# Patient Record
Sex: MALE | Race: BLACK OR AFRICAN AMERICAN
[De-identification: names, ages, dates, MRNs, and addresses within clinical notes are randomized per-mention and may not be internally consistent; named-entity substitution may affect disease eponyms.]

---

## 2018-02-25 ENCOUNTER — HOSPITAL ENCOUNTER (INPATIENT)
Dept: HOSPITAL 92 - ERS | Age: 63
LOS: 6 days | Discharge: HOME HEALTH | DRG: 291 | End: 2018-03-03
Attending: HOSPITALIST | Admitting: HOSPITALIST
Payer: MEDICARE

## 2018-02-25 VITALS — BODY MASS INDEX: 26.8 KG/M2

## 2018-02-25 DIAGNOSIS — I48.3: ICD-10-CM

## 2018-02-25 DIAGNOSIS — Z95.1: ICD-10-CM

## 2018-02-25 DIAGNOSIS — J44.9: ICD-10-CM

## 2018-02-25 DIAGNOSIS — I13.2: Primary | ICD-10-CM

## 2018-02-25 DIAGNOSIS — N17.9: ICD-10-CM

## 2018-02-25 DIAGNOSIS — I25.810: ICD-10-CM

## 2018-02-25 DIAGNOSIS — I24.8: ICD-10-CM

## 2018-02-25 DIAGNOSIS — I08.3: ICD-10-CM

## 2018-02-25 DIAGNOSIS — I48.0: ICD-10-CM

## 2018-02-25 DIAGNOSIS — I50.33: ICD-10-CM

## 2018-02-25 DIAGNOSIS — J96.21: ICD-10-CM

## 2018-02-25 DIAGNOSIS — I47.2: ICD-10-CM

## 2018-02-25 DIAGNOSIS — Z99.81: ICD-10-CM

## 2018-02-25 DIAGNOSIS — J18.9: ICD-10-CM

## 2018-02-25 DIAGNOSIS — N18.6: ICD-10-CM

## 2018-02-25 DIAGNOSIS — Z94.0: ICD-10-CM

## 2018-02-25 DIAGNOSIS — D63.1: ICD-10-CM

## 2018-02-25 DIAGNOSIS — E87.2: ICD-10-CM

## 2018-02-25 LAB
ALBUMIN SERPL BCG-MCNC: 4.2 G/DL (ref 3.4–4.8)
ALP SERPL-CCNC: 150 U/L (ref 40–150)
ALT SERPL W P-5'-P-CCNC: 8 U/L (ref 8–55)
ANALYZER IN CARDIO: (no result)
ANION GAP SERPL CALC-SCNC: 20 MMOL/L (ref 10–20)
AST SERPL-CCNC: 12 U/L (ref 5–34)
BASE EXCESS STD BLDA CALC-SCNC: -9.3 MEQ/L
BASOPHILS # BLD AUTO: 0 THOU/UL (ref 0–0.2)
BASOPHILS NFR BLD AUTO: 0.1 % (ref 0–1)
BILIRUB SERPL-MCNC: 1 MG/DL (ref 0.2–1.2)
BUN SERPL-MCNC: 40 MG/DL (ref 8.4–25.7)
CA-I BLDA-SCNC: 1.2 MMOL/L (ref 1.12–1.3)
CALCIUM SERPL-MCNC: 9.4 MG/DL (ref 7.8–10.44)
CHLORIDE SERPL-SCNC: 106 MMOL/L (ref 98–107)
CK MB SERPL-MCNC: 1.9 NG/ML (ref 0–6.6)
CK SERPL-CCNC: 69 U/L (ref 30–200)
CO2 SERPL-SCNC: 13 MMOL/L (ref 23–31)
CREAT CL PREDICTED SERPL C-G-VRATE: 0 ML/MIN (ref 70–130)
EOSINOPHIL # BLD AUTO: 0.1 THOU/UL (ref 0–0.7)
EOSINOPHIL NFR BLD AUTO: 0.7 % (ref 0–10)
GLOBULIN SER CALC-MCNC: 3.8 G/DL (ref 2.4–3.5)
GLUCOSE SERPL-MCNC: 93 MG/DL (ref 80–115)
HCO3 BLDA-SCNC: 14.3 MEQ/L (ref 22–26)
HCT VFR BLDA CALC: 28.8 % (ref 42–52)
HGB BLD-MCNC: 9.3 G/DL (ref 14–18)
HGB BLDA-MCNC: 8.1 G/DL (ref 14–18)
LYMPHOCYTES # BLD: 1.2 THOU/UL (ref 1.2–3.4)
LYMPHOCYTES NFR BLD AUTO: 15.1 % (ref 21–51)
MCH RBC QN AUTO: 26.6 PG (ref 27–31)
MCV RBC AUTO: 85.2 FL (ref 80–94)
MONOCYTES # BLD AUTO: 0.6 THOU/UL (ref 0.11–0.59)
MONOCYTES NFR BLD AUTO: 7.3 % (ref 0–10)
NEUTROPHILS # BLD AUTO: 6.3 THOU/UL (ref 1.4–6.5)
NEUTROPHILS NFR BLD AUTO: 76.8 % (ref 42–75)
PCO2 BLDA: 23.4 MMHG (ref 35–45)
PH BLDA: 7.4 [PH] (ref 7.35–7.45)
PLATELET # BLD AUTO: 301 THOU/UL (ref 130–400)
PO2 BLDA: 65.9 MMHG (ref 80–100)
POTASSIUM SERPL-SCNC: 5 MMOL/L (ref 3.5–5.1)
RBC # BLD AUTO: 3.51 MILL/UL (ref 4.7–6.1)
SODIUM SERPL-SCNC: 134 MMOL/L (ref 136–145)
SPECIMEN DRAWN FROM PATIENT: (no result)
TROPONIN I SERPL DL<=0.01 NG/ML-MCNC: 0.03 NG/ML (ref ?–0.03)
TROPONIN I SERPL DL<=0.01 NG/ML-MCNC: 0.04 NG/ML (ref ?–0.03)
TROPONIN I SERPL DL<=0.01 NG/ML-MCNC: 0.04 NG/ML (ref ?–0.03)
WBC # BLD AUTO: 8.2 THOU/UL (ref 4.8–10.8)

## 2018-02-25 PROCEDURE — 85025 COMPLETE CBC W/AUTO DIFF WBC: CPT

## 2018-02-25 PROCEDURE — 71046 X-RAY EXAM CHEST 2 VIEWS: CPT

## 2018-02-25 PROCEDURE — 83880 ASSAY OF NATRIURETIC PEPTIDE: CPT

## 2018-02-25 PROCEDURE — 93306 TTE W/DOPPLER COMPLETE: CPT

## 2018-02-25 PROCEDURE — 82553 CREATINE MB FRACTION: CPT

## 2018-02-25 PROCEDURE — 82805 BLOOD GASES W/O2 SATURATION: CPT

## 2018-02-25 PROCEDURE — 93312 ECHO TRANSESOPHAGEAL: CPT

## 2018-02-25 PROCEDURE — 80053 COMPREHEN METABOLIC PANEL: CPT

## 2018-02-25 PROCEDURE — 93005 ELECTROCARDIOGRAM TRACING: CPT

## 2018-02-25 PROCEDURE — 94760 N-INVAS EAR/PLS OXIMETRY 1: CPT

## 2018-02-25 PROCEDURE — 71045 X-RAY EXAM CHEST 1 VIEW: CPT

## 2018-02-25 PROCEDURE — 83550 IRON BINDING TEST: CPT

## 2018-02-25 PROCEDURE — 36415 COLL VENOUS BLD VENIPUNCTURE: CPT

## 2018-02-25 PROCEDURE — 84484 ASSAY OF TROPONIN QUANT: CPT

## 2018-02-25 PROCEDURE — 80197 ASSAY OF TACROLIMUS: CPT

## 2018-02-25 PROCEDURE — 93798 PHYS/QHP OP CAR RHAB W/ECG: CPT

## 2018-02-25 PROCEDURE — 80048 BASIC METABOLIC PNL TOTAL CA: CPT

## 2018-02-25 PROCEDURE — 83540 ASSAY OF IRON: CPT

## 2018-02-25 PROCEDURE — 93010 ELECTROCARDIOGRAM REPORT: CPT

## 2018-02-25 PROCEDURE — A4216 STERILE WATER/SALINE, 10 ML: HCPCS

## 2018-02-25 PROCEDURE — 82728 ASSAY OF FERRITIN: CPT

## 2018-02-25 PROCEDURE — 96374 THER/PROPH/DIAG INJ IV PUSH: CPT

## 2018-02-25 PROCEDURE — 87040 BLOOD CULTURE FOR BACTERIA: CPT

## 2018-02-25 RX ADMIN — ALUMINUM ZIRCONIUM TRICHLOROHYDREX GLY SCH EACH: 0.2 STICK TOPICAL at 20:40

## 2018-02-25 RX ADMIN — MOMETASONE FUROATE AND FORMOTEROL FUMARATE DIHYDRATE SCH PUFF: 100; 5 AEROSOL RESPIRATORY (INHALATION) at 18:42

## 2018-02-25 NOTE — RAD
PORTABLE CHEST:

 

Date:  02/25/18 

 

HISTORY:  

Dyspnea. 

 

COMPARISON:  

02/25/18. 

 

FINDINGS:

Cardiomegaly with postop sternotomy change. Mild vascular congestion. Interstitial congestion. Possib
ly small effusions. 

 

IMPRESSION: 

There is cardiomegaly and mild vascular congestion. 

 

 

POS: [facility]

## 2018-02-25 NOTE — HP
DATE OF ADMISSION:  02/25/2018

 

CHIEF COMPLAINT:  Shortness of breath.

 

SUBJECTIVE:  This is a 62-year-old -American male with a known history of congestive heart marylin
carla with a history of cardiac bypass done in 2014 and he sees Dr. Boggs as Cardiology.  Patient h
as never been followed up with his cardiologist.  He has a known history of renal transplant and sees
 Dr. Potter and he has not visited Dr. Potter for many weeks.  He has been admitted to the hospital in 11/2
016.  Patient has been noticing worsening shortness of breath for the past 2-3 days and today he felt
 worse and he decided to come to the hospital.  He denied having any chest pain now, but he was havin
g some occasional chest pains on and off.  When he came to the ER, he had heart rate of 30-40, so car
diologist was consulted who advised patient to be started on dobutamine drip.  Patient is on beta blo
cker 50 mg twice a day at home and rest today.  The patient also was very hypoxic in the ER and he re
fused keeping on the BiPAP.  So an ABG was done.  He had a low CO2 and very low oxygenation.  He was 
also noted to have elevated BNP and volume overload state.  Patient most likely is in congestive hear
t failure at this time.  He denies having any chest pain at this time.  No nausea, no vomiting, no di
arrhea, no constipation.  He does take Prograf and his Prograf has not been checked for some time acc
ording to him as he has not been following up with his primary doctors nor the nephrologist.

 

The patient is seen in the ICU.

 

PAST MEDICAL HISTORY:  Chronic kidney disease on renal transplant, on immunosuppressant drugs, histor
y of coronary artery bypass graft in 2015, history of nonsustained ventricular tachycardia, history o
f congestive heart failure.

 

PAST SURGICAL HISTORY:

1.  History of renal transplant.

2.  Coronary artery bypass graft surgery.

 

SOCIAL HISTORY:  Patient denies smoking tobacco.  No history of alcohol, no history of illicit drug u
se.  He lives with his wife at home.

 

FAMILY HISTORY:  Denies family history of any coronary artery disease.

 

ALLERGIES:  No known drug allergies.

 

HOME MEDICATIONS:  Albuterol inhaler 2 puffs inhalation q.4 hours, aspirin 325 mg p.o. daily, atorvas
tatin 40 mg p.o. daily, budesonide inhaler twice a day, clonidine 0.3 mg p.o. b.i.d., Lasix 40 mg p.o
. daily, hydralazine 25 mg p.o. b.i.d., isosorbide mononitrate 60 mg p.o. daily, metoprolol 50 mg p.o
. b.i.d., mycophenolate 360 mg p.o. b.i.d., nifedipine 60 mg p.o. b.i.d., tacrolimus, Prograf 0.5 mg 
p.o. b.i.d.

 

REVIEW OF SYSTEMS:  All 12 systems are reviewed with the patient thoroughly and found to be negative 
at this time.  Systems reviewed are HEENT, CVS, CNS, respiratory, GI, musculoskeletal, skin, and psyc
hiatric.  The following complete review of systems was negative, unless otherwise mentioned in the HP
I or below:

Constitutional:  Weight loss or gain, sense of well-being, ability to conduct usual activities, exerc
ise tolerance.

Skin/Breast:  Rash, itching, changes in hair growth or loss, nail changes, breast lumps, tenderness, 
swelling, nipple discharge.

Eyes:  Vision, double vision, tearing, blind spots, pain.

ENT/Mouth:  Headaches (location, time of onset, duration, precipitating factors), vertigo, lightheade
dness, injury. Vision, double vision, tearing, blind spots, pain, nose bleeding, colds, obstruction, 
discharge, dental difficulties, gingival bleeding, dentures, neck stiffness, pain, tenderness, masses
 in thyroid or other areas

Cardiovascular:  Precordial pain, substernal distress, palpitations, syncope, dyspnea on exertion, or
thopnea, nocturnal paroxysmal dyspnea, edema, cyanosis, hypertension, heart murmurs, varicosities, ph
lebitis, claudication.

Respiratory:  Pain, shortness of breath, wheezing, stridor, cough, hemoptysis, fever or night sweats

Gastrointestinal:  Poor appetite, dysphagia, indigestion, abdominal pain, heartburn, eructation, naus
ea, vomiting, hematemesis, jaundice, constipation, or diarrhea, abnormal stools (jos emanuel-colored, tarry,
 bloody, greasy, foul smelling), flatulence, hemorrhoids, recent changes in bowel habits.

Genitourinary:  Urgency, frequency, dysuria, nocturia, hematuria, polyuria, oliguria, unusual (or juanito
nge in) color of urine, stones, hesitancy, change in size of stream, dribbling, acute retention or in
continence, libido, potency.

Musculoskeletal:  Pain, swelling, redness or heat of muscles or joints, limitation, of motion, muscul
ar weakness, atrophy, cramps.

Neurologic/Psychiatric:  Convulsions, paralyses, tremor, incoordination, paraesthesias, difficulties 
with memory of speech, sensory or motor disturbances, or muscular coordination (ataxia, tremor), emot
ional problems, anxiety, depression, previous psychiatric care, unusual perceptions, hallucinations.

Allergy/Immunologic:  Skin rash, anemia, bleeding tendency, polydipsia, polyuria, intolerance to heat
 or cold.

 

PHYSICAL EXAMINATION:

VITAL SIGNS:  Blood pressure is 132/68, heart rate of 63, respiratory rate is 20, saturations 100% on
 3 liters.

GENERAL:  The patient is moderately built, moderately nourished.  Does not appear to be in acute dist
ress at this time, but is alert and oriented x3.

HEENT:  Atraumatic, normocephalic, PERRLA. Extraocular muscles were intact.  Oral mucosa pink and mo
st.

CARDIOVASCULAR:  S1 and S2 normal.  No murmurs, rubs or gallops.

LUNGS:  Bilateral air entry was equal.  No wheezing, no crackles.

ABDOMEN:  Soft and nontender.  No guarding, no rebound tenderness.  Bowel sounds normal.

MUSCULOSKELETAL:  No calf tenderness.  Pedal edema was noted up to the knees, 3+ pedal edema.

SKIN:  No cyanosis, no erythema, no rash, no pallor.

CENTRAL NERVOUS SYSTEM:  Cranial nerve examination II-XII intact.  No focal deficits are noted.

NECK:  JVD was noted elevated.  No thyromegaly.  No lymphadenopathy.

PSYCHIATRIC:  No signs of suicidal ideation.  No signs of gagan were noted.

 

LABORATORY DATA AND IMAGING DATA:

1.  Sodium 135, potassium 5.0, chloride is 106, bicarbonate is 13, BUN is 40, creatinine 2.25, and bl
ood sugar is 93.  Troponin 0.035, trending down to 0.031, BNP is 1478 which is elevated from his prev
ious visits.

2.  Blood gas; pH was 7.4, pCO2 was normal, pO2 was 65.9.

3.  Chest x-ray was done showing mild pulmonary vascular congestion with possible left basilar consol
idation.

 

ASSESSMENT AND PLAN:

1.  Acute hypoxic respiratory failure.

2.  Possible pneumonia in the left lung base.

3.  Acute congestive heart failure, this is likely diastolic dysfunction.

4.  Non-ST elevation myocardial infarction.

5.  Acute on chronic kidney disease stage 4.

6.  Immunosuppressed secondary to renal transplant medications.

7.  Anemia of chronic kidney disease.

8.  Acute bradycardia likely transient secondary to medications.

 

PLAN:

1.  Plan is to closely monitor this patient's high complexities with multiple comorbidities.  Patient
 presented with hypoxia with clear signs of congestive heart failure with elevated BNP and clear sign
s of volume overload.  Patient will be started on IV Lasix at this time with IV Lasix cautiously nadege
ting with 20 mg IV b.i.d. and closely monitor the renal functions.  We will continue the patient on h
ome medications at this time.

2.  The patient has transplanted kidney and is on Prograf.  We will consult Nephrology at this time a
nd we will check the Prograf levels.  We will closely follow up with Nephrology recommendations with 
his diuresis.

3.  The patient has an evidence of possible left lower lobe consolidation.  The patient being history
 of renal transplant, we will do prophylactic antibiotic with levofloxacin 500 mg IV daily and we mae
l closely monitor his infectious status.  Get the blood cultures and sputum cultures.

4.  The patient has had low heart rate in the ER 30s and initially, patient was planned to be started
 on dobutamine, but heart rate went up to like 60s to 70s.  At this time, we will wait for Cardiology
 recommendations and we will hold off on the beta blockers at this time.  At some point, patient migh
t need evaluation by Electrophysiology if the heart rate does not come up after stopping the beta blo
ckers.  We will get a 2D echo now and look for any wall motion abnormalities.

5.  Patient has elevated troponins, but they have been stable.  The patient denied having any chest p
ains.  Patient has known history of coronary artery disease with coronary artery bypass graft.  We wi
ll follow with Cardiology recommendations at this time.

6.  Patient will be continued on aspirin, but not beta blocker because of acute bradycardia.  The pat
ient has anemia of chronic disease, likely from chronic kidney disease.  We will closely monitor at t
his time.

7.  The patient has persistent hypoxia and we will continue with nebulizer treatments at this time an
d treat the underlying cause.

8.  DVT prophylaxis, he is on Lovenox 40 mg subcu daily.

 

I spent 75 minutes on this patient, of this, one hour is critical care time.

## 2018-02-25 NOTE — RAD
PORTABLE CHEST 1 VIEW:

 

Date:  02/25/18 

Time:  1032 hours

 

HISTORY:

Dyspnea

 

FINDINGS/IMPRESSION: 

 

Comparison made with exam of 07/11/17. 

 

Changes of median sternotomy are again seen. The heart is enlarged. There is mild pulmonary vascular 
congestion with left basilar consolidation. No pneumothoraces or large effusions are seen. 

 

 

 

 

POS: [facility]

## 2018-02-26 LAB
ANION GAP SERPL CALC-SCNC: 20 MMOL/L (ref 10–20)
BASOPHILS # BLD AUTO: 0 THOU/UL (ref 0–0.2)
BASOPHILS NFR BLD AUTO: 0.1 % (ref 0–1)
BUN SERPL-MCNC: 38 MG/DL (ref 8.4–25.7)
CALCIUM SERPL-MCNC: 9.3 MG/DL (ref 7.8–10.44)
CHLORIDE SERPL-SCNC: 106 MMOL/L (ref 98–107)
CO2 SERPL-SCNC: 14 MMOL/L (ref 23–31)
CREAT CL PREDICTED SERPL C-G-VRATE: 44 ML/MIN (ref 70–130)
EOSINOPHIL # BLD AUTO: 0 THOU/UL (ref 0–0.7)
EOSINOPHIL NFR BLD AUTO: 0.2 % (ref 0–10)
GLUCOSE SERPL-MCNC: 79 MG/DL (ref 80–115)
HGB BLD-MCNC: 9.2 G/DL (ref 14–18)
IRON SERPL-MCNC: 10 UG/DL (ref 65–175)
IRON SERPL-MCNC: 13 UG/DL (ref 65–175)
LYMPHOCYTES # BLD: 0.9 THOU/UL (ref 1.2–3.4)
LYMPHOCYTES NFR BLD AUTO: 9.2 % (ref 21–51)
MCH RBC QN AUTO: 26.9 PG (ref 27–31)
MCV RBC AUTO: 85.8 FL (ref 80–94)
MONOCYTES # BLD AUTO: 1.1 THOU/UL (ref 0.11–0.59)
MONOCYTES NFR BLD AUTO: 11.9 % (ref 0–10)
NEUTROPHILS # BLD AUTO: 7.4 THOU/UL (ref 1.4–6.5)
NEUTROPHILS NFR BLD AUTO: 78.7 % (ref 42–75)
PLATELET # BLD AUTO: 251 THOU/UL (ref 130–400)
POTASSIUM SERPL-SCNC: 5.1 MMOL/L (ref 3.5–5.1)
RBC # BLD AUTO: 3.42 MILL/UL (ref 4.7–6.1)
SODIUM SERPL-SCNC: 135 MMOL/L (ref 136–145)
UIBC SERPL-MCNC: 244 MCG/DL (ref 261–462)
WBC # BLD AUTO: 9.5 THOU/UL (ref 4.8–10.8)

## 2018-02-26 RX ADMIN — NITROGLYCERIN SCH INCH: 20 OINTMENT TOPICAL at 05:21

## 2018-02-26 RX ADMIN — NITROGLYCERIN SCH INCH: 20 OINTMENT TOPICAL at 22:15

## 2018-02-26 RX ADMIN — NITROGLYCERIN SCH INCH: 20 OINTMENT TOPICAL at 14:02

## 2018-02-26 RX ADMIN — ALUMINUM ZIRCONIUM TRICHLOROHYDREX GLY SCH EACH: 0.2 STICK TOPICAL at 08:42

## 2018-02-26 RX ADMIN — MOMETASONE FUROATE AND FORMOTEROL FUMARATE DIHYDRATE SCH PUFF: 100; 5 AEROSOL RESPIRATORY (INHALATION) at 08:37

## 2018-02-26 RX ADMIN — MOMETASONE FUROATE AND FORMOTEROL FUMARATE DIHYDRATE SCH PUFF: 100; 5 AEROSOL RESPIRATORY (INHALATION) at 19:18

## 2018-02-26 RX ADMIN — Medication SCH ML: at 20:52

## 2018-02-26 RX ADMIN — Medication SCH ML: at 08:42

## 2018-02-26 RX ADMIN — ALUMINUM ZIRCONIUM TRICHLOROHYDREX GLY SCH EACH: 0.2 STICK TOPICAL at 20:51

## 2018-02-26 RX ADMIN — NIFEDIPINE SCH MG: 60 TABLET, EXTENDED RELEASE ORAL at 08:41

## 2018-02-26 NOTE — RAD
PORTABLE AP CHEST XRAY:

 

DATE: 2/26/18.

 

HISTORY: 

Respiratory distress.

 

COMPARISON: 

2/25/18.

 

FINDINGS: 

There has been interval improvement in the pulmonary vascular congestion as well as the mild perihila
r interstitial opacities when compared to the prior study.  Interstitial densities do persist, but ag
ain are improved.  Cardiac silhouette is magnified by projection but does appear mildly enlarged.  Po
stsurgical changes related to CABG are again present.  There is probable tiny left pleural effusion p
resent.  No other interval change.

 

IMPRESSION: 

Improvement in congestive heart failure and mild pulmonary edema.  Mild interstitial edema does persi
st.

 

POS: [facility]

## 2018-02-26 NOTE — PDOC.PN
- Subjective


Encounter Start Date: 02/26/18


Encounter Start Time: 11:00





patient is seen today, alert and oriented. No other concenrs snoted. 





- Objective


MAR Reviewed: Yes


Vital Signs & Weight: 


 Vital Signs (12 hours)











  Temp Pulse Pulse Pulse Pulse Resp BP


 


 02/26/18 11:00  97.6 F      


 


 02/26/18 09:57    74  95  95   157/78 H


 


 02/26/18 08:44       


 


 02/26/18 08:41   89     


 


 02/26/18 08:37   89     15 


 


 02/26/18 08:00  97.9 F  89     15 


 


 02/26/18 04:00  99.2 F      














  BP BP Pulse Ox Pulse Ox Pulse Ox Pulse Ox


 


 02/26/18 11:00      


 


 02/26/18 09:57  174/79 H  153/78 H   93 L  87 L  93 L


 


 02/26/18 08:44    100   


 


 02/26/18 08:41      


 


 02/26/18 08:37    100   


 


 02/26/18 08:00      


 


 02/26/18 04:00      








 Weight











Admit Weight                   181 lb 10.574 oz


 


Weight                         173 lb 4.533 oz











 Most Recent Monitor Data











Heart Rate from ECG            76


 


NIBP                           155/74


 


NIBP BP-Mean                   110


 


Respiration from ECG           21


 


SpO2                           97














I&O: 


 











 02/25/18 02/26/18 02/27/18





 06:59 06:59 06:59


 


Intake Total   500


 


Output Total  2050 1260


 


Balance  -2050 -760











Result Diagrams: 


 02/26/18 03:54





 02/26/18 03:54


Radiology Reviewed by me: Yes





Phys Exam





- Physical Examination


HEENT: PERRLA, moist MMs


Neck: no nodes, no JVD


Respiratory: no wheezing, no rales


Cardiovascular: RRR, no significant murmur


Gastrointestinal: soft, non-tender


Musculoskeletal: no edema, pulses present





Dx/Plan


(1) Acute on chronic diastolic congestive heart failure


Code(s): I50.33 - ACUTE ON CHRONIC DIASTOLIC (CONGESTIVE) HEART FAILURE   Status

: Acute   Comment: ef of 55%, volume Over load, continue nwith IV diuresis, 

monitoring renal fucntions, cardiology following.   





(2) Demand ischemia of myocardium


Code(s): I24.8 - OTHER FORMS OF ACUTE ISCHEMIC HEART DISEASE   Status: Acute   

Comment: Conmtinue with treating underlying cause of CHF. Likely deman ischmia.

   





(3) Pneumonia


Code(s): J18.9 - PNEUMONIA, UNSPECIFIED ORGANISM   Status: Acute   


Qualifiers: 


   Laterality: left   Lung location: lower lobe of lung 


Comment: Will continue with IV Levofloxacin, renally dosed.   





(4) Chronic kidney disease, stage III (moderate)


Status: Chronic   Comment: Renal consulted, pending prograf levels.   





(5) Hypertension


Code(s): I10 - ESSENTIAL (PRIMARY) HYPERTENSION   Status: Chronic   


Qualifiers: 


   Hypertension type: essential hypertension 


Comment: Well controlled, COntinue with Lisinopril.   





(6) Kidney transplant status


Code(s): Z94.0 - KIDNEY TRANSPLANT STATUS   Status: Chronic   Comment: its been 

9 yrs now   





- Plan


cont current plan of care, plan discussed w/ family, continue antibiotics, PT/OT

, , respiratory therapy, incentive spirometry, DVT proph w/

lovenox





* .








- Discharge Day


Encounter end time: 11:35





Review of Systems





- Review of Systems


Constitutional: negative: fever, chills, sweats, weakness, malaise, other


Eyes: negative: Pain, Vision Change, Conjunctivae Inflammation, Eyelid 

Inflammation, Redness, Other


ENT: negative: Ear Pain, Ear Discharge, Nose Pain, Nose Discharge, Nose 

Congestion, Mouth Pain, Mouth Swelling, Throat Pain, Throat Swelling, Other


Respiratory: Cough, Shortness of Breath


Cardiovascular: negative: chest pain, palpitations, orthopnea, paroxysmal 

nocturnal dyspnea, edema, light headedness, other


Gastrointestinal: negative: Nausea, Vomiting, Abdominal Pain, Diarrhea, 

Constipation, Melena, Hematochezia, Other


Genitourinary: negative: Dysuria, Frequency, Incontinence, Hematuria, Retention

, Other


Musculoskeletal: negative: Neck Pain, Shoulder Pain, Arm Pain, Back Pain, Hand 

Pain, Leg Pain, Foot Pain, Other


Skin: negative: Rash, Lesions, Elliot, Bruising, Other





- Medications/Allergies


Allergies/Adverse Reactions: 


 Allergies











Allergy/AdvReac Type Severity Reaction Status Date / Time


 


No Known Drug Allergies Allergy Unknown  Verified 02/25/18 12:37











Medications: 


 Current Medications





Acetaminophen (Tylenol)  650 mg PO Q4H PRN


   PRN Reason: Headache/Fever or Pain


Hydrocodone Bitart/Acetaminophen (Norco 5/325)  1 tab PO Q4H PRN


   PRN Reason: Moderate Pain (4-6)


Albuterol Sulfate (Proventil Hfa)  2 puff INH Q4H PRN


   PRN Reason: Dyspnea


Aspirin (Aspirin)  325 mg PO DAILY [facility]


   Last Admin: 02/26/18 08:41 Dose:  325 mg


Atorvastatin Calcium (Lipitor)  40 mg PO HS [facility]


   Last Admin: 02/25/18 20:35 Dose:  40 mg


Atropine Sulfate (Atropine)  0.5 mg IVP ASDIR PRN


   PRN Reason: symptomatic bradycardia


Docusate Sodium (Colace)  100 mg PO BID [facility]


   Last Admin: 02/26/18 08:41 Dose:  100 mg


Famotidine (Pepcid)  20 mg PO 2100 [facility]


Furosemide (Lasix)  40 mg SLOW IVP 0600,1400 [facility]


   Last Admin: 02/26/18 14:02 Dose:  40 mg


Hydralazine HCl (Apresoline)  50 mg PO BID [facility]


   Last Admin: 02/26/18 08:41 Dose:  50 mg


Levofloxacin 250 mg/ Device  50 mls @ 100 mls/hr IVPB 1500 [facility]


   Last Admin: 02/26/18 14:03 Dose:  50 mls


Isosorbide Mononitrate (Imdur)  60 mg PO DAILY [facility]


   Last Admin: 02/26/18 08:41 Dose:  60 mg


Lisinopril (Zestril)  2.5 mg PO DAILY [facility]


   Last Admin: 02/26/18 08:41 Dose:  2.5 mg


Mometasone Furoate/Formoterol Fumar (Dulera 100 Mcg/5 Mcg Inhaler)  1 puff INH 

BID-RT [facility]


   Last Admin: 02/26/18 08:37 Dose:  1 puff


Nifedipine (Procardia Xl)  60 mg PO DAILY [facility]


   Last Admin: 02/26/18 08:41 Dose:  60 mg


Nitroglycerin (Nitro-Bid 2% Ointment)  1 inch TOP Q8HR [facility]


   Last Admin: 02/26/18 14:02 Dose:  1 inch


Ondansetron HCl (Zofran)  4 mg IVP Q6H PRN


   PRN Reason: Nausea/Vomiting


Myfortic 360 Mg Ec (Tab)  0 each PO BID [facility]


   Last Admin: 02/26/18 08:42 Dose:  1 each


Sodium Chloride (Flush - Normal Saline)  10 ml IVF Q12HR [facility]


   Last Admin: 02/26/18 08:42 Dose:  10 ml


Sodium Chloride (Flush - Normal Saline)  10 ml IVF PRN PRN


   PRN Reason: Saline Flush


Tacrolimus (Prograf)  0.5 mg PO BID [facility]


   Last Admin: 02/26/18 08:41 Dose:  0.5 mg

## 2018-02-26 NOTE — CON
DATE OF CONSULTATION:  02/26/2018

 

CONSULTING PHYSICIAN:  Dr. Cm.

 

REASON FOR CONSULTATION:  Acute respiratory failure.

 

HISTORY OF PRESENT ILLNESS:  Mr. Obie Collins is a 62-year-old male who have had opportunity to meet
 several time in the past.  He came into the hospital yesterday with increasing shortness of breath o
dominga the last several days.  He was found to be in congestive heart failure with intermittent bradycar
velasquez.  He was tried on BiPAP, but could not tolerate it.  He received some diuretics and diuresed prof
usely, is now breathing better.

 

He provided his own history without limitations.  I have also reviewed the notes in the chart.

 

PAST MEDICAL HISTORY:

1.  Chronic kidney disease, requiring transplant.

2.  Previous episodes of congestive heart failure and hypertensive emergency.

3.  Previous episodes of respiratory failure.

4.  Previous coronary bypass grafting surgery.

5.  Nonsustained ventricular tachycardia.

6.  Hyperlipidemia.

7.  Hypertension.

 

PAST SURGICAL HISTORY:  Coronary bypass grafting surgery and renal transplant.

 

SOCIAL HISTORY:  Formerly worked as a .  Former smoker.  He does not consume alcohol.

 

REVIEW OF SYSTEMS:  A 12-point review of systems is otherwise negative.

 

MEDICATIONS:  Prior to admission, Symbicort 80/4.5 two puffs twice daily, ProAir HFA 2 puffs every 4 
hours as needed, clonidine 0.3 mg b.i.d., Lipitor 40 mg daily, nifedipine ER 60 mg b.i.d., Apresoline
 50 mg b.i.d., Lasix 40 mg daily, aspirin 325 mg daily, Prograf 0.5 mg b.i.d., mycophenolate sodium 3
60 mg b.i.d., isosorbide 60 mg daily, metoprolol 50 mg b.i.d.

 

PHYSICAL EXAMINATION:

VITAL SIGNS:  Temperature 98.9, pulse 61, blood pressure 134/76, O2 sat 98% on 40% oxygen.

GENERAL:  He is awake, alert, and conversant, in no distress.

HEENT:  He has some mild bitemporal wasting.  Oropharynx clear.

NECK:  Without adenopathy, JVD, bruit, or thyromegaly.

CARDIAC:  S1 and S2 regular with a 2/6 systolic murmur.

LUNGS:  He has a few inspiratory crackles at the bases without wheezing.

ABDOMEN:  Soft, nontender, no hepatosplenomegaly.

EXTREMITIES:  No clubbing, cyanosis, or edema.

NEUROLOGIC:  Moves all 4 extremities without difficulty.

SKIN:  Shows no obvious lesions.

 

LABORATORY AND DIAGNOSTIC DATA:  White blood cell count 9.5, hemoglobin 9.2, hematocrit 29.3, platele
t count 251,000, pH 7.40, pCO2 of 23, pO2 of 65.  Sodium 135, potassium 5.1, chloride 106, CO2 of 14,
 BUN 38, creatinine 2.0, glucose 79.  BNP 1591.  Chest x-ray showed diffuse pulmonary edema bilateral
ly with cardiomegaly, post-sternotomy wires.

 

ASSESSMENT:

1.  Acute congestive heart failure.

2.  Acute respiratory failure secondary to pulmonary edema.

3.  Hypertension.

4.  Status post renal transplant.

5.  Renal insufficiency.

6.  Metabolic acidosis.

 

RECOMMENDATIONS:

1.  Would have Nephrology assess the patient's state as we could be looking at a situation where his 
kidney is failing.

2.  Continue diuretics.

3.  Wean oxygen as tolerated.

4.  Continue immunosuppression medications.

5.  Likely can transfer out of the ICU later today.

 

Thirty minutes time was spent performing the consultation.  Of this time, greater than 50% was spent 
with the patient and/or on patient's floor.

## 2018-02-26 NOTE — EKG
Test Reason : RHYTHM CHANGE

Blood Pressure : ***/*** mmHG

Vent. Rate : 059 BPM     Atrial Rate : 267 BPM

   P-R Int : 000 ms          QRS Dur : 130 ms

    QT Int : 486 ms       P-R-T Axes : 000 -23 -88 degrees

   QTc Int : 481 ms

 

Atrial flutter with variable A-V block

Right bundle branch block

Possible Inferior infarct (cited on or before 23-JUL-2014)

T wave abnormality, consider lateral ischemia

Abnormal ECG

When compared with ECG of 25-FEB-2018 09:33, (Unconfirmed)

Atrial flutter has replaced Sinus rhythm

T wave inversion less evident in Anterior leads

Confirmed by DR. GALINDO ARAMBULA (3) on 2/26/2018 4:57:47 PM

 

Referred By:  MIRTHA           Confirmed By:DR. GALINDO ARAMBULA

## 2018-02-26 NOTE — CON
DATE OF CONSULTATION:  02/26/2018

 

SUBJECTIVE:  Mr. Collins is a 62-year-old black male with known history of status post renal transpl
ant, admitted for congestive heart failure.  We are now being consulted for management of his transpl
anted kidney.  Please note this patient has had previous episodes of CHF.  He has not been able to fo
llow up at [facility] in the last several months.

 

REVIEW OF SYSTEMS:  Positive for shortness of breath, no chest pain, no nausea, no vomiting, no synco
pal episode, no allograft tenderness.  No productive cough, no fever or chills.  No gross hematuria. 
 No dysuria, no urinary frequency.

 

MEDICATIONS:  Currently on Norco 5/325 q.4, Proventil 2 puffs q.4, aspirin 325 mg once daily, Lipitor
 40 mg tab at bedtime, Pepcid 20 mg daily, Lasix 40 mg IV q.12, Imdur 60 mg daily, Levaquin 250 mg IV
 daily, lisinopril 2.5 mg once a day, Procardia 60 mg XL tab daily, Myfortic 360 mg b.i.d., tacrolimu
s 0.5 mg p.o. b.i.d.

 

PAST MEDICAL HISTORY:  Includes status post ESRD, coronary artery disease, status post congestive hea
rt failure, status post acute respiratory failure, chronic pain, hyperlipidemia, longstanding hyperte
nsion.

 

PAST SURGICAL HISTORY:

1.  Status post cadaveric renal transplant.

2.  Status post cardiac catheterization.

3.  Status post CABG.

4.  Status post cuffed hemodialysis catheter placement.

5.  Status post AV fistula placement.

6.  Status post colonoscopy.

 

SOCIAL HISTORY:  The patient is , retired , several children.  Currently no smoking, al
cohol intake, no IV drug abuse.  Status post multiple blood transfusions.  Sedentary lifestyle.  Educ
ation high school.

 

FAMILY HISTORY:  No family history of ESRD.

 

ALLERGIES: ? PENICILLIN.

 

TRAUMA:  None.

 

IMMUNIZATIONS:  Up to date.

 

HOSPITALIZATIONS:  Please see past medical history.

 

PHYSICAL EXAMINATION:

VITAL SIGNS:  Blood pressure is noted at 120/70, heart rate 89, pulse ox 100%.

GENERAL:  Patient is noted to be awake, alert, comfortable, not in overt distress.  He is eating and 
taking his breakfast.

HEENT:  He has slightly pale conjunctivae, anicteric sclerae.

NECK:  No neck mass, no carotid bruits, no JVD.

CHEST:  No deformities.

LUNGS:  Clear breath sounds.  No wheezing, no crackles.

HEART:  Normal sinus rhythm.  No murmur, no gallops, no rubs.

ABDOMEN:  Globular, soft, nontender.  Renal allograft.  No tenderness.

EXTREMITIES:  Trace edema, no deformities.

NEUROLOGIC:  Awake and oriented to 3 spheres.  Moving all extremities.  No tremors or asterixis, no a
taxia.

 

LABORATORY DATA AND IMAGING DATA:  Laboratories of 02/26/2018; white count 9.5, hemoglobin 9.2, hemat
ocrit 29.3.  Sodium 135, potassium 5.1, chloride 106, carbon dioxide 14, BUN 38, creatinine 2.02, glu
cose 79.  Iron is 13.  PRBC 244.  BNP is 1591.  Troponin I 0.035.

 

Chest x-ray shows increased lung markings.

 

ASSESSMENT AND PLAN:

1.  Status post cadaveric renal transplant - continue current immunosuppressive regimen.  No changes 
to be made.  I will get a Prograf level in a.m. with this patient.  We will adjust Prograf or tacroli
mus as needed.  Continue current CellCept regimen.

2.  Congestive heart failure, currently on IV Lasix.  Continue supportive care.  Adjust Lasix as need
ed depending on his renal function.  This patient has history of noncompliance to his clinic visits. 
 He has been advised to follow up at the [facility] for adjustments of his immunosuppressive regime
n.

 

I agree with current management.

## 2018-02-27 LAB
ANION GAP SERPL CALC-SCNC: 16 MMOL/L (ref 10–20)
BUN SERPL-MCNC: 31 MG/DL (ref 8.4–25.7)
CALCIUM SERPL-MCNC: 8.9 MG/DL (ref 7.8–10.44)
CHLORIDE SERPL-SCNC: 109 MMOL/L (ref 98–107)
CO2 SERPL-SCNC: 18 MMOL/L (ref 23–31)
CREAT CL PREDICTED SERPL C-G-VRATE: 46 ML/MIN (ref 70–130)
GLUCOSE SERPL-MCNC: 89 MG/DL (ref 80–115)
POTASSIUM SERPL-SCNC: 4.1 MMOL/L (ref 3.5–5.1)
SODIUM SERPL-SCNC: 139 MMOL/L (ref 136–145)
TROPONIN I SERPL DL<=0.01 NG/ML-MCNC: 0.05 NG/ML (ref ?–0.03)

## 2018-02-27 PROCEDURE — 5A2204Z RESTORATION OF CARDIAC RHYTHM, SINGLE: ICD-10-PCS | Performed by: INTERNAL MEDICINE

## 2018-02-27 RX ADMIN — NIFEDIPINE SCH MG: 60 TABLET, EXTENDED RELEASE ORAL at 08:37

## 2018-02-27 RX ADMIN — ASPIRIN SCH MG: 81 TABLET ORAL at 08:30

## 2018-02-27 RX ADMIN — NITROGLYCERIN SCH: 20 OINTMENT TOPICAL at 14:43

## 2018-02-27 RX ADMIN — ALUMINUM ZIRCONIUM TRICHLOROHYDREX GLY SCH EACH: 0.2 STICK TOPICAL at 20:59

## 2018-02-27 RX ADMIN — NITROGLYCERIN SCH: 20 OINTMENT TOPICAL at 06:02

## 2018-02-27 RX ADMIN — Medication PRN ML: at 06:02

## 2018-02-27 RX ADMIN — ALUMINUM ZIRCONIUM TRICHLOROHYDREX GLY SCH EACH: 0.2 STICK TOPICAL at 08:38

## 2018-02-27 RX ADMIN — MOMETASONE FUROATE AND FORMOTEROL FUMARATE DIHYDRATE SCH PUFF: 100; 5 AEROSOL RESPIRATORY (INHALATION) at 07:17

## 2018-02-27 RX ADMIN — Medication SCH ML: at 20:59

## 2018-02-27 RX ADMIN — Medication SCH ML: at 08:39

## 2018-02-27 RX ADMIN — MOMETASONE FUROATE AND FORMOTEROL FUMARATE DIHYDRATE SCH PUFF: 100; 5 AEROSOL RESPIRATORY (INHALATION) at 18:18

## 2018-02-27 NOTE — CON
DATE OF CONSULTATION:  02/27/2018

 

REFERRING PHYSICIAN:  Dr. Juanjo Boggs.

 

CONSULTING PHYSICIAN:  Dr. Mayer.

 

REASON FOR CONSULTATION:  Atrial fibrillation and arrhythmia management.

 

PRIMARY CARDIOLOGIST:  Dr. Juanjo Boggs.

 

HISTORY OF PRESENT ILLNESS:  Mr. Collins is a pleasant 62-year-old male with 
congestive heart failure, admitted to the hospital with progressive shortness 
of breath and swelling of the extremities.  He is found to be in diastolic 
heart failure exacerbation and presented to the hospital on 02/25.  He has been 
given IV Lasix, which is significantly reduced his fluid accumulation and 
mostly resolved symptoms.  Also, of note, he was found to have intermittent 
bradycardia on presentation to the emergency room and was placed on oxygen as 
he was unable to tolerate BiPAP.  He has not had any recurrent bradycardia 
since admission.  Overall Mr. Collins is unaware of his arrhythmia.  He 
reports that he has had irregular heart rates and rhythm for some time now.  He 
is unsure when he was diagnosed with his irregularity.  He is unaware when he 
is out of breath and does not have any associated symptoms.  He will 
occasionally feels heart race, but he is not correlated that with being out of 
rhythm.  He denies any dizziness, syncope, or near syncope.  He denies 
palpitations.  He reports that he does have recurrent shortness of breath fluid 
retention.  Today, he reports he is feeling well without heart race and 
palpitations, chest pain, or pressure, but continues to have just mild swelling 
of the extremities.

 

REVIEW OF SYSTEMS:  Twelve point review of systems is conducted and is negative 
except as listed in the HPI.

 

PAST MEDICAL HISTORY:

1.  Chronic kidney disease, status post renal transplant, on immunosuppressant 
drugs (Prograf).

2.  Prior coronary artery bypass grafting.

3.  History of nonsustained ventricular tachycardia.

4.  History of atrial fibrillation.

5.  Hypertension.

6.  Dyslipidemia.

 

PAST SURGICAL HISTORY:  Include coronary artery bypass grafting and renal 
transplant.

 

SOCIAL HISTORY:  Negative for tobacco habituation alcohol abuse or illicit drug 
use.

 

FAMILY HISTORY:  Negative for sudden cardiac death or early onset coronary 
artery disease for the age of 65.

 

ALLERGIES:  No known drug allergies.

 

CODE STATUS:  FULL resuscitation.

 

HOME MEDICATIONS:  Include albuterol inhaler, aspirin daily, atorvastatin, 
clonidine, Lasix 40 mg daily, hydralazine 25 mg b.i.d., isosorbide, metoprolol, 
nifedipine, and Prograf.

 

PHYSICAL EXAMINATION:

VITAL SIGNS:  Temperature 98.5, pulse is 88, respirations 16, oxygen saturation 
is 93% on 4 liters via nasal cannula.  Blood pressure is 166/78.

GENERAL:  This is a well-groomed -American gentleman in no acute 
distress.  He is resting comfortably in bed, well examined.  He does appeared 
older than his stated age and chronically ill.  Overall, he reports that he is 
beginning to feel better, but continues to have some swelling in his legs.

HEENT:  Patient is normocephalic.  Sclerae nonicteric.  EOMs are intact.  Oral 
mucosa is moist and pink with adequate dentition.

NECK:  Supple without thyromegaly or lymphadenopathy.  His carotids are clear 
without bruit.

LUNGS:  Clear to auscultation bilaterally without wheezes, crackles, or 
rhonchi.  Respirations are even and unlabored with good bilateral excursion.

HEART:  His heart rate is irregularly irregular.  At the movement without 
significant murmur, rub or gallop.

EXTREMITIES:  His legs are warm and dry to touch without clubbing or cyanosis.  
There is moderate edema to the lower extremities.

ABDOMINAL:  Exam is benign.  Abdomen is soft and nontender without palpable 
masses or hepatosplenomegaly.  Hepatojugular reflex is negative.  Gait was not 
assessed as the patient remains in bed during examination.

NEUROLOGIC:  Grossly intact.

SKIN:  Nonfocal.

 

DATABASE:  Recent lab results.  Hematology:  WBC 9.5, hemoglobin 9.2, 
hematocrit 29.3, platelet count is 251.  Chemistry:  Sodium 139, potassium 4.1, 
chloride 109, carbon dioxide 18, BUN is 31, creatinine 1.79 (2.5 on admission), 
calcium 8.9, glucose 89.  BNP on admission was 1478.  Chest x-ray on 02/26/
2018.  Impression:  Improvement of congestive heart failure, mild pulmonary 
edema.  Mild interstitial edema and does persist.  Most recent ROM available 
was performed 07/12/2017.  EF is estimated at 55% to 60%, mild left atrial 
dilation, mild concentric LVH, left ventricle size was normal.  Moderate to 
severe MR, mild TR and mild AR.  EKG and telemetry rhythm strips are reviewed 
personally.  EKG do reflect a coarse atrial fibrillation and typical atrial 
flutter.  Patient is currently in normal sinus rhythm, rate are well controlled 
in the 70 to 90 range.  There is existing right bundle branch block.

 

ASSESSMENT AND PLAN:

1.  Paroxysmal atrial fibrillation and atrial flutter, status post 
cardioversion with early recurrence of atrial arrhythmias.  Previous amiodarone 
therapy in 2014.

2.  Coronary artery disease, prior bypass grafting with normal LVEF, documented 
in 2017 at 55%-60%.

3.  Acute on chronic diastolic congestive heart failure exacerbation, now 
compensated.

4.  Nonsustained ventricular tachycardia, 7 beats in duration.

5.  Chronic kidney disease, status post renal transplant, currently on 
immunosuppressive therapy Prograf.  Creatinine on admission was 2.2 and is 
currently 1.79.

6.  QT prolongation (484 milliseconds) on EKG while on Prograf.

 

PLAN:   We discussed treatment options with the patient and his family at 
length.  Options include continued medical management is conservative therapy 
versus PVAI and radiofrequency ablation for more aggressive management.  At 
this time, the patient wishes to proceed with medical management alone.  He is 
asymptomatic with his arrhythmias and rate control was a reasonable option at 
this time.  Antiaarhythmic choices are severerly limited due to his renal 
dysfunction, severe LVH, active diastolic CHF and baseline QT prolongation.  We 
will continue to follow the patient as outpatient hence ablation is an option 
should the patient change his mind.  

 

Thank you for the referral for allowing me to participate in the care of this 
patient.

 

CORRINA

## 2018-02-27 NOTE — PRG
DATE OF SERVICE:  02/27/2018

 

SUBJECTIVE:  The patient is doing well and is going down for a ROM with cardioversion.

 

PHYSICAL EXAMINATION:

VITAL SIGNS:  Temperature 98.1, pulse 84, respirations 16, O2 sat 93% on 4 liters, blood pressure 150
/86.

HEENT:  Unremarkable.

NECK:  No JVD.

CHEST:  Clear without wheezing.

CARDIAC:  S1 and S2 regular.

ABDOMEN:  Soft.

EXTREMITIES:  No edema.

 

LABORATORY DATA:  Sodium 139, potassium 4.1, chloride 109, CO2 18, BUN 31, creatinine 1.7, glucose 89
.  BNP level is 900.

 

ASSESSMENT:

1.  Acute respiratory failure secondary to pulmonary edema from diastolic heart failure.

2.  Status post renal transplant with some degree of chronic renal insufficiency.

 

PLAN:  Continue diuresis and Cardiology management.  Continue nebulization treatments.

## 2018-02-27 NOTE — ECHO
62-year-old gentleman with paroxysmal atrial fibrillation.  

 

The patient was taken to the PACU. The patient is sedated by anesthesiology.  The  transesophageal pr
obe was placed distally in the esophagus and stomach.  Echocardiographic images were obtained.  The t
ransesophageal probe was removed.

 

FINDINGS:

1.  Normal left ventricular systolic function.

2.  Left ventricle is not dilated.

3.  Moderate mitral regurgitation.

4.  Mild tricuspid regurgitation.

5.  No thrombus in the left atrium or left atrial appendage.

6.  Atherosclerotic debris in the descending aorta.

 

IMPRESSION:  

No formed thrombus in the left atrium or left atrial appendage.

## 2018-02-27 NOTE — PRG
DATE OF SERVICE:  02/27/2018

 

RENAL MEDICINE

 

SUBJECTIVE:  Mr. Collins is a 62-year-old black male who was admitted for CHF.  We were consulted fo
r his management of his transplanted kidney.  His renal function this morning is much improved.  He i
s near baseline.  He is currently taking his current immunosuppressive regimen.

 

Current tacrolimus level is still pending.  His shortness of breath when compared to yesterday has ac
tually dramatically improved, he is on diuretic regimen.

 

PHYSICAL EXAMINATION:

VITAL SIGNS:  Blood pressure is 150/86, heart rate 84, respiratory rate 16, temperature 98.1, pulse o
x 93%.

GENERAL:  Noted to be awake, alert, comfortable, not in distress.

SKIN:  Adequate turgor.

HEENT:  Pinkish conjunctivae, anicteric sclerae.

NECK:  No neck mass, no carotid bruits, no JVD.

CHEST:  No deformities.

LUNGS:  Decreased breath sounds.  No wheezing.

HEART:  Normal sinus rhythm.  No murmur, no gallops, no rubs.

ABDOMEN:  Globular, soft, nontender, no masses.  Renal allograft, nontender.

EXTREMITIES:  Trace edema.

 

MEDICATIONS:  Medications of 02/27/2018 was reviewed.

 

LABORATORY DATA:  Laboratories of 02/26/2018, hemoglobin 9.2.  On 02/27/2018; sodium 139, potassium 4
.1, chloride 109, carbon dioxide 18, BUN 31, creatinine 1.79, glucose 89, calcium 8.9, BNP is 900.

 

ASSESSMENT AND PLAN:

1.  Congestive heart failure, clinically much improved.  Continuing current diuretic regimen.

2.  Status post cadaveric renal transplant, stable renal function.  Creatinine of 1.79 is at baseline
.  Continue current immunosuppressive regimen.  Awaiting tacrolimus level.

3.  Borderline anemia.  We will continue to observe.  We will be rechecking base met and CBC in a.m.

## 2018-02-27 NOTE — OP
PROCEDURE:  Electrical cardioversion.

 

INDICATIONS:  A 62-year-old gentleman with typical atrial flutter.

 

DESCRIPTION OF PROCEDURE:  The patient was taken to the PACU, the patient is sedated by Anesthesiolog
y.  The patient was shocked with 50 joules of synchronized electricity.  The patient converted to nor
mal sinus rhythm.

 

IMPRESSION:  Successful electrical cardioversion.

## 2018-02-27 NOTE — PDOC.PN
- Subjective


Encounter Start Date: 02/27/18


Encounter Start Time: 08:40


Subjective: marked improvement, no sob





- Objective


MAR Reviewed: Yes


Vital Signs & Weight: 


 Vital Signs (12 hours)











  Temp Pulse Resp BP BP Pulse Ox


 


 02/27/18 08:39   84    


 


 02/27/18 08:38   84    


 


 02/27/18 08:37   84    


 


 02/27/18 08:26  98.1 F  84  16   150/86 H  93 L


 


 02/27/18 07:20       100


 


 02/27/18 07:17   90  18    92 L


 


 02/27/18 04:00  98.9 F  88  18   151/78 H  93 L


 


 02/26/18 22:10   88  20  141/78 H   90 L


 


 02/26/18 20:46  98 F  81  20    90 L








 Weight











Admit Weight                   181 lb 10.574 oz


 


Weight                         168 lb 4.8 oz











 Most Recent Monitor Data











Heart Rate from ECG            88


 


NIBP                           136/75


 


NIBP BP-Mean                   106


 


Respiration from ECG           19


 


SpO2                           95














I&O: 


 











 02/26/18 02/27/18 02/28/18





 06:59 06:59 06:59


 


Intake Total  674 


 


Output Total 2050 1787 


 


Balance -2050 -1113 











Result Diagrams: 


 02/26/18 03:54





 02/27/18 05:21





Phys Exam





- Physical Examination


Neck: no JVD


Respiratory: clear to auscultation bilateral


Cardiovascular: no significant murmur, irregular


Gastrointestinal: soft, no distention, positive bowel sounds


Musculoskeletal: edema present





Dx/Plan


(1) Atrial fibrillation with controlled ventricular rate


Code(s): I48.91 - UNSPECIFIED ATRIAL FIBRILLATION   Status: Acute   





(2) Acute on chronic renal failure


Code(s): N17.9 - ACUTE KIDNEY FAILURE, UNSPECIFIED; N18.9 - CHRONIC KIDNEY 

DISEASE, UNSPECIFIED   Status: Acute   


Qualifiers: 


   Acute renal failure type: unspecified 





(3) Acute on chronic diastolic congestive heart failure


Code(s): I50.33 - ACUTE ON CHRONIC DIASTOLIC (CONGESTIVE) HEART FAILURE   Status

: Acute   Comment: ef of 55%, volume Over load, continue nwith IV diuresis, 

monitoring renal fucntions, cardiology following.   





(4) Demand ischemia of myocardium


Code(s): I24.8 - OTHER FORMS OF ACUTE ISCHEMIC HEART DISEASE   Status: Chronic 

  Comment: Conmtinue with treating underlying cause of CHF. Likely deman 

ischmia.   





(5) NSVT (nonsustained ventricular tachycardia)


Code(s): I47.2 - VENTRICULAR TACHYCARDIA   Status: Acute   





(6) CAD (coronary artery disease)


Code(s): I25.10 - ATHSCL HEART DISEASE OF NATIVE CORONARY ARTERY W/O ANG PCTRS 

  Status: Chronic   


Qualifiers: 


   Coronary Disease-Associated Artery/Lesion type: bypass graft   Native vs. 

transplanted heart: native heart   Associated angina: without angina   

Qualified Code(s): I25.810 - Atherosclerosis of coronary artery bypass graft(s) 

without angina pectoris   





(7) Hypertension


Code(s): I10 - ESSENTIAL (PRIMARY) HYPERTENSION   Status: Chronic   


Qualifiers: 


   Hypertension type: essential hypertension 


Comment: Well controlled, COntinue with Lisinopril.   





(8) Kidney transplant status


Code(s): Z94.0 - KIDNEY TRANSPLANT STATUS   Status: Chronic   Comment: its been 

9 yrs now   





(9) Respiratory failure with hypoxia


Code(s): J96.91 - RESPIRATORY FAILURE, UNSPECIFIED WITH HYPOXIA   Status: 

Resolved   





- Plan


planned ROM with cardioversion today


-: cont diuresis


-: renal adjusting immunosuppressives


-: cont ASA, statin





* .

## 2018-02-28 LAB
ANION GAP SERPL CALC-SCNC: 13 MMOL/L (ref 10–20)
BASOPHILS # BLD AUTO: 0 THOU/UL (ref 0–0.2)
BASOPHILS NFR BLD AUTO: 0.2 % (ref 0–1)
BUN SERPL-MCNC: 22 MG/DL (ref 8.4–25.7)
CALCIUM SERPL-MCNC: 9.1 MG/DL (ref 7.8–10.44)
CHLORIDE SERPL-SCNC: 109 MMOL/L (ref 98–107)
CO2 SERPL-SCNC: 21 MMOL/L (ref 23–31)
CREAT CL PREDICTED SERPL C-G-VRATE: 48 ML/MIN (ref 70–130)
EOSINOPHIL # BLD AUTO: 0.1 THOU/UL (ref 0–0.7)
EOSINOPHIL NFR BLD AUTO: 0.9 % (ref 0–10)
GLUCOSE SERPL-MCNC: 96 MG/DL (ref 80–115)
HGB BLD-MCNC: 8.7 G/DL (ref 14–18)
LYMPHOCYTES # BLD: 1.2 THOU/UL (ref 1.2–3.4)
LYMPHOCYTES NFR BLD AUTO: 12.9 % (ref 21–51)
MCH RBC QN AUTO: 25.9 PG (ref 27–31)
MCV RBC AUTO: 85.2 FL (ref 80–94)
MONOCYTES # BLD AUTO: 0.9 THOU/UL (ref 0.11–0.59)
MONOCYTES NFR BLD AUTO: 9.8 % (ref 0–10)
NEUTROPHILS # BLD AUTO: 7 THOU/UL (ref 1.4–6.5)
NEUTROPHILS NFR BLD AUTO: 76.2 % (ref 42–75)
PLATELET # BLD AUTO: 297 THOU/UL (ref 130–400)
POTASSIUM SERPL-SCNC: 3.8 MMOL/L (ref 3.5–5.1)
RBC # BLD AUTO: 3.36 MILL/UL (ref 4.7–6.1)
SODIUM SERPL-SCNC: 139 MMOL/L (ref 136–145)
WBC # BLD AUTO: 9.2 THOU/UL (ref 4.8–10.8)

## 2018-02-28 RX ADMIN — Medication SCH ML: at 10:08

## 2018-02-28 RX ADMIN — ALUMINUM ZIRCONIUM TRICHLOROHYDREX GLY SCH EACH: 0.2 STICK TOPICAL at 10:08

## 2018-02-28 RX ADMIN — ASPIRIN SCH MG: 81 TABLET ORAL at 10:07

## 2018-02-28 RX ADMIN — MOMETASONE FUROATE AND FORMOTEROL FUMARATE DIHYDRATE SCH PUFF: 100; 5 AEROSOL RESPIRATORY (INHALATION) at 18:46

## 2018-02-28 RX ADMIN — MOMETASONE FUROATE AND FORMOTEROL FUMARATE DIHYDRATE SCH PUFF: 100; 5 AEROSOL RESPIRATORY (INHALATION) at 08:00

## 2018-02-28 RX ADMIN — NIFEDIPINE SCH MG: 60 TABLET, EXTENDED RELEASE ORAL at 10:05

## 2018-02-28 RX ADMIN — HYDROCODONE BITARTRATE AND ACETAMINOPHEN PRN TAB: 5; 325 TABLET ORAL at 11:02

## 2018-02-28 RX ADMIN — Medication PRN ML: at 05:19

## 2018-02-28 RX ADMIN — Medication SCH: at 20:21

## 2018-02-28 RX ADMIN — ALUMINUM ZIRCONIUM TRICHLOROHYDREX GLY SCH EACH: 0.2 STICK TOPICAL at 20:21

## 2018-02-28 NOTE — PDOC.PN
- Subjective


Encounter Start Date: 02/28/18


Encounter Start Time: 08:27


Subjective: no sob, edema





- Objective


MAR Reviewed: Yes


Vital Signs & Weight: 


 Vital Signs (12 hours)











  Temp Pulse Resp BP Pulse Ox


 


 02/28/18 05:18    20  


 


 02/28/18 04:00  97.8 F  86  20  131/73  92 L


 


 02/28/18 03:20      91 L


 


 02/27/18 21:00  98.4 F  93  16   92 L








 Weight











Admit Weight                   181 lb 10.574 oz


 


Weight                         165 lb 4.8 oz











 Most Recent Monitor Data











Heart Rate from ECG            88


 


NIBP                           136/75


 


NIBP BP-Mean                   106


 


Respiration from ECG           19


 


SpO2                           95














I&O: 


 











 02/27/18 02/28/18 03/01/18





 06:59 06:59 06:59


 


Intake Total 674 1049 


 


Output Total 1787 1625 


 


Balance -1113 -576 











Result Diagrams: 


 02/28/18 05:02





 02/28/18 05:02





Phys Exam





- Physical Examination


Neck: no JVD


Respiratory: clear to auscultation bilateral


Cardiovascular: RRR, no significant murmur


Gastrointestinal: soft, non-tender, positive bowel sounds


Musculoskeletal: no edema





Dx/Plan


(1) Atrial fibrillation with controlled ventricular rate


Code(s): I48.91 - UNSPECIFIED ATRIAL FIBRILLATION   Status: Acute   





(2) Acute on chronic renal failure


Code(s): N17.9 - ACUTE KIDNEY FAILURE, UNSPECIFIED; N18.9 - CHRONIC KIDNEY 

DISEASE, UNSPECIFIED   Status: Acute   


Qualifiers: 


   Acute renal failure type: unspecified 





(3) Acute on chronic diastolic congestive heart failure


Code(s): I50.33 - ACUTE ON CHRONIC DIASTOLIC (CONGESTIVE) HEART FAILURE   Status

: Acute   Comment: ef of 55%, volume Over load, continue nwith IV diuresis, 

monitoring renal fucntions, cardiology following.   





(4) Demand ischemia of myocardium


Code(s): I24.8 - OTHER FORMS OF ACUTE ISCHEMIC HEART DISEASE   Status: Chronic 

  Comment: Conmtinue with treating underlying cause of CHF. Likely deman 

ischmia.   





(5) NSVT (nonsustained ventricular tachycardia)


Code(s): I47.2 - VENTRICULAR TACHYCARDIA   Status: Acute   





(6) CAD (coronary artery disease)


Code(s): I25.10 - ATHSCL HEART DISEASE OF NATIVE CORONARY ARTERY W/O ANG PCTRS 

  Status: Chronic   


Qualifiers: 


   Coronary Disease-Associated Artery/Lesion type: bypass graft   Native vs. 

transplanted heart: native heart   Associated angina: without angina   

Qualified Code(s): I25.810 - Atherosclerosis of coronary artery bypass graft(s) 

without angina pectoris   





(7) Hypertension


Code(s): I10 - ESSENTIAL (PRIMARY) HYPERTENSION   Status: Chronic   


Qualifiers: 


   Hypertension type: essential hypertension   Qualified Code(s): I10 - 

Essential (primary) hypertension   


Comment: Well controlled, COntinue with Lisinopril.   





(8) Kidney transplant status


Code(s): Z94.0 - KIDNEY TRANSPLANT STATUS   Status: Chronic   Comment: its been 

9 yrs now   





(9) Atrial fibrillation with rapid ventricular response


Code(s): I48.91 - UNSPECIFIED ATRIAL FIBRILLATION   Status: Acute   





- Plan


cxr- then reevaluate


-: 1130 am- AF @ about 140. carzem 15 mg bolus, then infuse cardizem at 5


-: cont all other meds





* .

## 2018-02-28 NOTE — PRG
DATE OF SERVICE:  02/28/2018

 

I am seeing Mr. Collins at our telemetry floor.

 

SUBJECTIVE:  He seems to be doing well today.  No angina, no palpitations.  No stroke-like symptoms o
r bleeding issues noted.

 

OBJECTIVE:

VITAL SIGNS:  Blood pressure is 131/73, heart rate 86, respiration 18, temperature 97.8 degrees Fahre
nheit.

GENERAL:  He is alert and oriented man in no apparent distress.

NECK:  Supple.  Jugular veins are distended.

CHEST:  Coarse without crackles.

CARDIOVASCULAR:  Heart sounds are irregularly irregular.  S1, S2, variable.  No murmur or gallop.

ABDOMEN:  Benign.  Bowel sounds positive.

EXTREMITIES:  Lower extremities:  No edema, clubbing, or cyanosis.

 

DATABASE:  Hemoglobin 8.7, white cell count 9.2, platelet count is 297.  Sodium 139, potassium 3.8, B
UN is 20, creatinine 1.66.

 

Telemetry strips reveals continue atrial fibrillation with adequate ventricular rate control.

 

ASSESSMENT AND PLAN:  Mr. Collins is a pleasant 62-year-old man with a history of renal transplant, 
which is functioning reasonably well.  Current BUN 22, creatinine 1.66.  He also has acute on chronic
 diastolic heart failure on this admission, which has improved with gentle diuresis.  He was found in
 atrial fibrillation and a cardioversion was attempted, but did not keep his sinus rhythm for a prolo
nged period of time and was back in AFib quickly.  He has nonsustained ventricular arrhythmia was not
ed, but without any evidence of ischemia present.

 

We discussed the treatment options.  Choice of antiarrhythmic agents are difficult in this patient wi
th renal dysfunction as well as Prograf use.  Amiodarone could be an option, but has additive QT prol
onging effects, which already present on the patient's baseline EKG.

 

For this reason, we opted for continued rate control.  He might be considered for a pulmonary venous 
isolation procedure after stabilization as an outpatient.  For now, we prefers medical management.  I
 would recommend oral anticoagulation.  He has already started on apixaban 5 mg twice a day.

 

I will see him as an outpatient.  Would sign off.  Discuss if any further questions arise.

## 2018-02-28 NOTE — PRG
DATE OF SERVICE:  02/28/2018

 

Mr. Collins is doing well today, he says that the electrophysiology team is working on changing medi
cations to help with his atrial arrhythmias.  

 

PHYSICAL EXAMINATION: 

VITAL SIGNS:  Temperature 97.8, pulse 86, respirations 20, O2 sat 92, blood pressure 131/73.

HEENT:  Unremarkable.

NECK:  No JVD.

CHEST:  Clear without wheezing.

CARDIAC:  S1 and S2 irregular with ectopy.

ABDOMEN:  Soft.

EXTREMITIES:  No edema.

 

LABORATORY DATA:  White blood cell count 9.2, hematocrit 28.7, platelet count 297, sodium 139, potass
ium 3.8, chloride 108, CO2 21, BUN 22, creatinine 1.6, glucose 96.

 

ASSESSMENT:

1.  Status post of pulmonary edema/congestive heart failure with respiratory failure requiring mechan
ical ventilation.

2.  Status post renal transplant.

 

PLAN:  Continue management per Cardiology.  Pulmonary status is stable.

## 2018-02-28 NOTE — PRG
DATE OF SERVICE:  02/28/2018

 

SUBJECTIVE:  Mr. Collins is a 62-year-old black male, who is status post cadaveric renal transplant 
and admitted for CHF.  We are following him up for his renal transplantation.  He is doing better.  DALE goodson has been diuresed well.  His shortness of breath is clinically much improved.  Renal function has a
lso been holding steady.

 

OBJECTIVE:

VITAL SIGNS:  Blood pressure 131/73, heart rate 86, respiratory rate 20, temperature 97.8, pulse ox 9
2%.

GENERAL EXAM:  Awake, supine, comfortable, not in distress.

SKIN:  Adequate turgor.

HEENT:  Slightly pale conjunctivae.  Anicteric sclerae.

NECK:  No neck mass, no carotid bruits, no JVD.

CHEST:  No deformities.

LUNGS:  Clear breath sounds.  No wheezing.  No crackles.

HEART:  Normal sinus rhythm.  No murmur, no gallops, no rubs.

ABDOMEN:  Globular, soft, nontender.  No masses.

EXTREMITIES:  No edema.

 

Medications of 02/28/2018 were reviewed.

 

LABORATORY DATA:  Laboratories of 02/28/2018, white count 9.2, hemoglobin 8.7.  Sodium 139, potassium
 3.8, chloride 109, carbon dioxide 21, BUN is 22, creatinine 1.66, glucose 96, calcium 9.1.  Tacrolim
us level is currently pending.

 

ASSESSMENT AND PLAN:

1.  Congestive heart failure - clinically much improved.  The patient has been diuresed.

He has also been started on a low-dose lisinopril.  Currently on Lasix 40 mg IV q.12 hours.

2.  Status post cadaveric renal transplant, stable renal function.  Creatinine is noted at its best v
alue.  Continue current immunosuppressive regimen.  No changes to be made with his current antireject
ion medications.  Recheck basic metabolic panel and CBC in a.m.

## 2018-02-28 NOTE — EKG
Test Reason : 

Blood Pressure : ***/*** mmHG

Vent. Rate : 093 BPM     Atrial Rate : 091 BPM

   P-R Int : 000 ms          QRS Dur : 126 ms

    QT Int : 390 ms       P-R-T Axes : 000 035 122 degrees

   QTc Int : 484 ms

 

Atrial fibrillation

Right bundle branch block

Possible Inferior infarct (cited on or before 23-JUL-2014)

T wave abnormality, consider lateral ischemia

Abnormal ECG

When compared with ECG of 25-FEB-2018 18:23,

Atrial fibrillation has replaced Atrial flutter

Vent. rate has increased BY  34 BPM

Nonspecific T wave abnormality has replaced inverted T waves in Inferior leads

T wave inversion more evident in Anterior leads

Confirmed by DR. GALINDO ARAMBULA ([id number]) on 2/28/2018 7:26:34 AM

 

Referred By:  LATHA           Confirmed By:DR. GALINDO ARAMBULA

## 2018-03-01 LAB
ANION GAP SERPL CALC-SCNC: 15 MMOL/L (ref 10–20)
BASOPHILS # BLD AUTO: 0 THOU/UL (ref 0–0.2)
BASOPHILS NFR BLD AUTO: 0 % (ref 0–1)
BUN SERPL-MCNC: 28 MG/DL (ref 8.4–25.7)
CALCIUM SERPL-MCNC: 9.3 MG/DL (ref 7.8–10.44)
CHLORIDE SERPL-SCNC: 104 MMOL/L (ref 98–107)
CO2 SERPL-SCNC: 20 MMOL/L (ref 23–31)
CREAT CL PREDICTED SERPL C-G-VRATE: 35 ML/MIN (ref 70–130)
EOSINOPHIL # BLD AUTO: 0.1 THOU/UL (ref 0–0.7)
EOSINOPHIL NFR BLD AUTO: 1.6 % (ref 0–10)
GLUCOSE SERPL-MCNC: 90 MG/DL (ref 80–115)
HGB BLD-MCNC: 8.5 G/DL (ref 14–18)
LYMPHOCYTES # BLD: 1.2 THOU/UL (ref 1.2–3.4)
LYMPHOCYTES NFR BLD AUTO: 14.7 % (ref 21–51)
MCH RBC QN AUTO: 26 PG (ref 27–31)
MCV RBC AUTO: 84.2 FL (ref 80–94)
MONOCYTES # BLD AUTO: 0.9 THOU/UL (ref 0.11–0.59)
MONOCYTES NFR BLD AUTO: 11.3 % (ref 0–10)
NEUTROPHILS # BLD AUTO: 5.7 THOU/UL (ref 1.4–6.5)
NEUTROPHILS NFR BLD AUTO: 72.4 % (ref 42–75)
PLATELET # BLD AUTO: 311 THOU/UL (ref 130–400)
POTASSIUM SERPL-SCNC: 4 MMOL/L (ref 3.5–5.1)
RBC # BLD AUTO: 3.26 MILL/UL (ref 4.7–6.1)
SODIUM SERPL-SCNC: 135 MMOL/L (ref 136–145)
WBC # BLD AUTO: 7.8 THOU/UL (ref 4.8–10.8)

## 2018-03-01 RX ADMIN — Medication SCH ML: at 21:04

## 2018-03-01 RX ADMIN — Medication PRN ML: at 05:06

## 2018-03-01 RX ADMIN — MOMETASONE FUROATE AND FORMOTEROL FUMARATE DIHYDRATE SCH PUFF: 100; 5 AEROSOL RESPIRATORY (INHALATION) at 18:50

## 2018-03-01 RX ADMIN — MOMETASONE FUROATE AND FORMOTEROL FUMARATE DIHYDRATE SCH PUFF: 100; 5 AEROSOL RESPIRATORY (INHALATION) at 06:49

## 2018-03-01 RX ADMIN — ALUMINUM ZIRCONIUM TRICHLOROHYDREX GLY SCH EACH: 0.2 STICK TOPICAL at 21:05

## 2018-03-01 RX ADMIN — ASPIRIN SCH MG: 81 TABLET ORAL at 10:21

## 2018-03-01 RX ADMIN — Medication SCH ML: at 10:22

## 2018-03-01 RX ADMIN — NIFEDIPINE SCH MG: 60 TABLET, EXTENDED RELEASE ORAL at 10:19

## 2018-03-01 RX ADMIN — ALUMINUM ZIRCONIUM TRICHLOROHYDREX GLY SCH EACH: 0.2 STICK TOPICAL at 10:23

## 2018-03-01 RX ADMIN — HYDROCODONE BITARTRATE AND ACETAMINOPHEN PRN TAB: 5; 325 TABLET ORAL at 05:15

## 2018-03-01 NOTE — PRG
DATE OF SERVICE:  03/01/2018

 

SUBJECTIVE:  Mr. Collins is a 62-year-old black male followed up for his cadaveric renal transplant.
  Yesterday his renal function has been holding steady.  He was seen by Dr. Mayer, his EP cardiologist
.  He was found to be in atrial fibrillation with an adequate ventricular rate control.  The treatmen
t options were said to have been discussed with this patient.  At the present time, he is preparing m
edical management.

 

His renal function was noted to be a little worse today.  This may be a reflection of the current diu
retic regimen.  We will be making adjustments with his meds.  No other complaints, no chest pain or s
hortness of breath.

 

PHYSICAL EXAMINATION: 

VITAL SIGNS:  Blood pressure is 126/65, heart rate 69, respiratory rate 14, pulse ox 99%.

GENERAL:  Awake, alert, comfortable, not in distress.

SKIN:  Adequate turgor. 

HEENT:  Pinkish conjunctivae, anicteric sclerae.

NECK:  No neck mass, no carotid bruits, no JVD.

CHEST:  No deformities.

LUNGS:  Clear breath sounds, no wheezing, no crackles.

HEART:  Irregular.

ABDOMEN:  Globular, soft, nontender.

EXTREMITIES:  No edema, no deformities.

 

MEDICATIONS:  03/01/2018 - Reviewed.

 

LABORATORIES:  03/01/2018 - Hemoglobin 8.5.  Sodium 135, potassium 4, chloride 104, carbon dioxide 20
, BUN 28, creatinine 2.27, glucose 90, calcium 9.3.  Tacrolimus level is noted to be at 17.4.

 

ASSESSMENT AND PLAN:  

1.  Elevated Tacrolimus level - this was a nonfasting lab work.  A repeat fasting blood work has alre
maude been ordered and the results are pending.  

2.  Status post cadaveric renal transplant.  Continue immunosuppressive regimen.  Creatinine is highe
r today which may be a reflection of the current diuretic regimen.  The patient was on Lasix at 40 mg
 IV q.12h. at one time.  In addition, he has also been on lisinopril.  Our plan is as previously orde
red by the hospitalist is to hold off the Lasix as well as the lisinopril.  

 

We will recheck base met and CBC in a.m.  Awaiting for results of the tacrolimus level.

## 2018-03-01 NOTE — PDOC.PN
- Subjective


Encounter Start Date: 03/01/18


Encounter Start Time: 08:41


Subjective: seen and examined feeling better





- Objective


Vital Signs & Weight: 


 Vital Signs (12 hours)











  Temp Pulse Resp BP Pulse Ox


 


 03/01/18 06:49   69  14   99


 


 03/01/18 04:42      100


 


 03/01/18 04:35  98.3 F  72  18  126/65  92 L








 Weight











Admit Weight                   181 lb 10.574 oz


 


Weight                         165 lb 6.4 oz











 Most Recent Monitor Data











Heart Rate from ECG            88


 


NIBP                           136/75


 


NIBP BP-Mean                   106


 


Respiration from ECG           19


 


SpO2                           95














I&O: 


 











 02/28/18 03/01/18 03/02/18





 06:59 06:59 06:59


 


Intake Total 1049 284.1 


 


Output Total 1625 225 


 


Balance -576 59.1 











Result Diagrams: 


 03/01/18 05:29





 03/01/18 05:29





Phys Exam





- Physical Examination


Constitutional: NAD


HEENT: PERRLA, moist MMs, sclera anicteric, TM's clear, oral pharynx no lesions


Neck: no nodes, no JVD, supple, full ROM


Respiratory: no wheezing, no rales, no rhonchi


Cardiovascular: no significant murmur, no rub


Gastrointestinal: soft, non-tender, no distention, positive bowel sounds


Musculoskeletal: no edema, pulses present





Dx/Plan


(1) Acute on chronic renal failure


Code(s): N17.9 - ACUTE KIDNEY FAILURE, UNSPECIFIED; N18.9 - CHRONIC KIDNEY 

DISEASE, UNSPECIFIED   Status: Acute   


Qualifiers: 


   Acute renal failure type: unspecified 





(2) Atrial fibrillation with controlled ventricular rate


Code(s): I48.91 - UNSPECIFIED ATRIAL FIBRILLATION   Status: Acute   





(3) Atrial fibrillation with rapid ventricular response


Code(s): I48.91 - UNSPECIFIED ATRIAL FIBRILLATION   Status: Acute   





(4) Acute on chronic diastolic congestive heart failure


Code(s): I50.33 - ACUTE ON CHRONIC DIASTOLIC (CONGESTIVE) HEART FAILURE   Status

: Acute   Comment: ef of 55%, volume Over load, continue nwith IV diuresis, 

monitoring renal fucntions, cardiology following.   





(5) Pulmonary edema


Code(s): J81.1 - CHRONIC PULMONARY EDEMA   Status: Acute   





(6) ESRD (end stage renal disease)


Code(s): N18.6 - END STAGE RENAL DISEASE   Status: Chronic   





(7) Hypertension


Code(s): I10 - ESSENTIAL (PRIMARY) HYPERTENSION   Status: Chronic   


Qualifiers: 


   Hypertension type: essential hypertension   Qualified Code(s): I10 - 

Essential (primary) hypertension   


Comment: Well controlled, COntinue with Lisinopril.   





(8) Kidney transplant status


Code(s): Z94.0 - KIDNEY TRANSPLANT STATUS   Status: Chronic   Comment: its been 

9 yrs now   





- Plan


plan discussed w/ family, PT/OT, , respiratory therapy


Worsening renal function noted--Nephrologist to see him


-: Rate control with meds per EP/cardiology





* .

## 2018-03-02 LAB
ANION GAP SERPL CALC-SCNC: 14 MMOL/L (ref 10–20)
BASOPHILS # BLD AUTO: 0 THOU/UL (ref 0–0.2)
BASOPHILS NFR BLD AUTO: 0.1 % (ref 0–1)
BUN SERPL-MCNC: 35 MG/DL (ref 8.4–25.7)
CALCIUM SERPL-MCNC: 9.2 MG/DL (ref 7.8–10.44)
CHLORIDE SERPL-SCNC: 104 MMOL/L (ref 98–107)
CO2 SERPL-SCNC: 21 MMOL/L (ref 23–31)
CREAT CL PREDICTED SERPL C-G-VRATE: 35 ML/MIN (ref 70–130)
EOSINOPHIL # BLD AUTO: 0.2 THOU/UL (ref 0–0.7)
EOSINOPHIL NFR BLD AUTO: 2.7 % (ref 0–10)
GLUCOSE SERPL-MCNC: 89 MG/DL (ref 80–115)
HGB BLD-MCNC: 8.2 G/DL (ref 14–18)
LYMPHOCYTES # BLD: 1.2 THOU/UL (ref 1.2–3.4)
LYMPHOCYTES NFR BLD AUTO: 17.1 % (ref 21–51)
MCH RBC QN AUTO: 25.6 PG (ref 27–31)
MCV RBC AUTO: 84.3 FL (ref 80–94)
MONOCYTES # BLD AUTO: 0.8 THOU/UL (ref 0.11–0.59)
MONOCYTES NFR BLD AUTO: 11.4 % (ref 0–10)
NEUTROPHILS # BLD AUTO: 4.8 THOU/UL (ref 1.4–6.5)
NEUTROPHILS NFR BLD AUTO: 68.7 % (ref 42–75)
PLATELET # BLD AUTO: 316 THOU/UL (ref 130–400)
POTASSIUM SERPL-SCNC: 4 MMOL/L (ref 3.5–5.1)
RBC # BLD AUTO: 3.19 MILL/UL (ref 4.7–6.1)
SODIUM SERPL-SCNC: 135 MMOL/L (ref 136–145)
WBC # BLD AUTO: 7.1 THOU/UL (ref 4.8–10.8)

## 2018-03-02 RX ADMIN — HYDROCODONE BITARTRATE AND ACETAMINOPHEN PRN TAB: 5; 325 TABLET ORAL at 23:31

## 2018-03-02 RX ADMIN — MOMETASONE FUROATE AND FORMOTEROL FUMARATE DIHYDRATE SCH PUFF: 100; 5 AEROSOL RESPIRATORY (INHALATION) at 09:25

## 2018-03-02 RX ADMIN — MOMETASONE FUROATE AND FORMOTEROL FUMARATE DIHYDRATE SCH PUFF: 100; 5 AEROSOL RESPIRATORY (INHALATION) at 18:44

## 2018-03-02 RX ADMIN — Medication SCH ML: at 20:21

## 2018-03-02 RX ADMIN — ASPIRIN SCH MG: 81 TABLET ORAL at 09:13

## 2018-03-02 RX ADMIN — Medication SCH ML: at 09:15

## 2018-03-02 RX ADMIN — ALUMINUM ZIRCONIUM TRICHLOROHYDREX GLY SCH EACH: 0.2 STICK TOPICAL at 09:15

## 2018-03-02 RX ADMIN — NIFEDIPINE SCH MG: 60 TABLET, EXTENDED RELEASE ORAL at 09:14

## 2018-03-02 RX ADMIN — ALUMINUM ZIRCONIUM TRICHLOROHYDREX GLY SCH EACH: 0.2 STICK TOPICAL at 20:21

## 2018-03-02 NOTE — PRG
DATE OF SERVICE:  03/02/2018

 

SUBJECTIVE:  Mr. Collins is a 62-year-old black male who is status post cadaveric renal transplant, 
admitted for  CHF.  He was diuresed and it improved his congestive heart failure.  However, in the la
st few days, creatinine was noted to have gone up.  We have discontinued the diuretics  and ACE inhib
itors have been discontinued.  There is some plateauing with the renal dysfunction.  He was also seen
 by Dr. Mayer and he has declined any further workup for his cardiac arrhythmia.  He prefers conservat
roberto management.  No new complaints today, no chest pain or shortness of breath.

 

PHYSICAL EXAMINATION:

VITAL SIGNS:  Blood pressure 130/66, heart rate 64, respiratory 16, temperature is 98.7, pulse ox 96%
.

GENERAL:  Awake, alert, sitting comfortable, not in distress.

SKIN:  Adequate turgor. 

HEENT:  Slightly pale conjunctivae, anicteric sclerae.

NECK:  No neck mass, no carotid bruits, no JVD.

CHEST:  No deformities.

LUNGS:  Clear breath sounds.

HEART:  Normal sinus rhythm.  Grade 2/6 systolic murmur, no gallops, no rubs.

ABDOMEN:  Globular, soft, nontender, no masses.

EXTREMITIES:  No edema, no deformities.

 

MEDICATIONS:  03/02/2018 - Reviewed.

 

LABORATORY:  03/02/2018 - White count 7.1, hemoglobin 8.2.  Sodium 135, potassium 4, chloride 104, ca
rbon dioxide 21, BUN 35, creatinine 2.29, GFR is 35 mL per minute, glucose 89, calcium 9.2.

 

ASSESSMENT AND PLAN:  

1.  Acute kidney injury - superimposed prerenal azotemia.  Off diuretics and ACE inhibitors.  There i
s leveling off of the creatinine.  It is now noted at 2.29.  We do anticipate improvement with the 
darrion kidney injury.  

2.  Status post cadaveric renal transplant, stable.  Continue current immunosuppressive regimen.  Tac
rolimus level on 02/27/2018 was noted at 5.5.  This is therapeutic.  Continue current immunosuppressi
ve regimen.

3.  Congestive heart failure, clinically much improved off diuretics due to the worsening renal dysfu
nction.

4.  Anemia.  Start ferrous sulfate 325 mg b.i.d.  Workup as an outpatient.

## 2018-03-02 NOTE — PDOC.PN
- Subjective


Encounter Start Date: 03/02/18


Encounter Start Time: 08:49


Subjective: Seen and examined feeling ok





- Objective


Vital Signs & Weight: 


 Vital Signs (12 hours)











  Temp Pulse Resp BP BP BP Pulse Ox


 


 03/02/18 08:24  98.7 F  71  18    130/66  92 L


 


 03/02/18 08:00  98.1 F  76  18     92 L


 


 03/02/18 03:55  98.1 F  76  18   135/74   93 L


 


 03/02/18 01:31        93 L


 


 03/01/18 21:04   75   107/57 L   








 Weight











Admit Weight                   181 lb 10.574 oz


 


Weight                         165 lb 1.6 oz











 Most Recent Monitor Data











Heart Rate from ECG            88


 


NIBP                           136/75


 


NIBP BP-Mean                   106


 


Respiration from ECG           19


 


SpO2                           95














I&O: 


 











 03/01/18 03/02/18 03/03/18





 06:59 06:59 06:59


 


Intake Total 284.1 920 


 


Output Total 225 300 


 


Balance 59.1 620 











Result Diagrams: 


 03/02/18 05:09





 03/02/18 05:09





Phys Exam





- Physical Examination


Constitutional: NAD


HEENT: PERRLA, moist MMs, sclera anicteric, TM's clear, oral pharynx no lesions


Neck: no nodes, no JVD, supple, full ROM


Respiratory: no wheezing, no rales, no rhonchi, clear to auscultation bilateral


Cardiovascular: RRR, no significant murmur, no rub


Gastrointestinal: soft, non-tender, no distention, positive bowel sounds


Musculoskeletal: no edema, pulses present





Dx/Plan


(1) Acute on chronic renal failure


Code(s): N17.9 - ACUTE KIDNEY FAILURE, UNSPECIFIED; N18.9 - CHRONIC KIDNEY 

DISEASE, UNSPECIFIED   Status: Acute   


Qualifiers: 


   Acute renal failure type: unspecified 





(2) Atrial fibrillation with controlled ventricular rate


Code(s): I48.91 - UNSPECIFIED ATRIAL FIBRILLATION   Status: Acute   





(3) Atrial fibrillation with rapid ventricular response


Code(s): I48.91 - UNSPECIFIED ATRIAL FIBRILLATION   Status: Acute   





(4) Acute on chronic diastolic congestive heart failure


Code(s): I50.33 - ACUTE ON CHRONIC DIASTOLIC (CONGESTIVE) HEART FAILURE   Status

: Acute   Comment: ef of 55%, volume Over load, continue nwith IV diuresis, 

monitoring renal fucntions, cardiology following.   





(5) Pulmonary edema


Code(s): J81.1 - CHRONIC PULMONARY EDEMA   Status: Acute   





(6) ESRD (end stage renal disease)


Code(s): N18.6 - END STAGE RENAL DISEASE   Status: Chronic   





(7) Hypertension


Code(s): I10 - ESSENTIAL (PRIMARY) HYPERTENSION   Status: Chronic   


Qualifiers: 


   Hypertension type: essential hypertension   Qualified Code(s): I10 - 

Essential (primary) hypertension   


Comment: Well controlled, COntinue with Lisinopril.   





(8) Kidney transplant status


Code(s): Z94.0 - KIDNEY TRANSPLANT STATUS   Status: Chronic   Comment: its been 

9 yrs now   





- Plan


cont current plan of care, PT/OT, 


Diuretics and Acei on hold but creatinine is still up


-: Prograf level is pending


-: Renal following


-: D/c when creatinine starts improving





* .

## 2018-03-03 VITALS — TEMPERATURE: 97.6 F | DIASTOLIC BLOOD PRESSURE: 63 MMHG | SYSTOLIC BLOOD PRESSURE: 117 MMHG

## 2018-03-03 LAB
ANION GAP SERPL CALC-SCNC: 13 MMOL/L (ref 10–20)
BASOPHILS # BLD AUTO: 0 THOU/UL (ref 0–0.2)
BASOPHILS NFR BLD AUTO: 0 % (ref 0–1)
BUN SERPL-MCNC: 33 MG/DL (ref 8.4–25.7)
CALCIUM SERPL-MCNC: 8.9 MG/DL (ref 7.8–10.44)
CHLORIDE SERPL-SCNC: 104 MMOL/L (ref 98–107)
CO2 SERPL-SCNC: 21 MMOL/L (ref 23–31)
CREAT CL PREDICTED SERPL C-G-VRATE: 44 ML/MIN (ref 70–130)
EOSINOPHIL # BLD AUTO: 0.2 THOU/UL (ref 0–0.7)
EOSINOPHIL NFR BLD AUTO: 3.6 % (ref 0–10)
GLUCOSE SERPL-MCNC: 85 MG/DL (ref 80–115)
HGB BLD-MCNC: 7.5 G/DL (ref 14–18)
LYMPHOCYTES # BLD: 1.3 THOU/UL (ref 1.2–3.4)
LYMPHOCYTES NFR BLD AUTO: 20.5 % (ref 21–51)
MCH RBC QN AUTO: 26 PG (ref 27–31)
MCV RBC AUTO: 81.7 FL (ref 80–94)
MONOCYTES # BLD AUTO: 0.8 THOU/UL (ref 0.11–0.59)
MONOCYTES NFR BLD AUTO: 13 % (ref 0–10)
NEUTROPHILS # BLD AUTO: 4 THOU/UL (ref 1.4–6.5)
NEUTROPHILS NFR BLD AUTO: 62.9 % (ref 42–75)
PLATELET # BLD AUTO: 279 THOU/UL (ref 130–400)
POTASSIUM SERPL-SCNC: 4.2 MMOL/L (ref 3.5–5.1)
RBC # BLD AUTO: 2.89 MILL/UL (ref 4.7–6.1)
SODIUM SERPL-SCNC: 134 MMOL/L (ref 136–145)
WBC # BLD AUTO: 6.3 THOU/UL (ref 4.8–10.8)

## 2018-03-03 RX ADMIN — NIFEDIPINE SCH MG: 60 TABLET, EXTENDED RELEASE ORAL at 08:11

## 2018-03-03 RX ADMIN — MOMETASONE FUROATE AND FORMOTEROL FUMARATE DIHYDRATE SCH PUFF: 100; 5 AEROSOL RESPIRATORY (INHALATION) at 07:01

## 2018-03-03 RX ADMIN — ASPIRIN SCH MG: 81 TABLET ORAL at 08:11

## 2018-03-03 RX ADMIN — ALUMINUM ZIRCONIUM TRICHLOROHYDREX GLY SCH EACH: 0.2 STICK TOPICAL at 08:11

## 2018-03-03 RX ADMIN — Medication SCH ML: at 08:12

## 2018-03-03 NOTE — PRG
DATE OF SERVICE:  03/03/2018

 

RENAL MEDICINE

 

SUBJECTIVE:  Mr. Ragland is a 62-year-old black male followed up for his status post cadaveric renal tra
nsplant.  Doing well.  Creatinine is now improved.  He had recently a superimposed prerenal azotemia.
  Diuretics were placed on hold and the inhibitor was discontinued.  In addition, his tacrolimus leve
l is therapeutic.  No other complaints, no chest pain or shortness of breath.

 

PHYSICAL EXAMINATION:

VITAL SIGNS:  Blood pressure is 147/71, heart rate 82, respiratory rate 18, temperature 98.2, pulse o
x 91%.

GENERAL:  Noted to be awake, sitting comfortable, not in distress.

SKIN:  Adequate turgor.

HEENT:  Pinkish conjunctivae, anicteric sclerae.

NECK:  No neck mass, no carotid bruits, no JVD.

CHEST:  No deformities.

LUNGS:  Clear breath sounds.

HEART:  Normal sinus rhythm.  No murmurs, no gallops, no rubs.

ABDOMEN:  Globular, soft, nontender, no masses.

EXTREMITIES:  No edema, no deformities.

 

MEDICATIONS:  Medications of 03/03/2018 was reviewed.

 

LABORATORY DATA:  Laboratories of 03/03/2018; sodium 134, potassium 4.2, chloride 104, carbon dioxide
 21, BUN 33, creatinine 1.83, GFR 45 mL per minute, glucose 85, and calcium 8.9.

 

ASSESSMENT AND PLAN:

1.  Acute kidney injury - hemodynamically mediated renal dysfunction.  Much improved with discontinua
tion of the furosemide and ACE inhibitor.  My bias is to resume him back on Lasix at 40 mg tab once a
 day due to improved renal function.

2.  Status post cadaveric renal transplant, stable.  Therapeutic Prograf level.  Continue current imm
unosuppressive regimen.

3.  Congestive heart failure, clinically improved.  We will resume back Lasix at 40 mg tab once a day
.

## 2018-03-03 NOTE — EKG
Test Reason : 

Blood Pressure : ***/*** mmHG

Vent. Rate : 057 BPM     Atrial Rate : 267 BPM

   P-R Int : 000 ms          QRS Dur : 128 ms

    QT Int : 510 ms       P-R-T Axes : 000 008 018 degrees

   QTc Int : 496 ms

 

Atrial flutter with variable A-V block

Right bundle branch block

Cannot rule out Inferior infarct , age undetermined

T wave abnormality, consider lateral ischemia

Abnormal ECG

 

Confirmed by RHEA LALA ([id number]),  MAURICIO GRAVES ([id number]) on 3/3/2018 2:24:26 PM

 

Referred By:             Confirmed By:RHEA LALA

## 2018-03-03 NOTE — EKG
Test Reason : 

Blood Pressure : ***/*** mmHG

Vent. Rate : 057 BPM     Atrial Rate : 057 BPM

   P-R Int : 164 ms          QRS Dur : 132 ms

    QT Int : 514 ms       P-R-T Axes : 000 003 042 degrees

   QTc Int : 500 ms

 

Sinus bradycardia with sinus arrhythmia

Right bundle branch block

Possible Inferior infarct , age undetermined

T wave abnormality, consider lateral ischemia

Abnormal ECG

 

Confirmed by RHEA LALA ([id number]),  MAURICIO GRAVES ([id number]) on 3/3/2018 2:24:27 PM

 

Referred By:             Confirmed By:RHEA LALA

## 2018-03-03 NOTE — PDOC.CTH
Cardiology Progress Note





- Subjective





The pt seen and examined.  No overnight events.  No cardiac complaints.  He is 

still on 3LNC.  He uses Home O2 as PRN.  





- Objective


 Vital Signs











  Temp Pulse Resp BP BP Pulse Ox


 


 03/03/18 11:53  98.2 F  56 L  16   133/64  96


 


 03/03/18 08:10  98.2 F  82  18    91 L


 


 03/03/18 08:06  98.2 F  82  18   147/71 H  91 L


 


 03/03/18 07:01   63  15    92 L


 


 03/03/18 04:51  98.2 F  58 L  18  117/75   94 L








 











Admit Weight                   181 lb 10.574 oz


 


Weight                         167 lb 11.2 oz














 











 03/02/18 03/03/18 03/04/18





 06:59 06:59 06:59


 


Intake Total 920 1820 


 


Output Total 300 805 


 


Balance 620 1015 














- Physical Examination


General/Neuro: alert & oriented x3


Neck: no JVD present


Lungs: CTA (diminished at bases)


Heart: other: (irregular)


Abdomen: soft


Extremities: other: (no edema)





- Telemetry


Telemetry Rhythm: Afib HR 50s-60s





- Labs


Result Diagrams: 


 03/03/18 04:47





 03/03/18 04:47


 Troponin/CKMB











CK-MB (CK-2)  1.9 ng/mL (0-6.6)   02/25/18  09:36    


 


Troponin I  0.047 ng/mL (< 0.028)  H  02/27/18  05:21    














- Assessment/Plan





1. Afib with RVR - remains in Afib with HR 50-60s.  On Digoxin and Eliquis 5mg 

BID.  S/p Cardioversion on 02/25/18.  


2. Acute on chronic diastolic renal failure - Stable with Lasix 40mg PO.  Not 

on ACE due to hx of CKD. 


3. DINA on CKD - Improving; managed by nephrologist


4. 3V CAD with hx of CABG in 2014 - stable; on ASA and statin; not on BBlocker 

due to Bradycardia. Not on ACE due to hx of CKD. Cont. monitor 


5. HTN - stable with current medication; cont. monitor


6. Hx of Kidney tx - stable


7. [place] - Hgb today was 7.5 which was 8.2 yesterday; on Iron supplement; 

managed by PCP


MAR reviewed 





* Echo on 02/25/18 showed EF 55-60%, mild LAE, mild AR, mild-mod MR, mod-severe 

TR, Rt ventrical systolic pressure of 77 mmHg











Review of Systems





- Review of Systems


Constitutional: reports: no symptoms reported


EENTM: reports: no symptoms reported


Respiratory: reports: see HPI


Cardiac (ROS): reports: no symptoms reported


ABD/GI: reports: no symptoms reported


: reports: no symptoms reported


Musculoskeletal: reports: no symptoms reported


Skin: reports: no symptoms reported

## 2018-03-19 ENCOUNTER — HOSPITAL ENCOUNTER (INPATIENT)
Dept: HOSPITAL 92 - ERS | Age: 63
LOS: 6 days | Discharge: HOME | DRG: 291 | End: 2018-03-25
Attending: FAMILY MEDICINE | Admitting: FAMILY MEDICINE
Payer: MEDICARE

## 2018-03-19 VITALS — BODY MASS INDEX: 25.2 KG/M2

## 2018-03-19 DIAGNOSIS — I27.20: ICD-10-CM

## 2018-03-19 DIAGNOSIS — I25.10: ICD-10-CM

## 2018-03-19 DIAGNOSIS — Z95.1: ICD-10-CM

## 2018-03-19 DIAGNOSIS — T86.12: ICD-10-CM

## 2018-03-19 DIAGNOSIS — E87.2: ICD-10-CM

## 2018-03-19 DIAGNOSIS — D63.1: ICD-10-CM

## 2018-03-19 DIAGNOSIS — Z87.891: ICD-10-CM

## 2018-03-19 DIAGNOSIS — I50.33: ICD-10-CM

## 2018-03-19 DIAGNOSIS — E87.5: ICD-10-CM

## 2018-03-19 DIAGNOSIS — R71.0: ICD-10-CM

## 2018-03-19 DIAGNOSIS — Z79.01: ICD-10-CM

## 2018-03-19 DIAGNOSIS — J44.9: ICD-10-CM

## 2018-03-19 DIAGNOSIS — J96.21: ICD-10-CM

## 2018-03-19 DIAGNOSIS — I44.1: ICD-10-CM

## 2018-03-19 DIAGNOSIS — N18.4: ICD-10-CM

## 2018-03-19 DIAGNOSIS — N17.9: ICD-10-CM

## 2018-03-19 DIAGNOSIS — E78.5: ICD-10-CM

## 2018-03-19 DIAGNOSIS — I13.0: Primary | ICD-10-CM

## 2018-03-19 DIAGNOSIS — Z99.81: ICD-10-CM

## 2018-03-19 DIAGNOSIS — I48.0: ICD-10-CM

## 2018-03-19 DIAGNOSIS — I07.1: ICD-10-CM

## 2018-03-19 DIAGNOSIS — M10.9: ICD-10-CM

## 2018-03-19 LAB
ALBUMIN SERPL BCG-MCNC: 4.1 G/DL (ref 3.4–4.8)
ALP SERPL-CCNC: 144 U/L (ref 40–150)
ALT SERPL W P-5'-P-CCNC: 8 U/L (ref 8–55)
ANION GAP SERPL CALC-SCNC: 16 MMOL/L (ref 10–20)
AST SERPL-CCNC: 9 U/L (ref 5–34)
BASOPHILS # BLD AUTO: 0 THOU/UL (ref 0–0.2)
BASOPHILS NFR BLD AUTO: 0.3 % (ref 0–1)
BICARBONATE (HCO3V): 18.4 MMOL/L (ref 1–85)
BILIRUB SERPL-MCNC: 0.7 MG/DL (ref 0.2–1.2)
BUN SERPL-MCNC: 61 MG/DL (ref 8.4–25.7)
CA-I BLD-SCNC: 1.17 MMOL/L (ref 1.12–1.32)
CALCIUM SERPL-MCNC: 9.4 MG/DL (ref 7.8–10.44)
CHLORIDE SERPL-SCNC: 110 MMOL/L (ref 98–107)
CHLORIDE SERPL-SCNC: 113 MMOL/L (ref 98–113)
CK MB SERPL-MCNC: 1.5 NG/ML (ref 0–6.6)
CO2 BLDV CALC-SCNC: 19.3 MMOL/L (ref 1–85)
CO2 SERPL-SCNC: 18 MMOL/L (ref 23–31)
CO2 TENSION (PVCO2): 31.6 MMHG (ref 41–51)
CREAT CL PREDICTED SERPL C-G-VRATE: 0 ML/MIN (ref 70–130)
EOSINOPHIL # BLD AUTO: 0.1 THOU/UL (ref 0–0.7)
EOSINOPHIL NFR BLD AUTO: 1.2 % (ref 0–10)
GLOBULIN SER CALC-MCNC: 3.6 G/DL (ref 2.4–3.5)
GLUCOSE SERPL-MCNC: 101 MG/DL (ref 80–115)
HCT VFR BLD CALC: 24 % (ref 36–52)
HEMOGLOBIN - CALC: 8.2 G/DL (ref 12–18)
HGB BLD-MCNC: 8.2 G/DL (ref 14–18)
LYMPHOCYTES # BLD: 1.1 THOU/UL (ref 1.2–3.4)
LYMPHOCYTES NFR BLD AUTO: 15.7 % (ref 21–51)
MCH RBC QN AUTO: 25.4 PG (ref 27–31)
MCV RBC AUTO: 82 FL (ref 80–94)
MONOCYTES # BLD AUTO: 0.7 THOU/UL (ref 0.11–0.59)
MONOCYTES NFR BLD AUTO: 10.5 % (ref 0–10)
NEUTROPHILS # BLD AUTO: 4.8 THOU/UL (ref 1.4–6.5)
NEUTROPHILS NFR BLD AUTO: 72.2 % (ref 42–75)
O2 TENSION (PVO2): 32 MMHG (ref 35–45)
PLATELET # BLD AUTO: 336 THOU/UL (ref 130–400)
POTASSIUM SERPL-SCNC: 5.1 MMOL/L (ref 3.4–4.7)
POTASSIUM SERPL-SCNC: 5.2 MMOL/L (ref 3.5–5.1)
RBC # BLD AUTO: 3.25 MILL/UL (ref 4.7–6.1)
SAO2 % BLDV FROM PO2: 60.9 % (ref 94–98)
SODIUM SERPL-SCNC: 139 MMOL/L (ref 136–145)
SODIUM SERPL-SCNC: 143 MMOL/L (ref 138–145)
TROPONIN I SERPL DL<=0.01 NG/ML-MCNC: 0.04 NG/ML (ref ?–0.03)
TROPONIN I SERPL DL<=0.01 NG/ML-MCNC: 0.05 NG/ML (ref ?–0.03)
WBC # BLD AUTO: 6.7 THOU/UL (ref 4.8–10.8)

## 2018-03-19 PROCEDURE — 85025 COMPLETE CBC W/AUTO DIFF WBC: CPT

## 2018-03-19 PROCEDURE — 83605 ASSAY OF LACTIC ACID: CPT

## 2018-03-19 PROCEDURE — 84484 ASSAY OF TROPONIN QUANT: CPT

## 2018-03-19 PROCEDURE — 82553 CREATINE MB FRACTION: CPT

## 2018-03-19 PROCEDURE — 82330 ASSAY OF CALCIUM: CPT

## 2018-03-19 PROCEDURE — 94640 AIRWAY INHALATION TREATMENT: CPT

## 2018-03-19 PROCEDURE — 80197 ASSAY OF TACROLIMUS: CPT

## 2018-03-19 PROCEDURE — 86850 RBC ANTIBODY SCREEN: CPT

## 2018-03-19 PROCEDURE — 85027 COMPLETE CBC AUTOMATED: CPT

## 2018-03-19 PROCEDURE — 83880 ASSAY OF NATRIURETIC PEPTIDE: CPT

## 2018-03-19 PROCEDURE — 81001 URINALYSIS AUTO W/SCOPE: CPT

## 2018-03-19 PROCEDURE — P9016 RBC LEUKOCYTES REDUCED: HCPCS

## 2018-03-19 PROCEDURE — 94760 N-INVAS EAR/PLS OXIMETRY 1: CPT

## 2018-03-19 PROCEDURE — 36430 TRANSFUSION BLD/BLD COMPNT: CPT

## 2018-03-19 PROCEDURE — 85007 BL SMEAR W/DIFF WBC COUNT: CPT

## 2018-03-19 PROCEDURE — 71045 X-RAY EXAM CHEST 1 VIEW: CPT

## 2018-03-19 PROCEDURE — 36415 COLL VENOUS BLD VENIPUNCTURE: CPT

## 2018-03-19 PROCEDURE — 83540 ASSAY OF IRON: CPT

## 2018-03-19 PROCEDURE — 82274 ASSAY TEST FOR BLOOD FECAL: CPT

## 2018-03-19 PROCEDURE — 82728 ASSAY OF FERRITIN: CPT

## 2018-03-19 PROCEDURE — 86900 BLOOD TYPING SEROLOGIC ABO: CPT

## 2018-03-19 PROCEDURE — 96376 TX/PRO/DX INJ SAME DRUG ADON: CPT

## 2018-03-19 PROCEDURE — 80053 COMPREHEN METABOLIC PANEL: CPT

## 2018-03-19 PROCEDURE — 83550 IRON BINDING TEST: CPT

## 2018-03-19 PROCEDURE — 96374 THER/PROPH/DIAG INJ IV PUSH: CPT

## 2018-03-19 PROCEDURE — 80048 BASIC METABOLIC PNL TOTAL CA: CPT

## 2018-03-19 PROCEDURE — 82805 BLOOD GASES W/O2 SATURATION: CPT

## 2018-03-19 PROCEDURE — 93005 ELECTROCARDIOGRAM TRACING: CPT

## 2018-03-19 PROCEDURE — 85046 RETICYTE/HGB CONCENTRATE: CPT

## 2018-03-19 PROCEDURE — 86901 BLOOD TYPING SEROLOGIC RH(D): CPT

## 2018-03-19 PROCEDURE — A4216 STERILE WATER/SALINE, 10 ML: HCPCS

## 2018-03-19 PROCEDURE — 82803 BLOOD GASES ANY COMBINATION: CPT

## 2018-03-19 RX ADMIN — MYCOPHENOLIC ACID SCH MG: 180 TABLET, DELAYED RELEASE ORAL at 20:44

## 2018-03-19 RX ADMIN — NIFEDIPINE SCH MG: 60 TABLET, EXTENDED RELEASE ORAL at 20:39

## 2018-03-19 NOTE — RAD
CHEST ONE VIEW:

 

HISTORY:

Dyspnea.

 

COMPARISON:

02/26/2018

 

FINDINGS:

Persistent cardiomegaly.  Stable sternotomy changes.  Pulmonary vessels are within normal limits.  Th
ere are diffuse interstitial opacities, suggesting an infiltrate or edema.  No significant pleural ef
fusion.  There is hyperinflation.  No pneumothorax.

 

IMPRESSION:

1.  Cardiomegaly.

 

2.  Interstitial edema.

 

3.  Congestive heart failure.

 

POS: [facility]

## 2018-03-19 NOTE — HP
DATE OF ADMISSION:  03/19/2018

 

PRIMARY CARE PHYSICIAN:  Dr. Arthur Dang.

 

PRIMARY NEPHROLOGIST:  Dr. Potter.

 

CHIEF COMPLAINT:  Shortness of breath and lower extremity swelling.

 

HISTORY OF PRESENT ILLNESS:  This is a 62-year-old -American male who presents to [facility] complaining of increasing lower extremity swelling with associated shortness of
 breath over the last 3-5 days.  The patient's history is notable with a recent admission on 02/25/20
18 to [facility] for shortness of breath with a known history of diastolic con
gestive heart failure as well as chronic hypoxic respiratory failure with severe pulmonary hypertensi
on on chronic oxygen supplementation at 3-4 liters per minute by nasal cannula.  During the admission
 on 02/25/2018, the patient was treated with IV Lasix as well as evaluated by the Cardiology Service 
for atrial fibrillation/flutter, undergoing an electrical cardioversion on 02/27/2018 with conversion
 to a sinus mechanism.  The patient apparently was placed on digoxin, which the patient states he has
 been taking on a regular basis since his discharge in early 03/2018.  The patient does complain of s
ome hallucinations with the new medication that he was placed upon either Eliquis or digoxin, which w
ere apparently new prescriptions at the time of discharge in early 03/2018.  The patient also states 
he has been compliant with his chronic medications to include Lasix 40 mg daily.  The patient denied 
any specific dietary indiscretion, increased fluid intake, fever, chills, hemoptysis, hematemesis or 
chest pain.  The patient states his symptoms are worse when lying flat and usually sits propped up in
 a chair.  The patient also states he has been elevating his lower extremities to help with the swell
ing, but this has been unsuccessful.  In the emergency room, the patient underwent general evaluation
 including chest imaging showing bilateral pulmonary edema with interstitial lung changes and treated
 with IV Lasix x40 mg total.  The patient apparently had excellent diuresis according to the ER atten
ding with improved respiratory function.  The patient was referred to the Hospitalist Service for stefanie christine.

 

PAST MEDICAL HISTORY:

1.  Diastolic congestive heart failure with preserved ejection fraction of 55%.

2.  Coronary artery disease status post coronary artery bypass grafting in 2014.

3.  History of atrial fibrillation/flutter, status post cardioversion on 02/27/2018 with current sinu
s mechanism.

4.  Chronic anticoagulation with Eliquis.

5.  Chronic kidney disease, stage 4, status post cadaveric transplant, on chronic immunosuppressive t
herapy.

6.  Pulmonary hypertension.

7.  Moderate to severe tricuspid regurgitation.

 

PAST SURGICAL HISTORY:

1.  Status post cadaveric renal transplant.

2.  Status post coronary artery bypass grafting.

3.  Status post electrical cardioversion.

 

CURRENT MEDICATIONS:

1.  ProAir HFA 2 puffs inhaled q.4 hours p.r.n.

2.  Eliquis 5 mg p.o. b.i.d.

3.  Lipitor 40 mg p.o. at bedtime.

4.  Symbicort 80/4.5 one puff inhaled b.i.d.

5.  Clonidine 0.3 mg p.o. b.i.d.

6.  Digoxin 0.125 mg p.o. daily.

7.  Lasix 40 mg p.o. daily.

8.  Hydralazine 50 mg p.o. b.i.d.

9.  Isosorbide mononitrate 60 mg p.o. daily.

10.  Lopressor 50 mg p.o. b.i.d.

11.  Myfortic 360 mg p.o. b.i.d.

12.  Nifedipine ER 60 mg p.o. b.i.d.

13.  Prograf 0.5 mg p.o. b.i.d.

 

ALLERGIES:  No known drug allergies.

 

FAMILY HISTORY:  No inheritable diseases per patient report.

 

SOCIAL HISTORY:  The patient is , residing in [place], [place].  Currently disabled.  No current a
lcohol, tobacco or illicit drug use.  Functional of all activities of daily living.

 

REVIEW OF SYSTEMS:  The following complete review of systems was negative, unless otherwise mentioned
 in the HPI or below:

Constitutional:  Weight loss or gain, ability to conduct usual activities.

Skin:  Rash, itching.

Eyes:  Double vision, pain.

ENT/Mouth:  Nose bleeding, neck stiffness, pain, tenderness.

Cardiovascular:  Palpitations, dyspnea on exertion, orthopnea.

Respiratory:  Shortness of breath, wheezing, cough, hemoptysis, fever or night sweats.

Gastrointestinal:  Poor appetite, abdominal pain, heartburn, nausea, vomiting, constipation, or diarr
hea.

Genitourinary:  Urgency, frequency, dysuria, nocturia.

Musculoskeletal:  Pain, swelling.

Neurologic/Psychiatric:  Anxiety, depression.

Allergy/Immunologic:  Skin rash, bleeding tendency.

 

Otherwise negative except as stated per HPI.

 

PHYSICAL EXAMINATION:

VITAL SIGNS:  On admission, blood pressure 132/63, pulse 65, respiratory rate 16, temperature 96.4 de
grees Fahrenheit, O2 saturation 92% on 4 liters per minute by nasal cannula.

GENERAL APPEARANCE:  This is a 62-year-old -American male, alert and oriented x3, pleasant, il
l appearing, in no acute distress.

HEENT:  Pupils are equal, round, and reactive to light and accommodation.  Extraocular muscles are in
tact.  No scleral icterus, no conjunctival injection.  Nares patent.  OP is clear.

NECK:  Supple, no cervical adenopathy, no thyromegaly, no carotid bruits, no JVD appreciated.  Cervic
al spine with full active and passive range of motion.  No meningeal signs appreciated.

LUNGS:  Scattered basilar crackles bilaterally.  Diminished airflow in the bases.

CARDIOVASCULAR:  S1, S2 with 2/6 systolic ejection murmur in the left upper sternal border.

ABDOMEN:  Rounded, soft, nontender, nondistended.  Bowel sounds are positive in all four quadrants.  
There is no hepatosplenomegaly, no abdominal bruits, no rebound or guarding appreciated.

EXTREMITIES:  Bilateral pitting edema to the knees.  Pulses are palpable distally at the dorsalis ped
is, posterior tibial, and popliteal arteries bilaterally.  Capillary refill less than 2 seconds.

NEUROLOGIC:  Cranial nerves II-XII are grossly intact.  No focal or lateralizing signs appreciated.

 

PERTINENT LABORATORY AND X-RAY FINDINGS:  Sodium 139, potassium 5.2, chloride 110, CO2 of 18, BUN 61,
 creatinine 3.04 with estimated GFR of 25.  Previous estimated GFR 45 on 03/03/2018.  Troponin I rang
ed between 0.036-0.049.  BNP 1471, previously noted 524 on 02/28/2018.  CBC showed a white blood cell
 count of 6.7, hemoglobin 8.2, hematocrit 27, platelet count of 336.  Venous blood gas dated 03/19/20
18 showed a pH 7.37, pCO2 of 32, pO2 of 32.  Portable chest x-ray dated 03/19/2018 showed interstitia
l pulmonary edema bilaterally.  Cardiomegaly noted.  Changes consistent with congestive heart failure
.  EKG dated 03/19/2018 by my interpretation shows a sinus bradycardia with first degree AV block.  R
ight bundle branch block pattern noted.  Heart rates in the 50s to 60s.  Attenuated R waves noted in 
the precordial leads.  Left axis deviation.  Isolated T-wave inversion in lead III.  No acute ST-T wa
ve changes appreciated.

 

ASSESSMENT AND PLAN:

1.  Acute on chronic diastolic congestive heart failure exacerbation with ejection fraction of 55%.  
The patient will be placed in observation status.  Continue Lasix 20 mg IV b.i.d.  We will provide di
uretic therapy with cautious use given patient's chronic kidney disease stage 4 and cadaveric renal t
ransplant.  We will continue to monitor urine output and daily weights.  Last 2D transthoracic echoca
rdiogram performed on 02/25/2018 showed preserved ejection fraction of 55%.

2.  Acute kidney injury on chronic kidney disease, stage 3-4.  We will continue close monitoring of r
enal function.  Consult Nephrology Service for evaluation.  Given the need for diuretic therapy, clos
e monitoring is in order.  Avoid nephrotoxic agents and contrast media.

3.  Hyperkalemia.  Mild.  Suspect secondarily to acute kidney injury.  See #2 above.

4.  Acute on chronic hypoxemic respiratory failure.  We will continue oxygen supplementation to maint
ain O2 saturation greater than or equal to 90%  Titrate oxygen therapy for clinical response.  Resume
 ProAir HFA 2 puffs q.4. hours p.r.n. and Symbicort 80/4.5 one puff inhaled b.i.d.

5.  History of atrial fibrillation with rapid ventricular response, status post electrical cardiovers
ion, on chronic anticoagulation with Eliquis.  We will continue rate control measures.  Current sinus
 mechanism noted on telemetry monitoring.  Continue Eliquis 5 mg p.o. b.i.d.  Check digoxin level.

6.  Three-vessel coronary artery disease status post coronary artery bypass grafting.  Stable current
ly.  No evidence to suggest acute coronary syndrome.  Resume home medication regimen and monitor clin
ical response.

7.  Chronic normocytic anemia secondary to chronic kidney disease.  Stable currently.  No evidence to
 suggest acute blood loss.  Serial H&H monitoring.

8.  Hypertension.  Resume home antihypertensive regimen and monitor clinical response.

9.  Prophylaxis.  Sequential compression devices held due to lower extremity edema.  Currently on ant
icoagulation with Eliquis.  No deep venous thrombosis prophylaxis.

10.  Code status is FULL.  Surrogate medical decision maker is patient's spouse.

## 2018-03-20 LAB
ANALYZER IN CARDIO: (no result)
ANION GAP SERPL CALC-SCNC: 15 MMOL/L (ref 10–20)
BASE EXCESS STD BLDA CALC-SCNC: -8.5 MEQ/L
BUN SERPL-MCNC: 55 MG/DL (ref 8.4–25.7)
CA-I BLDA-SCNC: 1.2 MMOL/L (ref 1.12–1.3)
CALCIUM SERPL-MCNC: 9.2 MG/DL (ref 7.8–10.44)
CHLORIDE SERPL-SCNC: 110 MMOL/L (ref 98–107)
CO2 SERPL-SCNC: 18 MMOL/L (ref 23–31)
CREAT CL PREDICTED SERPL C-G-VRATE: 32 ML/MIN (ref 70–130)
CRYSTAL-AUWI FLAG: 0 (ref 0–15)
GLUCOSE SERPL-MCNC: 88 MG/DL (ref 80–115)
HCO3 BLDA-SCNC: 16.1 MEQ/L (ref 22–26)
HCT VFR BLDA CALC: 30.3 % (ref 42–52)
HEV IGM SER QL: 0.1 (ref 0–7.99)
HGB BLD-MCNC: 7.6 G/DL (ref 14–18)
HGB BLDA-MCNC: 7.9 G/DL (ref 14–18)
HYALINE CASTS #/AREA URNS LPF: (no result) LPF
MCH RBC QN AUTO: 25.9 PG (ref 27–31)
MCV RBC AUTO: 85.2 FL (ref 80–94)
MDIFF COMPLETE?: YES
PATHC CAST-AUWI FLAG: 0.13 (ref 0–2.49)
PCO2 BLDA: 29.7 MMHG (ref 35–45)
PH BLDA: 7.35 [PH] (ref 7.35–7.45)
PLATELET # BLD AUTO: 293 THOU/UL (ref 130–400)
PO2 BLDA: 48.2 MMHG (ref 80–100)
POTASSIUM SERPL-SCNC: 4.9 MMOL/L (ref 3.5–5.1)
RBC # BLD AUTO: 2.94 MILL/UL (ref 4.7–6.1)
RBC UR QL AUTO: (no result) HPF (ref 0–3)
SODIUM SERPL-SCNC: 138 MMOL/L (ref 136–145)
SP GR UR STRIP: 1.01 (ref 1–1.04)
SPECIMEN DRAWN FROM PATIENT: (no result)
SPERM-AUWI FLAG: 0 (ref 0–9.9)
WBC # BLD AUTO: 7.4 THOU/UL (ref 4.8–10.8)
YEAST-AUWI FLAG: 0 (ref 0–25)

## 2018-03-20 RX ADMIN — Medication SCH ML: at 13:38

## 2018-03-20 RX ADMIN — MYCOPHENOLIC ACID SCH MG: 180 TABLET, DELAYED RELEASE ORAL at 09:21

## 2018-03-20 RX ADMIN — NIFEDIPINE SCH MG: 60 TABLET, EXTENDED RELEASE ORAL at 09:16

## 2018-03-20 RX ADMIN — MYCOPHENOLIC ACID SCH MG: 180 TABLET, DELAYED RELEASE ORAL at 22:04

## 2018-03-20 RX ADMIN — MOMETASONE FUROATE AND FORMOTEROL FUMARATE DIHYDRATE SCH PUFF: 100; 5 AEROSOL RESPIRATORY (INHALATION) at 18:17

## 2018-03-20 RX ADMIN — MOMETASONE FUROATE AND FORMOTEROL FUMARATE DIHYDRATE SCH PUFF: 100; 5 AEROSOL RESPIRATORY (INHALATION) at 06:57

## 2018-03-20 RX ADMIN — Medication SCH ML: at 22:05

## 2018-03-20 RX ADMIN — NIFEDIPINE SCH MG: 60 TABLET, EXTENDED RELEASE ORAL at 22:05

## 2018-03-20 NOTE — CON
DATE OF CONSULTATION:  03/20/2018

 

HISTORY OF PRESENT ILLNESS:  The patient is a pleasant 61-year-old gentleman with a history of corona
ry bypass surgery and congestive heart failure, who presents again with increasing dyspnea.  The yeimy
ent in 2014 was found to have severe 3-vessel coronary artery disease.  He subsequently underwent cor
onary bypass graft surgery.  The patient also has a history of nonsustained ventricular tachycardia, 
underwent previous EP evaluation.  He has declined to undergo evaluation for ICD and has been on medi
corey therapy, admitted on several occasions with congestive heart failure.  The patient also has a his
tory of paroxysmal atrial fibrillation.  He was recently hospitalized and underwent an EP consultatio
n.  The patient declined to undergo an EP study.  Digoxin was added to the patient's medical regimen.
  He was started on anticoagulation therapy.  The patient states he has been compliant with his medic
ation, but presents again with increasing dyspnea.  He denies having any chest discomfort.

 

PAST MEDICAL HISTORY:  Significant for,

1.  Coronary artery disease.

2.  Congestive heart failure.

3.  Hypertension.

4.  Hyperlipidemia.

5.  History of neuropathy.

 

PAST SURGICAL HISTORY:  He has had a renal transplant, appendectomy, coronary bypass graft surgery.

 

SOCIAL HISTORY:  Nonsmoker.

 

ALLERGIES:  No known drug allergies

 

MEDICATION:  On admission was Prograf 0.5 b.i.d., nifedipine 60 b.i.d., Myfortic 360 b.i.d., Eliquis 
5 b.i.d., Lipitor 40 at bedtime, Lasix 40 daily, hydralazine 50 p.o. t.i.d., Imdur 60 q.a.m., digoxin
 0.125 daily, and clonidine 0.3 b.i.d.

 

FAMILY HISTORY:  No strong family history of heart disease.

 

REVIEW OF SYSTEMS:  Ten-point system otherwise unremarkable.  No history of easy bruising or bleeding
, bright red blood per rectum.

 

PHYSICAL EXAMINATION:

GENERAL:  This is an ill-appearing gentleman in no acute distress with a blood pressure 115/53.

NECK:  Showed no carotid bruits.

LUNGS:  Few crackles in both bases.

HEART:  Irregular rate and rhythm with a normal S1 and S2.

ABDOMEN:  Nondistended.

EXTREMITIES:  Showed mild bilateral edema.

SKIN:  Warm and dry.

NEUROLOGIC:  Nonfocal.

VASCULAR:  Radial pulses 2+.

 

LABORATORY DATA:  Sodium 138, potassium 4.9, chloride 110, bicarbonate 18, BUN 55, creatinine 2.56.  
Troponin 0.049, BNP is 1446.  White blood cell count 7.4, hemoglobin 7.6, hematocrit 25, platelets ar
e 293.  His EKG revealed normal sinus rhythm with prolonged first degree AV block with Q-waves sugges
tive of previous inferior infarct.  Telemetry monitoring revealed a second degree AV block, Mobitz ty
pe 1.

 

IMPRESSION:

1.  Congestive heart failure, diastolic.

2.  Second degree Mobitz type 1, asymptomatic.

3.  History of atrial fibrillation.

4.  History of coronary bypass surgery.

5.  History of renal transplant.

6.  History of tricuspid regurgitation.

7.  Dyslipidemia.

 

This gentleman presents with recurrent congestive heart failure.  His blood pressure at this time is 
adequately controlled.  He has a second degree Mobitz type 1 heart block, we would discontinue the padma madison's digoxin.  He also had reported having hallucinations may be secondary due to this medication.
  We will follow this patient with you through his hospitalization.

## 2018-03-20 NOTE — CON
DATE OF CONSULTATION:  03/20/2018

 

HISTORY OF PRESENT ILLNESS:  Mr. Collins is a 62-year-old black male who is status post cadaveric re
nal transplant and admitted for congestive heart failure.  He was started on IV diuretics.  His breat
fantasma this morning is actually improved.  Please note this patient has a history of diastolic dysfunct
ion.  I also reviewed his immunosuppressive regimen and I adjusted it this morning.

 

REVIEW OF SYSTEMS:  Positive for lower leg edema.  Positive for shortness of breath.  No chest pain o
r syncopal episode.  No nausea.  No vomiting.  No productive cough.  No dysuria, no fever or chills. 
 No urinary frequency.  No abdominal pain.  Appetite and energy level is decreased.  No joint pains. 
 No new skin rash.  No sore throat.  No diplopia.

 

MEDICATIONS:  Currently on albuterol inhaler q.4 p.r.n., Eliquis 5 mg p.o. b.i.d., atorvastatin 40 mg
 at bedtime, budesonide oral inhaler, clonidine 0.3 mg p.o. b.i.d., famotidine 20 mg q.p.m., furosemi
de 20 mg IV b.i.d., hydralazine 50 mg p.o. b.i.d., Imdur 60 mg once a day, metoprolol tartrate 50 mg 
p.o. b.i.d., mometasone oral inhaler b.i.d., mycophenolate 360 mg p.o. b.i.d., tacrolimus 2.5 mg p.o.
 b.i.d., Zofran p.r.n., nifedipine 60 mg p.o. b.i.d.

 

PAST MEDICAL HISTORY:

1.  Status post cadaveric renal transplant.

2.  Status post CHF - diastolic dysfunction.

3.  Hypertension.

4.  Hyperlipidemia.

5.  Chronic pain.

6.  Coronary artery disease.

 

PAST SURGICAL HISTORY:

1.  Status post cadaveric renal transplant.

2.  Status post colonoscopy.

3.  Status post AV fistula placement.

4.  Status post cuffed dialysis catheter placement.

5.  Status post cardiac catheterization.

6.  Status post coronary artery bypass graft.

 

SOCIAL HISTORY:  The patient lives in [place], .  He is a retired , several children.  No
 smoking, no alcohol intake, no drug abuse.  Status post multiple blood transfusions.  Sedentary life
style.  Education high school.

 

FAMILY HISTORY:  No family history of ESRD.

 

ALLERGIES:  PENICILLIN.

 

TRAUMA:  None.

 

IMMUNIZATIONS:  Up to date.

 

HOSPITALIZATIONS:  Please see past medical history.

 

PHYSICAL EXAMINATION:

VITAL SIGNS:  Blood pressure is 112/56, heart rate 61, respiratory rate 18, temperature 98, pulse ox 
93% on 3 liters nasal cannula.

GENERAL:  Awake, alert, supine, in mild respiratory distress.

SKIN:  Adequate turgor.

HEENT:  He has pale conjunctivae, anicteric sclerae.

NECK:  No neck mass, no carotid bruits, no JVD.

CHEST:  No deformities.

LUNGS:  Decreased breath sounds.

HEART:  Normal sinus rhythm.  No murmur, no gallops, no rubs.

ABDOMEN:  Globular, soft, nontender.  No masses.

EXTREMITIES:  No edema, no deformities.

 

LABORATORY:  Of 03/20/2018, white count 7.4, hemoglobin 7.6.  Sodium 138, potassium 4.9, chloride 110
, carbon dioxide 18, BUN 55, creatinine 2.56, glucose 88, GFR 31 mL per minute, calcium 9.2.

 

Of 03/19/2018, BUN 61, creatinine 3.04.

 

Of 03/03/2018, creatinine 1.83.

 

Chest x-ray of 03/19/2018 shows CHF.

 

ASSESSMENT AND PLAN:

1.  Shortness of breath secondary to congestive heart failure.  Last echo was normal - he has diastol
ic dysfunction.  Agree with IV diuretics.

2.  Status post cadaveric renal transplant - creatinine in the higher when he came at 3.01.  He may h
ave a possible superimposed prerenal azotemia on top of his underlying chronic renal failure.  I have
 adjusted the immunosuppressive regimen to Prograf 2.5 mg tab b.i.d., mycophenolate 360 mg p.o. b.i.d
. and I resume back his prednisone at 5 mg tab once a day.  Please note that in the past, renal dysfu
nction has been slowly progressive.  The patient is not complied with his renal visit to the transpla
nt program back at [facility].  We will be checking tacrolimus level in a.m.  For the moment, ag
ree with overall management.

3.  Anemia.  Check stool cards.  Start ferrous sulfate.

## 2018-03-20 NOTE — PDOC.EVN
Event Note





- Event Note


Event Note: 





Called due to hypoxia and dyspnea, rhythm to Aflutter with variable block, but 

ratecontrolled.





ABG requested, PCO2 30 and pH 7.35, PO2 40s.  Labs reviewe,d HCO3 18.  looks 

like a metabolic acidosis iwht resp compensation.  still satting in 80s on 40 

VM.  biomarkers and BMP ordered STAT.  Will transfer to IM.  Renal transplant 

with DINA on CKD, Dr Potter following.  Already on eliquis 5mg BID, nursing 

reported blood tinged phlegm earlier this evening.





CXR with improvement form yesterdays, less pulm edema, will give lasix and 

follow up on labs

## 2018-03-20 NOTE — PDOC.PN
- Subjective


Encounter Start Date: 03/20/18


Encounter Start Time: 11:10





- Objective


Resuscitation Status: 


 











Resuscitation Status           FULL:Full Resuscitation














Vital Signs & Weight: 


 Vital Signs (12 hours)











  Temp Pulse Resp BP Pulse Ox


 


 03/20/18 10:47  97.9 F  64  18  114/56 L  92 L


 


 03/20/18 09:16   61   


 


 03/20/18 08:10  98 F  61  18  


 


 03/20/18 07:26  98 F  61  18  112/56 L  93 L


 


 03/20/18 06:57   62  14   96


 


 03/20/18 03:12  98.0 F  59 L  18  115/53 L  92 L








 Weight











Weight                         171 lb 1.6 oz














I&O: 


 











 03/19/18 03/20/18 03/21/18





 06:59 06:59 06:59


 


Intake Total  294 240


 


Output Total  950 450


 


Balance  -656 -210











Result Diagrams: 


 03/20/18 04:23





 03/20/18 04:23


Additional Labs: 





 Laboratory Tests











  11/16/16 03/19/18 03/19/18





  10:45 12:30 12:30


 


Hgb   8.2 L 


 


MCV   82.0 


 


Sodium  131 L  


 


Potassium   


 


BUN   


 


Creatinine   


 


B-Natriuretic Peptide    1470.9 H














  03/19/18 03/20/18 03/20/18





  12:30 04:23 04:23


 


Hgb   


 


MCV    85.2


 


Sodium   


 


Potassium  5.2 H  


 


BUN  61 H  


 


Creatinine  3.04 H  


 


B-Natriuretic Peptide   1446.7 H 











EKG Reviewed by me: Yes (Tele = SR, intermittent 2nd degree AV block)





Phys Exam





- Physical Examination


Constitutional: NAD


HEENT: PERRLA, oral pharynx no lesions


Neck: no JVD, supple


basilar crackles


Cardiovascular: RRR


Gastrointestinal: soft, non-tender, no distention, positive bowel sounds


diminished edema of bilat LE's


Musculoskeletal: pulses present, edema present


Neurological: normal sensation, moves all 4 limbs


Psychiatric: A&O x 3


Skin: normal turgor, cap refill <2 seconds





Dx/Plan


(1) Acute on chronic diastolic congestive heart failure


Code(s): I50.33 - ACUTE ON CHRONIC DIASTOLIC (CONGESTIVE) HEART FAILURE   Status

: Acute   Comment: Improved with diuresis, EF 55% on Echo, continue Lasix 20mg 

IV q12h   





(2) Acute on chronic renal failure


Code(s): N17.9 - ACUTE KIDNEY FAILURE, UNSPECIFIED; N18.9 - CHRONIC KIDNEY 

DISEASE, UNSPECIFIED   Status: Acute   


Qualifiers: 


   Acute renal failure type: unspecified 


Comment: Improved with diuretic use, continue Lasix as outlined in #1, 

Nephrology assistance appreciated   





(3) CAD (coronary artery disease)


Code(s): I25.10 - ATHSCL HEART DISEASE OF NATIVE CORONARY ARTERY W/O ANG PCTRS 

  Status: Chronic   


Qualifiers: 


   Coronary Disease-Associated Artery/Lesion type: bypass graft   Native vs. 

transplanted heart: native heart   Associated angina: without angina   

Qualified Code(s): I25.810 - Atherosclerosis of coronary artery bypass graft(s) 

without angina pectoris   


Comment: Stable overall, no evidence of ACS, ASA 81mg daily, Lipitor 40mg daily

   





(4) Hypertension


Code(s): I10 - ESSENTIAL (PRIMARY) HYPERTENSION   Status: Chronic   


Qualifiers: 


   Hypertension type: essential hypertension   Qualified Code(s): I10 - 

Essential (primary) hypertension   


Comment: Stable, continue home regimen with Hydralazine, Clonidine, Nifedipine 

and Metoprolol, serial monitoring   





(5) Hyperkalemia


Code(s): E87.5 - HYPERKALEMIA   Status: Acute   Comment: Improved with diuresis

, follow serially   





(6) Acute and chronic respiratory failure with hypoxia


Code(s): J96.21 - ACUTE AND CHRONIC RESPIRATORY FAILURE WITH HYPOXIA   Status: 

Acute   Comment: Currently improved and at baseline O2 requirement of 3-4L/min 

NC   





(7) Second degree AV block


Code(s): I44.1 - ATRIOVENTRICULAR BLOCK, SECOND DEGREE   Status: Acute   Comment

: Intermittent, consult Cardiology for evaluation, may need titration of 

current CCB and B-blocker   





(8) Normocytic anemia


Code(s): D64.9 - ANEMIA, UNSPECIFIED   Status: Chronic   Comment: subacute, 

serial H/H monitoring, FeSO4, stool guaiac   





- Plan


out of bed/ambulate


Continue diuresis with Lasix


-: Appreciate Nephrology assistance


-: FeSO4 325mg BID


-: Restart ASA and Lipitor


-: Convert to inpt status





* Consult Cardiology service regarding 2nd degree AVB


* AM lab: BMP, CBC, Stool guaiac

## 2018-03-21 LAB
ANION GAP SERPL CALC-SCNC: 12 MMOL/L (ref 10–20)
ANION GAP SERPL CALC-SCNC: 21 MMOL/L (ref 10–20)
BASOPHILS # BLD AUTO: 0 THOU/UL (ref 0–0.2)
BASOPHILS NFR BLD AUTO: 0.3 % (ref 0–1)
BUN SERPL-MCNC: 44 MG/DL (ref 8.4–25.7)
BUN SERPL-MCNC: 45 MG/DL (ref 8.4–25.7)
CALCIUM SERPL-MCNC: 8.9 MG/DL (ref 7.8–10.44)
CALCIUM SERPL-MCNC: 9.4 MG/DL (ref 7.8–10.44)
CHLORIDE SERPL-SCNC: 108 MMOL/L (ref 98–107)
CHLORIDE SERPL-SCNC: 109 MMOL/L (ref 98–107)
CK MB SERPL-MCNC: 1 NG/ML (ref 0–6.6)
CK MB SERPL-MCNC: 1.2 NG/ML (ref 0–6.6)
CO2 SERPL-SCNC: 13 MMOL/L (ref 23–31)
CO2 SERPL-SCNC: 21 MMOL/L (ref 23–31)
CREAT CL PREDICTED SERPL C-G-VRATE: 37 ML/MIN (ref 70–130)
CREAT CL PREDICTED SERPL C-G-VRATE: 37 ML/MIN (ref 70–130)
EOSINOPHIL # BLD AUTO: 0 THOU/UL (ref 0–0.7)
EOSINOPHIL NFR BLD AUTO: 0.2 % (ref 0–10)
GLUCOSE SERPL-MCNC: 90 MG/DL (ref 80–115)
GLUCOSE SERPL-MCNC: 98 MG/DL (ref 80–115)
HGB BLD-MCNC: 7.4 G/DL (ref 14–18)
LYMPHOCYTES # BLD: 0.8 THOU/UL (ref 1.2–3.4)
LYMPHOCYTES NFR BLD AUTO: 10.4 % (ref 21–51)
MCH RBC QN AUTO: 25.6 PG (ref 27–31)
MCV RBC AUTO: 83.2 FL (ref 80–94)
MONOCYTES # BLD AUTO: 0.8 THOU/UL (ref 0.11–0.59)
MONOCYTES NFR BLD AUTO: 10.5 % (ref 0–10)
NEUTROPHILS # BLD AUTO: 5.8 THOU/UL (ref 1.4–6.5)
NEUTROPHILS NFR BLD AUTO: 78.6 % (ref 42–75)
PLATELET # BLD AUTO: 267 THOU/UL (ref 130–400)
POTASSIUM SERPL-SCNC: 5.1 MMOL/L (ref 3.5–5.1)
POTASSIUM SERPL-SCNC: 5.1 MMOL/L (ref 3.5–5.1)
RBC # BLD AUTO: 2.89 MILL/UL (ref 4.7–6.1)
SODIUM SERPL-SCNC: 137 MMOL/L (ref 136–145)
SODIUM SERPL-SCNC: 137 MMOL/L (ref 136–145)
TROPONIN I SERPL DL<=0.01 NG/ML-MCNC: 0.03 NG/ML (ref ?–0.03)
TROPONIN I SERPL DL<=0.01 NG/ML-MCNC: 0.06 NG/ML (ref ?–0.03)
WBC # BLD AUTO: 7.4 THOU/UL (ref 4.8–10.8)

## 2018-03-21 RX ADMIN — MOMETASONE FUROATE AND FORMOTEROL FUMARATE DIHYDRATE SCH PUFF: 100; 5 AEROSOL RESPIRATORY (INHALATION) at 11:12

## 2018-03-21 RX ADMIN — NIFEDIPINE SCH: 60 TABLET, EXTENDED RELEASE ORAL at 08:16

## 2018-03-21 RX ADMIN — MYCOPHENOLIC ACID SCH MG: 180 TABLET, DELAYED RELEASE ORAL at 08:14

## 2018-03-21 RX ADMIN — Medication SCH ML: at 20:25

## 2018-03-21 RX ADMIN — MOMETASONE FUROATE AND FORMOTEROL FUMARATE DIHYDRATE SCH PUFF: 100; 5 AEROSOL RESPIRATORY (INHALATION) at 19:18

## 2018-03-21 RX ADMIN — ASPIRIN SCH MG: 81 TABLET ORAL at 08:14

## 2018-03-21 RX ADMIN — MYCOPHENOLIC ACID SCH MG: 180 TABLET, DELAYED RELEASE ORAL at 20:21

## 2018-03-21 RX ADMIN — Medication PRN ML: at 00:44

## 2018-03-21 RX ADMIN — Medication SCH ML: at 08:16

## 2018-03-21 RX ADMIN — Medication PRN ML: at 06:11

## 2018-03-21 NOTE — CON
DATE OF CONSULTATION:  03/21/2018

 

This is a 62-year-old  gentleman who was brought to the hospital with worsening conge
stive heart failure.  He has had lower extremity swelling.  Last night he became more hypoxic and was
 transferred to [facility].

 

He is currently on a face mask.  His sats are 96%.  He said he is better.  He is coughing up some rel
atively clear sputum.  Denies any chest pain, chills or sweats.

 

A VQ scan was ordered, but the patient is apparently on Eliquis 2.5 mg twice a day.

 

The patient is a former smoker, quit many years ago.

 

PAST MEDICAL HISTORY:  Otherwise, pertinent for CHF, COPD, renal failure, failed transplant, hyperlip
idemia, gout.

 

PAST SURGICAL HISTORY:  Kidney transplant in 2009.  Bypass surgery, access.

 

SOCIAL/FAMILY HISTORY:  He is a .

 

ALCOHOL:  None.  

 

TOBACCO:  Tobacco as noted.

 

REVIEW OF SYSTEMS:  Negative.

 

I reviewed his medical records, old x-rays personally.  X-ray shows bilateral pulmonary infiltrates c
onsistent with CHF.

 

PHYSICAL EXAMINATION:

VITAL SIGNS:  Blood pressure 106/50, sats are 96 on 4 liters, respiration rate 18.

CHEST:  Minimal crackles, no wheezing.

CARDIAC:  Normal S1, S2.

ABDOMEN:  Soft.  No masses.

 

LABORATORY:  White count 7,000.  H&H 7 and 24, platelet count is normal.  His pO2 74, pCO2 47.29.  Cr
eatinine 2.24.

 

IMPRESSION:

1.  Acute and chronic respiratory failure secondary to congestive heart failure.

2.  Renal failure, failed transplant.

3.  Hypertension.

 

PLAN:  He is on his antirejection medication, low dose prednisone and Prograf.  I have started aggres
sive neb treatments, supportive care.  It would be unusual for him to have a PE on Eliquis.  

 

I will follow while in the [facility].

 

I will notify Dr. Walls who has seen him in the past.

## 2018-03-21 NOTE — PRG
DATE OF SERVICE:  03/21/2018

 

SUBJECTIVE:  Mr. Collins is a 62-year-old black male with known history of a renal transplantation a
nd admitted for congestive heart failure.  His breathing worsened last night and for that reason he h
as been placed at [facility].  He is currently on a face mask.  His breathing is a little improved.  No com
plaints of chest pain.  Cardiology has been consulted.

 

OBJECTIVE:

VITAL SIGNS:  Blood pressure is 106/55 with a heart rate of 59, respiratory rate 22, pulse ox 96%.

GENERAL:  Noted to be awake, sitting comfortable, not in overt distress.

SKIN:  Adequate turgor. 

HEENT:  Slightly pale conjunctivae, anicteric sclerae.

NECK:  No neck mass, no carotid bruits, no JVD.

CHEST:  No deformities.

LUNGS:  Decreased breath sounds.

HEART:  Normal sinus rhythm.  No murmur, no gallops, no rubs.

ABDOMEN:  Globular, soft, nontender.

EXTREMITIES:  No edema.

 

MEDICATIONS:  03/21/2018 - Reviewed.

 

LABORATORY:  03/21/2018 - White count 7.4, hemoglobin 7.4, hematocrit 24, sodium 137, potassium 5.1, 
chloride 109, carbon dioxide 21, BUN 44, creatinine 2.24, GFR 36 mL per minute.  Troponin I 0.057.

 

ASSESSMENT AND PLAN:

1.  Anemia.  Continue current management.  I would probably consider starting this patient on iron patel
pplementation.  If no improvement, consider placing patient on weekly Epogen.

2.  Congestive heart failure - continue current diuretic regimen.  The patient is currently on Lasix 
at 40 mg IV q.12.  

3.  Status post cadaveric renal transplant, stabilizing renal function.  Creatinine of 2.2 is about t
he same as yesterday.  Continue current diuretic regimen.  There is no indication for any dialytic in
tervention with this patient.  He will continue with his current immunosuppressive regimen.

 

Overall, I agree with current management.

 

Recheck base met and CBC in a.m.

## 2018-03-21 NOTE — PDOC.PN
- Subjective


Encounter Start Date: 03/21/18


Encounter Start Time: 18:00


Subjective: f/u for acute/chronic diast CHF with increased dyspnea and hypoxia 


-: requiring transfer to [facility] overnight. Currently feels fine. Lasix 40mg BID





- Objective


MAR Reviewed: Yes


Vital Signs & Weight: 


 Vital Signs (12 hours)











  Temp Pulse Resp BP BP Pulse Ox


 


 03/21/18 15:29  98.3 F  62  19   103/52 L  100


 


 03/21/18 11:10   64  20    91 L


 


 03/21/18 11:00  98.8 F  71  17   119/63  90 L


 


 03/21/18 08:16   59 L   106/55 L  


 


 03/21/18 08:15   59 L   106/55 L  


 


 03/21/18 08:00  98.0 F  59 L  22 H    100


 


 03/21/18 07:38  98.6 F     


 


 03/21/18 07:24  98.6 F  57 L  22 H   109/59 L  100








 Weight











Weight                         170 lb 6.4 oz














I&O: 


 











 03/20/18 03/21/18 03/22/18





 06:59 06:59 06:59


 


Intake Total  260 


 


Output Total  1080 


 


Balance  -820 











Result Diagrams: 


 03/21/18 04:05





 03/21/18 04:05


Additional Labs: 





 Laboratory Tests











  11/16/16 11/16/16 11/16/16





  10:45 10:45 10:45


 


Hgb   11.3 L 


 


MCV   


 


Sodium  131 L  


 


Potassium  3.2 L  


 


BUN  30 H  


 


Creatinine  2.23 H  


 


Uric Acid   


 


B-Natriuretic Peptide    321.5 H














  11/17/16 11/17/16 03/19/18





  05:00 05:05 12:30


 


Hgb   11.6 L  8.2 L


 


MCV    82.0


 


Sodium   


 


Potassium   


 


BUN   


 


Creatinine   


 


Uric Acid  13.3 H  


 


B-Natriuretic Peptide   














  03/19/18 03/19/18 03/20/18





  12:30 12:30 04:23


 


Hgb   


 


MCV   


 


Sodium   


 


Potassium   5.2 H 


 


BUN   61 H 


 


Creatinine   3.04 H 


 


Uric Acid   


 


B-Natriuretic Peptide  1470.9 H   1446.7 H














  03/20/18 03/20/18





  04:23 23:44


 


Hgb  7.6 L 


 


MCV  85.2 


 


Sodium  


 


Potassium  


 


BUN  


 


Creatinine   2.22 H


 


Uric Acid  


 


B-Natriuretic Peptide  











Radiology Reviewed by me: Yes (PCXR - increased edema)


EKG Reviewed by me: Yes (Tele - SR in 70's)





Phys Exam





- Physical Examination


Constitutional: NAD


HEENT: PERRLA, oral pharynx no lesions


Neck: no JVD, supple


few basilar crackles


Respiratory: no wheezing


Cardiovascular: RRR


Gastrointestinal: soft, non-tender, no distention, positive bowel sounds


mild edema of bilat LE's


Musculoskeletal: pulses present


Neurological: normal sensation, moves all 4 limbs


Psychiatric: A&O x 3


Skin: normal turgor, cap refill <2 seconds





Dx/Plan


(1) Acute on chronic diastolic congestive heart failure


Code(s): I50.33 - ACUTE ON CHRONIC DIASTOLIC (CONGESTIVE) HEART FAILURE   Status

: Acute   Comment: Improved with diuresis, EF 55% on Echo, continue Lasix 40mg 

IV q12h   





(2) Acute on chronic renal failure


Code(s): N17.9 - ACUTE KIDNEY FAILURE, UNSPECIFIED; N18.9 - CHRONIC KIDNEY 

DISEASE, UNSPECIFIED   Status: Acute   


Qualifiers: 


   Acute renal failure type: unspecified 


Comment: Improved with diuretic use, continue Lasix as outlined in #1, 

Nephrology assistance appreciated   





(3) CAD (coronary artery disease)


Code(s): I25.10 - ATHSCL HEART DISEASE OF NATIVE CORONARY ARTERY W/O ANG PCTRS 

  Status: Chronic   


Qualifiers: 


   Coronary Disease-Associated Artery/Lesion type: bypass graft   Native vs. 

transplanted heart: native heart   Associated angina: without angina   

Qualified Code(s): I25.810 - Atherosclerosis of coronary artery bypass graft(s) 

without angina pectoris   


Comment: Stable overall, no evidence of ACS, ASA 81mg daily, Lipitor 40mg daily

   





(4) Hypertension


Code(s): I10 - ESSENTIAL (PRIMARY) HYPERTENSION   Status: Chronic   


Qualifiers: 


   Hypertension type: essential hypertension   Qualified Code(s): I10 - 

Essential (primary) hypertension   


Comment: Stable, continue home regimen with Hydralazine, Clonidine, Nifedipine 

and Metoprolol, serial monitoring   





(5) Hyperkalemia


Code(s): E87.5 - HYPERKALEMIA   Status: Acute   Comment: Improved with diuresis

, follow serially   





(6) Acute and chronic respiratory failure with hypoxia


Code(s): J96.21 - ACUTE AND CHRONIC RESPIRATORY FAILURE WITH HYPOXIA   Status: 

Acute   Comment: Currently improved and at baseline O2 requirement of 3-4L/min 

NC   





(7) Second degree AV block


Code(s): I44.1 - ATRIOVENTRICULAR BLOCK, SECOND DEGREE   Status: Acute   Comment

: Intermittent, consult Cardiology for evaluation, may need titration of 

current CCB and B-blocker   





(8) Normocytic anemia


Code(s): D64.9 - ANEMIA, UNSPECIFIED   Status: Chronic   Comment: ?subacute, 

serial H/H monitoring, FeSO4, stool guaiac, consider Epogen   





- Plan


PT/OT, respiratory therapy, out of bed/ambulate, DVT proph w/SCDs


Stable overall


-: Continue Lasix 40mg IV q12h


-: Digoxin d/c'd


-: continue Prograf, Prednisone and Mycophenolate


-: Transfe to Tele





* AM lab: BMP, CBC, Stool occult

## 2018-03-22 LAB
ANION GAP SERPL CALC-SCNC: 14 MMOL/L (ref 10–20)
BASOPHILS # BLD AUTO: 0.1 THOU/UL (ref 0–0.2)
BASOPHILS NFR BLD AUTO: 1.2 % (ref 0–1)
BUN SERPL-MCNC: 39 MG/DL (ref 8.4–25.7)
CALCIUM SERPL-MCNC: 8.9 MG/DL (ref 7.8–10.44)
CHLORIDE SERPL-SCNC: 106 MMOL/L (ref 98–107)
CO2 SERPL-SCNC: 21 MMOL/L (ref 23–31)
CREAT CL PREDICTED SERPL C-G-VRATE: 41 ML/MIN (ref 70–130)
EOSINOPHIL # BLD AUTO: 0 THOU/UL (ref 0–0.7)
EOSINOPHIL NFR BLD AUTO: 0.7 % (ref 0–10)
GLUCOSE SERPL-MCNC: 90 MG/DL (ref 80–115)
HGB BLD-MCNC: 6.8 G/DL (ref 14–18)
LYMPHOCYTES # BLD: 0.9 THOU/UL (ref 1.2–3.4)
LYMPHOCYTES NFR BLD AUTO: 14.9 % (ref 21–51)
MCH RBC QN AUTO: 25.3 PG (ref 27–31)
MCV RBC AUTO: 83 FL (ref 80–94)
MONOCYTES # BLD AUTO: 0.8 THOU/UL (ref 0.11–0.59)
MONOCYTES NFR BLD AUTO: 12.6 % (ref 0–10)
NEUTROPHILS # BLD AUTO: 4.4 THOU/UL (ref 1.4–6.5)
NEUTROPHILS NFR BLD AUTO: 70.5 % (ref 42–75)
PLATELET # BLD AUTO: 228 THOU/UL (ref 130–400)
POTASSIUM SERPL-SCNC: 4.5 MMOL/L (ref 3.5–5.1)
RBC # BLD AUTO: 2.7 MILL/UL (ref 4.7–6.1)
SODIUM SERPL-SCNC: 136 MMOL/L (ref 136–145)
WBC # BLD AUTO: 6.2 THOU/UL (ref 4.8–10.8)

## 2018-03-22 PROCEDURE — 30233N1 TRANSFUSION OF NONAUTOLOGOUS RED BLOOD CELLS INTO PERIPHERAL VEIN, PERCUTANEOUS APPROACH: ICD-10-PCS | Performed by: INTERNAL MEDICINE

## 2018-03-22 RX ADMIN — MOMETASONE FUROATE AND FORMOTEROL FUMARATE DIHYDRATE SCH PUFF: 100; 5 AEROSOL RESPIRATORY (INHALATION) at 08:57

## 2018-03-22 RX ADMIN — MYCOPHENOLIC ACID SCH MG: 180 TABLET, DELAYED RELEASE ORAL at 08:24

## 2018-03-22 RX ADMIN — Medication SCH ML: at 20:27

## 2018-03-22 RX ADMIN — MYCOPHENOLIC ACID SCH MG: 180 TABLET, DELAYED RELEASE ORAL at 21:32

## 2018-03-22 RX ADMIN — ASPIRIN SCH MG: 81 TABLET ORAL at 08:14

## 2018-03-22 RX ADMIN — Medication SCH ML: at 08:24

## 2018-03-22 RX ADMIN — MOMETASONE FUROATE AND FORMOTEROL FUMARATE DIHYDRATE SCH PUFF: 100; 5 AEROSOL RESPIRATORY (INHALATION) at 18:58

## 2018-03-22 NOTE — PRG
DATE OF SERVICE:  03/22/2018

 

SUBJECTIVE:  Mr. Collins is a 62-year-old black male who was admitted for CHF and we are following h
im up for his renal transplantation.  Creatinine was initially higher than baseline when admitted.  H
owever, over the last few days, this has been slowly improving.  He is currently on a diuretic regime
n of Lasix 40 mg IV q.12.  His breathing is much better this morning.  We have also resumed his curre
nt immunosuppressive regimen.  He has no new complaints today.  He still feels tired.

 

PHYSICAL EXAMINATION:

VITAL SIGNS:  Blood pressure is 134/70, respiratory rate 19, heart rate 70, pulse ox ranging from 88%
-94%.

GENERAL:  Awake, supine, comfortable, not in overt distress.

SKIN:  Adequate turgor.

HEENT:  Pale conjunctivae, anicteric sclerae.

NECK:  No neck mass, no carotid bruits, no JVD.

CHEST:  No deformities.

LUNGS:  Clear breath sounds.  No wheezing, no crackles.

HEART:  Normal sinus rhythm.  No murmurs, no gallops, no rubs.

ABDOMEN:  Globular, soft, nontender, no masses.

EXTREMITIES:  Trace edema, no deformities.

 

MEDICATIONS:  Medications of 03/22/2018 reviewed.

 

LABORATORY DATA:  Laboratories of 03/22/2018; white count 6.2, hemoglobin 6.8, hematocrit 22.4.

 

Sodium 136, potassium 4.5, chloride 106, carbon dioxide 21, BUN 39, creatinine 2.05.

 

ASSESSMENT AND PLAN:

1.  Shortness of breath - secondary to congestive heart failure, clinically much improved.  Currently
 on IV diuresis.

2.  Status post renal transplant.  He has a superimposed prerenal azotemia which is actually improvin
g.  Creatinine now is noted at 2.05 with a glomerular filtration rate of 40 mL per minute.  We will c
ontinue with his current immunosuppressive regimen.  No changes to be made at the present time.

3.  Anemia.  We will observe.  The patient is clinically asymptomatic.  Recheck another basic metabol
ic panel and CBC in a.m.

## 2018-03-22 NOTE — PDOC.PN
- Subjective


Encounter Start Date: 03/22/18


Encounter Start Time: 19:15


Subjective: f/u for CHF on IV Lasix and DINA improving. Pt denies any specific 

complaint


-: but feels generally weak. Appetite good. No fever. Tele showing 


-: A-flutter and bradycardia in 40-50's. 





- Objective


MAR Reviewed: Yes


Vital Signs & Weight: 


 Vital Signs (12 hours)











  Temp Pulse Resp BP BP Pulse Ox


 


 03/22/18 19:26  97.8 F  67  17   143/69 H  96


 


 03/22/18 18:58   54 L  18   


 


 03/22/18 15:55  98.0 F  60  17   135/78  100


 


 03/22/18 14:09   57 L  18    94 L


 


 03/22/18 11:45  98.1 F  61  18   135/69  90 L


 


 03/22/18 08:58       96


 


 03/22/18 08:55   60  15    95


 


 03/22/18 08:34  98.1 F  70  19   134/70  88 L


 


 03/22/18 08:14   74   134/70  


 


 03/22/18 08:13     134/70  








 Weight











Weight                         171 lb 9 oz














I&O: 


 











 03/21/18 03/22/18 03/23/18





 06:59 06:59 06:59


 


Intake Total 260 420 960


 


Output Total 1080 925 750


 


Balance -820 -505 210











Result Diagrams: 


 03/22/18 04:39





 03/22/18 04:39


Additional Labs: 





Microbiology





03/22/18 14:15   Stool   Stool Occult Blood (ALEXIS) - Final





 Laboratory Tests











  11/16/16 11/16/16 11/16/16





  10:45 10:45 10:45


 


Hgb   11.3 L 


 


MCV   


 


Sodium  131 L  


 


Potassium  3.2 L  


 


BUN  30 H  


 


Creatinine  2.23 H  


 


Uric Acid   


 


B-Natriuretic Peptide    321.5 H














  11/17/16 11/17/16 03/19/18





  05:00 05:05 12:30


 


Hgb   11.6 L  8.2 L


 


MCV    82.0


 


Sodium   


 


Potassium   


 


BUN   


 


Creatinine   


 


Uric Acid  13.3 H  


 


B-Natriuretic Peptide   














  03/19/18 03/19/18 03/20/18





  12:30 12:30 04:23


 


Hgb   


 


MCV   


 


Sodium   


 


Potassium   5.2 H 


 


BUN   61 H 


 


Creatinine   3.04 H 


 


Uric Acid   


 


B-Natriuretic Peptide  1470.9 H   1446.7 H














  03/20/18 03/20/18 03/21/18





  04:23 23:44 04:05


 


Hgb  7.6 L  


 


MCV  85.2  


 


Sodium   


 


Potassium   


 


BUN   


 


Creatinine   2.22 H  2.24 H


 


Uric Acid   


 


B-Natriuretic Peptide   














  03/21/18





  04:05


 


Hgb  7.4 L


 


MCV 


 


Sodium 


 


Potassium 


 


BUN 


 


Creatinine 


 


Uric Acid 


 


B-Natriuretic Peptide 











Radiology Reviewed by me: Yes (PCXR -infiltrates and edema similar to 3/19/18)


EKG Reviewed by me: Yes (Tele - A-flutter, rates in 40-50's)





Phys Exam





- Physical Examination


ill-appearing, frail, responsive to questions


HEENT: PERRLA, oral pharynx no lesions


Neck: no JVD, supple


few basilar coarse sounds


Cardiovascular: irregular


Gastrointestinal: soft, non-tender, no distention, positive bowel sounds


minimal edema


Musculoskeletal: pulses present


Neurological: normal sensation, moves all 4 limbs


Psychiatric: A&O x 3


Skin: normal turgor, cap refill <2 seconds





Dx/Plan


(1) Acute on chronic diastolic congestive heart failure


Code(s): I50.33 - ACUTE ON CHRONIC DIASTOLIC (CONGESTIVE) HEART FAILURE   Status

: Acute   Comment: Improved with diuresis, EF 55% on Echo, continue Lasix 40mg 

IV q12h   





(2) Acute on chronic renal failure


Code(s): N17.9 - ACUTE KIDNEY FAILURE, UNSPECIFIED; N18.9 - CHRONIC KIDNEY 

DISEASE, UNSPECIFIED   Status: Acute   


Qualifiers: 


   Acute renal failure type: unspecified 


Comment: Improved with diuretic use, continue Lasix as outlined in #1, 

Nephrology assistance appreciated   





(3) CAD (coronary artery disease)


Code(s): I25.10 - ATHSCL HEART DISEASE OF NATIVE CORONARY ARTERY W/O ANG PCTRS 

  Status: Chronic   


Qualifiers: 


   Coronary Disease-Associated Artery/Lesion type: bypass graft   Native vs. 

transplanted heart: native heart   Associated angina: without angina   

Qualified Code(s): I25.810 - Atherosclerosis of coronary artery bypass graft(s) 

without angina pectoris   


Comment: Stable overall, no evidence of ACS, ASA 81mg daily, Lipitor 40mg daily

   





(4) Hypertension


Code(s): I10 - ESSENTIAL (PRIMARY) HYPERTENSION   Status: Chronic   


Qualifiers: 


   Hypertension type: essential hypertension   Qualified Code(s): I10 - 

Essential (primary) hypertension   


Comment: Stable, continue home regimen, hold Nifedipine and decrease Metoprolol 

25mg BID due to bradycardia   





(5) Hyperkalemia


Code(s): E87.5 - HYPERKALEMIA   Status: Acute   Comment: Improved with diuresis

, follow serially   





(6) Acute and chronic respiratory failure with hypoxia


Code(s): J96.21 - ACUTE AND CHRONIC RESPIRATORY FAILURE WITH HYPOXIA   Status: 

Acute   Comment: Currently improved and at baseline O2 requirement of 3-4L/min 

NC   





(7) Second degree AV block


Code(s): I44.1 - ATRIOVENTRICULAR BLOCK, SECOND DEGREE   Status: Acute   Comment

: Intermittent, consult Cardiology for evaluation, may need titration of 

current CCB and B-blocker   





(8) Normocytic anemia


Code(s): D64.9 - ANEMIA, UNSPECIFIED   Status: Chronic   Comment: ?subacute, 

serial H/H monitoring, FeSO4, stool guaiac, consider Epogen, Type and cross 1u 

PRBC's, transfuse 1u when availabe, CBC in am, GI consult   





(9) Bradycardia


Code(s): R00.1 - BRADYCARDIA, UNSPECIFIED   Status: Acute   Comment: Likely 

iatrogenic, d/c Clonidine, decrease Metoprolol 25mg BID, Nifedipine d/c'd   





- Plan


PT/OT, , out of bed/ambulate


Continue Lasix 40mg IV q12h


-: T &C 1u PRBCs and transfuse 1u


-: Decrease Metoprolol 25mg BID


-: Hold Eliquis due to guaiac + stool


-: Consult GI service





* Increase Pepcid 20mg BID


* AM lab: BMP, CBC, Fe, Ferritin, Retic count

## 2018-03-22 NOTE — RAD
PORTABLE UPRIGHT FRONTAL CHEST RADIOGRAPH:

 

03/22/2018

 

HISTORY:

Congestive heart failure.

 

COMPARISON:

03/19/2018

 

FINDINGS:

Diffuse increased interstitial density noted.  Mild alveolar opacity suspected, lateral aspect, mid r
ight lung zone.  No pneumothorax or large volume pleural effusion.  The cardiac silhouette is promine
nt.  Midline sternotomy wires are present.

 

IMPRESSION:

Interstitial opacity noted with mild lateral mid right lung zone air space disease.  These findings a
re similar when compared to the 03/20/2018 exam and the 03/19/2018 exam.  They are new since 02/28/20
18.

 

Findings suggest interstitial edema and/or interstitial infectious pneumonitis.  Followup following t
reatment advised.

 

POS: [facility]

## 2018-03-22 NOTE — PRG
DATE OF SERVICE:  03/22/2018

 

This morning is awake, alert, responsive.  

 

PHYSICAL EXAMINATION: 

VITAL SIGNS:  Blood pressure 130/64, sats are 90 on 4 liters, respiratory rate 18, temperature 99.

CHEST:  Chest reveals decreased breath sounds, no wheezing.

CARDIAC:  Normal S1, S2.  

ABDOMEN:  Soft, no masses.

 

LABORATORY DATA:  White count 6,000, H&H 6 and 22, platelet count 248, creatinine 2.

 

IMPRESSION:

1.  Renal failure.  

2.  Congestive heart failure.  

3.  Status post renal transplant. 

4.  Hypertension. 

5.  Anemia.

 

The patient appears to be at his baseline.  Diuretics, neb treatments, supportive care.

 

I will follow while in the [facility].

## 2018-03-23 LAB
ANION GAP SERPL CALC-SCNC: 16 MMOL/L (ref 10–20)
BUN SERPL-MCNC: 41 MG/DL (ref 8.4–25.7)
CALCIUM SERPL-MCNC: 9.7 MG/DL (ref 7.8–10.44)
CHLORIDE SERPL-SCNC: 104 MMOL/L (ref 98–107)
CO2 SERPL-SCNC: 20 MMOL/L (ref 23–31)
CREAT CL PREDICTED SERPL C-G-VRATE: 33 ML/MIN (ref 70–130)
GLUCOSE SERPL-MCNC: 90 MG/DL (ref 80–115)
HGB BLD-MCNC: 9.7 G/DL (ref 14–18)
IRON SERPL-MCNC: 31 UG/DL (ref 65–175)
MCH RBC QN AUTO: 25.3 PG (ref 27–31)
MCV RBC AUTO: 82.3 FL (ref 80–94)
MDIFF COMPLETE?: YES
PLATELET # BLD AUTO: 294 THOU/UL (ref 130–400)
PLATELET BLD QL SMEAR: (no result)
POTASSIUM SERPL-SCNC: 4.2 MMOL/L (ref 3.5–5.1)
RBC # BLD AUTO: 3.84 MILL/UL (ref 4.7–6.1)
RETICS/RBC NFR: 3.8 % (ref 0.5–1.5)
SODIUM SERPL-SCNC: 136 MMOL/L (ref 136–145)
UIBC SERPL-MCNC: 275 MCG/DL (ref 261–462)
WBC # BLD AUTO: 9 THOU/UL (ref 4.8–10.8)

## 2018-03-23 RX ADMIN — MYCOPHENOLIC ACID SCH: 180 TABLET, DELAYED RELEASE ORAL at 09:13

## 2018-03-23 RX ADMIN — MOMETASONE FUROATE AND FORMOTEROL FUMARATE DIHYDRATE SCH PUFF: 100; 5 AEROSOL RESPIRATORY (INHALATION) at 19:40

## 2018-03-23 RX ADMIN — ASPIRIN SCH MG: 81 TABLET ORAL at 09:11

## 2018-03-23 RX ADMIN — NIFEDIPINE SCH MG: 60 TABLET, EXTENDED RELEASE ORAL at 09:12

## 2018-03-23 RX ADMIN — Medication PRN ML: at 05:59

## 2018-03-23 RX ADMIN — Medication SCH ML: at 20:52

## 2018-03-23 RX ADMIN — MOMETASONE FUROATE AND FORMOTEROL FUMARATE DIHYDRATE SCH PUFF: 100; 5 AEROSOL RESPIRATORY (INHALATION) at 06:29

## 2018-03-23 RX ADMIN — MYCOPHENOLIC ACID SCH MG: 180 TABLET, DELAYED RELEASE ORAL at 20:56

## 2018-03-23 RX ADMIN — Medication SCH ML: at 09:12

## 2018-03-23 NOTE — PRG
DATE OF SERVICE:  03/23/2018

 

SUBJECTIVE:  Mr. Collins is a 62-year-old black male with known history of status post renal transpl
ant and admitted for CHF.  He has been diuresed.  He is much improved.  However, creatinine slightly 
higher at 2.5.  This could be a reflection of the diuretic regimen.  Also received 1 unit packed RBC 
for his anemia.  I did check his tacrolimus level and it came back as 16.  It is unclear whether this
 is a fasting.  My plan is simply to recheck again his tacrolimus level in a.m.  No other complaints.


 

PHYSICAL EXAMINATION:

VITAL SIGNS:  Blood pressure is 132/66, heart rate 87, respiratory rate 18, temperature 97.9, pulse o
x 90%.

GENERAL:  Noted to be awake, alert, comfortable, not in distress.

SKIN:  Adequate turgor.

HEENT:  He has pinkish conjunctivae, anicteric sclerae.

NECK:  No neck mass, no carotid bruits, no JVD.

CHEST:  No deformities.

LUNGS:  Clear breath sounds.  No wheezing, no crackles.

HEART:  Normal sinus rhythm.  No murmur, no gallops or rubs.

ABDOMEN:  Globular, soft, nontender, no masses.

EXTREMITIES:  Trace edema.

 

MEDICATIONS:  03/23/2018, reviewed.

 

LABORATORIES:  03/23/2018, potassium 4.2, BUN 41, creatinine 2.5.  Iron is 31.

Tacrolimus level, 03/21/2018, 16.

 

ASSESSMENT AND PLAN:

1.  Status post renal transplant -- tacrolimus level is elevated at 16.  This may be erroneous.  My p
bella is to repeat another tacrolimus level in a.m.

2.  Congestive heart failure, clinically much improved.  Continue current diuretic regimen.  Furosemi
de has been adjusted downwards.

3.  Acute kidney injury -- superimposed prerenal azotemia.  Adjust diuretics as needed.

## 2018-03-23 NOTE — CON
DATE OF CONSULTATION:  03/23/2018

 

REASON FOR CONSULTATION:  Anemia.

 

CONSULTING PHYSICIAN:  Dr. Marco Altman.

 

HISTORY OF PRESENT ILLNESS:  The patient is a 62-year-old male with past medical history of diastolic
 heart failure with an ejection fraction of 55%, coronary artery disease status post CABG, atrial fib
rillation/atrial flutter status post cardioversion on chronic anticoagulation, chronic kidney disease
 stage 4 status post transplant with continued renal insufficiency, pulmonary hypertension and modera
te/severe tricuspid regurgitation who was initially admitted for complaints of increased shortness of
 breath and weakness.  The patient was admitted on 03/19/2018 with increased complaints of shortness 
of breath and weakness with dyspnea on exertion.  He was noted to have increased lower extremity nelsy
a and given IV Lasix at that time with good diuresis and improvement in his shortness of breath.  How
ever, during this hospitalization, his H and H was trended and on 03/22/2018, he was noted to have a 
significant decrease in his H and H when compared to his baseline (baseline being hemoglobin 7.5, hem
atocrit 25).  This drop in his H and H was felt to be due to an acute event and he was transfused 1 u
nit of PRBCs with good response to that therapy.  Upon interview with the patient, he denies any naus
ea, vomiting, fevers, chills, shortness of breath, chest pain, hematemesis, melena, hematochezia, abd
ominal pain, dysphagia, odynophagia, increased ecchymosis anywhere on his body or rash.

 

REVIEW OF SYSTEMS:  A 10-category review of systems was obtained with all responses negative except f
or the pertinent positives as listed in the HPI.

 

PAST MEDICAL HISTORY:  As per HPI.

 

PAST SURGICAL HISTORY:  Cadaveric renal transplant, coronary artery disease status post CABG, electri
corey cardioversion.

 

FAMILY HISTORY:  No GI malignancies.

 

SOCIAL HISTORY:  Currently denies any tobacco, alcohol or illicit drug use.

 

OUTPATIENT MEDICATIONS:  Reviewed.

 

ALLERGIES:  No known drug allergies.

 

PHYSICAL EXAMINATION:

VITAL SIGNS:  Temperature of 97.9, pulse 87, blood pressure 132/66, respiratory rate 18, satting 90% 
on 4 liters nasal cannula.

GENERAL:  The patient lying in bed, in no acute distress.  He is alert and oriented x4.

NECK:  Supple, mild JVD noted.

CARDIOVASCULAR:  Irregularly irregular rhythm with no discernible murmurs, gallops or rubs.

RESPIRATORY:  Mild diminished breath sounds in the bilateral lower lung bases, otherwise clear to aus
cultation bilaterally.

ABDOMEN:  Normoactive bowel sounds, soft, nontender, nondistended.

EXTREMITIES:  Mild left lower extremity edema to mid shin.  No edema noted in the right lower extremi
ty.

 

LABORATORY DATA:  CBC with a white blood cell count of 9, hemoglobin 9.7, hematocrit 31.6, platelets 
294.  Chemistry with a sodium 136, potassium 4.2, chloride 104, CO2 of 20, BUN 41, creatinine 2.5, gl
ucose 90, MCV 82, RDW 17.9, iron 32, ferritin 263, TIBC 271.

 

IMAGING DATA:

Chest x-ray obtained on 03/22/2018 showed diffuse increased interstitial density and mild alveolar op
acities suspected in the mid right lung zone.  No pneumothorax or large volume pleural effusion was s
een.  These findings suggested interstitial edema and/or interstitial infectious pneumonitis.

 

ASSESSMENT AND PLAN:  The patient is a 62-year-old male with past medical history of diastolic heart 
failure, coronary artery disease status post coronary artery bypass graft, atrial fibrillation/atrial
 flutter status post cardioversion on anticoagulation, chronic kidney disease stage 4 status post cad
averic renal transplant, pulmonary hypertension and moderate to severe tricuspid regurgitation, prese
nting with possible anemia.

 

Possible anemia.  During this hospitalization, the patient had had a fairly stable H and H, except fo
r the last couple days when it slowly started to downtrend with an H and H drawn on 03/22/2018 with a
 value of 6.8 and 22.4.  This was concerning for ongoing blood loss during this hospitalization and h
e was subsequently transfused 1 unit of PRBCs; however, repeat evaluation of his H and H today reveal
ed an H and H of 9.7 and 31.6, which would be an inappropriate response to the infusion of only 1 uni
t of PRBCs.  At this time, the more likely explanation to show such a profound response in his H and 
H after infusion of blood would be that the H and H drawn on 03/22/2018 may have been a bad lab value
 and incorrectly estimating his blood counts.  Currently, presenting with an iron panel consistent mo
re of anemia of chronic disease/renal disease and not necessarily consistent with chronic gastrointes
tinal blood loss.  He is also having no symptoms of melena, hematemesis or hematochezia.  Further anisa
ing a diagnosis of gastrointestinal blood loss unlikely.

 

RECOMMENDATIONS:

1.  Would continue to trend H and H and transfuse as necessary to maintain and H and H 7/21.

2.  Would continue to monitor the patient clinically for any signs of bleeding in general.

3.  No EGD or colonoscopy is indicated at this time given the profound response to infusion 1 PRBCs a
nd the lack of evidence of ongoing gastrointestinal blood loss either acute or chronic.

 

We will sign off at this time.  Please call with any additional questions.

## 2018-03-23 NOTE — PDOC.PN
- Subjective


Encounter Start Date: 03/23/18


Encounter Start Time: 16:20


Subjective: f/u for CHF on IV Lasix overall improved. DINA/CKD stabilizing. 

Received


-: 1u PRBC's last pm and states he feels better overall. No magy melena or 


-: hematemesis. Stool guaiac + x 1 sample on Eliquis. 





- Objective


MAR Reviewed: Yes


Vital Signs & Weight: 


 Vital Signs (12 hours)











  Temp Pulse Resp BP Pulse Ox


 


 03/23/18 15:11   64  20  


 


 03/23/18 12:20  97.7 F  85  20  158/74 H  91 L


 


 03/23/18 10:55      95


 


 03/23/18 10:54   62  20   95


 


 03/23/18 08:00  97.5 F L  64  20   90 L


 


 03/23/18 07:42  97.5 F L  67  18  168/97 H  90 L


 


 03/23/18 06:29   60  20  


 


 03/23/18 06:08   60  20  








 Weight











Weight                         161 lb 14.4 oz














I&O: 


 











 03/22/18 03/23/18 03/24/18





 06:59 06:59 06:59


 


Intake Total 420 1660 


 


Output Total 925 800 


 


Balance -505 860 











Result Diagrams: 


 03/23/18 04:26





 03/23/18 04:26


Additional Labs: 





Microbiology





03/22/18 14:15   Stool   Stool Occult Blood ([facility]) - Final





 Laboratory Tests











  11/16/16 11/16/16 11/16/16





  10:45 10:45 10:45


 


Hgb   11.3 L 


 


MCV   


 


Retic Count   


 


Immature Retic Fraction   


 


Sodium  131 L  


 


Potassium  3.2 L  


 


BUN  30 H  


 


Creatinine  2.23 H  


 


Uric Acid   


 


Iron   


 


TIBC   


 


% Saturation   


 


Ferritin   


 


B-Natriuretic Peptide    321.5 H














  11/17/16 11/17/16 03/19/18





  05:00 05:05 12:30


 


Hgb   11.6 L  8.2 L


 


MCV    82.0


 


Retic Count   


 


Immature Retic Fraction   


 


Sodium   


 


Potassium   


 


BUN   


 


Creatinine   


 


Uric Acid  13.3 H  


 


Iron   


 


TIBC   


 


% Saturation   


 


Ferritin   


 


B-Natriuretic Peptide   














  03/19/18 03/19/18 03/20/18





  12:30 12:30 04:23


 


Hgb   


 


MCV   


 


Retic Count   


 


Immature Retic Fraction   


 


Sodium   


 


Potassium   5.2 H 


 


BUN   61 H 


 


Creatinine   3.04 H 


 


Uric Acid   


 


Iron   


 


TIBC   


 


% Saturation   


 


Ferritin   


 


B-Natriuretic Peptide  1470.9 H   1446.7 H














  03/20/18 03/20/18 03/21/18





  04:23 23:44 04:05


 


Hgb  7.6 L  


 


MCV  85.2  


 


Retic Count   


 


Immature Retic Fraction   


 


Sodium   


 


Potassium   


 


BUN   


 


Creatinine   2.22 H  2.24 H


 


Uric Acid   


 


Iron   


 


TIBC   


 


% Saturation   


 


Ferritin   


 


B-Natriuretic Peptide   














  03/21/18 03/22/18 03/22/18





  04:05 04:39 04:39


 


Hgb  7.4 L   6.8 L


 


MCV   


 


Retic Count   


 


Immature Retic Fraction   


 


Sodium   


 


Potassium   


 


BUN   


 


Creatinine   2.05 H 


 


Uric Acid   


 


Iron   


 


TIBC   


 


% Saturation   


 


Ferritin   


 


B-Natriuretic Peptide   














  03/23/18 03/23/18 03/23/18





  04:26 04:26 04:26


 


Hgb   


 


MCV   


 


Retic Count    3.8 H


 


Immature Retic Fraction    0.471 H


 


Sodium   


 


Potassium   


 


BUN   


 


Creatinine   


 


Uric Acid   


 


Iron  32 L  


 


TIBC  271  


 


% Saturation  12 L  


 


Ferritin   263.05 


 


B-Natriuretic Peptide   











EKG Reviewed by me: Yes (Tele  - A-fib/flutter, Non-sustained RVR)





Phys Exam





- Physical Examination


Constitutional: NAD


HEENT: PERRLA, oral pharynx no lesions


Neck: no JVD, supple


Respiratory: no wheezing, clear to auscultation bilateral


Cardiovascular: irregular


Gastrointestinal: soft, non-tender, no distention, positive bowel sounds


minimal edema


Musculoskeletal: pulses present


Neurological: normal sensation, moves all 4 limbs


Psychiatric: A&O x 3


Skin: normal turgor, cap refill <2 seconds





Dx/Plan


(1) Acute on chronic diastolic congestive heart failure


Code(s): I50.33 - ACUTE ON CHRONIC DIASTOLIC (CONGESTIVE) HEART FAILURE   Status

: Acute   Comment: Improved with diuresis, EF 55% on Echo, continue Lasix 40mg 

po daily   





(2) Acute on chronic renal failure


Code(s): N17.9 - ACUTE KIDNEY FAILURE, UNSPECIFIED; N18.9 - CHRONIC KIDNEY 

DISEASE, UNSPECIFIED   Status: Acute   


Qualifiers: 


   Acute renal failure type: unspecified 


Comment: Improved with diuretic use, continue Lasix as outlined in #1, 

Nephrology assistance appreciated   





(3) CAD (coronary artery disease)


Code(s): I25.10 - ATHSCL HEART DISEASE OF NATIVE CORONARY ARTERY W/O ANG PCTRS 

  Status: Chronic   


Qualifiers: 


   Coronary Disease-Associated Artery/Lesion type: bypass graft   Native vs. 

transplanted heart: native heart   Associated angina: without angina   

Qualified Code(s): I25.810 - Atherosclerosis of coronary artery bypass graft(s) 

without angina pectoris   


Comment: Stable overall, no evidence of ACS, ASA 81mg daily, Lipitor 40mg daily

   





(4) Hypertension


Code(s): I10 - ESSENTIAL (PRIMARY) HYPERTENSION   Status: Chronic   


Qualifiers: 


   Hypertension type: essential hypertension   Qualified Code(s): I10 - 

Essential (primary) hypertension   


Comment: Stable, continue home regimen, hold Nifedipine and decrease Metoprolol 

25mg BID due to bradycardia   





(5) Hyperkalemia


Code(s): E87.5 - HYPERKALEMIA   Status: Acute   Comment: Improved with diuresis

, follow serially   





(6) Acute and chronic respiratory failure with hypoxia


Code(s): J96.21 - ACUTE AND CHRONIC RESPIRATORY FAILURE WITH HYPOXIA   Status: 

Acute   Comment: Currently improved and at baseline O2 requirement of 3-4L/min 

NC   





(7) Second degree AV block


Code(s): I44.1 - ATRIOVENTRICULAR BLOCK, SECOND DEGREE   Status: Acute   Comment

: Intermittent, consult Cardiology for evaluation, may need titration of 

current CCB and B-blocker   





(8) Normocytic anemia


Code(s): D64.9 - ANEMIA, UNSPECIFIED   Status: Chronic   Comment: ?subacute, 

serial H/H monitoring, FeSO4, stool guaiac, consider Epogen, Type and cross 1u 

PRBC's, transfuse 1u when availabe, CBC in am, GI consult. Stable H/H currently

, monitor trend. GI likely observing and won't proceed with endoscopy   





(9) Bradycardia


Code(s): R00.1 - BRADYCARDIA, UNSPECIFIED   Status: Acute   Comment: Likely 

iatrogenic, d/c Clonidine, decrease Metoprolol 25mg BID, Nifedipine 60mg po 

daily   





- Plan


out of bed/ambulate, DVT proph w/SCDs


Stable overall


-: Continue to monitor H/H trend on Eliquis


-: Continue Lasix 40mg po daily


-: Continue Metoprolol and Nifedipine


-: AM lab: BMP, CBC





* Home in am 3/24/18 if H/H remains stable

## 2018-03-23 NOTE — PRG
DATE OF SERVICE:  03/23/2018

 

He is doing better.  Less short of breath.  X-ray looks improved.  

 

PHYSICAL EXAMINATION: 

VITAL SIGNS:  O2 sats 90% on 4 liters, temperature 97, blood pressure 168/97.  

CHEST:  Chest, no crackles.

CARDIAC:  Normal S1, S2.

ABDOMEN:  Soft, no masses.  

 

LABORATORY DATA:  White count 9,000, H&H 10 and 31, platelet count normal.  Creatinine is 2.5.

 

IMPRESSION:

1.  Renal failure, status post transplant failure.

2.  Congestive heart failure.  

3.  Chronic obstructive pulmonary disease.

 

PLAN:  The patient appears to have improved to his baseline.  Probably can be transferred home in the
 next day or two.  Pulmonary will follow at a distance.  Please call if needed.

## 2018-03-24 LAB
ANION GAP SERPL CALC-SCNC: 13 MMOL/L (ref 10–20)
BASOPHILS # BLD AUTO: 0 THOU/UL (ref 0–0.2)
BASOPHILS NFR BLD AUTO: 0.2 % (ref 0–1)
BUN SERPL-MCNC: 35 MG/DL (ref 8.4–25.7)
CALCIUM SERPL-MCNC: 9.1 MG/DL (ref 7.8–10.44)
CHLORIDE SERPL-SCNC: 106 MMOL/L (ref 98–107)
CO2 SERPL-SCNC: 21 MMOL/L (ref 23–31)
CREAT CL PREDICTED SERPL C-G-VRATE: 42 ML/MIN (ref 70–130)
EOSINOPHIL # BLD AUTO: 0 THOU/UL (ref 0–0.7)
EOSINOPHIL NFR BLD AUTO: 0.6 % (ref 0–10)
GLUCOSE SERPL-MCNC: 98 MG/DL (ref 80–115)
HGB BLD-MCNC: 8.4 G/DL (ref 14–18)
HGB BLD-MCNC: 9.3 G/DL (ref 14–18)
LYMPHOCYTES # BLD: 1.1 THOU/UL (ref 1.2–3.4)
LYMPHOCYTES NFR BLD AUTO: 15.2 % (ref 21–51)
MCH RBC QN AUTO: 25.5 PG (ref 27–31)
MCV RBC AUTO: 82.3 FL (ref 80–94)
MONOCYTES # BLD AUTO: 0.9 THOU/UL (ref 0.11–0.59)
MONOCYTES NFR BLD AUTO: 12.3 % (ref 0–10)
NEUTROPHILS # BLD AUTO: 5.4 THOU/UL (ref 1.4–6.5)
NEUTROPHILS NFR BLD AUTO: 71.7 % (ref 42–75)
PLATELET # BLD AUTO: 257 THOU/UL (ref 130–400)
POTASSIUM SERPL-SCNC: 3.9 MMOL/L (ref 3.5–5.1)
RBC # BLD AUTO: 3.3 MILL/UL (ref 4.7–6.1)
SODIUM SERPL-SCNC: 136 MMOL/L (ref 136–145)
WBC # BLD AUTO: 7.5 THOU/UL (ref 4.8–10.8)

## 2018-03-24 RX ADMIN — MOMETASONE FUROATE AND FORMOTEROL FUMARATE DIHYDRATE SCH PUFF: 100; 5 AEROSOL RESPIRATORY (INHALATION) at 06:46

## 2018-03-24 RX ADMIN — NIFEDIPINE SCH MG: 60 TABLET, EXTENDED RELEASE ORAL at 10:31

## 2018-03-24 RX ADMIN — Medication SCH: at 21:47

## 2018-03-24 RX ADMIN — MOMETASONE FUROATE AND FORMOTEROL FUMARATE DIHYDRATE SCH PUFF: 100; 5 AEROSOL RESPIRATORY (INHALATION) at 19:29

## 2018-03-24 RX ADMIN — Medication SCH ML: at 10:34

## 2018-03-24 RX ADMIN — ASPIRIN SCH MG: 81 TABLET ORAL at 10:33

## 2018-03-24 RX ADMIN — MYCOPHENOLIC ACID SCH MG: 180 TABLET, DELAYED RELEASE ORAL at 21:44

## 2018-03-24 RX ADMIN — MYCOPHENOLIC ACID SCH MG: 180 TABLET, DELAYED RELEASE ORAL at 10:33

## 2018-03-24 NOTE — PDOC.CTH
Cardiology Progress Note





- Subjective





His breathing is close to baseline. He uses O2 at home per his report. No chest 

pain. 





- Objective


 Vital Signs











  Temp Pulse Resp BP BP Pulse Ox


 


 03/24/18 16:00  98.3 F  76  18   140/72  93 L


 


 03/24/18 15:57   68  20   


 


 03/24/18 12:00  98.4 F  94  18  127/74   91 L


 


 03/24/18 10:32   81    


 


 03/24/18 10:31   81    


 


 03/24/18 10:13   81  20    100


 


 03/24/18 08:00  97.5 F L  91  20  136/77   100


 


 03/24/18 06:46   81  24 H    100








 











Weight                         158 lb 8 oz














 











 03/23/18 03/24/18 03/25/18





 06:59 06:59 06:59


 


Intake Total 1660 720 960


 


Output Total 800 775 


 


Balance 860 -55 960














- Physical Examination


General/Neuro: alert & oriented x3, NAD


Neck: no JVD present


Lungs: unlabored respirations


Heart: RRR


Abdomen: NT/ND


Extremities: other: (no edema)





- Telemetry


Telemetry Rhythm: NSR





- Labs


Result Diagrams: 


 03/24/18 12:08





 03/24/18 04:55


 Troponin/CKMB











CK-MB (CK-2)  1.0 ng/mL (0-6.6)   03/21/18  04:05    


 


Troponin I  0.057 ng/mL (< 0.028)  H  03/21/18  04:05    














- Assessment/Plan





1. Acute on chronic diastolic hear tfaliure


2. Sever epulmonary HTN


3. Severe TR


4. S/P Renal transplant.


5. Paroxysmal afib, currently in sinus. 


6. CAD, s/p CABG in the past, stable. 





PLAN:


- Continue to hold Eliquis until anemia is fully evaluated. 


- Continue other meds. 


- May discharge at any time from cardiac perspective.

## 2018-03-24 NOTE — PRG
DATE OF SERVICE:  03/24/2018

 

RENAL MEDICINE

 

SUBJECTIVE:  Mr. Collins is a 62-year-old black male admitted for congestive heart failure.  We are 
following him up for his renal transplant.  His tacrolimus level was noted to be elevated at more fahad
n 10 yesterday.  I decreased the tacrolimus from 2.5 mg twice a day to 2 mg twice a day.  His CHF is 
also much improved.

 

He has no complaints.

 

PHYSICAL EXAMINATION:

VITAL SIGNS:  Blood pressure is noted at 148/80 with heart rate of 81, respiratory rate 20, pulse ox 
100%.

GENERAL:  Awake, alert, comfortable, not in distress.

SKIN:  Adequate turgor.

HEENT:  Slightly pale conjunctivae, anicteric sclerae.

NECK:  No neck mass, no carotid bruits, no JVD.

CHEST:  No deformities.

LUNGS:  Clear breath sounds, no wheezing, no crackles.

HEART:  Normal sinus rhythm.  No murmurs, no gallops, no rubs.

ABDOMEN:  Globular, soft, nontender.  No masses.

EXTREMITIES:  No edema, no deformities.

 

MEDICATIONS:  Medications of 03/24/2018 reviewed.

 

LABORATORY DATA:  Laboratories of 03/24/2018; white count 7.5, hemoglobin 8.4, sodium 136, potassium 
3.9, chloride 106, carbon dioxide 21, BUN 35, creatinine 1.84, GFR 45 mL per minute, ferritin 263.

 

ASSESSMENT AND PLAN:

1.  Chronic status post cadaveric renal transplant/chronic renal failure, stabilizing renal function.
  Creatinine now is near baseline at 1.84.  He is currently at stage 3 chronic renal failure.  Contin
ue current immunosuppressive regimen.

2.  Elevated tacrolimus level - decrease tacrolimus from 2.5-2.0 mg b.i.d.  He will need another tacr
olimus level in 1-2 weeks.  I did instruct the patient to report for renal clinic so we can review th
at.

3.  Anemia.  The patient is status post blood transfusion.  Continue to observe.  If needed, we could
 consider initiating Epogen as an outpatient with this patient.

 

Agree with current management.

## 2018-03-24 NOTE — ADD-PRG
ADDENDUM:  03/23/2018

 

Elevated tacrolimus level of 16 - we will decrease tacrolimus to 2.0 b.i.d.  He may need to have the 
tacrolimus check again in 1 week after.  I instructed the patient to follow up at the Renal Clinic.

## 2018-03-24 NOTE — PDOC.PN
- Subjective


Encounter Start Date: 03/24/18


Encounter Start Time: 16:05


Subjective: No acute events overnight


-: No new complaints. Feels well. 





- Objective


MAR Reviewed: Yes


Vital Signs & Weight: 


 Vital Signs (12 hours)











  Temp Pulse Resp BP Pulse Ox


 


 03/24/18 12:00  98.4 F  94  18  127/74  91 L


 


 03/24/18 10:32   81   


 


 03/24/18 10:31   81   


 


 03/24/18 10:13   81  20   100


 


 03/24/18 08:00  97.5 F L  91  20  136/77  100


 


 03/24/18 06:46   81  24 H   100


 


 03/24/18 06:29      100


 


 03/24/18 06:27   81  24 H   100


 


 03/24/18 04:00  98.1 F  71  16  148/80 H  94 L








 Weight











Weight                         158 lb 8 oz














I&O: 


 











 03/23/18 03/24/18 03/25/18





 06:59 06:59 06:59


 


Intake Total 1660 720 960


 


Output Total 800 775 


 


Balance 860 -55 960











Result Diagrams: 


 03/24/18 12:08





 03/24/18 04:55





Phys Exam





- Physical Examination


Constitutional: NAD


HEENT: PERRLA, moist MMs, sclera anicteric


Neck: no JVD, supple, full ROM


Respiratory: no wheezing, no rales, no rhonchi, clear to auscultation bilateral


Cardiovascular: RRR, no significant murmur, no rub


Gastrointestinal: soft, non-tender, no distention, positive bowel sounds


Musculoskeletal: no edema, pulses present


Neurological: non-focal, moves all 4 limbs


Psychiatric: normal affect, A&O x 3


Skin: no rash, normal turgor





Dx/Plan


(1) Acute on chronic diastolic congestive heart failure


Code(s): I50.33 - ACUTE ON CHRONIC DIASTOLIC (CONGESTIVE) HEART FAILURE   Status

: Acute   Comment: Stable. Improved with diuresis, EF 55% on Echo. Will 

continue Lasix 40mg po daily   





(2) Normocytic anemia


Code(s): D64.9 - ANEMIA, UNSPECIFIED   Status: Chronic   Comment: Status post 

PRBC transfusion. Hemoglobin stable. Stool occult blood positive. Will 

discontinue Eliquis. HE will follow up with his PCP regarding restarting this 

medication. GI on board, won't proceed with endoscopy/colonoscopy since Hb 

stable.    





(3) Acute on chronic renal failure


Code(s): N17.9 - ACUTE KIDNEY FAILURE, UNSPECIFIED; N18.9 - CHRONIC KIDNEY 

DISEASE, UNSPECIFIED   Status: Acute   


Qualifiers: 


   Acute renal failure type: unspecified 


Comment: Rolan cardiorenal from CHF. Continues to improve with diuretic. Will 

continue Lasix. Nephrology assistance appreciated   





(4) CAD (coronary artery disease)


Code(s): I25.10 - ATHSCL HEART DISEASE OF NATIVE CORONARY ARTERY W/O ANG PCTRS 

  Status: Chronic   


Qualifiers: 


   Coronary Disease-Associated Artery/Lesion type: bypass graft   Native vs. 

transplanted heart: native heart   Associated angina: without angina   

Qualified Code(s): I25.810 - Atherosclerosis of coronary artery bypass graft(s) 

without angina pectoris   


Comment: Stable, chest pain free. Continue ASA 81mg daily, Lipitor 40mg daily   





(5) Hypertension


Code(s): I10 - ESSENTIAL (PRIMARY) HYPERTENSION   Status: Chronic   


Qualifiers: 


   Hypertension type: essential hypertension   Qualified Code(s): I10 - 

Essential (primary) hypertension   


Comment: Controlled. Continue current regimen.    





(6) Second degree AV block


Code(s): I44.1 - ATRIOVENTRICULAR BLOCK, SECOND DEGREE   Status: Chronic   

Comment: Asymptomatic. Will continue to monitor. Had an episode of bradycardia 

but none since beta blocker dose adjusted.    





(7) Bradycardia


Code(s): R00.1 - BRADYCARDIA, UNSPECIFIED   Status: Resolved   Comment: 

Resolved. Likely iatrogenic, d/c Clonidine, decrease Metoprolol 25mg BID, 

Nifedipine 60mg po daily   





(8) Acute and chronic respiratory failure with hypoxia


Code(s): J96.21 - ACUTE AND CHRONIC RESPIRATORY FAILURE WITH HYPOXIA   Status: 

Resolved   Comment: Currently improved and at baseline O2 requirement of 3-4L/

min NC   





(9) Hyperkalemia


Code(s): E87.5 - HYPERKALEMIA   Status: Resolved   





- Plan


cont current plan of care, PT/OT, respiratory therapy, DVT proph w/SCDs


Likely discharge in the am if hemoglobin stable. 





* .








Review of Systems





- Medications/Allergies


Allergies/Adverse Reactions: 


 Allergies











Allergy/AdvReac Type Severity Reaction Status Date / Time


 


No Known Drug Allergies Allergy Unknown  Verified 02/25/18 12:37











Medications: 


 Current Medications





Acetaminophen (Tylenol)  1,000 mg PO Q6H PRN


   PRN Reason: Headache/Fever or Mild Pain


   Last Admin: 03/22/18 11:52 Dose:  1,000 mg


Albuterol Sulfate (Proventil Hfa)  2 puff INH Q4HR PRN


   PRN Reason: Dyspnea


Albuterol/Ipratropium (Duoneb)  3 ml NEB [id number]


   Last Admin: 03/24/18 10:13 Dose:  3 ml


Aspirin (Ecotrin)  81 mg PO DAILY [facility]


   Last Admin: 03/24/18 10:33 Dose:  81 mg


Atorvastatin Calcium (Lipitor)  40 mg PO HS [facility]


   Last Admin: 03/23/18 20:50 Dose:  40 mg


Clonidine (Catapres)  0.1 mg PO Q4H PRN


   PRN Reason: Systolic BP > 180


Famotidine (Pepcid)  20 mg PO 2100 [facility]


   Last Admin: 03/23/18 20:50 Dose:  20 mg


Furosemide (Lasix)  40 mg PO DAILY-AC [facility]


   Last Admin: 03/24/18 10:32 Dose:  40 mg


Hydralazine HCl (Apresoline)  10 mg SLOW IVP Q4H PRN


   PRN Reason: Systolic BP > 180


Hydralazine HCl (Apresoline)  50 mg PO BID [facility]


   Last Admin: 03/24/18 10:32 Dose:  50 mg


Isosorbide Mononitrate (Imdur)  60 mg PO DAILY [facility]


   Last Admin: 03/24/18 10:33 Dose:  60 mg


Metoprolol Tartrate (Lopressor)  25 mg PO BID [facility]


   Last Admin: 03/24/18 10:32 Dose:  25 mg


Mometasone Furoate/Formoterol Fumar (Dulera 100 Mcg/5 Mcg Inhaler)  1 puff INH 

BID-RT [facility]


   Last Admin: 03/24/18 06:46 Dose:  1 puff


Mycophenolate Sodium (Myfortic)  360 mg PO BID [facility]


   Last Admin: 03/24/18 10:33 Dose:  360 mg


Nifedipine (Procardia Xl)  60 mg PO DAILY [facility]


   Last Admin: 03/24/18 10:31 Dose:  60 mg


Ondansetron HCl (Zofran Odt)  4 mg PO Q6H PRN


   PRN Reason: Nausea/Vomiting


Ondansetron HCl (Zofran)  4 mg IVP Q6H PRN


   PRN Reason: Nausea/Vomiting


   Last Admin: 03/20/18 22:14 Dose:  4 mg


Prednisone (Prednisone)  5 mg PO Q[facility]


   Last Admin: 03/24/18 10:32 Dose:  5 mg


Sodium Chloride (Flush - Normal Saline)  10 ml IVF Q12HR [facility]


   Last Admin: 03/24/18 10:34 Dose:  10 ml


Sodium Chloride (Flush - Normal Saline)  10 ml IVF PRN PRN


   PRN Reason: Saline Flush


   Last Admin: 03/23/18 05:59 Dose:  10 ml


Tacrolimus (Prograf)  2 mg PO BID [facility]


   Last Admin: 03/24/18 10:34 Dose:  2 mg

## 2018-03-25 VITALS — TEMPERATURE: 98.5 F

## 2018-03-25 VITALS — SYSTOLIC BLOOD PRESSURE: 123 MMHG | DIASTOLIC BLOOD PRESSURE: 64 MMHG

## 2018-03-25 LAB
ANION GAP SERPL CALC-SCNC: 12 MMOL/L (ref 10–20)
BASOPHILS # BLD AUTO: 0 THOU/UL (ref 0–0.2)
BASOPHILS NFR BLD AUTO: 0.1 % (ref 0–1)
BUN SERPL-MCNC: 27 MG/DL (ref 8.4–25.7)
CALCIUM SERPL-MCNC: 9.1 MG/DL (ref 7.8–10.44)
CHLORIDE SERPL-SCNC: 106 MMOL/L (ref 98–107)
CO2 SERPL-SCNC: 22 MMOL/L (ref 23–31)
CREAT CL PREDICTED SERPL C-G-VRATE: 50 ML/MIN (ref 70–130)
EOSINOPHIL # BLD AUTO: 0.1 THOU/UL (ref 0–0.7)
EOSINOPHIL NFR BLD AUTO: 0.9 % (ref 0–10)
GLUCOSE SERPL-MCNC: 96 MG/DL (ref 80–115)
HGB BLD-MCNC: 8.7 G/DL (ref 14–18)
LYMPHOCYTES # BLD: 1.3 THOU/UL (ref 1.2–3.4)
LYMPHOCYTES NFR BLD AUTO: 15.1 % (ref 21–51)
MCH RBC QN AUTO: 25.6 PG (ref 27–31)
MCV RBC AUTO: 83.9 FL (ref 80–94)
MONOCYTES # BLD AUTO: 1 THOU/UL (ref 0.11–0.59)
MONOCYTES NFR BLD AUTO: 11.4 % (ref 0–10)
NEUTROPHILS # BLD AUTO: 6.2 THOU/UL (ref 1.4–6.5)
NEUTROPHILS NFR BLD AUTO: 72.5 % (ref 42–75)
PLATELET # BLD AUTO: 290 THOU/UL (ref 130–400)
POTASSIUM SERPL-SCNC: 4.1 MMOL/L (ref 3.5–5.1)
RBC # BLD AUTO: 3.42 MILL/UL (ref 4.7–6.1)
SODIUM SERPL-SCNC: 136 MMOL/L (ref 136–145)
WBC # BLD AUTO: 8.5 THOU/UL (ref 4.8–10.8)

## 2018-03-25 RX ADMIN — MOMETASONE FUROATE AND FORMOTEROL FUMARATE DIHYDRATE SCH PUFF: 100; 5 AEROSOL RESPIRATORY (INHALATION) at 06:32

## 2018-03-25 RX ADMIN — NIFEDIPINE SCH MG: 60 TABLET, EXTENDED RELEASE ORAL at 09:37

## 2018-03-25 RX ADMIN — Medication SCH ML: at 09:38

## 2018-03-25 RX ADMIN — ASPIRIN SCH MG: 81 TABLET ORAL at 09:34

## 2018-03-25 RX ADMIN — MYCOPHENOLIC ACID SCH MG: 180 TABLET, DELAYED RELEASE ORAL at 09:37

## 2018-03-25 NOTE — PRG
DATE OF SERVICE:  03/25/2018

 

SUBJECTIVE:  Mr. Collins is a 62-year-old black male who is status post cadaveric renal transplant a
nd admitted for CHF.  He has been diuresed.  He is feeling much better, no shortness of breath.  No a
bdominal pain or chest pain.  Recently tacrolimus was adjusted downwards from 2.5-2.0 b.i.d. due to h
igh level tacrolimus.  No other complaints today.

 

OBJECTIVE:

VITAL SIGNS:  Blood pressure 125/72, heart rate 102, respiratory rate 20, temperature 98.5, pulse ox 
96%.

GENERAL:  Noted to be awake, sitting comfortable, not in overt distress.

SKIN:  Adequate turgor.

HEENT:  Slightly pale conjunctivae.  Anicteric sclerae.

NECK:  No neck mass, no carotid bruits, no JVD.

CHEST:  No deformities.

LUNGS:  Decreased breath sounds.

HEART:  Normal sinus rhythm.  No murmur, no gallops, no rubs.

ABDOMEN:  Globular, soft, nontender, no masses.

EXTREMITIES:  No edema, no deformities.

 

MEDICATIONS:  Of 03/25/2018 reviewed.

 

LABORATORY:  Of 03/25/2018, white count 8.5, hemoglobin 8.7, hematocrit 28.7, platelet count is 290,0
00.  Sodium 136, potassium 4.1, chloride 106, carbon dioxide 22, BUN 57, creatinine 1.56, glucose 96,
 calcium 9.1.

 

ASSESSMENT AND PLAN:

1.  Status post cadaveric renal transplant - renal function is stabilizing.  He is tolerating current
 diuretic regimen.  He will continue current immunosuppressive regimen.  Please note the tacrolimus a
djusted downwards to 2.0 b.i.d.  The repeat tacrolimus level in about a week's time.

2.  Anemia.  Continue to observe p.r.n. blood transfusion.

3.  Congestive heart failure, clinically much improved.  Continue current diuretic regimen.

 

The patient discharged today.  He has been instructed to follow up at the renal clinic.

## 2018-03-25 NOTE — DIS
DATE OF ADMISSION:  03/20/2013

 

DATE OF DISCHARGE:  03/25/2018

 

DISCHARGE DIAGNOSES:  Acute on chronic diastolic congestive heart failure, 
normocytic anemia, heme positive stool, acute on chronic renal failure, 
coronary artery disease, hypertension, second degree atrioventricular block, 
bradycardia and acute on chronic respiratory failure with hypoxia, hyperkalemia.

 

HISTORY OF PRESENT ILLNESS:

62-year-old male who presented to the hospital with complaints of worsening 
lower extremity swelling, shortness of breath going on for 3-5 days, was 
recently admitted for shortness of breath and CHF on 02/25/2018 at this 
hospital.

 

HOSPITAL COURSE:  

He reports compliance with his diuretics.  He denied any other complaints.  He 
also reports orthopnea.  At the emergency room, he had a chest x-ray which 
showed bilateral pulmonary edema with interstitial lung changes consistent with 
CHF exacerbation.  He was started on IV diuretics and he achieved excellent 
diuresis during his hospital stay.  He reports being on 3-4 liters on oxygen at 
home and he was eventually weaned down to this.  His hospital stay was 
complicated by an episode of sudden drop in his hemoglobin.  Patient is on 
Eliquis and aspirin due to old PE.  He required a unit of PRBC.  Stool was 
positive for occult blood.  GI evaluated and decided not to proceed with 
intervention due to his hemoglobin being stable after transfusion.  Eliquis and 
aspirin were then discontinued.  The patient is to follow up with his primary 
care physician with consideration to start anticoagulation, on antiplatelet at 
a later date.  While in-house, he was also evaluated by Pulmonary and 
Pulmonology.  He was stable on day of discharge and was discharged without 
incident.  Other medical conditions addressed during his stay are chronic 
kidney failure stage 4 with cadaveric transplant.  He was continued on his 
immunosuppressants.  He also had a second degree AV block with an episode of 
bradycardia.  His clonidine was discontinued as well as digoxin, metoprolol 
dose was also decreased to 25 mg b.i.d. and he was placed on nifedipine daily 
as well.  He tolerated the new medication regimen and had no further episodes 
of bradycardia.

 

DISCHARGE MEDICATIONS:  

Nifedipine 60 mg daily, prednisone 5 mg every morning with breakfast, albuterol 
2 puffs inhaled every 4 hours as needed for dyspnea, atorvastatin 5 mg daily, 
hydralazine 50 mg b.i.d., furosemide 40 mg daily, tacrolimus 0.5 mg twice a day
, mycophenolate sodium 360 mg twice a day, isosorbide mononitrate 60 mg daily, 
Dulera inhaler 1 puff twice a day, Symbicort 1 puff twice a day, tacrolimus 2 
mg twice a day.

 

CHANGED MEDICATION:  Metoprolol 25 mg twice a day.

 

DISCONTINUED MEDICATIONS:  Clonidine, nifedipine, apixaban.

 

PHYSICAL EXAMINATION:

GENERAL:  He was examined on day of discharge.

VITAL SIGNS:  Temperature 98.5 degree Fahrenheit, pulse 85, respiratory rate 18
, oxygen saturation 97% on 3 liters of nasal cannula, blood pressure 123/64.

 

LABORATORY DATA:  WBC 8.5, hemoglobin 8.7, platelet count 290.  Sodium 136, 
potassium 4.1, chloride 106, carbon dioxide 22, anion gap 12, BUN 27, 
creatinine 1.56, glucose 96, calcium 9.1.

 

IMAGING:  Chest x-ray as stated in HPI.  Followup chest x-ray showed findings 
suggestive of mild interstitial edema or interstitial infectious pneumonitis.

 

CONSULTATIONS:  Cardiology, Gastroenterology, Pulmonology.

 

CONDITION ON DISCHARGE:  Stable and improved.

 

PROCEDURES:  None.

 

DIET:  Heart healthy.

 

CARE GOALS:  To follow up with his primary care physician for repeat labs and 
optimal blood pressure control.

 

ACTIVITY:  Resume as tolerated.

 

Discharge time 65 minutes including chart review and documentation.

 

[facility]

## 2018-10-21 ENCOUNTER — HOSPITAL ENCOUNTER (INPATIENT)
Dept: HOSPITAL 92 - ERS | Age: 63
LOS: 2 days | Discharge: ANOTHER HOSPITAL | DRG: 291 | End: 2018-10-23
Attending: INTERNAL MEDICINE | Admitting: INTERNAL MEDICINE
Payer: MEDICARE

## 2018-10-21 VITALS — BODY MASS INDEX: 23.3 KG/M2

## 2018-10-21 DIAGNOSIS — I24.8: ICD-10-CM

## 2018-10-21 DIAGNOSIS — N17.9: ICD-10-CM

## 2018-10-21 DIAGNOSIS — D63.1: ICD-10-CM

## 2018-10-21 DIAGNOSIS — I48.2: ICD-10-CM

## 2018-10-21 DIAGNOSIS — I47.2: ICD-10-CM

## 2018-10-21 DIAGNOSIS — R00.0: ICD-10-CM

## 2018-10-21 DIAGNOSIS — I50.33: ICD-10-CM

## 2018-10-21 DIAGNOSIS — E78.5: ICD-10-CM

## 2018-10-21 DIAGNOSIS — Z95.1: ICD-10-CM

## 2018-10-21 DIAGNOSIS — E87.2: ICD-10-CM

## 2018-10-21 DIAGNOSIS — J96.21: ICD-10-CM

## 2018-10-21 DIAGNOSIS — Z99.81: ICD-10-CM

## 2018-10-21 DIAGNOSIS — N18.3: ICD-10-CM

## 2018-10-21 DIAGNOSIS — I13.2: Primary | ICD-10-CM

## 2018-10-21 DIAGNOSIS — I25.10: ICD-10-CM

## 2018-10-21 DIAGNOSIS — N18.6: ICD-10-CM

## 2018-10-21 DIAGNOSIS — I36.1: ICD-10-CM

## 2018-10-21 DIAGNOSIS — Z94.0: ICD-10-CM

## 2018-10-21 DIAGNOSIS — I73.9: ICD-10-CM

## 2018-10-21 DIAGNOSIS — Z87.891: ICD-10-CM

## 2018-10-21 DIAGNOSIS — I27.20: ICD-10-CM

## 2018-10-21 DIAGNOSIS — J44.9: ICD-10-CM

## 2018-10-21 DIAGNOSIS — I25.2: ICD-10-CM

## 2018-10-21 DIAGNOSIS — I48.0: ICD-10-CM

## 2018-10-21 DIAGNOSIS — I45.81: ICD-10-CM

## 2018-10-21 LAB
ALBUMIN SERPL BCG-MCNC: 4 G/DL (ref 3.4–4.8)
ALP SERPL-CCNC: 132 U/L (ref 40–150)
ALT SERPL W P-5'-P-CCNC: 19 U/L (ref 8–55)
ANION GAP SERPL CALC-SCNC: 20 MMOL/L (ref 10–20)
AST SERPL-CCNC: 27 U/L (ref 5–34)
BILIRUB SERPL-MCNC: 1.5 MG/DL (ref 0.2–1.2)
BUN SERPL-MCNC: 35 MG/DL (ref 8.4–25.7)
CALCIUM SERPL-MCNC: 9.8 MG/DL (ref 7.8–10.44)
CHLORIDE SERPL-SCNC: 106 MMOL/L (ref 98–107)
CK MB SERPL-MCNC: 1.6 NG/ML (ref 0–6.6)
CO2 SERPL-SCNC: 15 MMOL/L (ref 23–31)
CREAT CL PREDICTED SERPL C-G-VRATE: 0 ML/MIN (ref 70–130)
CRYSTAL-AUWI FLAG: 0 (ref 0–15)
DIGOXIN SERPL-MCNC: 0.84 NG/ML (ref 0.8–2)
GLOBULIN SER CALC-MCNC: 4.6 G/DL (ref 2.4–3.5)
GLUCOSE SERPL-MCNC: 120 MG/DL (ref 80–115)
HEV IGM SER QL: 1.7 (ref 0–7.99)
HGB BLD-MCNC: 11 G/DL (ref 14–18)
HYALINE CASTS #/AREA URNS LPF: (no result) LPF
MCH RBC QN AUTO: 25.3 PG (ref 27–31)
MCV RBC AUTO: 84.5 FL (ref 78–98)
MDIFF COMPLETE?: YES
PATHC CAST-AUWI FLAG: 0.72 (ref 0–2.49)
PLATELET # BLD AUTO: 368 THOU/UL (ref 130–400)
PLATELET BLD QL SMEAR: (no result)
POTASSIUM SERPL-SCNC: 4.3 MMOL/L (ref 3.5–5.1)
RBC # BLD AUTO: 4.35 MILL/UL (ref 4.7–6.1)
RBC UR QL AUTO: (no result) HPF (ref 0–3)
SODIUM SERPL-SCNC: 137 MMOL/L (ref 136–145)
SP GR UR STRIP: 1.02 (ref 1–1.04)
SPERM-AUWI FLAG: 0 (ref 0–9.9)
TROPONIN I SERPL DL<=0.01 NG/ML-MCNC: 0.05 NG/ML (ref ?–0.03)
WBC # BLD AUTO: 9.1 THOU/UL (ref 4.8–10.8)
WBC UR QL AUTO: (no result) HPF (ref 0–3)
YEAST-AUWI FLAG: 0 (ref 0–25)

## 2018-10-21 PROCEDURE — 94660 CPAP INITIATION&MGMT: CPT

## 2018-10-21 PROCEDURE — 36415 COLL VENOUS BLD VENIPUNCTURE: CPT

## 2018-10-21 PROCEDURE — 94760 N-INVAS EAR/PLS OXIMETRY 1: CPT

## 2018-10-21 PROCEDURE — A9540 TC99M MAA: HCPCS

## 2018-10-21 PROCEDURE — 93005 ELECTROCARDIOGRAM TRACING: CPT

## 2018-10-21 PROCEDURE — 80197 ASSAY OF TACROLIMUS: CPT

## 2018-10-21 PROCEDURE — 85730 THROMBOPLASTIN TIME PARTIAL: CPT

## 2018-10-21 PROCEDURE — 71045 X-RAY EXAM CHEST 1 VIEW: CPT

## 2018-10-21 PROCEDURE — A9558 XE133 XENON 10MCI: HCPCS

## 2018-10-21 PROCEDURE — 81001 URINALYSIS AUTO W/SCOPE: CPT

## 2018-10-21 PROCEDURE — 78582 LUNG VENTILAT&PERFUS IMAGING: CPT

## 2018-10-21 PROCEDURE — 84484 ASSAY OF TROPONIN QUANT: CPT

## 2018-10-21 PROCEDURE — 82553 CREATINE MB FRACTION: CPT

## 2018-10-21 PROCEDURE — 96374 THER/PROPH/DIAG INJ IV PUSH: CPT

## 2018-10-21 PROCEDURE — 94640 AIRWAY INHALATION TREATMENT: CPT

## 2018-10-21 PROCEDURE — 80162 ASSAY OF DIGOXIN TOTAL: CPT

## 2018-10-21 PROCEDURE — 83605 ASSAY OF LACTIC ACID: CPT

## 2018-10-21 PROCEDURE — 93306 TTE W/DOPPLER COMPLETE: CPT

## 2018-10-21 PROCEDURE — 80048 BASIC METABOLIC PNL TOTAL CA: CPT

## 2018-10-21 PROCEDURE — 83735 ASSAY OF MAGNESIUM: CPT

## 2018-10-21 PROCEDURE — 93798 PHYS/QHP OP CAR RHAB W/ECG: CPT

## 2018-10-21 PROCEDURE — 80053 COMPREHEN METABOLIC PANEL: CPT

## 2018-10-21 PROCEDURE — 85025 COMPLETE CBC W/AUTO DIFF WBC: CPT

## 2018-10-21 RX ADMIN — NITROGLYCERIN SCH INCH: 20 OINTMENT TOPICAL at 14:49

## 2018-10-21 RX ADMIN — NIFEDIPINE SCH: 60 TABLET, EXTENDED RELEASE ORAL at 21:09

## 2018-10-21 RX ADMIN — HEPARIN SODIUM SCH UNITS: 5000 INJECTION, SOLUTION INTRAVENOUS; SUBCUTANEOUS at 20:54

## 2018-10-21 RX ADMIN — MOMETASONE FUROATE AND FORMOTEROL FUMARATE DIHYDRATE SCH PUFF: 200; 5 AEROSOL RESPIRATORY (INHALATION) at 18:53

## 2018-10-21 RX ADMIN — MYCOPHENOLIC ACID SCH MG: 180 TABLET, DELAYED RELEASE ORAL at 21:23

## 2018-10-21 RX ADMIN — NITROGLYCERIN SCH: 20 OINTMENT TOPICAL at 21:24

## 2018-10-21 NOTE — CON
DATE OF CONSULTATION:  10/21/2018

 

SERVICE:  Renal Medicine.

 

HISTORY OF PRESENT ILLNESS:  Mr. Collins is a 63-year-old black male with known history of status po
st renal transplant - currently on immunosuppressive regimen, and admitted for CHF.  I did discuss th
e case with the hospitalist and we started him on Lasix 40 mg IV q.12 hours.  He has a history of velasquez
stolic dysfunction.  We are following him up for management of his post-renal transplant.

 

Patient is noted to be breathing better.  He is currently on a BiPAP.

 

REVIEW OF SYSTEMS:  Positive for shortness of breath, no chest pain, no syncopal episode, no nausea, 
no vomiting, no productive cough, no fever or chills.  No abdominal pain.  Appetite and energy level 
are fair.  No dysuria, no hematochezia, no melena, no hematemesis.  Occasional joint pains.

 

HOME MEDICATIONS:  Included furosemide 40 mg once a day, clonidine 0.3 mg b.i.d., Nifedical 60 mg XL 
tab once a day, tacrolimus 1 mg 2 capsules b.i.d., Symbicort orally inhaler as directed, Myfortic 360
 mg b.i.d., isosorbide mononitrate 60 mg once a day, metoprolol succinate 50 mg b.i.d., hydralazine 5
0 mg b.i.d., Eliquis 5 mg b.i.d., digoxin 125 mcg every day.

 

PAST MEDICAL HISTORY:

1.  Status post renal transplant.

2.  COPD.

3.  Status post CHF.

4.  Hyperlipidemia.

5.  Hypertension.

6.  History of gout.

7.  Patient also has a history of chronic pain.  He is also status post acute respiratory failure.

8.  Coronary artery disease.

9.  Status post ESRD.

 

PAST SURGICAL HISTORY:

1.  Status post cadaveric renal transplant - 2009.

2.  Status post colonoscopy.

3.  Status post AV fistula placement.

4.  Status post cuffed dialysis catheter placement.

5.  Status post cardiac catheterization.

6.  Status post CABG.

 

SOCIAL HISTORY:  The patient is a retired .  He is  and lives in [place].  Several childr
en.  Currently, no smoking or alcohol intake.  He denies any IV drug.  Status post multiple blood tra
nsfusions.  Education, high school.  Sedentary lifestyle.

 

FAMILY HISTORY:  No family history of ESRD.

 

ALLERGIES:  ?PENICILLIN.

 

TRAUMA:  None.

 

IMMUNIZATIONS:  Up-to-date.

 

HOSPITALIZATIONS:  Please see past medical history.

 

PHYSICAL EXAMINATION:

VITAL SIGNS:  Blood pressure 124/63 with a heart rate of 49, temperature is 97.6, respiratory rate 17
, O2 sat is 99.

GENERAL EXAM:  Noted to be awake, alert, on BiPAP, not in distress.

SKIN:  Adequate turgor.

HEENT:  He has pinkish conjunctivae, anicteric sclerae.

NECK:  No neck mass, no carotid bruits, no JVD.

CHEST:  No deformities.

LUNGS:  Decreased breath sounds.  Positive for crackles.

HEART:  Normal sinus rhythm.  No murmur, no gallops, no rubs.

ABDOMEN:  Globular, soft, nontender, no masses.

EXTREMITIES:  No edema, no deformities.

NEUROLOGICAL EXAM:  Awake and oriented to 3 spheres.  Moving all extremities.  No tremors, no asterix
is, no ataxia.

 

Hospital medication shows Lasix 40 mg IV q.12 hours, Lipitor 40 mg at bedtime, calcitriol 0.25 mcg ev
rene day, Ecotrin 81 mg tab once a day, DuoNeb q.4 hours, Pepcid 20 mg tab b.i.d., heparin 5000 units 
subcu b.i.d., hydralazine 25 mg p.o. b.i.d., Procardia-XL 60 mg p.o. b.i.d., Solu-Medrol 40 mg IV candie
ry 8 hours, Nitro-Bid 2% half an inch q.8 hours, Myfortic 360 mg q.12 hours, Symbicort as directed, t
acrolimus 1 mg - 2 tabs q.12 hours.

 

LABORATORY DATA:  Laboratories of 10/21/2018, sodium 137, potassium 4.3, chloride 106, carbon dioxide
 15, BUN 35, creatinine 1.72, glucose 120, AST 27, ALT 19.  Lactic acid 2.4.  03/25/2018, creatinine 
1.56.

 

Chest x-ray shows CHF.

 

ASSESSMENT AND PLAN:

1.  Acute kidney injury - superimposed hemodynamically-mediated renal dysfunction appear most likely 
from his underlying congestive heart failure.  Continue to optimize cardiac status.

2.  Congestive heart failure.  Agree with Lasix 40 mg IV q.12 hours.  Adjust as needed.

3.  Status post cadaveric renal transplant, stable.  Continuing current immunosuppressive regimen.  N
o changes will be made for the moment with this immunosuppressive regimen.  My last tacrolimus level 
with him in the outpatient shows it is within a therapeutic level.

4.  Bradycardia.  The patient is currently off his beta blocker.

 

Overall, prognosis remains guarded.  Agree with current management.

## 2018-10-21 NOTE — CON
DATE OF CONSULTATION:  10/21/2018

 

HISTORY OF PRESENT ILLNESS:  Obie Collins is a 63-year-old -American gentleman who presented 
with recurrent shortness of breath, cough, vague chest pain.  Apparently his sats were low on 3 liter
s nasal O2, but in ER, unable to document any sats, respirations 24, blood pressure was 172/80.  He w
as placed on noninvasive ventilation this morning.  He said he is feeling better.  Sats are 99%.

 

Denies any fever or chills.  Denies any tobacco abuse.

 

PAST MEDICAL HISTORY:  Pertinent for CHF, COPD, renal failure, hyperlipidemia, hypertension, gout.

 

PAST SURGICAL HISTORY:  Renal transplant, bypass surgery, access.

 

MEDICATIONS AT HOME:  Metoprolol 50 twice a day, digoxin 0.125, Symbicort twice a day, nebulizer, Las
ix 40, _____, hydralazine 25 twice a day, Myfortic 360 twice a day, Procardia 60 twice a day.

 

REVIEW OF SYSTEMS:  Otherwise unremarkable.

 

PHYSICAL EXAMINATION:

GENERAL:  Awake, alert, responsive, in no distress.  We tried to get him off his BiPAP.

VITAL SIGNS:  Temperature 97, pulse 47, respiratory rate _____, blood pressure is 120/66.

CHEST:  Decreased breath sounds, no wheezing.

CARDIAC:  Normal S1-S2.  No gallops.

ABDOMEN:  No masses.

 

LABORATORY DATA:  Creatinine 1.72 and BUN 35.

 

White count 9,000.

 

IMPRESSION:

1.  Respiratory failure secondary to congestive heart failure.

2.  Chronic obstructive pulmonary disease.

3.  Renal failure.

 

PLAN:  Continue neb treatments, steroids, supportive care.

 

Off BiPAP.  Await input from Cardiology.  We will follow.

 

Consultation note is 70 minutes in which 50% spent in direct patient care.

## 2018-10-21 NOTE — HP
DATE OF ADMISSION:  10/21/2018

 

CHIEF COMPLAINT:  Worsening shortness of breath of 1-week duration.

 

HISTORY OF PRESENT ILLNESS:  The patient is a 63-year-old male with coronary artery disease status po
st CABG, chronic diastolic heart failure, CKD stage IV, status post renal transplant, on chronic immu
nosuppressive therapy, pulmonary hypertension and tricuspid regurgitation, presented to the emergency
 room with worsening shortness of breath of 1-week duration.  The patient is usually on 3 liters home
 oxygen.  The shortness of breath got progressively worse over the past 3-4 days.  It got worse last 
night for which he presented to the emergency room.  He also noticed bilateral lower extremity swelli
ng.  He denies recent immobilization or travel.  He had some orthopnea as well.  No chest pain, palpi
tations, diaphoresis reported.

 

In the emergency room, his initial vital signs showed temperature 98.2, pulse rate of 63, respiration
s 24 with blood pressure 172/80 with O2 saturation of 88% on 3 liters nasal cannula.  His chest x-ray
 showed pulmonary vascular congestion.  EKG showed atrial fibrillation with right bundle branch block
.  He was placed on noninvasive positive pressure ventilation.  He received 40 mg IV Lasix and 1-inch
 nitropatch was placed.

 

PAST MEDICAL HISTORY:

1.  Chronic diastolic heart failure.

2.  Chronic respiratory failure on home oxygen.

3.  Chronic kidney disease stage IV, status post renal transplant, on chronic immunosuppressive thera
py.

4.  Chronic atrial fibrillation.  The patient is not on anticoagulation.

5.  Coronary artery disease, status post CABG in 2014.

6.  Pulmonary hypertension.

7.  Moderate-to-severe tricuspid regurgitation.

8.  Former smoker.

9.  Hyperlipidemia.

10.  Gastroesophageal reflux disease.

11.  History of myocardial infarction.

12. Peripheral vascular disease status post aortoiliac artery bypass graft.

 

PAST SURGICAL HISTORY:

1.  Coronary artery bypass grafting in 2014 at [facility].

2.  Electrical cardioversion.

3.  Renal transplant.

4.  Dialysis access.

 

ALLERGIES:  No known drug allergies.

 

CURRENT HOME MEDICATIONS:  Albuterol inhaler as needed, Lipitor 40 mg at bedtime, calcitriol 0.25 mcg
 daily, digoxin 125 mcg daily, Lasix 40 mg daily, hydralazine 25 mg twice a day, Imdur ER 60 mg daily
, Lopressor 50 mg b.i.d., mycophenolate 360 mg twice a day, Procardia XL 60 mg b.i.d., Prograf 2 mg b
.i.d., Symbicort twice a day.

 

SOCIAL HISTORY:  The patient currently lives at home.  Denies any smoking, alcohol or drug use.  He i
s independent of activities of daily living.  He is full code, makes his own decisions with the help 
of his family.  The patient has a 35-pack-year smoking history and quit smoking in 2004.

 

FAMILY HISTORY:  Negative for inheritable diseases per patient report.

 

PHYSICAL EXAMINATION:

VITAL SIGNS:  As discussed above.

GENERAL:  A 63-year-old male, on noninvasive positive pressure ventilation.

HEENT:  Head atraumatic, normocephalic.  Sclerae are anicteric.  Moist mucous membrane.  No oral lesi
on.

NECK:  Supple, no JVD, no carotid bruit.

LUNGS:  Showed bibasilar rales with rhonchi.  There was scattered wheezing.  Minimal accessory muscle
 use.

HEART:  S1, S2 present.  Healed midline scar from previous CABG.  A 2/6 systolic murmur over the left
 lateral sternal border.  No heaves or pulsation.

ABDOMEN:  Soft, nontender.  Bowel sounds present.

EXTREMITIES:  Pitting edema up to 3+ in bilateral lower extremities.  No calf tenderness.

SKIN:  Warm and dry.

LYMPH NODES:  No palpable lymph nodes in the neck.

PERIPHERAL VASCULAR:  Radial pulses palpable bilaterally.

MUSCULOSKELETAL:  No joint swelling or tenderness.

 

LABORATORY FINDINGS:  Troponin in the indeterminate range at 0.051.  WBC 9.1 with hemoglobin 11, plat
elet count 368,000, creatinine 1.72 with BUN 35, bicarbonate of 15.  Lactic acid 3.5.  Repeat lactic 
acid 2.4.  Total bilirubin 1.5.  Chest x-ray by my review as discussed above.  EKG by my review as di
scussed above.

 

IMPRESSION:

1.  Acute on chronic hypoxic respiratory failure secondary to congestive heart failure exacerbation.

2.  Acute on chronic diastolic heart failure exacerbation.

3.  Metabolic acidosis/lactic acidosis, multifactorial.

4.  Elevated troponin secondary to demand ischemia.

5.  Abnormal liver function tests secondary to passive hepatic congestion.

6.  Chronic kidney disease stage 3 with a history of renal transplant, on immunosuppressive.

7.  Chronic anemia secondary to renal insufficiency.

8.  Chronic atrial fibrillation with slow ventricular rate.  His heart rate lowest was in 30s.

9.  Hyperlipidemia.

10.  Coronary artery disease, status post myocardial infarction and coronary artery bypass graft.

11.  Peripheral vascular disease.

12.  Pulmonary hypertension.

13.  Moderate-to-severe tricuspid regurgitation.

 

PLAN:  The patient will be monitored in the intermediate care unit.  We will continue noninvasive pos
itive pressure ventilation.  We will give him IV steroids for 24 hours.  Continue nitropatch.  IV Las
ix 40 mg twice a day.  We will consult Nephrology, Dr. Potter due to history of renal transplant.  We wi
ll resume immunosuppressive.  We will resume Procardia XL with holding parameters.  We will hold digo
des and Toprol due to bradycardia.  Nebulizer treatment every 4 hourly.  Atropine as needed for susta
ined bradycardia.  We will repeat lactic acid and electrolytes in a.m.  We will consult Dr. Boggs 
in a.m.

 

Plan of care was discussed with the patient in detail.  He stated understanding.

## 2018-10-22 LAB
ALBUMIN SERPL BCG-MCNC: 3.6 G/DL (ref 3.4–4.8)
ALP SERPL-CCNC: 105 U/L (ref 40–150)
ALT SERPL W P-5'-P-CCNC: 13 U/L (ref 8–55)
ANION GAP SERPL CALC-SCNC: 10 MMOL/L (ref 10–20)
AST SERPL-CCNC: 14 U/L (ref 5–34)
BASOPHILS # BLD AUTO: 0 THOU/UL (ref 0–0.2)
BASOPHILS NFR BLD AUTO: 0 % (ref 0–1)
BILIRUB SERPL-MCNC: 0.8 MG/DL (ref 0.2–1.2)
BUN SERPL-MCNC: 41 MG/DL (ref 8.4–25.7)
CALCIUM SERPL-MCNC: 9.2 MG/DL (ref 7.8–10.44)
CHLORIDE SERPL-SCNC: 109 MMOL/L (ref 98–107)
CO2 SERPL-SCNC: 22 MMOL/L (ref 23–31)
CREAT CL PREDICTED SERPL C-G-VRATE: 40 ML/MIN (ref 70–130)
EOSINOPHIL # BLD AUTO: 0 THOU/UL (ref 0–0.7)
EOSINOPHIL NFR BLD AUTO: 0.2 % (ref 0–10)
GLOBULIN SER CALC-MCNC: 3.9 G/DL (ref 2.4–3.5)
GLUCOSE SERPL-MCNC: 135 MG/DL (ref 80–115)
HGB BLD-MCNC: 9.2 G/DL (ref 14–18)
LYMPHOCYTES # BLD: 0.6 THOU/UL (ref 1.2–3.4)
LYMPHOCYTES NFR BLD AUTO: 12.2 % (ref 21–51)
MAGNESIUM SERPL-MCNC: 2 MG/DL (ref 1.6–2.6)
MCH RBC QN AUTO: 25.6 PG (ref 27–31)
MCV RBC AUTO: 83.6 FL (ref 78–98)
MONOCYTES # BLD AUTO: 0.6 THOU/UL (ref 0.11–0.59)
MONOCYTES NFR BLD AUTO: 12.6 % (ref 0–10)
NEUTROPHILS # BLD AUTO: 3.8 THOU/UL (ref 1.4–6.5)
NEUTROPHILS NFR BLD AUTO: 75 % (ref 42–75)
PLATELET # BLD AUTO: 255 THOU/UL (ref 130–400)
POTASSIUM SERPL-SCNC: 4.2 MMOL/L (ref 3.5–5.1)
RBC # BLD AUTO: 3.6 MILL/UL (ref 4.7–6.1)
SODIUM SERPL-SCNC: 137 MMOL/L (ref 136–145)
WBC # BLD AUTO: 5.1 THOU/UL (ref 4.8–10.8)

## 2018-10-22 RX ADMIN — MOMETASONE FUROATE AND FORMOTEROL FUMARATE DIHYDRATE SCH PUFF: 200; 5 AEROSOL RESPIRATORY (INHALATION) at 08:39

## 2018-10-22 RX ADMIN — MYCOPHENOLIC ACID SCH MG: 180 TABLET, DELAYED RELEASE ORAL at 08:37

## 2018-10-22 RX ADMIN — MOMETASONE FUROATE AND FORMOTEROL FUMARATE DIHYDRATE SCH PUFF: 200; 5 AEROSOL RESPIRATORY (INHALATION) at 20:19

## 2018-10-22 RX ADMIN — NITROGLYCERIN SCH: 20 OINTMENT TOPICAL at 05:27

## 2018-10-22 RX ADMIN — NIFEDIPINE SCH: 60 TABLET, EXTENDED RELEASE ORAL at 20:11

## 2018-10-22 RX ADMIN — NITROGLYCERIN SCH INCH: 20 OINTMENT TOPICAL at 14:26

## 2018-10-22 RX ADMIN — HEPARIN SODIUM SCH UNITS: 5000 INJECTION, SOLUTION INTRAVENOUS; SUBCUTANEOUS at 08:36

## 2018-10-22 RX ADMIN — HEPARIN SODIUM SCH UNIT: 1000 INJECTION, SOLUTION INTRAVENOUS; SUBCUTANEOUS at 22:56

## 2018-10-22 RX ADMIN — ASPIRIN SCH MG: 81 TABLET ORAL at 08:35

## 2018-10-22 RX ADMIN — NIFEDIPINE SCH MG: 60 TABLET, EXTENDED RELEASE ORAL at 08:37

## 2018-10-22 RX ADMIN — MYCOPHENOLIC ACID SCH MG: 180 TABLET, DELAYED RELEASE ORAL at 20:11

## 2018-10-22 RX ADMIN — HEPARIN SODIUM SCH UNIT: 1000 INJECTION, SOLUTION INTRAVENOUS; SUBCUTANEOUS at 14:27

## 2018-10-22 NOTE — PDOC.PN
- Subjective


Encounter Start Date: 10/22/18


Encounter Start Time: 15:03


Subjective: feels a little better but weak and easily tired and SOB w exertion


-: no CP/palpitations


-: in and out of a-fib w SVT per RN





- Objective


Resuscitation Status: 


 











Resuscitation Status           FULL:Full Resuscitation














MAR Reviewed: Yes


Vital Signs & Weight: 


 Vital Signs (12 hours)











  Temp Pulse Resp BP BP Pulse Ox


 


 10/22/18 11:17  97.7 F  69  22 H   124/55 L  95


 


 10/22/18 11:10   78  16    96


 


 10/22/18 08:41       96


 


 10/22/18 08:39   64  16    94 L


 


 10/22/18 08:37   69   141/58 H  


 


 10/22/18 08:36   49 L   141/58 H  


 


 10/22/18 08:00       96


 


 10/22/18 07:32  97.2 F L  49 L  20   134/66  95


 


 10/22/18 04:00  97.9 F  51 L  18   120/59 L  92 L








 Weight











Weight                         150 lb 14.4 oz














I&O: 


 











 10/21/18 10/22/18 10/23/18





 06:59 06:59 06:59


 


Intake Total  520 


 


Output Total  950 


 


Balance  -430 











Result Diagrams: 


 10/22/18 04:39





 10/22/18 04:39


Additional Labs: 





 Laboratory Tests











  03/22/18 03/23/18 03/24/18





  04:39 04:26 04:55


 


Creatinine  2.05 H  2.50 H  1.84 H


 


Troponin I   














  03/25/18 10/21/18 10/21/18





  05:32 05:50 05:50


 


Creatinine  1.56 H   1.72 H


 


Troponin I   0.051 H 














  10/21/18 10/21/18 10/22/18





  09:04 12:54 04:39


 


Creatinine    1.87 H


 


Troponin I  0.051 H  0.046 H 














Phys Exam





- Physical Examination


Constitutional: NAD (looks chronically ill)


HEENT: PERRLA, moist MMs, sclera anicteric, oral pharynx no lesions, 2+ tonsils


Neck: no nodes, no JVD, supple, full ROM


Respiratory: no wheezing


bibasilar crackles


Cardiovascular: RRR, no significant murmur


Gastrointestinal: soft, non-tender, no distention, positive bowel sounds


Musculoskeletal: no edema, pulses present


Neurological: non-focal, normal sensation, moves all 4 limbs


Psychiatric: normal affect, A&O x 3


Skin: no rash





Dx/Plan


(1) Acute and chronic respiratory failure with hypoxia


Code(s): J96.21 - ACUTE AND CHRONIC RESPIRATORY FAILURE WITH HYPOXIA   Status: 

Resolved   Comment: Currently improved and at baseline O2 requirement of 3-4L/

min NC   





(2) Acute on chronic diastolic congestive heart failure


Code(s): I50.33 - ACUTE ON CHRONIC DIASTOLIC (CONGESTIVE) HEART FAILURE   Status

: Acute   Comment: Stable. Improved with diuresis, EF 55% on Echo. Will 

continue Lasix 40mg po daily   





(3) NSVT (nonsustained ventricular tachycardia)


Code(s): I47.2 - VENTRICULAR TACHYCARDIA   Status: Acute   





(4) Demand ischemia of myocardium


Code(s): I24.8 - OTHER FORMS OF ACUTE ISCHEMIC HEART DISEASE   Status: Chronic 

  Comment: Conmtinue with treating underlying cause of CHF. Likely deman 

ischmia.   





(5) CAD (coronary artery disease)


Code(s): I25.10 - ATHSCL HEART DISEASE OF NATIVE CORONARY ARTERY W/O ANG PCTRS 

  Status: Chronic   


Qualifiers: 


   Coronary Disease-Associated Artery/Lesion type: bypass graft   Native vs. 

transplanted heart: native heart   Associated angina: without angina   

Qualified Code(s): I25.810 - Atherosclerosis of coronary artery bypass graft(s) 

without angina pectoris   


Comment: Stable, chest pain free. Continue ASA 81mg daily, Lipitor 40mg daily   





(6) ESRD (end stage renal disease)


Code(s): N18.6 - END STAGE RENAL DISEASE   Status: Chronic   





(7) Hypertension


Code(s): I10 - ESSENTIAL (PRIMARY) HYPERTENSION   Status: Chronic   


Qualifiers: 


   Hypertension type: essential hypertension   Qualified Code(s): I10 - 

Essential (primary) hypertension   


Comment: Controlled. Continue current regimen.    





(8) Kidney transplant status


Code(s): Z94.0 - KIDNEY TRANSPLANT STATUS   Status: Chronic   Comment: its been 

9 yrs now   





(9) Paroxysmal A-fib


Code(s): I48.0 - PAROXYSMAL ATRIAL FIBRILLATION   Status: Chronic   





(10) Normocytic anemia


Code(s): D64.9 - ANEMIA, UNSPECIFIED   Status: Chronic   Comment: Status post 

PRBC transfusion. Hemoglobin stable. Stool occult blood positive. Will 

discontinue Eliquis. HE will follow up with his PCP regarding restarting this 

medication. GI on board, micheline't proceed with endoscopy/colonoscopy since Hb 

stable.    





(11) Pulmonary HTN


Code(s): I27.20 - PULMONARY HYPERTENSION, UNSPECIFIED   Status: Chronic   

Comment: Per ECHO 2/2018.EF 55%   





- Plan


PT/OT, out of bed/ambulate, DVT proph w/SCDs


cont diuresis.Fluid restriction.Strict I/Os


-: ECHO.May need AICD.


-: ? ablation.EPconsult


-: start anticoagulation given high CHADS2-VASc score.heparin drip for now


-: Digoxin and BB on hold d/t bradycardia.monitor





* .Cont immunosuppressants.monitor level


* Cont ASA,statin,Imdur.


* am labs


* duonebs prn








Review of Systems





- Review of Systems


Constitutional: weakness, malaise.  negative: fever, chills, sweats, other


ENT: negative: Ear Pain, Ear Discharge, Nose Pain, Nose Discharge, Nose 

Congestion, Mouth Pain, Mouth Swelling, Throat Pain, Throat Swelling, Other


Respiratory: Shortness of Breath, SOB with Excertion.  negative: Cough, Dry, 

Hemoptysis, Pleuritic Pain, Sputum, Wheezing


Cardiovascular: edema, light headedness.  negative: chest pain, palpitations, 

orthopnea, paroxysmal nocturnal dyspnea, other


Gastrointestinal: negative: Nausea, Vomiting, Abdominal Pain, Diarrhea, 

Constipation, Melena, Hematochezia, Other


Genitourinary: negative: Dysuria, Frequency, Incontinence, Hematuria, Retention

, Other


Musculoskeletal: negative: Neck Pain, Shoulder Pain, Arm Pain, Back Pain, Hand 

Pain, Leg Pain, Foot Pain, Other


Neurological: negative: Weakness, Numbness, Incoordination, Change in Speech, 

Confusion, Seizures, Other





- Medications/Allergies


Allergies/Adverse Reactions: 


 Allergies











Allergy/AdvReac Type Severity Reaction Status Date / Time


 


No Known Drug Allergies Allergy Unknown  Verified 02/25/18 12:37











Medications: 


 Current Medications





Acetaminophen (Tylenol)  650 mg PO Q4H PRN


   PRN Reason: Headache/Fever/Mild Pain (1-3)


Albuterol/Ipratropium (Duoneb)  3 ml NEB Q8YU-MR SANTI


   Last Admin: 10/22/18 11:10 Dose:  3 ml


Albuterol/Ipratropium (Duoneb)  3 ml NEB Q2H PRN


   PRN Reason: SOB &/or Wheezing


Aspirin (Ecotrin)  81 mg PO DAILY [facility]


   Last Admin: 10/22/18 08:35 Dose:  81 mg


Atorvastatin Calcium (Lipitor)  40 mg PO HS [facility]


   Last Admin: 10/21/18 20:53 Dose:  40 mg


Atropine Sulfate (Atropine)  0.5 mg IVP ASDIR PRN


   PRN Reason: Sustained Bradycardia Hr <30s


Calcitriol (Rocaltrol)  0.25 mcg PO DAILY [facility]


   Last Admin: 10/22/18 08:35 Dose:  0.25 mcg


Famotidine (Pepcid)  20 mg PO 0900 [facility]


Furosemide (Lasix)  40 mg SLOW IVP 0600,1400 [facility]


   Last Admin: 10/22/18 14:31 Dose:  40 mg


Heparin Sodium (Porcine) (Heparin 1,000 Units/Ml (10 Ml))  0 units SLOW IVP 

WILLCALL [facility]


   Last Admin: 10/22/18 14:27 Dose:  3,810 unit


Hydralazine HCl (Apresoline)  25 mg PO BID [facility]


   Last Admin: 10/22/18 08:36 Dose:  25 mg


Heparin Sodium/Dextrose (Heparin 25,000 Units/D5w 500 Ml)  500 mls @ 0 mls/hr 

IV INF [facility]; Protocol


   Last Admin: 10/22/18 14:27 Dose:  500 mls


Mometasone Furoate/Formoterol Fumar (Dulera 200 Mcg/5 Mcg Inhaler)  2 puff INH 

BID-RT [facility]


   Last Admin: 10/22/18 08:39 Dose:  2 puff


Mycophenolate Sodium (Myfortic)  360 mg PO BID [facility]


   Last Admin: 10/22/18 08:37 Dose:  360 mg


Nifedipine (Procardia Xl)  60 mg PO BID [facility]


   Last Admin: 10/22/18 08:37 Dose:  60 mg


Nitroglycerin (Nitrostat)  0.4 mg PO Q5MIN PRN


   PRN Reason: Chest Pain


Ondansetron HCl (Zofran Odt)  4 mg PO Q6H PRN


   PRN Reason: Nausea/Vomiting


Ondansetron HCl (Zofran)  4 mg IVP Q6H PRN


   PRN Reason: Nausea/Vomiting


Senna/Docusate Sodium (Senokot S)  2 tab PO BID PRN


   PRN Reason: Constipation


Sodium Chloride (Flush - Normal Saline)  10 ml IVF PRN PRN


   PRN Reason: Saline Flush


Tacrolimus (Prograf)  2 mg PO Q12HR [facility]


   Last Admin: 10/22/18 08:38 Dose:  2 mg

## 2018-10-22 NOTE — RAD
CHEST 1 VIEW:

 

Date:  10/22/18 

 

HISTORY:  

Chest pain. Shortness of breath. 

 

COMPARISON:  

Radiograph dated 10/21/18. 

 

FINDINGS:

Heart size is enlarged. Low grade interstitial increased markings. No pneumothorax. 

 

No acute osseous abnormality. 

 

IMPRESSION: 

Cardiomegaly with mild pulmonary venous congestion. Overall, findings are slightly improved. 

 

 

POS: [facility]

## 2018-10-22 NOTE — CON
DATE OF CONSULTATION:  10/22/2018

 

HISTORY OF PRESENT ILLNESS:  I am seeing Mr. Collins at our [facility] [place] as an electrophysiology consultant.  His problems are:

1.  Episode of wide complex tachycardia.

2.  Chronic atrial fibrillation.

3.  Acute on chronic diastolic congestive heart failure.

4.  History of chronic renal insufficiency on renal transplant.  Creatinine is 
1.87.

5.  Poorly-controlled hypertension.

6.  Dyslipidemia.

7.  Risk factors.

8.  History of coronary artery disease, prior bypass surgery.

9.  History of pulmonary hypertension.

10.  History of severe pulmonary hypertension and chronic obstructive pulmonary 
disease.

 

ALLERGIES:  None noted.

 

MEDICATIONS AT HOME:  Included albuterol, Lipitor, hydralazine, furosemide, 
Myfortic, isosorbide mononitrate, budesonide, tacrolimus, digoxin, Rocaltrol, 
nifedipine, metoprolol.

 

SUBJECTIVE:  Mr. Collins is here due to progressive dyspnea.  He was noticing 
increasing shortness of breath for about a week.  He is using about 3 liters of 
home oxygen at home.  He also noted some lower extremity swelling, orthopnea 
was noted.  Denies chest pains or palpitations.  He denies dizziness, loss of 
consciousness.

 

REVIEW OF SYSTEMS:  Rest of 12-point systems is otherwise unremarkable.

 

PAST MEDICAL HISTORY:  As above, history of coronary artery bypass grafting in 
2014 in [place] [facility] [place].  He had prior cardioversion and then renal transplant.
  He does have history of significant valve disease with severe TR, GERD, prior 
history of myocardial infarction and peripheral vascular disease, status post 
aortoiliac bypass grafting as noted.

 

FAMILY HISTORY:  Not contributory.

 

SOCIAL HISTORY:  Patient lives alone.  Denies smoking, ETOH or drug abuse.  
Patient quit smoking in 2004 after a 35-pack per year smoking history.

 

OBJECTIVE DATA:

VITAL SIGNS:  Blood pressure is 120/46, heart rate 80, respirations 22, 
temperature 97.5 degrees Fahrenheit.

GENERAL:  He is alert and oriented man in no apparent distress.

NECK:  Supple.  Jugular veins not distended.

CHEST:  Coarse without crackles.

CARDIOVASCULAR:  Heart sounds are regular to rate and rhythm.  No murmur or 
gallop.

ABDOMEN:  Benign.  Bowel sounds positive.

EXTREMITIES:  Lower extremities without edema, clubbing or cyanosis.  Pulses 
are adequate.

NEUROLOGIC:  Patient nonfocal.

MUSCULOSKELETAL:  No joint swelling or deformities.

SKIN:  Without rash.

 

DATABASE:  The EKG is reviewed revealing shows atrial fibrillation, QRS 
duration 130 milliseconds, right bundle-branch block is noted.  QTC is 
prolonged at 491 milliseconds.

 

The telemetry strips reveal an episode of wide complex arrhythmia at 4:00 this 
morning, but 51 beats noted.

 

ASSESSMENT AND PLAN:

1.  Mr. Collins is a 63-year-old man with history of severe pulmonary 
hypertension, diastolic heart failure with an acute exacerbation prompting his 
admission this time.  He also has chronic persistent atrial fibrillation with 
remote cardioversion.  He was admitted with the heart failure exacerbation and 
while on telemetry developed a wide complex arrhythmia, but he is asymptomatic  
Wide complex rhythm to likely right ventricular tachycardia in origin.  We have 
discussed the treatment options.  At this point, optimization of heart failure 
is prudent.  I agree with rechecking his LV systolic function.  If indeed, LV 
systolic function is severe, reduced less than 35%, consultation with ICD 
therapy could be made.  Alternatively, beta blocker therapy could be 
administered if tolerated from his pulmonary standpoint.

2.  Atrial fibrillation, chronic.  Patient prefers conservative therapy.  I 
agree with rate control.

3.  Prolonged QT while in progress, await QT prolonging agents.  I doubt the 
arrhythmia was related to QT prolongation hence it was clearly monomorphic.

4.  History of renal transplant as per Nephrology.

5.  History of coronary artery disease as per Cardiology consultants.

 

Thank you again for allowing me to participate in the care of this patient.  We 
will follow with you.

 

CORRINA

## 2018-10-22 NOTE — PRG
DATE OF SERVICE:  10/22/2018

 

Mr. Collins is a 63-year-old black male who is status post renal transplant - currently on immunosup
pressive regimen and being followed by the Renal Service for his transplant management.  He was initi
ally admitted for CHF.  He has been started on diuretics.  His congestive heart failure is clinically
 improved.  He is currently on furosemide 40 mg IV q.12.  No other complaints today, no chest pain.

 

PHYSICAL EXAMINATION: 

VITAL SIGNS:  Blood pressure 134/66, heart rate 64, respiratory rate 16, temperature is 97.2, pulse o
x 94%.

GENERAL:  Noted to be awake, alert, comfortable, not in distress.

SKIN:  Adequate turgor. 

HEENT:  He has pinkish conjunctivae, anicteric sclerae.

NECK:  No neck mass, no carotid bruits, no JVD.

CHEST:  No deformities.

LUNGS:  Clear breath sounds.  No wheezing, no crackles.

HEART:  Normal sinus rhythm.  No murmur, no gallops or rubs.

ABDOMEN:  Globular, soft, nontender, no masses.

EXTREMITIES:  No edema, no deformities.

 

MEDICATIONS:  10/22/2018 - Reviewed.

 

LABORATORY DATA:  10/22/2018 - Showed the following:  White count 5.1, hemoglobin 9.2.  Sodium 137, p
otassium 4.2, chloride 109, carbon dioxide 22, BUN 41, creatinine 1.87, glucose 135, magnesium 2.0, c
alcium is 9.2, AST 14, ALT 13, albumin is 3.6.

 

ASSESSMENT AND PLAN:  

1.  Status post cadaveric renal transplant, stable renal function.  Creatinine 1.87 is about baseline
.  He was 1.72 yesterday.  We will continue current immunosuppressive regimen.  Continue current diur
etic regimen.  If renal function would further worsen, consider decreasing Lasix from 40 mg IV q.12 t
o once a day dosing.  We will recheck another basic metabolic panel and CBC in a.m.

2.  Congestive heart failure, stable.  Continue current diuretic regimen.  Much improved.

3.  Hypertension, adequate control.

 

Recheck base met and CBC in a.m.

## 2018-10-22 NOTE — CON
DATE OF CONSULTATION:  10/22/2018

 

REASON FOR CONSULTATION:  Congestive heart failure.

 

PRIMARY CARDIOLOGIST:  Dr. Juanjo Boggs.

 

HISTORY OF PRESENT ILLNESS:  Mr. Collins is 63-year-old gentleman with history of coronary artery di
sease, status post bypass surgery in addition to ischemic cardiomyopathy, who has refused ICD placed 
in the past.  He recently states he had increased shortness of breath, lower extremity edema, PND, an
d orthopnea.  He states it occurred over the last several weeks.  He states he has a setback every 6-
8 months.  He also had a nonsustained VT present on telemetry monitoring.  The mild chest pain or pre
ssure noted during the episode of shortness of breath.

 

PAST MEDICAL HISTORY:  As described above including hypertension, hyperlipidemia, neuropathy, renal t
ransplant, appendectomy, paroxysmal atrial fibrillation..

 

SOCIAL HISTORY:  No current tobacco or alcohol use.

 

ALLERGIES:  None.

 

REVIEW OF SYSTEMS:  Ten point review of systems reviewed and as above, otherwise negative.

 

MEDICATIONS:  His current medication list includes the following:  Metoprolol 50 b.i.d., digoxin, corey
citriol, atorvastatin, albuterol, isosorbide, Prograf, Lasix, alprazolam, Myfortic and Procardia.

 

PHYSICAL EXAMINATION:

VITAL SIGNS:  Blood pressure 124/55, pulse 60, temperature 97.9.

GENERAL:  Patient is a pleasant male who is in no acute distress.  The patient appears his stated age
.

NEUROLOGIC:  The patient is alert and oriented times 3 with no focal neurologic deficits.

HEENT:  Sclerae without icterus.  Mouth has moist mucous membranes with normal pallor.

NECK:  No JVD.  Carotid upstroke brisk.  No bruits bilaterally.

LUNGS:  Crackles noted bilaterally.

BACK:  No scoliosis or kyphosis.

CARDIAC:  Regular rate and rhythm with normal S1 and S2.  No S3 or S4 noted.  No significant rubs, mu
rmurs, thrills, or gallops noted throughout the precordium.  PMI is not displaced.  There is no ayla
ternal heave.

ABDOMEN:  Soft, nontender, nondistended.  No peritoneal signs present.  No hepatosplenomegaly.  No ab
normal striae.

EXTREMITIES:  2+ femoral and 2+ dorsalis pedis pulses.  No cyanosis, clubbing, or edema.

SKIN:  No gross abnormalities.

 

LABORATORY:  Hemoglobin 9.2, creatinine 1.87, sodium 137, platelet count 255.

 

IMPRESSION:

1.  Acute on chronic systolic heart failure.

2.  Coronary artery disease.

3.  Status post bypass surgery.

4.  Previous renal transplant.

 

RECOMMENDATIONS:  Symptoms have improved.  He has no significant lower extremity edema currently.  He
 has mild crackles noted bilaterally.  At this point, we will continue current medical therapy.  He i
s on Lasix 40 mg IV.  He is also on low dose beta blocker therapy and would continue.

## 2018-10-22 NOTE — CON
DATE OF CONSULTATION:  10/22/2018

 

HISTORY OF PRESENT ILLNESS:  The patient is an unfortunate 63-year-old gentleman
, who presents with increasing dyspnea.  The patient has a long history of 
coronary artery disease.  The patient has a previous history of cardiac 
transplantation.  He was seen in 2014 with poorly controlled hypertension.  The 
patient eventually underwent a cardiac catheterization and was found to have 
severe 3-vessel coronary artery disease.  The patient underwent a coronary 
bypass graft surgery in July 2014.  The patient subsequently developed atrial 
fibrillation.  He has been at times on Eliquis for anticoagulation.  The 
patient also has developed congestive heart failure.  He has had CHF primarily 
due to doubt poorly-controlled hypertension, and the patient also has developed 
a nonsustained ventricular tachycardia.  The patient has been followed by 
Electrophysiology.  He was, for a time, was on amiodarone.  The patient 
presents once again with increasing dyspnea.  He recently underwent an 
electrical cardioversion, but has not been in sinus rhythm.  The patient 
presents once again with increasing dyspnea.  The patient denies having any 
chest pain.

 

PAST MEDICAL HISTORY:  

1.  Congestive heart failure, psecondary to diastolic dysfunction.

2.  History of coronary artery bypass surgery.

3.  History of nonsustained ventricular tachycardia.

4.  Permanent atrial fibrillation.

5.  Poorly-controlled hypertension.

6.  Dyslipidemia.

7.  Pulmonary hypertension severe secondary to pulmonary hypertension.

8.  History of renal transplantation with chronic renal insufficiency.

 

PAST SURGICAL HISTORY:  He had coronary artery bypass surgery and kidney 
transplant.

 

SOCIAL HISTORY:  Nonsmoker.

 

FAMILY HISTORY:  No strong family history of heart disease.

 

ALLERGIES:  None.

 

MEDICATIONS ON ADMISSION:  Metoprolol 50 b.i.d., digoxin 0.125 daily, Lipitor 
40 at bedtime, Imdur 60 q.a.m., Prograf 2 grams daily, Lasix 40 daily, 
hydralazine 25 b.i.d., nifedipine 60 b.i.d.

 

REVIEW OF SYSTEMS:  Noticeable for increasing dyspnea.  No bright red blood per 
rectum or easy bruising.

 

PHYSICAL EXAMINATION:

GENERAL:  This is a thin gentleman in mild distress.

VITAL SIGNS:  Blood pressure is 141/58.

NECK:  Showed jugular venous distention.

LUNGS:  Clear to auscultation.

HEART:  Regular rate and rhythm, normal S1 and S2 with a 3/6 holosystolic 
murmur.

ABDOMEN:  Nondistended.

EXTREMITIES:  Showed trace edema.

VASCULAR:  Radial pulses are 2+.

 

LABORATORY DATA:  Sodium 137, potassium 4.2, chloride 109, bicarbonate 22, BUN 
41, creatinine 1.87, glucose 135.  Troponin was 0.046.  His white blood cell 
count was 5.1, hemoglobin 9.2, hematocrit 30.1.  His platelets were 255.  His 
EKG revealed him to have atrial fibrillation with a wide complex tachycardia, 
possibly due to atrial fibrillation with aberrancy or ventricular tachycardia.

 

IMPRESSION:

1.  Congestive heart failure.

2.  Wide complex tachycardia.

3.  Permanent atrial fibrillation.

4.  History of severe pulmonary hypertension.

5.  Chronic obstructive pulmonary disease.

6.  Chronic renal insufficiency.

7.  Hypertension.

8.  Dyslipidemia.

 

This unfortunate gentleman presents again with congestive heart failure.  From 
a cardiac standpoint, we will ask EP to further evaluate his wide complex 
tachycardia.  We will repeat his echocardiogram.  The patient being diuresed 
with IV Lasix.  His prognosis is very guarded with his severe pulmonary 
hypertension.

 

ARAMNID

## 2018-10-22 NOTE — PRG
DATE OF SERVICE:  10/22/2018

 

The patient is better this morning, he is less short of breath.  He did not use the BiPAP last night.


 

PHYSICAL EXAMINATION: 

VITAL SIGNS:  Sats are 97 on 5 liters, respiration 20, temperature 97, pulse is 50, blood pressure 13
4/66.

CHEST:  Minimal crackles.

CARDIAC:  Normal S1, S2.  No gallops.

ABDOMEN:  Soft, no masses.

 

X-ray shows cardiomegaly, CHF.  

 

His creatinine is 1.87.  White count 5000, H&H 9 and 30, platelet 255,000.

 

IMPRESSION:  

1.  Respiratory failure.  

2.  Congestive heart failure.  

3.  Chronic obstructive pulmonary disease.

 

PLAN:  He appears to be at his baseline.

 

Continue supportive care.

## 2018-10-23 VITALS — TEMPERATURE: 98.2 F | SYSTOLIC BLOOD PRESSURE: 115 MMHG | DIASTOLIC BLOOD PRESSURE: 62 MMHG

## 2018-10-23 LAB
ANION GAP SERPL CALC-SCNC: 16 MMOL/L (ref 10–20)
BUN SERPL-MCNC: 38 MG/DL (ref 8.4–25.7)
CALCIUM SERPL-MCNC: 8.8 MG/DL (ref 7.8–10.44)
CHLORIDE SERPL-SCNC: 108 MMOL/L (ref 98–107)
CO2 SERPL-SCNC: 18 MMOL/L (ref 23–31)
CREAT CL PREDICTED SERPL C-G-VRATE: 45 ML/MIN (ref 70–130)
GLUCOSE SERPL-MCNC: 112 MG/DL (ref 80–115)
POTASSIUM SERPL-SCNC: 3.7 MMOL/L (ref 3.5–5.1)
SODIUM SERPL-SCNC: 138 MMOL/L (ref 136–145)

## 2018-10-23 RX ADMIN — HEPARIN SODIUM SCH UNIT: 1000 INJECTION, SOLUTION INTRAVENOUS; SUBCUTANEOUS at 13:39

## 2018-10-23 RX ADMIN — MYCOPHENOLIC ACID SCH MG: 180 TABLET, DELAYED RELEASE ORAL at 10:24

## 2018-10-23 RX ADMIN — NIFEDIPINE SCH: 60 TABLET, EXTENDED RELEASE ORAL at 10:23

## 2018-10-23 RX ADMIN — MOMETASONE FUROATE AND FORMOTEROL FUMARATE DIHYDRATE SCH PUFF: 200; 5 AEROSOL RESPIRATORY (INHALATION) at 18:18

## 2018-10-23 RX ADMIN — MYCOPHENOLIC ACID SCH MG: 180 TABLET, DELAYED RELEASE ORAL at 20:38

## 2018-10-23 RX ADMIN — MOMETASONE FUROATE AND FORMOTEROL FUMARATE DIHYDRATE SCH PUFF: 200; 5 AEROSOL RESPIRATORY (INHALATION) at 06:53

## 2018-10-23 RX ADMIN — ASPIRIN SCH MG: 81 TABLET ORAL at 10:23

## 2018-10-23 RX ADMIN — NIFEDIPINE SCH: 60 TABLET, EXTENDED RELEASE ORAL at 20:38

## 2018-10-23 RX ADMIN — HEPARIN SODIUM SCH UNIT: 1000 INJECTION, SOLUTION INTRAVENOUS; SUBCUTANEOUS at 06:20

## 2018-10-23 NOTE — PRG
DATE OF SERVICE:  10/23/2018

 

SUBJECTIVE:  Mr. Collins is a 63-year-old black male with known history of renal transplant on immun
osuppressive regimen, came in with congestive heart failure with acute kidney injury.  We are followi
ng him up for his acute kidney injury.  His renal function is slowly improving with improvement of CH
F.  In addition, he has been on diuretics.  Our plan is to adjust the diuretics downwards.  His most 
recent cardiac echo showed a normal EF.  He was also evaluated by the cardiologist/electrophysiologis
tDr. Mayer.  Recommendation is conservative management.  He did develop a wide complex ventricular t
achycardia.  He also has a persistent atrial fibrillation.

 

Currently, he is asymptomatic.  He denies any chest pain, shortness of breath.

 

OBJECTIVE:

VITAL SIGNS:  Blood pressure is 114/72, heart rate 75, respiratory rate 14, pulse ox 93%, temperature
 97.7.

GENERAL:  Awake, alert, supine, comfortable, not in distress.

SKIN:  Adequate turgor. 

HEENT:  He has pinkish conjunctivae, anicteric sclerae.

NECK:  No neck mass, no carotid bruits, no JVD.

CHEST:  No deformities.

LUNGS:  Clear breath sounds.

HEART:  Normal sinus rhythm.  No murmur, no gallops, no rubs.

ABDOMEN:  Globular, soft, nontender, no masses.

EXTREMITIES:  No edema, no deformities.

 

MEDICATIONS:  10/23/2018 - Reviewed.

 

LABORATORY:  10/22/2018 - White count 5.1, hemoglobin 9.2.

 

10/23/2018 - Sodium 138, potassium 3.7, chloride 108, carbon dioxide 18, BUN 38, creatinine 1.6, calc
ium 8.8.

 

ASSESSMENT AND PLAN:  

1.  Congestive heart failure, clinically much improved.  We will decrease current diuretic from 40 mg
 IV q.12 to 40 mg tab daily.

2.  Status post cadaveric renal transplant improving renal function.  Continue current immunosuppress
roberto regimen.  Last cyclosporine level is still pending.  No changes will be made with his immunosuppr
essive regimen.  No indication for any dialytic intervention.

3.  Acute kidney injury.  Superimposed hemodynamically mediated renal dysfunction.  Adjust diuretics.

## 2018-10-23 NOTE — PDOC.PN
- Subjective


Encounter Start Date: 10/23/18


Encounter Start Time: 16:00


Subjective: feels a little better but still very SOB





- Objective


Resuscitation Status: 


 











Resuscitation Status           FULL:Full Resuscitation














MAR Reviewed: Yes


Vital Signs & Weight: 


 Vital Signs (12 hours)











  Temp Pulse Pulse Pulse Resp BP BP


 


 10/23/18 15:59  97.0 F L  75    22 H  


 


 10/23/18 14:56   61    14  


 


 10/23/18 11:49  97.4 F L  77    20  


 


 10/23/18 10:42   66    14  


 


 10/23/18 10:22   73     115/78 


 


 10/23/18 10:14    79  78    126/87


 


 10/23/18 08:00       


 


 10/23/18 07:25  97.1 F L  77    16  


 


 10/23/18 06:54   75    14  














  BP BP BP Pulse Ox Pulse Ox Pulse Ox


 


 10/23/18 15:59   125/72    


 


 10/23/18 14:56      


 


 10/23/18 11:49    147/79 H  95  


 


 10/23/18 10:42      


 


 10/23/18 10:22      


 


 10/23/18 10:14  115/78     93 L  99


 


 10/23/18 08:00     97  


 


 10/23/18 07:25   126/66   97  


 


 10/23/18 06:54      








 Weight











Weight                         155 lb














I&O: 


 











 10/22/18 10/23/18 10/24/18





 06:59 06:59 06:59


 


Intake Total 520 1247.7 


 


Output Total 950 1050 


 


Balance -430 197.7 











Result Diagrams: 


 10/22/18 04:39





 10/23/18 05:25


Additional Labs: 





 Laboratory Tests











  03/25/18 10/21/18 10/21/18





  05:32 05:50 05:50


 


Creatinine  1.56 H  


 


Lactic Acid    3.5 H


 


Troponin I   0.051 H 














  10/21/18 10/21/18 10/21/18





  05:50 09:04 09:32


 


Creatinine  1.72 H  


 


Lactic Acid    2.4 H


 


Troponin I   0.051 H 














  10/21/18 10/22/18 10/22/18





  12:54 04:39 04:39


 


Creatinine   1.87 H 


 


Lactic Acid    2.6 H


 


Troponin I  0.046 H  














  10/23/18





  05:25


 


Creatinine  1.60 H


 


Lactic Acid 


 


Troponin I 











Radiology Reviewed by me: Yes (V/Q-low probability for PE)





Phys Exam





- Physical Examination


Constitutional: NAD


easily winded


HEENT: PERRLA, moist MMs, sclera anicteric, oral pharynx no lesions


Neck: no nodes, no JVD


Respiratory: no wheezing, no rhonchi, clear to auscultation bilateral


crackles


Cardiovascular: RRR, no significant murmur


Gastrointestinal: soft, non-tender, no distention, positive bowel sounds


Musculoskeletal: no edema, pulses present


Neurological: non-focal, normal sensation, moves all 4 limbs


Psychiatric: normal affect, A&O x 3


Skin: no rash





Dx/Plan


(1) Acute and chronic respiratory failure with hypoxia


Code(s): J96.21 - ACUTE AND CHRONIC RESPIRATORY FAILURE WITH HYPOXIA   Status: 

Acute   Comment: Currently improved and at baseline O2 requirement of 3-4L/min 

NC   





(2) Acute on chronic diastolic congestive heart failure


Code(s): I50.33 - ACUTE ON CHRONIC DIASTOLIC (CONGESTIVE) HEART FAILURE   Status

: Acute   Comment: Stable. Improved with diuresis, EF 55% on Echo. Will 

continue Lasix 40mg po daily   





(3) NSVT (nonsustained ventricular tachycardia)


Code(s): I47.2 - VENTRICULAR TACHYCARDIA   Status: Acute   





(4) Demand ischemia of myocardium


Code(s): I24.8 - OTHER FORMS OF ACUTE ISCHEMIC HEART DISEASE   Status: Chronic 

  Comment: Conmtinue with treating underlying cause of CHF. Likely deman 

ischmia.   





(5) CAD (coronary artery disease)


Code(s): I25.10 - ATHSCL HEART DISEASE OF NATIVE CORONARY ARTERY W/O ANG PCTRS 

  Status: Chronic   


Qualifiers: 


   Coronary Disease-Associated Artery/Lesion type: bypass graft   Native vs. 

transplanted heart: native heart   Associated angina: without angina   

Qualified Code(s): I25.810 - Atherosclerosis of coronary artery bypass graft(s) 

without angina pectoris   


Comment: Stable, chest pain free. Continue ASA 81mg daily, Lipitor 40mg daily   





(6) ESRD (end stage renal disease)


Code(s): N18.6 - END STAGE RENAL DISEASE   Status: Chronic   





(7) Hypertension


Code(s): I10 - ESSENTIAL (PRIMARY) HYPERTENSION   Status: Chronic   


Qualifiers: 


   Hypertension type: essential hypertension   Qualified Code(s): I10 - 

Essential (primary) hypertension   


Comment: Controlled. Continue current regimen.    





(8) Kidney transplant status


Code(s): Z94.0 - KIDNEY TRANSPLANT STATUS   Status: Chronic   Comment: its been 

9 yrs now   





(9) Paroxysmal A-fib


Code(s): I48.0 - PAROXYSMAL ATRIAL FIBRILLATION   Status: Chronic   





(10) Normocytic anemia


Code(s): D64.9 - ANEMIA, UNSPECIFIED   Status: Chronic   Comment: Status post 

PRBC transfusion. Hemoglobin stable. Stool occult blood positive. Will 

discontinue Eliquis. HE will follow up with his PCP regarding restarting this 

medication. GI on board, won't proceed with endoscopy/colonoscopy since Hb 

stable.    





(11) Pulmonary HTN


Code(s): I27.20 - PULMONARY HYPERTENSION, UNSPECIFIED   Status: Chronic   

Comment: Per ECHO 2/2018.EF 55%   





- Plan


DVT proph w/SCDs


plans to transfer to  [facility] per cardiology.will facilitate


-: cont rest as below


-: HD stable





* .








Review of Systems





- Medications/Allergies


Allergies/Adverse Reactions: 


 Allergies











Allergy/AdvReac Type Severity Reaction Status Date / Time


 


No Known Drug Allergies Allergy Unknown  Verified 02/25/18 12:37











Medications: 


 Current Medications





Acetaminophen (Tylenol)  650 mg PO Q4H PRN


   PRN Reason: Headache/Fever/Mild Pain (1-3)


Albuterol/Ipratropium (Duoneb)  3 ml NEB A2OM-BW [facility]


   Last Admin: 10/23/18 14:56 Dose:  3 ml


Albuterol/Ipratropium (Duoneb)  3 ml NEB Q2H PRN


   PRN Reason: SOB &/or Wheezing


Aspirin (Ecotrin)  81 mg PO DAILY [facility]


   Last Admin: 10/23/18 10:23 Dose:  81 mg


Atorvastatin Calcium (Lipitor)  40 mg PO HS [facility]


   Last Admin: 10/22/18 20:11 Dose:  40 mg


Atropine Sulfate (Atropine)  0.5 mg IVP ASDIR PRN


   PRN Reason: Sustained Bradycardia Hr <30s


Calcitriol (Rocaltrol)  0.25 mcg PO DAILY [facility]


   Last Admin: 10/23/18 10:24 Dose:  0.25 mcg


Famotidine (Pepcid)  20 mg PO 0900 [facility]


   Last Admin: 10/23/18 10:23 Dose:  20 mg


Furosemide (Lasix)  40 mg PO DAILY [facility]


   Last Admin: 10/23/18 10:22 Dose:  40 mg


Heparin Sodium (Porcine) (Heparin 1,000 Units/Ml (10 Ml))  0 units SLOW IVP 

WILLCALL [facility]


   Last Admin: 10/23/18 13:39 Dose:  3,810 unit


Hydralazine HCl (Apresoline)  25 mg PO BID [facility]


   Last Admin: 10/23/18 10:22 Dose:  25 mg


Heparin Sodium/Dextrose (Heparin 25,000 Units/D5w 500 Ml)  500 mls @ 0 mls/hr 

IV INF [facility]; Protocol


   Last Admin: 10/22/18 14:27 Dose:  500 mls


Metoprolol Succinate (Toprol Xl)  25 mg PO DAILY [facility]


Mometasone Furoate/Formoterol Fumar (Dulera 200 Mcg/5 Mcg Inhaler)  2 puff INH 

BID-RT [facility]


   Last Admin: 10/23/18 06:53 Dose:  2 puff


Mycophenolate Sodium (Myfortic)  360 mg PO BID [facility]


   Last Admin: 10/23/18 10:24 Dose:  360 mg


Nifedipine (Procardia Xl)  60 mg PO BID [facility]


   Last Admin: 10/23/18 10:23 Dose:  Not Given


Nitroglycerin (Nitrostat)  0.4 mg PO Q5MIN PRN


   PRN Reason: Chest Pain


Ondansetron HCl (Zofran Odt)  4 mg PO Q6H PRN


   PRN Reason: Nausea/Vomiting


Ondansetron HCl (Zofran)  4 mg IVP Q6H PRN


   PRN Reason: Nausea/Vomiting


Senna/Docusate Sodium (Senokot S)  2 tab PO BID PRN


   PRN Reason: Constipation


Sodium Chloride (Flush - Normal Saline)  10 ml IVF PRN PRN


   PRN Reason: Saline Flush


Tacrolimus (Prograf)  2 mg PO Q12HR [facility]


   Last Admin: 10/23/18 10:24 Dose:  2 mg

## 2018-10-23 NOTE — PDOC.CTH
Cardiology Progress Note





- Subjective





EP progress note:


 Patient doing well today. Shortness of breath is slightly improved. No heart 

racing, palpitations, dizziness, or passing out. 








- Objective


 Vital Signs











  Temp Pulse Pulse Pulse Resp BP BP


 


 10/23/18 11:49  97.4 F L  77    20  


 


 10/23/18 10:42   66    14  


 


 10/23/18 10:22   73     115/78 


 


 10/23/18 10:14    79  78    126/87


 


 10/23/18 08:00       


 


 10/23/18 07:25  97.1 F L  77    16  


 


 10/23/18 06:54   75    14  


 


 10/23/18 04:00  97.7 F  81    20  


 


 10/23/18 03:07       














  BP BP BP Pulse Ox Pulse Ox Pulse Ox


 


 10/23/18 11:49    147/79 H  95  


 


 10/23/18 10:42      


 


 10/23/18 10:22      


 


 10/23/18 10:14  115/78     93 L  99


 


 10/23/18 08:00     97  


 


 10/23/18 07:25   126/66   97  


 


 10/23/18 06:54      


 


 10/23/18 04:00   114/72   93 L  


 


 10/23/18 03:07     92 L  








 











Weight                         155 lb














 











 10/22/18 10/23/18 10/24/18





 06:59 06:59 06:59


 


Intake Total 520 1247.7 


 


Output Total 950 1050 


 


Balance -430 197.7 














- Physical Examination


General/Neuro: alert & oriented x3, NAD


Neck: carotid US brisk, no JVD present


Lungs: unlabored respirations


Heart: PMI normal, other: (irreg irreg)


Abdomen: NT/ND, soft





- Telemetry


Telemetry Rhythm: AF, RBBB





- Labs


Result Diagrams: 


 10/22/18 04:39





 10/23/18 05:25


 Troponin/CKMB











CK-MB (CK-2)  1.6 ng/mL (0-6.6)   10/21/18  05:50    


 


Troponin I  0.046 ng/mL (< 0.028)  H  10/21/18  12:54    














- Assessment/Plan





1. Chronic atrial fibrillation


   continue rate control strategy


2. A/C congestive diastolic heart failure


   Recheck EF, if <35%, ICD can be considered


3. Non sustained monomorphic wide complex tachycardia, 51 beats on tele 10/22 

without recurrence


   Betablocker therapy as tolerated


4. QTc prolongation


   Avoid QT prolonging agents


5. Pulmonary hypertension


6. Chronic renal insufficiency


7. COPD





Prefers conservative medical management for now. No further NS WCT events on 

tele since last episode. Will start on low dose metoprolol

## 2018-10-23 NOTE — NM
NUCLEAR MEDICINE VENTILATION PERFUSION EVALUATION:

 

CLINICAL HISTORY:

Chest pain.  Short of breath.  History of pulmonary hypertension.  Clinical concern for pulmonary emb
olus.

 

RADIOPHARMACEUTICAL:

Xenon 22.3 millicuries inhaled.

Technetium 99m MAA 6.6 millicuries IV.

 

FINDINGS:

There is homogeneous uptake of the lungs on ventilation imaging with mild retention that can be seen 
in the setting of COPD.  Diffusion imaging reveals no significant moderate or large defects of the eboni
ngs bilaterally.

 

IMPRESSION:

1.  Low probability VQ scan for pulmonary embolus.

 

2.  Mild radiotracer retention on the ventilation portion of the examination can be seen in the setti
ng of air trapping from chronic obstructive pulmonary disease.  Correlate clinically.

 

POS: [facility]

## 2018-10-23 NOTE — PRG
DATE OF SERVICE:  10/23/2018

 

This morning, he is better, less shortness of breath, less cough, less wheezing.

 

PHYSICAL EXAMINATION:

VITAL SIGNS:  Sats are 97 on 3 liters, respiration 16, pulse is 77, temperature 97, blood pressure is
 120/66.

CHEST:  Chest reveals decreased breath sounds without any wheezing.

CARDIAC:  Normal S1-S2.  No gallops.

ABDOMEN:  Soft, no masses.

 

IMPRESSION:

1.  Chronic obstructive pulmonary disease.  

2.  Congestive heart failure.

3.  Recurrent supraventricular tachycardia.

 

PLAN:  He can be transferred to a monitored bed.  Otherwise, continue supportive care.  Pulmonary wis
e, neb treatments and Dulera.  

 

Probably needs a consultation by EP.

## 2018-10-24 NOTE — DIS
DATE OF ADMISSION:  10/21/2018

 

DATE OF DISCHARGE:  10/24/2018

 

CONDITION AT THE TIME OF DISCHARGE:  Stable and improved.

 

DISCHARGE DISPOSITION:  Inpatient transfer to [facility].

 

DISCHARGE DIAGNOSES:

1.  Acute on chronic congestive heart failure.

2.  Acute on chronic respiratory failure with hypoxia secondary to #1.

3.  Nonsustained supraventricular tachycardia.

4.  Demand ischemia.

5.  Coronary artery disease.

6.  End-stage renal disease, on hemodialysis.

7.  Hypertension.

8.  History of kidney transplantation, on immunosuppressive medications.

9.  Chronic paroxysmal atrial fibrillation.

10.  Normocytic anemia.

11.  Severe pulmonary arterial hypertension.

 

DISCHARGE MEDICATIONS:  Digoxin 125 mcg daily, Rocaltrol 0.25 mcg daily, Symbicort b.i.d., Lipitor 40
 mg daily, isosorbide mononitrate 60 mg daily, Prograf 2 mg p.o. b.i.d., Lasix 40 mg daily, hydralazi
ne 25 mg p.o. b.i.d., mycophenolate 360 mg p.o. daily, Procardia 60 mg p.o. b.i.d., Zofran p.r.n., Ni
trostat p.r.n., Toprol-XL 25 mg daily, DuoNebs p.r.n., Lasix 40 mg daily, aspirin 81 mg daily.

 

INHOUSE CONSULTATIONS:

1.  Cardiology, Juan Mendiola M.D. and Juanjo Boggs M.D.

2.  Pulmonary Critical Care Medicine.

3.  Electrophysiology, Simon Mayer M.D..

 

PROCEDURES DONE IN THE HOSPITAL:

1.  Transthoracic echocardiogram, which shows EF of 60-65%, moderately dilated left atrium and right 
atrium as well as right ventricle.  Left ventricular hypertrophy is also seen.  He has severe tricusp
id regurgitation and evidence of cor pulmonale with right ventricular systolic pressure of 109 mmHg c
onsistent with severe pulmonary arterial hypertension.  IVC dilatation is seen.

2.  Pulmonary perfusion VQ scan which has low probability for a PE.

 

HISTORY OF PRESENTING ILLNESS:  Mr. Collins is a 63-year-old male with coronary artery disease statu
s post CABG, chronic diastolic congestive heart failure, chronic kidney disease stage 4, status post 
renal transplantation and immunosuppressive therapy, known pulmonary arterial hypertension, who prese
nted to the emergency room with worsening shortness of breath of 1-2 weeks' duration.  He is on 3 lit
ers oxygen at home.  Upon presentation, he was hypoxic with oxygen saturation of 88% on 3 liters nasa
l cannula.  Chest x-ray showed pulmonary vascular congestion.  EKG showed atrial fibrillation with ri
ght bundle branch block.  He was started on BiPAP and was given IV Lasix and nitroglycerin patch and 
was admitted to [facility] for further workup with a presumptive diagnosis of acute hypoxic respiratory marylin
lure due to acute on chronic diastolic congestive heart failure.  He was also found to have metabolic
 acidosis as well as elevated troponin secondary to demand ischemia upon presentation.  He also had e
levated liver enzymes likely secondary to passive hepatic congestion.  Please see admission history a
nd physical for further detail.

 

HOSPITAL COURSE:  The patient was admitted and Pulmonary Medicine saw the patient because of him bein
g on BiPAP.  He was quickly weaned off of the BiPAP and was back at baseline.

 

Cardiology saw the patient.  Dr. Boggs ordered another echocardiogram which was again consistent w
ith severe pulmonary arterial hypertension.  The patient had some episodes of nonsustained wide compl
ex tachycardia on the top of chronic atrial fibrillation.  He was started on IV heparin drip by Dr. ALANA christine.  Dr. Mayer was consulted with regards to his arrhythmias.  He recommended continuation and op
timization of the heart failure at this time and AICD if the EF was less than 30%.  His EF was 50-55%
 on the echo done in the hospital.  He was also noticed to have some QT prolongation while here.

 

Dr. Potter from Nephrology was also consulted to follow along with regards to his history of chronic kid
nicolasa disease and him being on immunosuppressive medications.  A VQ scan was also ordered by Dr. Jesenia rice to rule out PE with low probability.

 

He was eventually optimized as much as he could with IV Lasix and medical management.  Dr. Boggs d
ecided that the best way to have this patient is to get his pulmonary arterial hypertension treated, 
so a decision was made to transfer him to [facility] for the same.  Transfer was arranged by Dr. Lane terrell and he was discharged when accepted.

 

He was seen and examined prior to discharge.  Please see the hospitalist progress note from the date 
of discharge for further detail including face-to-face interaction.  The patient was agreeable with t
he transfer.  He was hemodynamically stable at the time of transfer and was discharged on heparin dri
p along with other medications as above.

 

Total time spent, 35 minutes.

## 2019-02-08 ENCOUNTER — HOSPITAL ENCOUNTER (INPATIENT)
Dept: HOSPITAL 92 - ERS | Age: 64
LOS: 11 days | Discharge: HOME HEALTH | DRG: 291 | End: 2019-02-19
Attending: INTERNAL MEDICINE | Admitting: INTERNAL MEDICINE
Payer: MEDICARE

## 2019-02-08 DIAGNOSIS — T45.1X1A: ICD-10-CM

## 2019-02-08 DIAGNOSIS — Z94.0: ICD-10-CM

## 2019-02-08 DIAGNOSIS — I73.9: ICD-10-CM

## 2019-02-08 DIAGNOSIS — I25.10: ICD-10-CM

## 2019-02-08 DIAGNOSIS — E78.5: ICD-10-CM

## 2019-02-08 DIAGNOSIS — Z99.81: ICD-10-CM

## 2019-02-08 DIAGNOSIS — M10.9: ICD-10-CM

## 2019-02-08 DIAGNOSIS — J96.21: ICD-10-CM

## 2019-02-08 DIAGNOSIS — I47.2: ICD-10-CM

## 2019-02-08 DIAGNOSIS — Z95.1: ICD-10-CM

## 2019-02-08 DIAGNOSIS — D64.9: ICD-10-CM

## 2019-02-08 DIAGNOSIS — E87.1: ICD-10-CM

## 2019-02-08 DIAGNOSIS — I50.33: ICD-10-CM

## 2019-02-08 DIAGNOSIS — J44.1: ICD-10-CM

## 2019-02-08 DIAGNOSIS — N17.9: ICD-10-CM

## 2019-02-08 DIAGNOSIS — E86.0: ICD-10-CM

## 2019-02-08 DIAGNOSIS — I27.20: ICD-10-CM

## 2019-02-08 DIAGNOSIS — I48.2: ICD-10-CM

## 2019-02-08 DIAGNOSIS — K80.20: ICD-10-CM

## 2019-02-08 DIAGNOSIS — I13.0: Primary | ICD-10-CM

## 2019-02-08 DIAGNOSIS — N18.3: ICD-10-CM

## 2019-02-08 DIAGNOSIS — E87.5: ICD-10-CM

## 2019-02-08 DIAGNOSIS — I42.0: ICD-10-CM

## 2019-02-08 DIAGNOSIS — K21.9: ICD-10-CM

## 2019-02-08 LAB
ALBUMIN SERPL BCG-MCNC: 3.8 G/DL (ref 3.4–4.8)
ALP SERPL-CCNC: 228 U/L (ref 40–150)
ALT SERPL W P-5'-P-CCNC: 15 U/L (ref 8–55)
ANALYZER IN CARDIO: (no result)
ANION GAP SERPL CALC-SCNC: 18 MMOL/L (ref 10–20)
AST SERPL-CCNC: 34 U/L (ref 5–34)
BASE EXCESS STD BLDA CALC-SCNC: -9 MEQ/L
BASOPHILS # BLD AUTO: 0.1 THOU/UL (ref 0–0.2)
BASOPHILS NFR BLD AUTO: 0.8 % (ref 0–1)
BILIRUB SERPL-MCNC: 1.6 MG/DL (ref 0.2–1.2)
BUN SERPL-MCNC: 66 MG/DL (ref 8.4–25.7)
CA-I BLDA-SCNC: 1.21 MMOL/L (ref 1.12–1.3)
CALCIUM SERPL-MCNC: 10.2 MG/DL (ref 7.8–10.44)
CHLORIDE SERPL-SCNC: 109 MMOL/L (ref 98–107)
CK MB SERPL-MCNC: 1.3 NG/ML (ref 0–6.6)
CK SERPL-CCNC: 41 U/L (ref 30–200)
CO2 SERPL-SCNC: 17 MMOL/L (ref 23–31)
CREAT CL PREDICTED SERPL C-G-VRATE: 0 ML/MIN (ref 70–130)
DIGOXIN SERPL-MCNC: (no result) NG/ML (ref 0.8–2)
EOSINOPHIL # BLD AUTO: 0 THOU/UL (ref 0–0.7)
EOSINOPHIL NFR BLD AUTO: 0.5 % (ref 0–10)
GLOBULIN SER CALC-MCNC: 4.4 G/DL (ref 2.4–3.5)
GLUCOSE SERPL-MCNC: 104 MG/DL (ref 80–115)
HCO3 BLDA-SCNC: 13.4 MEQ/L (ref 22–28)
HCT VFR BLDA CALC: 34 % (ref 42–52)
HGB BLD-MCNC: 11.6 G/DL (ref 14–18)
HGB BLDA-MCNC: 11.6 G/DL (ref 14–18)
LIPASE SERPL-CCNC: 7 U/L (ref 8–78)
LYMPHOCYTES # BLD: 2 THOU/UL (ref 1.2–3.4)
LYMPHOCYTES NFR BLD AUTO: 25.7 % (ref 21–51)
MCH RBC QN AUTO: 25.1 PG (ref 27–31)
MCV RBC AUTO: 81.5 FL (ref 78–98)
MONOCYTES # BLD AUTO: 1 THOU/UL (ref 0.11–0.59)
MONOCYTES NFR BLD AUTO: 12.5 % (ref 0–10)
NEUTROPHILS # BLD AUTO: 4.6 THOU/UL (ref 1.4–6.5)
NEUTROPHILS NFR BLD AUTO: 60.4 % (ref 42–75)
PCO2 BLDA: 21 MMHG (ref 35–45)
PH BLDA: 7.42 [PH] (ref 7.35–7.45)
PLATELET # BLD AUTO: 250 THOU/UL (ref 130–400)
PO2 BLDA: 59.3 MMHG (ref 80–?)
POTASSIUM BLD-SCNC: 5.09 MMOL/L (ref 3.7–5.3)
POTASSIUM SERPL-SCNC: 5.5 MMOL/L (ref 3.5–5.1)
RBC # BLD AUTO: 4.63 MILL/UL (ref 4.7–6.1)
SODIUM SERPL-SCNC: 138 MMOL/L (ref 136–145)
SPECIMEN DRAWN FROM PATIENT: (no result)
TROPONIN I SERPL DL<=0.01 NG/ML-MCNC: 0.07 NG/ML (ref ?–0.03)
WBC # BLD AUTO: 7.7 THOU/UL (ref 4.8–10.8)

## 2019-02-08 PROCEDURE — 71045 X-RAY EXAM CHEST 1 VIEW: CPT

## 2019-02-08 PROCEDURE — 96375 TX/PRO/DX INJ NEW DRUG ADDON: CPT

## 2019-02-08 PROCEDURE — 78582 LUNG VENTILAT&PERFUS IMAGING: CPT

## 2019-02-08 PROCEDURE — 93005 ELECTROCARDIOGRAM TRACING: CPT

## 2019-02-08 PROCEDURE — 82553 CREATINE MB FRACTION: CPT

## 2019-02-08 PROCEDURE — 85025 COMPLETE CBC W/AUTO DIFF WBC: CPT

## 2019-02-08 PROCEDURE — 94660 CPAP INITIATION&MGMT: CPT

## 2019-02-08 PROCEDURE — P9047 ALBUMIN (HUMAN), 25%, 50ML: HCPCS

## 2019-02-08 PROCEDURE — 84484 ASSAY OF TROPONIN QUANT: CPT

## 2019-02-08 PROCEDURE — 94640 AIRWAY INHALATION TREATMENT: CPT

## 2019-02-08 PROCEDURE — 76705 ECHO EXAM OF ABDOMEN: CPT

## 2019-02-08 PROCEDURE — 80053 COMPREHEN METABOLIC PANEL: CPT

## 2019-02-08 PROCEDURE — 85610 PROTHROMBIN TIME: CPT

## 2019-02-08 PROCEDURE — 93306 TTE W/DOPPLER COMPLETE: CPT

## 2019-02-08 PROCEDURE — 80162 ASSAY OF DIGOXIN TOTAL: CPT

## 2019-02-08 PROCEDURE — 93970 EXTREMITY STUDY: CPT

## 2019-02-08 PROCEDURE — A9540 TC99M MAA: HCPCS

## 2019-02-08 PROCEDURE — 80197 ASSAY OF TACROLIMUS: CPT

## 2019-02-08 PROCEDURE — 78227 HEPATOBIL SYST IMAGE W/DRUG: CPT

## 2019-02-08 PROCEDURE — 83690 ASSAY OF LIPASE: CPT

## 2019-02-08 PROCEDURE — A9558 XE133 XENON 10MCI: HCPCS

## 2019-02-08 PROCEDURE — 96365 THER/PROPH/DIAG IV INF INIT: CPT

## 2019-02-08 PROCEDURE — 96368 THER/DIAG CONCURRENT INF: CPT

## 2019-02-08 PROCEDURE — 36415 COLL VENOUS BLD VENIPUNCTURE: CPT

## 2019-02-08 PROCEDURE — 87040 BLOOD CULTURE FOR BACTERIA: CPT

## 2019-02-08 PROCEDURE — 80069 RENAL FUNCTION PANEL: CPT

## 2019-02-08 PROCEDURE — A9537 TC99M MEBROFENIN: HCPCS

## 2019-02-08 PROCEDURE — 94664 DEMO&/EVAL PT USE INHALER: CPT

## 2019-02-08 PROCEDURE — 82805 BLOOD GASES W/O2 SATURATION: CPT

## 2019-02-08 PROCEDURE — 82550 ASSAY OF CK (CPK): CPT

## 2019-02-08 RX ADMIN — PIPERACILLIN SODIUM, TAZOBACTAM SODIUM SCH MLS: 2; .25 INJECTION, POWDER, LYOPHILIZED, FOR SOLUTION INTRAVENOUS at 19:37

## 2019-02-08 RX ADMIN — MOMETASONE FUROATE AND FORMOTEROL FUMARATE DIHYDRATE SCH PUFF: 100; 5 AEROSOL RESPIRATORY (INHALATION) at 20:38

## 2019-02-08 RX ADMIN — MYCOPHENOLIC ACID SCH MG: 180 TABLET, DELAYED RELEASE ORAL at 17:37

## 2019-02-08 RX ADMIN — ALBUMIN HUMAN SCH GM: 250 SOLUTION INTRAVENOUS at 22:47

## 2019-02-08 NOTE — NM
NUCLEAR MEDICINE PERFUSION STUDY:

 

02/08/2019

 

PROVIDED CLINICAL HISTORY:

Dyspnea.

 

COMPARISON:

Chest radiograph performed earlier same date.

 

RADIOPHARMACEUTICAL:

Technetium 99m labeled MAA 6.2 millicuries IV.

 

FINDINGS:

There is mildly inhomogeneous radiotracer distribution throughout the lung parenchyma bilaterally, wi
thout evidence for a segmental, pleural-based defect.  Ventilation imaging was not performed, as the 
patient was unable to tolerate such.

 

IMPRESSION:

No scintigraphic evidence for pulmonary embolus.

 

POS: [facility]

## 2019-02-08 NOTE — RAD
SINGLE VIEW CHEST:

 

Date:  02/08/19 

 

COMPARISON:  

10/22/18. 

 

HISTORY:  

Body aches that started 3 weeks ago and dyspnea. 

 

FINDINGS:

Single view of the chest shows an enlarged but stable cardiomediastinal silhouette. The patient is st
atus post sternotomy. There is no evidence of consolidation, mass, or pleural effusion. 

 

IMPRESSION: 

No evidence of acute cardiopulmonary disease. 

 

 

POS: [facility]

## 2019-02-08 NOTE — ULT
RIGHT UPPER QUADRANT ULTRASOUND:

 

Date:  02/08/19 

 

PROVIDED CLINICAL HISTORY:   

Abdominal pain. 

 

FINDINGS:

The visualized portions of the IVC and pancreas appear normal. The liver demonstrates no mass or intr
ahepatic biliary ductal dilatation. The common duct is prominent, measuring 7.0 mm. There are echogen
ic mobile debris within the gallbladder lumen compatible with gallstones and sludge. There is wall th
ickening of 3-4 mm. No sonographic Alcantara's sign. The native right kidney is atrophic. Transplant rig
ht kidney is seen within the right pelvis and appears without evidence for hydronephrosis or mass. Fr
ee intraperitoneal fluid is seen within the abdomen. 

 

IMPRESSION: 

1.  Cholelithiasis with gallbladder wall thickening. Correlate with concerns for acute cholecystitis.
 

 

2.  Prominence of the common duct. Correlate with laboratory values to exclude biliary obstructive ch
ann-marie. MRCP may be useful as indicated. 

 

3.  Ascites. 

 

 

POS: [facility]

## 2019-02-08 NOTE — CON
DATE OF CONSULTATION:  02/08/2019



HISTORY OF PRESENT ILLNESS:  Mr. Collins is a 63-year-old black male who is status

post cadaveric renal transplant, who was admitted here due to complaints of diffuse

myalgia.  The placement tells me that several weeks ago, he was in [place] and that

day, TAVR done there.  Please note, he has also underlying COPD and uses BiPAP at

home.  He comes in today with diffuse myalgia.  His creatinine was noted to be

elevated from his baseline.  We are now consulting for his cadaveric renal

transplant, as well as acute kidney injury on top of his chronic renal failure.  The

real concern was about whether the patient may be having CHF.  His chest x-ray

showed no overt CHF or pulmonary infiltrates or pulmonary edema.  He is currently on

Lasix. 



REVIEW OF SYSTEMS:  Positive for chronic shortness of breath, not any worse.  No

chest pain.  No syncopal episode.  Diffuse myalgia.  No diarrhea.  No constipation.

No productive cough.  No fever or chills.  No diarrhea.  No dysuria.  No urinary

frequency. 



PAST MEDICAL HISTORY:  

1. Status post CHF-?diastolic dysfunction.

2. Status post cadaveric renal transplant, COPD, hyperlipidemia, hypertension, gout,

history of chronic pain, status post acute respiratory failure, coronary artery

disease, status post ESRD, valvular heart disease. 



PAST SURGICAL HISTORY:  Status post cadaveric renal transplant, status post

colonoscopy, status post AV fistula placement, status post cardiac cath, status post

CABG, status post cuffed dialysis catheter placement. 



SOCIAL HISTORY:  The patient lives in [place].  Has not followed up there with the

Renal Clinic for several years.  He is a retired , , several children.

 No history of smoking.  No alcohol.  Denies any IV drug abuse.  Status post blood

transfusion.  Education, high school.  Sedentary lifestyle. 



ALLERGIES:  ?PENICILLIN.



TRAUMA:  None.



IMMUNIZATION:  Up-to-date.



HOSPITALIZATIONS:  Please see past medical history.



FAMILY HISTORY:  No family history of ESRD.



PHYSICAL EXAMINATION:

VITAL SIGNS:  Blood pressure is 153/88, heart rate 66, respiratory rate 7, pulse ox

91%. 

GENERAL:  Noted to be awake, alert, comfortable, not in distress. 

SKIN:  Adequate turgor. 

HEENT:  He has a pinkish conjunctivae.  Anicteric sclerae.  No neck mass.  No

carotid bruits.  No JVD. 

CHEST:  No deformities. 

LUNGS:  Clear breath sounds.  No wheezing.  No crackles. 

HEART:  Normal sinus rhythm.  No murmur.  No gallops.  No rubs. 

ABDOMEN:  Globular, soft, nontender.  No masses.  Positive for bowel sounds. 

EXTREMITIES:  No edema.  No deformities.



MEDICATIONS:  Medications on February 2018,

1. Salt poor albumin 25 g IV q.6.

2. DuoNeb q.6.

3. Ecotrin 81 mg tablet once a day.

4. Calcitriol 0.25 mcg tab.

5. Lasix 80 mg IV q.12.

6. Lovenox 30 mg subcu daily.

7. Imdur 60 mg ER tab.

8. Solu-Medrol 40 mg IV q.6.

9. Toprol-XL 25 mg daily.

10. Myfortic 360 mg p.o. b.i.d.

11. Zosyn 2.25 g IV q.8.

12. Tacrolimus 2 mg p.o. b.i.d.



LABORATORY DATA:  Laboratories of February 2019, white count 7.7, hemoglobin 11.6.

Sodium 138, potassium 5.5, chloride 109, carbon dioxide 17, BUN 66, creatinine 3.59. 



October 23, 2019, creatinine was 1.6. 



AST 34, ALT 15. 



Chest x-ray, no CHF.  No infiltrates.



ASSESSMENT AND PLAN:  

1. Acute kidney injury, consider hemodynamically-mediated renal dysfunction.  The

patient was on lisinopril and on diuretics at home.  Agree to hold off lisinopril.

I would suggest we decrease the furosemide to once a day dosing.  Continue albumin

infusion.  I suspect he has a hemodynamically-mediated renal dysfunction on top of

his renal transplant. 

2. Status post cadaveric renal transplant.  Continue current immunosuppressive

regimen.  Please note, this patient was taking atorvastatin.  This could be causing

the diffuse myalgia with the patient.  Please note, he tells me his breathing is at

baseline.  For this reason, we could probably consider holding off the diuretics or

decreasing it. 

3. Agree with salt poor albumin 25 g IV q.6.







Job ID:  [id number]

## 2019-02-08 NOTE — HP
REASON FOR ADMISSION:  Acute kidney injury, possible acute cholecystitis, 
possible

volume overload, and acute respiratory failure with hypoxia. 



HISTORY OF PRESENTING ILLNESS:  The patient gives history of having pain all 
over.

He says he was hurting in both his knees, but worse on the right side; hips, 
entire

abdomen, shoulders, and the patient also mentions that he felt like as though 
he has

a crook in his neck.  He has had subjective fever at home.  He has not been 
able to

get out of bed from last 3 weeks.  All the above symptoms, which I have 
mentioned,

the patient says it has been on and off from last 3 weeks.  He lives with his 
wife.

The patient mentions that he has recent transcatheter valve placement in 
[place] two

months back.  He is also on 3 L home oxygen all the time.  Does not use CPAP or

BiPAP at home.  He normally ambulates with a cane, but has not been able to do 
so

from last 3 weeks.  He has cough with usual expectoration of phlegm.  In fact, 
the

patient states he has not been able to bring up sputum from last 2 weeks now. 



PAST MEDICAL AND SURGICAL HISTORY:  Hypertension; history of renal transplant;

chronic kidney disease, stage 3; chronic atrial fibrillation, CABG done in ;

severe pulmonary hypertension; moderate-to-severe chronic obstructive pulmonary

disease, on 3 L home oxygen; dyslipidemia; gastroesophageal reflux disease;

peripheral vascular disease with history of aortoiliac bypass graft; chronic

diastolic heart failure; prior history of cardioversion for atrial fibrillation;

dialysis access procedures. 



CURRENT MEDICATIONS:  Please note, the patient does not recall all his 
medications.

Per ER records, the patient is on, 

1. Flomax 0.4 mg p.o. daily.

2. Bumex 2 mg twice daily.

3. Toprol-XL 25 mg twice daily.

4. Tacrolimus 2 mg twice daily.

5. Mycophenolate 360 mg p.o. twice daily.

6. Hydralazine 25 mg twice daily.

7. Lisinopril 2.5 mg daily.

8. Atorvastatin 20 mg daily.

9. Lasix 40 mg daily.

10. Coreg 12.5 mg twice daily.

11. Symbicort inhaler two puffs twice daily.



ALLERGIES:  NO KNOWN DRUG ALLERGIES.



PERSONAL HISTORY:  Quit smoking 10 years back.  Does not abuse alcohol or drugs.

Lives with his wife. 



FAMILY HISTORY:  Mother  at the age of 38.  She had history of hypertension.

Father is living and healthy. 



REVIEW OF SYSTEMS:  CONSTITUTIONAL:  Negative for weight loss or gain, ability 
to

conduct usual activities. 

SKIN:  Negative for rash, itching. 

EYES:  Negative for double vision, pain. 

ENT/MOUTH:  Negative for nose bleeding, neck stiffness, pain, tenderness. 

CARDIOVASCULAR:  Negative for palpitations, dyspnea on exertion, orthopnea. 

RESPIRATORY:  Negative for shortness of breath, wheezing, cough, hemoptysis, 
fever

or night sweats. 

GASTROINTESTINAL:  Negative for poor appetite, abdominal pain, heartburn, nausea
,

vomiting, constipation, or diarrhea. 

GENITOURINARY:  Negative for urgency, frequency, dysuria, nocturia. 

MUSCULOSKELETAL:  Negative for pain, swelling. 

NEUROLOGIC/PSYCHIATRIC:  Negative for anxiety, depression. 

ALLERGY/IMMUNOLOGIC:  Negative for skin rash, bleeding tendency.



PHYSICAL EXAMINATION:

GENERAL:  The patient is a 63-year-old male, who is currently comfortable on 
BiPAP. 

VITAL SIGNS:  Blood pressure 170/94, pulse 90 per minute, respiratory rate 18 
per

minute, temperature 97.2 degrees Fahrenheit, and saturating 93% on BiPAP. 

NECK:  Supple.  No elevated JVD. 

HEENT:  Eyes; extraocular muscles are intact.  Pupils are reacting to light.  
Oral

cavity, mucous membranes are dry.  No exudates or congestion. 

CARDIOVASCULAR:  S1 and S2 heard.  Regular rhythm. 

RESPIRATORY:  Air entry 1+ bilateral.  Scattered wheezes plus bilateral. 

ABDOMEN:  Soft.  Bowel sounds are heard.  No tenderness, rigidity, or guarding. 

EXTREMITIES:  Calf tenderness on the right side.  No peripheral edema in the 
legs.

Peripheral pulses are 1+ bilateral.  No ischemic ulcerations or gangrene in the

foot. 

CENTRAL NERVOUS SYSTEM:  No gross focal deficits noted.  The patient is alert,

awake, and oriented well. 

PSYCHIATRIC:  The patient's mood is euthymic.  No hallucinations or delusions.



LABORATORY DATA:  EKG done shows atrial fibrillation at 92 beats per minute.  
There

is RBBB with poor R-wave progression seen.  The patient also has T-wave 
inversions

in anterolateral leads.  Chest x-ray done shows no evidence of cardiopulmonary

disease.  Right upper quadrant ultrasound done showed cholelithiasis with

gallbladder wall thickening.  Common duct is 7 mm.  There is no mass or 
intrahepatic

biliary ductal dilatation seen.  V/Q scan done shows no scintigraphic evidence 
for

pulmonary embolus.  White count of 7, hemoglobin and hematocrit 11 and 37, 
platelet

count 250, MCV is 81 with 60% neutrophils.  Blood gas shows a pH of 7.42, pCO2 
of

21, PO2 of 59.  Serum potassium 5.5, serum bicarb 17, BUN 66, creatinine 3.5, 
and

total bilirubin 1.6.  AST and ALT 34 and 15, alkaline phosphatase is 228.  
Albumin

is 3.8.  Troponin I is 0.07.  CK-MB 1.3.  Lipase is 7.  Digoxin levels are less 
than

0.15. 



CLINICAL IMPRESSION AND PLAN:  The patient will be admitted to [facility].  He is

currently comfortable on BiPAP.  The plan is to slowly wean him off.  He will 
be on

DuoNeb q.6 hourly along with Solu-Medrol 40 mg q.8 hourly.  The patient has been

placed on Zosyn in the ER for suspected cholelithiasis with cholecystitis and 
will

continue the same.  Dr. Molina has been consulted from ER.  I have discussed his

findings with Dr. Potter regarding acute kidney injury on top of his chronic kidney

disease stage 3 with renal transplant.  He will be given albumin infusions, 
Lasix 80

mg q.12.  We will continue his mycophenolate and Prograf as before.  The 
patient has

multiple medical conditions.  He also mentions that he has had a percutaneous 
valve

replacement done in [place] two months back.  We will obtain echo with 2D 
Doppler

for LV function.  His most recent echo done here showed normal ejection fraction
,

but had severe pulmonary hypertension.  We will obtain Pulmonary consultation 
with

Dr. Sanchez, who is on call. 







Job ID:  [id number]



MTDD

## 2019-02-08 NOTE — ULT
BILATERAL LOWER EXTREMITY VENOUS DOPPLER

2/8/19

 

PROVIDED CLINICAL HISTORY:  

Dyspnea. 

 

FINDINGS:  

Gray scale and color doppler sonography with spectral analysis was performed of the bilateral common 
femoral, femoral, popliteal, posterior tibial and greater saphenous and profunda femoral veins, demon
strating a normal sonographic appearance to each. 

 

IMPRESSION:  

No sonographic evidence for lower extremity deep venous thrombosis. 

 

POS: [facility]

## 2019-02-09 LAB
ALBUMIN SERPL BCG-MCNC: 3.5 G/DL (ref 3.4–4.8)
ANION GAP SERPL CALC-SCNC: 21 MMOL/L (ref 10–20)
BASOPHILS # BLD AUTO: 0 THOU/UL (ref 0–0.2)
BASOPHILS NFR BLD AUTO: 0 % (ref 0–1)
BUN SERPL-MCNC: 62 MG/DL (ref 8.4–25.7)
BUN/CREAT SERPL: 19.2
CALCIUM SERPL-MCNC: 9.7 MG/DL (ref 7.8–10.44)
CHLORIDE SERPL-SCNC: 109 MMOL/L (ref 98–107)
CO2 SERPL-SCNC: 13 MMOL/L (ref 23–31)
CREAT CL PREDICTED SERPL C-G-VRATE: 0 ML/MIN (ref 70–130)
EOSINOPHIL # BLD AUTO: 0 THOU/UL (ref 0–0.7)
EOSINOPHIL NFR BLD AUTO: 0.1 % (ref 0–10)
GLUCOSE SERPL-MCNC: 157 MG/DL (ref 80–115)
HGB BLD-MCNC: 9.9 G/DL (ref 14–18)
LYMPHOCYTES # BLD: 0.6 THOU/UL (ref 1.2–3.4)
LYMPHOCYTES NFR BLD AUTO: 9.3 % (ref 21–51)
MCH RBC QN AUTO: 25 PG (ref 27–31)
MCV RBC AUTO: 82.7 FL (ref 78–98)
MONOCYTES # BLD AUTO: 0.5 THOU/UL (ref 0.11–0.59)
MONOCYTES NFR BLD AUTO: 6.9 % (ref 0–10)
NEUTROPHILS # BLD AUTO: 5.5 THOU/UL (ref 1.4–6.5)
NEUTROPHILS NFR BLD AUTO: 83.7 % (ref 42–75)
PLATELET # BLD AUTO: 187 THOU/UL (ref 130–400)
POTASSIUM SERPL-SCNC: 5.4 MMOL/L (ref 3.5–5.1)
RBC # BLD AUTO: 3.96 MILL/UL (ref 4.7–6.1)
SODIUM SERPL-SCNC: 138 MMOL/L (ref 136–145)
WBC # BLD AUTO: 6.6 THOU/UL (ref 4.8–10.8)

## 2019-02-09 RX ADMIN — ALBUMIN HUMAN SCH GM: 250 SOLUTION INTRAVENOUS at 14:56

## 2019-02-09 RX ADMIN — MYCOPHENOLIC ACID SCH MG: 180 TABLET, DELAYED RELEASE ORAL at 09:21

## 2019-02-09 RX ADMIN — PIPERACILLIN SODIUM, TAZOBACTAM SODIUM SCH MLS: 2; .25 INJECTION, POWDER, LYOPHILIZED, FOR SOLUTION INTRAVENOUS at 11:58

## 2019-02-09 RX ADMIN — MYCOPHENOLIC ACID SCH MG: 180 TABLET, DELAYED RELEASE ORAL at 18:16

## 2019-02-09 RX ADMIN — ASPIRIN SCH MG: 81 TABLET ORAL at 08:35

## 2019-02-09 RX ADMIN — Medication SCH ML: at 21:51

## 2019-02-09 RX ADMIN — PIPERACILLIN SODIUM, TAZOBACTAM SODIUM SCH MLS: 2; .25 INJECTION, POWDER, LYOPHILIZED, FOR SOLUTION INTRAVENOUS at 03:30

## 2019-02-09 RX ADMIN — PIPERACILLIN SODIUM, TAZOBACTAM SODIUM SCH MLS: 2; .25 INJECTION, POWDER, LYOPHILIZED, FOR SOLUTION INTRAVENOUS at 19:50

## 2019-02-09 RX ADMIN — SODIUM BICARBONATE SCH MG: 325 TABLET ORAL at 14:56

## 2019-02-09 RX ADMIN — SODIUM BICARBONATE SCH MG: 325 TABLET ORAL at 21:50

## 2019-02-09 RX ADMIN — ALBUMIN HUMAN SCH GM: 250 SOLUTION INTRAVENOUS at 05:27

## 2019-02-09 RX ADMIN — ALBUMIN HUMAN SCH GM: 250 SOLUTION INTRAVENOUS at 21:58

## 2019-02-09 RX ADMIN — MOMETASONE FUROATE AND FORMOTEROL FUMARATE DIHYDRATE SCH PUFF: 100; 5 AEROSOL RESPIRATORY (INHALATION) at 20:21

## 2019-02-09 RX ADMIN — MOMETASONE FUROATE AND FORMOTEROL FUMARATE DIHYDRATE SCH PUFF: 100; 5 AEROSOL RESPIRATORY (INHALATION) at 07:47

## 2019-02-09 NOTE — CON
DATE OF CONSULTATION:  02/08/2019



HISTORY OF PRESENT ILLNESS:  This is a 63-year-old man who was admitted earlier

today with a 3-week history of malaise, angina, and generalized body aches and pain.

 The patient is not sure if he has had any fevers, but he denies any chills. 



He denies any nausea or vomiting. 



Denies any abdominal distention or frequent flatulence. 



He has a history of severe congestive cardiomyopathy, COPD, and has been having a

nonproductive cough over the last 2 to 3 weeks. 



PAST MEDICAL HISTORY:  Significant for coronary artery disease, congestive

cardiomyopathy, chronic atrial fibrillation, pulmonary hypertension,

gastroesophageal reflux disease, chronic kidney disease and severe peripheral

vascular disease. 



PAST SURGICAL HISTORY:  Pertinent for kidney transplant, coronary artery bypass

graft in 2014, transcatheter valvular replacement two months ago in [place],

aortoiliac bypass and dialysis vascular access. 



FAMILY HISTORY:  Noncontributory for this patient's age.



SOCIAL HISTORY:  The patient is .  Living at home with his wife.



MEDICATIONS:  I have reviewed his prehospital medications, which is on record.



ALLERGIES:  THE PATIENT DENIES ANY KNOWN DRUG ALLERGIES.



REVIEW OF SYSTEMS:  Unremarkable except as stated in past medical history and chief

complaint. 



PHYSICAL EXAMINATION:

GENERAL:  This reveals a 63-year-old man who is fatigued, but coherent and

interactive. 

He moves all extremities and appears to be in no acute distress at time of my

evaluation. 

VITAL SIGNS:  Today include blood pressure 154/80, pulse is 71, respiratory rate is

16, temperature is 98.4 degrees Fahrenheit, oxygen saturation is 97% on 2 L by nasal

cannula oxygen. 

HEENT:  Pupils are equal, round, and reactive to light and accommodation.  He has no

scleral icterus present. 

He has no jugular venous distention noted. 

HEART:  Reveals regular rate and rhythm. 

LUNGS:  Reveal bibasilar rhonchi.  Breathing, regular and nonlabored. 

ABDOMEN:  Soft, nontender, nondistended.  Liver and spleen nonpalpable below costal

margin. 

NEUROLOGIC:  Reveals no focal deficits present.



LABORATORY DATA:  Pertinent laboratory findings today include a CBC with 7700 white

blood cells, hemoglobin and hematocrit 11.6 and 37.7 respectively. 

Platelet count is 250,000. 



Metabolic profile; sodium 138, potassium is 5.5, chloride is 109, bicarb is 17, BUN

66, creatinine is 3.59, glucose 104, total bilirubin is 1.6, AST and ALT are normal

at 34 and 15 respectively. 

Alkaline phosphatase is elevated at 228. 

Serum lipase is normal at 7. 



I have personally reviewed the abdominal ultrasound.  This reveals slightly enlarged

gallbladder with intraluminal gallstones. 

Gallbladder wall is minimally thickened at 3 mm. 

There is no pericholecystic fluid present. 



Common bile duct size is upper limit of normal for this patient's age at 7 mm in

diameter. 



IMPRESSION:  

1. Malaise with generalized body aches.  Etiology is uncertain to me at this time.

2. Cholelithiasis with no clinical evidence of acute cholecystitis.

3. Severe coronary artery disease.

4. Acute on chronic renal insufficiency, history of stage 3 kidney disease.

5. History of chronic obstructive pulmonary disease.

6. Gastroesophageal reflux disease.



RECOMMENDATIONS:  

1. Continue current medical management.

2. We will obtain HIDA scan to better define the biliary function and exclude any

obstructive process.  Meanwhile, the patient will be started on a clear liquid diet,

which could be advanced if he is tolerating diet. 

Further surgical recommendation will be predicated upon the findings of the HIDA

scan.  Above findings and recommendations have been discussed with the patient who

indicated understanding of the information given. 







Job ID:  [id number]

## 2019-02-09 NOTE — CON
DATE OF CONSULTATION:  



HISTORY OF PRESENT ILLNESS:  A 63-year-old gentleman, who sees Dr. Dang at [facility] for routine care, presented with generalized body aches.  He said his leg

was hurting him, chest was hurting him, head was hurting him, and back was hurting

him. 



He denies any fever or chills.  He has some difficulty breathing. 



His sputum is relatively clear.  He is in the MICU. 



This morning, he is still complaining of significant pain and discomfort.  He said

he recently went to [place] to have some kind of a valve procedure done. 



PAST MEDICAL HISTORY:  Chronic renal failure, COPD, hypertension, hyperlipidemia,

gout, and arthritis. 



PREVIOUS SURGERIES:  Coronary artery bypass surgery, multiple accesses, renal

transplant, and some kind of cardiac valve replacement. 



MEDICATIONS:  

1. Hydralazine 25 twice a day.

2. Flomax 0.4.

3. Prograf 2 mg twice a day.

4. Sildenafil 20 three times a day.

5. Myfortic 360 twice a day.

6. Dulera.

7. Toprol-XL 25.

8. Lisinopril 2.5.

9. Ismo 60.

10. Nebulizer.

11. Coreg 12.5.

12. Bumetanide 2 mg twice a day.



ALLERGIES:  NONE.



SOCIAL HISTORY:  Disabled.



REVIEW OF SYSTEMS:  Otherwise, 10-point negative.



PHYSICAL EXAMINATION:

VITAL SIGNS:  Sats are 97% on 3 liters, respirations 16, and blood pressure is

120/61. 

CHEST:  Revealed decreased breath sounds.  No wheezing. 

CARDIAC:  Normal S1, S2.  No gallops or masses.



LABORATORY DATA:  Creatinine is 3.23, probably his baseline.  Troponin is normal.

White count is 6000. 



IMAGING:  Chest x-ray shows no acute infiltrates.  He had a V/Q scan and ultrasound

of the leg done, all were negative. 



IMPRESSION:  

1. Multiple joint pains, etiology unclear, probably musculoskeletal.  No evidence of

pulmonary emboli. 

2. Congestive heart failure.

3. Renal transplant, on immunosuppressive therapy.

4. Chronic obstructive pulmonary disease and tobacco abuse.  Pulmonary wise, it

appears to be relatively stable. 



PLAN:  No need to add anymore medication.  He can probably be transferred out of the

MICU.  I would deescalate his antibiotics and steroids.  I see no evidence of any

infection at this stage. 



TIME SPENT:  Consultation note, 70 minutes, 50% direct patient care.







Job ID:  [id number]

## 2019-02-09 NOTE — PRG
DATE OF SERVICE:  02/09/2019



SUBJECTIVE:  Mr. Collins is a 63-year-old black male status post cadaveric renal

transplant and initially admitted for diffuse myalgia.  He was initially started on

diuretics; however, the patient's shortness of breath was noted to be stable, and

chest x-ray showed no evidence of overt pulmonary edema.  For that reason, we

discontinued the Lasix.  I have started him also on an albumin infusion.  Creatinine

this morning slightly improved.  The patient denies any chest pain, shortness of

breath, nausea, or vomiting. 



There was no acute event noted in the last 24 hours.  Please note, he has diffuse

myalgia/improved. 



OBJECTIVE:  VITAL SIGNS:  Blood pressure is 126/61, heart rate is 81, respiratory

rate is 12, and pulse ox is 94%. 

GENERAL:  Awake, supine, comfortable, not in distress. 

SKIN:  Adequate turgor. 

HEENT:  He has a pinkish conjunctivae.  Anicteric sclerae. 

NECK:  No neck mass.  No carotid bruits.  No JVD. 

CHEST:  No deformities. 

LUNGS:  Clear breath sounds. 

HEART:  Normal sinus rhythm.  No murmur.  No gallops.  No rubs. 

ABDOMEN:  Globular, soft, nontender.  No masses.  Renal allograft is nontender. 

EXTREMITIES:  No edema.  No deformities.



MEDICATIONS:  Medications of February 9, 2019 were reviewed.



LABORATORY STUDIES:  Laboratories of February 9, 2019; white count 6.6, hemoglobin

9.9.  Sodium 138, potassium 5.4, chloride 109, carbon dioxide 13, BUN 62, creatinine

3.23, phosphorus 5.2, calcium 9.7, albumin 3.5. 



ASSESSMENT AND PLAN:  

1. Metabolic acidosis.  Start sodium bicarbonate 650 mg p.o. t.i.d.

2. Diffuse myalgia, clinically improved.  I feel this could be related from the

intake of atorvastatin.  This has been discontinued. 

3. Acute kidney injury-slowly improving.  Continue to hold diuretics.  Continue IV

albumin infusion. 

4. Status post cadaveric renal transplant, stable.  Continue current

immunosuppressive regimen.  Continue gentle volume repletion.  Hold off diuretics. 







Job ID:  [id number]

## 2019-02-09 NOTE — PRG
DATE OF SERVICE:  02/09/2019



I am seeing Mr. Collins today, whom I have been asked to evaluate to rule out acute

cholecystitis. 



Mr. Collins reports no abdominal pain today. 



He tolerates clear liquid diet.



OBJECTIVE:  VITAL SIGNS:  This morning include blood pressure 126/61, pulse is 76,

respiratory rate is 16, maximum temperature in the last 24 hours is 98.4 degrees

Fahrenheit.  Oxygen saturation currently is 98% on room air. 

ABDOMEN:  Soft, nontender, nondistended. 

NEUROLOGIC:  Reveals no focal deficits present.



LABORATORY FINDINGS:  Today include a CBC with 6600 white blood cells, hemoglobin

and hematocrit of 9.9 and 32.8 respectively. 



Platelet count is 187,000. 



Metabolic profile; sodium 138, potassium is 5.4, chloride is 109, bicarb is 13, BUN

and creatinine are stable at 62 and 3.23 respectively. 



Glucose is 157. 



The patient was unable to complete the HIDA scan today as he recently underwent a

pulmonary scan. 



The patient has no clinical evidence of acute cholecystitis.  Therefore, I am

recommending we will advance his diet to regular. 



If he tolerates diet with no onset of abdominal pain, we will consider cancelling

the HIDA scan and avoid any further workup of his asymptomatic cholelithiasis.  The

above 

findings and plan discussed with the patient, who indicates understanding of the

information given. 







Job ID:  [id number]

## 2019-02-09 NOTE — PDOC.PN
- Subjective


Encounter Start Date: 02/09/19


Encounter Start Time: 11:45


Subjective: breathing better, is off bipap


-: no chest pain or palp. Has no abd pain today or nausea


-: still has gen bodyaches more so on right half of his body





- Objective


Resuscitation Status - Order Detail:





02/08/19 12:27


Resuscitation Status Routine 


   Resuscitation Status: FULL: Full Resuscitation


   Discussed with: d/w patient, dvaid is his wife








MAR Reviewed: Yes


Vital Signs & Weight: 


 Vital Signs (12 hours)











  Temp Pulse Resp Pulse Ox


 


 02/09/19 08:34   76  


 


 02/09/19 08:00     97


 


 02/09/19 07:47   76  16  97


 


 02/09/19 07:46   74  14  93 L


 


 02/09/19 04:00  97.4 F L   








 Weight











Weight                         2.56 oz











 Most Recent Monitor Data











Heart Rate from ECG            80


 


NIBP                           169/145


 


NIBP BP-Mean                   153


 


Respiration from ECG           14


 


SpO2                           98














Result Diagrams: 


 02/09/19 05:35





 02/09/19 05:35





Phys Exam





- Physical Examination


HEENT: PERRLA, moist MMs


Neck: no JVD, supple


Respiratory: no wheezing, no rales


Cardiovascular: RRR, no significant murmur


Gastrointestinal: soft, non-tender, positive bowel sounds


Musculoskeletal: no edema, pulses present


Neurological: non-focal, moves all 4 limbs


Psychiatric: normal affect, A&O x 3





Dx/Plan


(1) COPD exacerbation


Code(s): J44.1 - CHRONIC OBSTRUCTIVE PULMONARY DISEASE W (ACUTE) EXACERBATION   

Status: Acute   





(2) Acute and chronic respiratory failure with hypoxia


Code(s): J96.21 - ACUTE AND CHRONIC RESPIRATORY FAILURE WITH HYPOXIA   Status: 

Acute   Comment: Currently improved and at baseline O2 requirement of 3-4L/min 

NC   





(3) Cholelithiasis


Code(s): K80.20 - CALCULUS OF GALLBLADDER W/O CHOLECYSTITIS W/O OBSTRUCTION   

Status: Acute   


Qualifiers: 


   Cholelithiasis location: gallbladder   Cholecystitis presence: without 

cholecystitis   Biliary obstruction: without biliary obstruction   Qualified 

Code(s): K80.20 - Calculus of gallbladder without cholecystitis without 

obstruction   





(4) Acute on chronic renal failure


Code(s): N17.9 - ACUTE KIDNEY FAILURE, UNSPECIFIED; N18.9 - CHRONIC KIDNEY 

DISEASE, UNSPECIFIED   Status: Acute   


Qualifiers: 


   Acute renal failure type: unspecified 





(5) Atrial fibrillation with controlled ventricular rate


Code(s): I48.91 - UNSPECIFIED ATRIAL FIBRILLATION   Status: Chronic   





(6) CAD (coronary artery disease)


Code(s): I25.10 - ATHSCL HEART DISEASE OF NATIVE CORONARY ARTERY W/O ANG PCTRS 

  Status: Chronic   


Qualifiers: 


   Coronary Disease-Associated Artery/Lesion type: bypass graft   Native vs. 

transplanted heart: native heart   Associated angina: without angina   

Qualified Code(s): I25.810 - Atherosclerosis of coronary artery bypass graft(s) 

without angina pectoris   





(7) Chronic kidney disease, stage III (moderate)


Status: Chronic   





(8) Hypertension


Code(s): I10 - ESSENTIAL (PRIMARY) HYPERTENSION   Status: Chronic   


Qualifiers: 


   Hypertension type: essential hypertension   Qualified Code(s): I10 - 

Essential (primary) hypertension   





(9) Kidney transplant status


Code(s): Z94.0 - KIDNEY TRANSPLANT STATUS   Status: Chronic   Comment: its been 

11 yrs now   





(10) Normocytic anemia


Code(s): D64.9 - ANEMIA, UNSPECIFIED   Status: Chronic   





(11) Pulmonary HTN


Code(s): I27.20 - PULMONARY HYPERTENSION, UNSPECIFIED   Status: Chronic   





- Plan


albumin infusions for mando, is off lasix


-: continue prograf and mycophenolate for tx status


-: zosyn for susp cholecystitis, await hida scan to r/o


-: oral prednisone, nebs for copd, is off bipap


-: may tx to tele





* .








Review of Systems





- Medications/Allergies


Allergies/Adverse Reactions: 


 Allergies











Allergy/AdvReac Type Severity Reaction Status Date / Time


 


No Known Drug Allergies Allergy Unknown  Verified 02/25/18 12:37











Medications: 


 Current Medications





Acetaminophen (Tylenol)  650 mg PO Q4H PRN


   PRN Reason: Headache/Fever/Mild Pain (1-3)


   Last Admin: 02/09/19 04:57 Dose:  650 mg


Albumin Human (Albumin 25%)  25 gm IVPB Q8HR [facility]


   Stop: 02/10/19 06:01


   Last Admin: 02/09/19 05:27 Dose:  25 gm


Albuterol/Ipratropium (Duoneb)  3 ml NEB [id number] [facility]


   Last Admin: 02/09/19 07:46 Dose:  3 ml


Aspirin (Ecotrin)  81 mg PO DAILY [facility]


   Last Admin: 02/09/19 08:35 Dose:  81 mg


Benzonatate (Tessalon)  100 mg PO TID [facility]


   Last Admin: 02/09/19 08:40 Dose:  100 mg


Calcitriol (Rocaltrol)  0.25 mcg PO DAILY [facility]


   Last Admin: 02/09/19 08:35 Dose:  0.25 mcg


Enoxaparin Sodium (Lovenox)  30 mg SC 0900 [facility]


   Last Admin: 02/09/19 08:34 Dose:  30 mg


Guaifenesin (Mucinex)  600 mg PO Q12HR [facility]


   Last Admin: 02/09/19 08:35 Dose:  600 mg


Guaifenesin/Dextromethorphan (Robitussin Dm)  15 ml PO Q4H PRN


   PRN Reason: Cough


Hydralazine HCl (Apresoline)  25 mg PO BID [facility]


   Last Admin: 02/09/19 08:34 Dose:  25 mg


Piperacillin Sod/Tazobactam (Sod 2.25 gm/ Sodium Chloride)  100 mls @ 200 mls/

hr IVPB 0300,1100,1900 [facility]


   Last Admin: 02/09/19 11:58 Dose:  100 mls


Isosorbide Mononitrate (Imdur)  60 mg PO DAILY [facility]


   Last Admin: 02/09/19 08:34 Dose:  60 mg


Metoprolol Succinate (Toprol Xl)  25 mg PO DAILY [facility]


   Last Admin: 02/09/19 08:35 Dose:  25 mg


Mometasone Furoate/Formoterol Fumar (Dulera 100 Mcg/5 Mcg Inhaler)  1 puff INH 

BID-RT [facility]


   Last Admin: 02/09/19 07:47 Dose:  1 puff


Mycophenolate Sodium (Myfortic)  360 mg PO BID-AC [facility]


   Last Admin: 02/09/19 09:21 Dose:  360 mg


Ondansetron HCl (Zofran)  4 mg IVP Q6H PRN


   PRN Reason: Nausea/Vomiting


Prednisone (Prednisone)  20 mg PO QAM-WM [facility]


Senna/Docusate Sodium (Senokot S)  2 tab PO BIDPRN PRN


   PRN Reason: Constipation


Sodium Bicarbonate (Bicarbonate, Sodium)  650 mg PO TID [facility]


Tacrolimus (Prograf)  2 mg PO BID [facility]


   Last Admin: 02/09/19 10:32 Dose:  2 mg

## 2019-02-10 LAB
ALBUMIN SERPL BCG-MCNC: 3.8 G/DL (ref 3.4–4.8)
ANION GAP SERPL CALC-SCNC: 20 MMOL/L (ref 10–20)
BASOPHILS # BLD AUTO: 0 THOU/UL (ref 0–0.2)
BASOPHILS NFR BLD AUTO: 0 % (ref 0–1)
BUN SERPL-MCNC: 71 MG/DL (ref 8.4–25.7)
BUN/CREAT SERPL: 20.23
CALCIUM SERPL-MCNC: 9.3 MG/DL (ref 7.8–10.44)
CHLORIDE SERPL-SCNC: 104 MMOL/L (ref 98–107)
CO2 SERPL-SCNC: 16 MMOL/L (ref 23–31)
CREAT CL PREDICTED SERPL C-G-VRATE: 0 ML/MIN (ref 70–130)
EOSINOPHIL # BLD AUTO: 0 THOU/UL (ref 0–0.7)
EOSINOPHIL NFR BLD AUTO: 0.1 % (ref 0–10)
GLUCOSE SERPL-MCNC: 299 MG/DL (ref 80–115)
HGB BLD-MCNC: 9.1 G/DL (ref 14–18)
LYMPHOCYTES # BLD: 0.4 THOU/UL (ref 1.2–3.4)
LYMPHOCYTES NFR BLD AUTO: 4.8 % (ref 21–51)
MCH RBC QN AUTO: 25.1 PG (ref 27–31)
MCV RBC AUTO: 82.6 FL (ref 78–98)
MONOCYTES # BLD AUTO: 0.6 THOU/UL (ref 0.11–0.59)
MONOCYTES NFR BLD AUTO: 8.1 % (ref 0–10)
NEUTROPHILS # BLD AUTO: 6.4 THOU/UL (ref 1.4–6.5)
NEUTROPHILS NFR BLD AUTO: 87 % (ref 42–75)
PLATELET # BLD AUTO: 175 THOU/UL (ref 130–400)
POTASSIUM SERPL-SCNC: 5.8 MMOL/L (ref 3.5–5.1)
RBC # BLD AUTO: 3.64 MILL/UL (ref 4.7–6.1)
SODIUM SERPL-SCNC: 134 MMOL/L (ref 136–145)
WBC # BLD AUTO: 7.4 THOU/UL (ref 4.8–10.8)

## 2019-02-10 RX ADMIN — Medication SCH ML: at 12:34

## 2019-02-10 RX ADMIN — ALBUMIN HUMAN SCH GM: 250 SOLUTION INTRAVENOUS at 23:36

## 2019-02-10 RX ADMIN — ASPIRIN SCH MG: 81 TABLET ORAL at 12:32

## 2019-02-10 RX ADMIN — SODIUM BICARBONATE SCH: 325 TABLET ORAL at 12:24

## 2019-02-10 RX ADMIN — SODIUM BICARBONATE SCH MG: 325 TABLET ORAL at 20:32

## 2019-02-10 RX ADMIN — SODIUM BICARBONATE SCH MG: 325 TABLET ORAL at 16:02

## 2019-02-10 RX ADMIN — ALBUMIN HUMAN SCH GM: 250 SOLUTION INTRAVENOUS at 17:55

## 2019-02-10 RX ADMIN — PIPERACILLIN SODIUM, TAZOBACTAM SODIUM SCH MLS: 2; .25 INJECTION, POWDER, LYOPHILIZED, FOR SOLUTION INTRAVENOUS at 02:41

## 2019-02-10 RX ADMIN — HYDROCODONE BITARTRATE AND ACETAMINOPHEN PRN TAB: 5; 325 TABLET ORAL at 23:35

## 2019-02-10 RX ADMIN — MYCOPHENOLIC ACID SCH MG: 180 TABLET, DELAYED RELEASE ORAL at 12:32

## 2019-02-10 RX ADMIN — Medication SCH ML: at 20:33

## 2019-02-10 RX ADMIN — MOMETASONE FUROATE AND FORMOTEROL FUMARATE DIHYDRATE SCH PUFF: 100; 5 AEROSOL RESPIRATORY (INHALATION) at 08:52

## 2019-02-10 RX ADMIN — MOMETASONE FUROATE AND FORMOTEROL FUMARATE DIHYDRATE SCH PUFF: 100; 5 AEROSOL RESPIRATORY (INHALATION) at 19:16

## 2019-02-10 RX ADMIN — ALBUMIN HUMAN SCH GM: 250 SOLUTION INTRAVENOUS at 12:34

## 2019-02-10 RX ADMIN — MYCOPHENOLIC ACID SCH MG: 180 TABLET, DELAYED RELEASE ORAL at 17:56

## 2019-02-10 NOTE — NM
HEPATOBILIARY SCAN:

 

COMPARISON: 

Gallbladder ultrasound 2/8/2019.

 

HISTORY: 

Biliary disease.  Cholelithiasis and gallbladder wall thickening on prior ultrasound:

 

TECHNIQUE: 

A hepatobiliary scan was performed after administration of 5 mCi of Technetium 99m mebrofenin.

 

FINDINGS: 

Prompt uptake of the radiopharmaceutical by the liver is seen.  Bowel activity is seen within 15 tom
mona.  Biliary activity and gallbladder activity are seen within 20 minutes.

 

The gallbladder ejection fraction was estimated at 76% after administration of 8 ounces of Boost p.o.


 

IMPRESSION: 

Normal hepatobiliary scan.

 

POS: [facility]

## 2019-02-10 NOTE — PDOC.PN
- Subjective


Encounter Start Date: 02/10/19


Encounter Start Time: 11:00


Subjective: breathing better, no nausea


-: feels better





- Objective


Resuscitation Status - Order Detail:





02/08/19 12:27


Resuscitation Status Routine 


   Resuscitation Status: FULL: Full Resuscitation


   Discussed with: d/w patient, david is his wife








MAR Reviewed: Yes


Vital Signs & Weight: 


 Vital Signs (12 hours)











  Temp Pulse Resp BP BP Pulse Ox


 


 02/10/19 08:52   65  16   


 


 02/10/19 08:38       95


 


 02/10/19 08:36   65  16   


 


 02/10/19 07:52  97.6 F  64  16  118/58 L   93 L


 


 02/10/19 07:50       95


 


 02/10/19 04:00  97.9 F  70  18   132/73  92 L


 


 02/10/19 02:53   73  18   


 


 02/09/19 23:39  97.9 F  89  18  113/69   99








 Weight











Weight                         162 lb 9.6 oz











 Most Recent Monitor Data











Heart Rate from ECG            69


 


NIBP                           104/54


 


NIBP BP-Mean                   70


 


Respiration from ECG           10


 


SpO2                           98














I&O: 


 











 02/09/19 02/10/19 02/11/19





 06:59 06:59 06:59


 


Intake Total  1120 


 


Output Total  550 


 


Balance  570 











Result Diagrams: 


 02/10/19 04:04





 02/10/19 04:04





Phys Exam





- Physical Examination


HEENT: PERRLA, moist MMs


Neck: no JVD, supple


Respiratory: no wheezing, no rales


rhonchi+


Cardiovascular: RRR, no significant murmur


Gastrointestinal: soft, non-tender, positive bowel sounds


Musculoskeletal: no edema, pulses present


Neurological: non-focal, moves all 4 limbs


Psychiatric: normal affect, A&O x 3





Dx/Plan


(1) COPD exacerbation


Code(s): J44.1 - CHRONIC OBSTRUCTIVE PULMONARY DISEASE W (ACUTE) EXACERBATION   

Status: Acute   





(2) Acute and chronic respiratory failure with hypoxia


Code(s): J96.21 - ACUTE AND CHRONIC RESPIRATORY FAILURE WITH HYPOXIA   Status: 

Acute   Comment: Currently improved and at baseline O2 requirement of 3-4L/min 

NC   





(3) Cholelithiasis


Code(s): K80.20 - CALCULUS OF GALLBLADDER W/O CHOLECYSTITIS W/O OBSTRUCTION   

Status: Acute   


Qualifiers: 


   Cholelithiasis location: gallbladder   Cholecystitis presence: without 

cholecystitis   Biliary obstruction: without biliary obstruction   Qualified 

Code(s): K80.20 - Calculus of gallbladder without cholecystitis without 

obstruction   





(4) Acute on chronic renal failure


Code(s): N17.9 - ACUTE KIDNEY FAILURE, UNSPECIFIED; N18.9 - CHRONIC KIDNEY 

DISEASE, UNSPECIFIED   Status: Acute   


Qualifiers: 


   Acute renal failure type: unspecified 





(5) Atrial fibrillation with controlled ventricular rate


Code(s): I48.91 - UNSPECIFIED ATRIAL FIBRILLATION   Status: Chronic   





(6) CAD (coronary artery disease)


Code(s): I25.10 - ATHSCL HEART DISEASE OF NATIVE CORONARY ARTERY W/O ANG PCTRS 

  Status: Chronic   


Qualifiers: 


   Coronary Disease-Associated Artery/Lesion type: bypass graft   Native vs. 

transplanted heart: native heart   Associated angina: without angina   

Qualified Code(s): I25.810 - Atherosclerosis of coronary artery bypass graft(s) 

without angina pectoris   





(7) Chronic kidney disease, stage III (moderate)


Status: Chronic   





(8) Hypertension


Code(s): I10 - ESSENTIAL (PRIMARY) HYPERTENSION   Status: Chronic   


Qualifiers: 


   Hypertension type: essential hypertension   Qualified Code(s): I10 - 

Essential (primary) hypertension   





(9) Kidney transplant status


Code(s): Z94.0 - KIDNEY TRANSPLANT STATUS   Status: Chronic   Comment: its been 

11 yrs now   





(10) Normocytic anemia


Code(s): D64.9 - ANEMIA, UNSPECIFIED   Status: Chronic   





(11) Pulmonary HTN


Code(s): I27.20 - PULMONARY HYPERTENSION, UNSPECIFIED   Status: Chronic   





- Plan


await hida scan results


-: dc zosyn, prednisone taper, has glucose intolerance sec to steroids


-: continue alb infusions, soda bicarb for mando, on prograf, mycophenolate


-: ef is 55% with diast dysfunction, severe mitral regurg


-: on asp, imdur, toprol xl, hydralazine





* .


To ambulate in hallway as tolerated.


On solid diet





Review of Systems





- Medications/Allergies


Allergies/Adverse Reactions: 


 Allergies











Allergy/AdvReac Type Severity Reaction Status Date / Time


 


No Known Drug Allergies Allergy Unknown  Verified 02/25/18 12:37











Medications: 


 Current Medications





Acetaminophen (Tylenol)  650 mg PO Q4H PRN


   PRN Reason: Headache/Fever/Mild Pain (1-3)


   Last Admin: 02/09/19 04:57 Dose:  650 mg


Albumin Human (Albumin 25%)  25 gm IVPB Q6HR [facility]


   Stop: 02/13/19 12:01


Albuterol/Ipratropium (Duoneb)  3 ml NEB [id number] [facility]


   Last Admin: 02/10/19 08:36 Dose:  3 ml


Aspirin (Ecotrin)  81 mg PO DAILY [facility]


   Last Admin: 02/09/19 08:35 Dose:  81 mg


Benzonatate (Tessalon)  100 mg PO TID [facility]


   Last Admin: 02/10/19 11:25 Dose:  Not Given


Calcitriol (Rocaltrol)  0.25 mcg PO DAILY [facility]


   Last Admin: 02/09/19 08:35 Dose:  0.25 mcg


Enoxaparin Sodium (Lovenox)  30 mg SC 0900 [facility]


   Last Admin: 02/09/19 08:34 Dose:  30 mg


Guaifenesin (Mucinex)  600 mg PO Q12HR [facility]


   Last Admin: 02/09/19 21:50 Dose:  600 mg


Guaifenesin/Dextromethorphan (Robitussin Dm)  15 ml PO Q4H PRN


   PRN Reason: Cough


Hydralazine HCl (Apresoline)  25 mg PO BID [facility]


   Last Admin: 02/09/19 21:50 Dose:  25 mg


Isosorbide Mononitrate (Imdur)  60 mg PO DAILY [facility]


   Last Admin: 02/09/19 08:34 Dose:  60 mg


Metoprolol Succinate (Toprol Xl)  25 mg PO DAILY [facility]


   Last Admin: 02/09/19 08:35 Dose:  25 mg


Mometasone Furoate/Formoterol Fumar (Dulera 100 Mcg/5 Mcg Inhaler)  1 puff INH 

BID-RT [facility]


   Last Admin: 02/10/19 08:52 Dose:  1 puff


Mycophenolate Sodium (Myfortic)  360 mg PO BID-AC [facility]


   Last Admin: 02/09/19 18:16 Dose:  360 mg


Ondansetron HCl (Zofran)  4 mg IVP Q6H PRN


   PRN Reason: Nausea/Vomiting


   Last Admin: 02/09/19 14:56 Dose:  4 mg


Prednisone (Prednisone)  20 mg PO QAM-WM [facility]


Senna/Docusate Sodium (Senokot S)  2 tab PO BIDPRN PRN


   PRN Reason: Constipation


Sodium Bicarbonate (Bicarbonate, Sodium)  650 mg PO TID [facility]


   Last Admin: 02/09/19 21:50 Dose:  650 mg


Sodium Chloride (Flush - Normal Saline)  10 ml IVF Q12HR [facility]


   Last Admin: 02/09/19 21:51 Dose:  10 ml


Sodium Chloride (Flush - Normal Saline)  10 ml IVF PRN PRN


   PRN Reason: Saline Flush


   Last Admin: 02/10/19 02:42 Dose:  10 ml


Tacrolimus (Prograf)  2 mg PO BID [facility]


   Last Admin: 02/09/19 21:51 Dose:  2 mg

## 2019-02-10 NOTE — PRG
DATE OF SERVICE:  02/10/2019



SUBJECTIVE:  Mr. Collins was recently admitted with generalized malaise and body

aches. 



He was found to have incidental cholelithiasis and gallbladder wall thickening. 



I was asked to evaluate the patient to exclude acute cholecystitis. 



The patient has had no abdominal pain over the last 24 hours. 



HIDA scan was obtained today, which is essentially unremarkable. 



The patient, therefore, has no clinical or radiographic examination of acute

cholecystitis. 



His diet could be advanced as tolerated. 



There is no surgical indication for this patient at this time. 



No further workup is warranted for his biliary disease. 



He does indeed have asymptomatic cholelithiasis, which does not require any surgical

intervention at this time.  General Surgery will sign off and be available to

re-evaluate the patient on demand. 







Job ID:  [id number]

## 2019-02-10 NOTE — PRG
DATE OF SERVICE:  02/10/2019



SUBJECTIVE:  Mr. Collins is a 63-year-old black male, status post cadaveric 
renal

transplant, was initially admitted for generalized malaise with diffuse body 
aches.

Please note, his statin has been discontinued.  In addition, he was found to 
have

some mild abdominal pain.  He has been evaluated by Surgery and the feeling is 
he

has asymptomatic cholelithiasis.  No surgical indication.  He also has been 
having

worsening renal dysfunction.  I have started him on normal saline.  No

other complaints.  No chest pain or shortness of breath. 



OBJECTIVE:  VITAL SIGNS:  Blood pressure is 127/61, heart rate 69, respiratory 
rate

18, temperature 97.3, and pulse ox 96%. 

GENERAL:  Noted to be awake, comfortable, not in overt distress. 

SKIN:  Adequate turgor. 

HEENT:  He has pinkish conjunctivae.  Anicteric sclerae. 

NECK:  No neck mass.  No carotid bruits.  No JVD. 

CHEST:  No deformities. 

LUNGS:  Decreased breath sounds. 

HEART:  Normal sinus rhythm.  No murmur.  No gallops or rubs. 

ABDOMEN:  Globular, soft, nontender.  No masses. 

EXTREMITIES:  No edema.  No deformities.



MEDICATIONS:  Medications of February 10, 2019, was reviewed.



LABORATORY DATA:  February 10, 2019, white count 7.4, hemoglobin 9.1.  Sodium 
134,

potassium 5.8, chloride 104, carbon dioxide 16, BUN 71, creatinine 3.51, glucose

299, calcium 9.3, phosphorus 4.6, and albumin 3.8. 



ASSESSMENT AND PLAN:  

1. Acute kidney injury on top of his chronic renal failure, consider

hemodynamically-mediated renal dysfunction.  Continue IV hydration, currently on

albumin and crystalloid infusion.  No indication for any dialytic intervention. 

2. Status post cadaveric renal transplant.  Continue immunosuppressive regimen.

Tacrolimus level has been checked for tomorrow.  Continue current 
immunosuppressive

regimen. 

3. Abdominal pain, resolved.

4. Diffuse myalgia, off statins.

5. Chronic obstructive pulmonary disease exacerbation, on steroids.  Pulmonary

following. 







Job ID:  [id number]



[facility]

## 2019-02-10 NOTE — PRG
DATE OF SERVICE:  02/10/2019



SUBJECTIVE:  He was transferred to a monitored bed this morning.



OBJECTIVE:  VITAL SIGNS:  His temperature 97, pulse 65, respiratory rate 16, O2 sats

96% on 3 L, and blood pressure 131/55. 

RESPIRATORY:  Denies any difficulty breathing, coughing, or wheezing. 

CHEST:  Decreased breath sounds without any wheezing. 

CARDIAC:  Normal S1 and S2.  No gallops. 

ABDOMEN:  Soft without any masses.



LABORATORY DATA:  Creatinine is 3.51.  His white count is only 7000, H and H 9 and

30, and platelet count is normal.  He had an echo done, which showed his EF was

normal.  Severe tricuspid regurgitation, severe mitral regurgitation. 



IMPRESSION:  Congestive heart failure, status post renal transplant; chronic

obstructive pulmonary disease; and tobacco abuse. 



PLAN:  At this stage, continue present treatment, neb treatments etc. 



We will follow.







Job ID:  [id number]

## 2019-02-11 LAB
ALBUMIN SERPL BCG-MCNC: 4.4 G/DL (ref 3.4–4.8)
ANION GAP SERPL CALC-SCNC: 17 MMOL/L (ref 10–20)
BASOPHILS # BLD AUTO: 0 THOU/UL (ref 0–0.2)
BASOPHILS NFR BLD AUTO: 0 % (ref 0–1)
BUN SERPL-MCNC: 77 MG/DL (ref 8.4–25.7)
BUN/CREAT SERPL: 24.92
CALCIUM SERPL-MCNC: 9.4 MG/DL (ref 7.8–10.44)
CHLORIDE SERPL-SCNC: 104 MMOL/L (ref 98–107)
CO2 SERPL-SCNC: 16 MMOL/L (ref 23–31)
CREAT CL PREDICTED SERPL C-G-VRATE: 25 ML/MIN (ref 70–130)
EOSINOPHIL # BLD AUTO: 0 THOU/UL (ref 0–0.7)
EOSINOPHIL NFR BLD AUTO: 0.2 % (ref 0–10)
GLUCOSE SERPL-MCNC: 172 MG/DL (ref 80–115)
HGB BLD-MCNC: 9 G/DL (ref 14–18)
LYMPHOCYTES # BLD: 0.6 THOU/UL (ref 1.2–3.4)
LYMPHOCYTES NFR BLD AUTO: 7.3 % (ref 21–51)
MCH RBC QN AUTO: 24.8 PG (ref 27–31)
MCV RBC AUTO: 83.4 FL (ref 78–98)
MONOCYTES # BLD AUTO: 1 THOU/UL (ref 0.11–0.59)
MONOCYTES NFR BLD AUTO: 11.8 % (ref 0–10)
NEUTROPHILS # BLD AUTO: 7 THOU/UL (ref 1.4–6.5)
NEUTROPHILS NFR BLD AUTO: 80.7 % (ref 42–75)
PLATELET # BLD AUTO: 174 THOU/UL (ref 130–400)
POTASSIUM SERPL-SCNC: 5.1 MMOL/L (ref 3.5–5.1)
RBC # BLD AUTO: 3.63 MILL/UL (ref 4.7–6.1)
SODIUM SERPL-SCNC: 132 MMOL/L (ref 136–145)
WBC # BLD AUTO: 8.6 THOU/UL (ref 4.8–10.8)

## 2019-02-11 RX ADMIN — MYCOPHENOLIC ACID SCH MG: 180 TABLET, DELAYED RELEASE ORAL at 15:48

## 2019-02-11 RX ADMIN — ALBUMIN HUMAN SCH GM: 250 SOLUTION INTRAVENOUS at 11:37

## 2019-02-11 RX ADMIN — SODIUM BICARBONATE SCH MG: 325 TABLET ORAL at 08:27

## 2019-02-11 RX ADMIN — ASPIRIN SCH MG: 81 TABLET ORAL at 08:28

## 2019-02-11 RX ADMIN — SODIUM BICARBONATE SCH MG: 325 TABLET ORAL at 15:48

## 2019-02-11 RX ADMIN — ALBUMIN HUMAN SCH GM: 250 SOLUTION INTRAVENOUS at 04:42

## 2019-02-11 RX ADMIN — HYDROCODONE BITARTRATE AND ACETAMINOPHEN PRN TAB: 5; 325 TABLET ORAL at 21:09

## 2019-02-11 RX ADMIN — MOMETASONE FUROATE AND FORMOTEROL FUMARATE DIHYDRATE SCH PUFF: 100; 5 AEROSOL RESPIRATORY (INHALATION) at 07:04

## 2019-02-11 RX ADMIN — HYDROCODONE BITARTRATE AND ACETAMINOPHEN PRN TAB: 5; 325 TABLET ORAL at 04:50

## 2019-02-11 RX ADMIN — Medication SCH: at 21:11

## 2019-02-11 RX ADMIN — MYCOPHENOLIC ACID SCH MG: 180 TABLET, DELAYED RELEASE ORAL at 08:27

## 2019-02-11 RX ADMIN — SODIUM BICARBONATE SCH MG: 325 TABLET ORAL at 21:10

## 2019-02-11 RX ADMIN — Medication SCH: at 08:29

## 2019-02-11 RX ADMIN — MOMETASONE FUROATE AND FORMOTEROL FUMARATE DIHYDRATE SCH PUFF: 100; 5 AEROSOL RESPIRATORY (INHALATION) at 19:32

## 2019-02-11 NOTE — CON
DATE OF CONSULTATION:  



REASON FOR CONSULTATION:  Atrial flutter.



HISTORY OF PRESENT ILLNESS:  Mr. Collins is a 63-year-old gentleman, who is a

patient of Dr. Juanjo Boggs.  He has previous history of CAD, status post bypass

surgery in addition to renal transplant and end-stage renal disease, he recently

presented with all over body aches and pains.  No specific complaints.  He has

chronic shortness of breath.  No chest pain or pressure noted.  He states from head

to toe, he had body aches and pains. 



We were consulted due to atrial flutter/fibrillation, which is a chronic condition.

He also had wide-complex tachycardia, which is a known condition, has been seen and

evaluated by Dr. Maeyr in the past. 



PAST MEDICAL HISTORY:  As above including hyperlipidemia, severe pulmonary

hypertension, previous renal transplant with rejection, recent TAVR. 



SOCIAL HISTORY:  No current tobacco or alcohol use.



ALLERGIES:  NONE.



HOME MEDICATIONS:  Include;

1. Metoprolol.

2. Prograf.

3. Lasix.

4. Digoxin.

5. Hydralazine.

6. Lipitor.

7. Imdur. 

8. Nifedipine.



REVIEW OF SYMPTOMS:  Ten-point review of systems is reviewed and as above, otherwise

negative. 



PHYSICAL EXAMINATION:

GENERAL:  Patient is a pleasant 63-year-old male, who is in no acute distress.  The

patient appears their stated age. 

VITAL SIGNS:  Blood pressure 131/55 pulse 66, respirations 18. 

NEUROLOGIC:  The patient is alert and oriented x3 with no focal neurologic deficits. 

HEENT:  Sclerae without icterus.  Mouth has moist mucous membranes with normal

pallor. 

NECK:  No JVD.  Carotid upstroke brisk.  No bruits bilaterally. 

LUNGS:  Clear to auscultation with unlabored respirations. 

BACK:  No scoliosis or kyphosis. 

CARDIAC:  Regular rate and rhythm with normal S1 and S2.  No S3 or S4 noted.  No

significant rubs, murmurs, thrills, or gallops noted throughout the precordium.  PMI

is not displaced.  There is no parasternal heave. 

ABDOMEN:  Soft, nontender, nondistended.  No peritoneal signs present.  No

hepatosplenomegaly.  No abnormal striae. 

EXTREMITIES:  AV fistula noted in the left upper extremity. 

SKIN:  No gross abnormalities.



PERTINENT LABORATORY DATA:  Hemoglobin 9.  Creatinine 3.09.



IMPRESSION:  

1. Atrial flutter/fibrillation.

2. Coronary artery disease.

3. Status post bypass surgery.

4. Status post transcatheter aortic valve replacement.

5. Wide complex tachycardia.



RECOMMENDATIONS:  Mr. Collins has been seen and evaluated by Dr. Mayer for this same

issue 6 months ago.  Beta blocker therapy is recommended.  He has no previous

history of syncope or presyncope.  We would recommend repeating his echo to assess

his LVEF.  His last echo dated 02/09/2019 with LVEF 55% to 60%.  Again, atrial

fibrillation/flutter seems to be a chronic condition.  The patient could not afford

anticoagulation therapy.  May need to discuss Coumadin treatment with Mr. Collins. 







Job ID:  [id number]

## 2019-02-11 NOTE — PDOC.PN
- Subjective


Encounter Start Date: 02/11/19


Encounter Start Time: 10:05


Subjective: breathing better, no palp or chest pain





- Objective


Resuscitation Status - Order Detail:





02/08/19 12:27


Resuscitation Status Routine 


   Resuscitation Status: FULL: Full Resuscitation


   Discussed with: d/w patient, david is his wife








MAR Reviewed: Yes


Vital Signs & Weight: 


 Vital Signs (12 hours)











  Temp Pulse Resp BP Pulse Ox


 


 02/11/19 08:27      96


 


 02/11/19 07:45  98.5 F  66  16  144/67 H  96


 


 02/11/19 07:06   67  16   94 L


 


 02/11/19 07:04   67  18   94 L


 


 02/11/19 04:00  97.4 F L  66  20  148/71 H  98


 


 02/10/19 23:03   68  12  








 Weight











Weight                         161 lb 4 oz











 Most Recent Monitor Data











Heart Rate from ECG            69


 


NIBP                           104/54


 


NIBP BP-Mean                   70


 


Respiration from ECG           10


 


SpO2                           98














I&O: 


 











 02/10/19 02/11/19 02/12/19





 06:59 06:59 06:59


 


Intake Total 4137 220 5100


 


Output Total 550 500 350


 


Balance 











Result Diagrams: 


 02/11/19 05:07





 02/11/19 05:07





Phys Exam





- Physical Examination


HEENT: PERRLA, moist MMs


Neck: no JVD, supple


Respiratory: no wheezing, no rales


rhonchi+


Cardiovascular: no significant murmur, irregular


Gastrointestinal: soft, non-tender, positive bowel sounds


Musculoskeletal: no edema, pulses present


Neurological: non-focal, moves all 4 limbs


Psychiatric: normal affect, A&O x 3





Dx/Plan


(1) COPD exacerbation


Code(s): J44.1 - CHRONIC OBSTRUCTIVE PULMONARY DISEASE W (ACUTE) EXACERBATION   

Status: Acute   





(2) Acute and chronic respiratory failure with hypoxia


Code(s): J96.21 - ACUTE AND CHRONIC RESPIRATORY FAILURE WITH HYPOXIA   Status: 

Resolved   Comment: Currently improved and at baseline O2 requirement of 3-4L/

min NC   





(3) Cholelithiasis


Code(s): K80.20 - CALCULUS OF GALLBLADDER W/O CHOLECYSTITIS W/O OBSTRUCTION   

Status: Acute   


Qualifiers: 


   Cholelithiasis location: gallbladder   Cholecystitis presence: without 

cholecystitis   Biliary obstruction: without biliary obstruction   Qualified 

Code(s): K80.20 - Calculus of gallbladder without cholecystitis without 

obstruction   





(4) Acute on chronic renal failure


Code(s): N17.9 - ACUTE KIDNEY FAILURE, UNSPECIFIED; N18.9 - CHRONIC KIDNEY 

DISEASE, UNSPECIFIED   Status: Acute   


Qualifiers: 


   Acute renal failure type: unspecified 





(5) Atrial fibrillation with controlled ventricular rate


Code(s): I48.91 - UNSPECIFIED ATRIAL FIBRILLATION   Status: Chronic   Comment: 

now in atrial flutter   





(6) CAD (coronary artery disease)


Code(s): I25.10 - ATHSCL HEART DISEASE OF NATIVE CORONARY ARTERY W/O ANG PCTRS 

  Status: Chronic   


Qualifiers: 


   Coronary Disease-Associated Artery/Lesion type: bypass graft   Native vs. 

transplanted heart: native heart   Associated angina: without angina   

Qualified Code(s): I25.810 - Atherosclerosis of coronary artery bypass graft(s) 

without angina pectoris   





(7) Chronic kidney disease, stage III (moderate)


Status: Chronic   





(8) Hypertension


Code(s): I10 - ESSENTIAL (PRIMARY) HYPERTENSION   Status: Chronic   


Qualifiers: 


   Hypertension type: essential hypertension   Qualified Code(s): I10 - 

Essential (primary) hypertension   





(9) Kidney transplant status


Code(s): Z94.0 - KIDNEY TRANSPLANT STATUS   Status: Chronic   Comment: its been 

11 yrs now   





(10) Normocytic anemia


Code(s): D64.9 - ANEMIA, UNSPECIFIED   Status: Chronic   





(11) Pulmonary HTN


Code(s): I27.20 - PULMONARY HYPERTENSION, UNSPECIFIED   Status: Chronic   





- Plan


has atrial flutter with controlled rate


-: has severe Mitral regurg and O2 dep copd requiring nebs


-: continue prednisone, asp, toprol xl, imdur


-: is gentle iv hydration now, watch for overload


-: continue prograf and mycophenolate





* .


Has refused rehab/swing placement, will need HH and PT on discharge.





Review of Systems





- Medications/Allergies


Allergies/Adverse Reactions: 


 Allergies











Allergy/AdvReac Type Severity Reaction Status Date / Time


 


No Known Drug Allergies Allergy Unknown  Verified 02/25/18 12:37











Medications: 


 Current Medications





Acetaminophen (Tylenol)  650 mg PO Q4H PRN


   PRN Reason: Headache/Fever/Mild Pain (1-3)


   Last Admin: 02/10/19 20:33 Dose:  650 mg


Hydrocodone Bitart/Acetaminophen (Norco 5/325)  1 tab PO Q6H PRN


   PRN Reason: Moderate Pain (4-6)


   Last Admin: 02/11/19 04:50 Dose:  1 tab


Albumin Human (Albumin 25%)  25 gm IVPB Q6HR [facility]


   Stop: 02/13/19 12:01


   Last Admin: 02/11/19 04:42 Dose:  25 gm


Albuterol/Ipratropium (Duoneb)  3 ml NEB [id number] [facility]


   Last Admin: 02/11/19 07:06 Dose:  3 ml


Aspirin (Ecotrin)  81 mg PO DAILY [facility]


   Last Admin: 02/11/19 08:28 Dose:  81 mg


Benzonatate (Tessalon)  100 mg PO TID [facility]


   Last Admin: 02/11/19 08:27 Dose:  100 mg


Calcitriol (Rocaltrol)  0.25 mcg PO DAILY [facility]


   Last Admin: 02/11/19 08:27 Dose:  0.25 mcg


Enoxaparin Sodium (Lovenox)  30 mg SC 0900 [facility]


   Last Admin: 02/11/19 08:28 Dose:  Not Given


Guaifenesin (Mucinex)  600 mg PO Q12HR [facility]


   Last Admin: 02/11/19 08:29 Dose:  600 mg


Guaifenesin/Dextromethorphan (Robitussin Dm)  15 ml PO Q4H PRN


   PRN Reason: Cough


Hydralazine HCl (Apresoline)  25 mg PO BID [facility]


   Last Admin: 02/11/19 08:28 Dose:  25 mg


Sodium Chloride (Normal Saline 0.9%)  1,000 mls @ 75 mls/hr IV .[id number] [facility]


   Last Admin: 02/10/19 12:34 Dose:  1,000 mls


Isosorbide Mononitrate (Imdur)  60 mg PO DAILY [facility]


   Last Admin: 02/11/19 08:29 Dose:  60 mg


Metoprolol Succinate (Toprol Xl)  25 mg PO DAILY [facility]


   Last Admin: 02/11/19 08:27 Dose:  25 mg


Mometasone Furoate/Formoterol Fumar (Dulera 100 Mcg/5 Mcg Inhaler)  1 puff INH 

BID-RT [facility]


   Last Admin: 02/11/19 07:04 Dose:  1 puff


Mycophenolate Sodium (Myfortic)  360 mg PO BID-AC [facility]


   Last Admin: 02/11/19 08:27 Dose:  360 mg


Ondansetron HCl (Zofran)  4 mg IVP Q6H PRN


   PRN Reason: Nausea/Vomiting


   Last Admin: 02/09/19 14:56 Dose:  4 mg


Prednisone (Prednisone)  5 mg PO QAM-WM [facility]


   Last Admin: 02/11/19 08:28 Dose:  5 mg


Senna/Docusate Sodium (Senokot S)  2 tab PO BIDPRN PRN


   PRN Reason: Constipation


Sodium Bicarbonate (Bicarbonate, Sodium)  650 mg PO TID [facility]


   Last Admin: 02/11/19 08:27 Dose:  650 mg


Sodium Chloride (Flush - Normal Saline)  10 ml IVF Q12HR [facility]


   Last Admin: 02/11/19 08:29 Dose:  Not Given


Sodium Chloride (Flush - Normal Saline)  10 ml IVF PRN PRN


   PRN Reason: Saline Flush


   Last Admin: 02/10/19 02:42 Dose:  10 ml


Tacrolimus (Prograf)  2 mg PO BID [facility]


   Last Admin: 02/11/19 08:28 Dose:  2 mg

## 2019-02-11 NOTE — PRG
DATE OF SERVICE:  02/11/2019



SUBJECTIVE:  Mr. Collins is a 63-year-old black male, who is status post cadaveric

renal transplant, who was admitted for diffuse body aches.  He says statin was

discontinued.  He was also evaluated by Surgery for any acute cholecystitis, and the

feeling is there is no acute cholecystitis.  He was also seen by the Renal Service

for his worsening renal dysfunction.  He has been started on albumin infusion and

normal saline. 



No complaints of chest pain or any worsening shortness of breath.



OBJECTIVE:  VITAL SIGNS:  Blood pressure 144/67, heart rate 66, respiratory rate 16,

temperature 98.5, and pulse ox 96%. 

GENERAL:  Noted to be awake, alert, comfortable, not in distress. 

SKIN:  Adequate turgor. 

HEENT:  He has slightly pale conjunctivae, anicteric sclerae. 

NECK:  No neck mass.  No carotid bruits.  No JVD. 

CHEST:  No deformities. 

LUNGS:  Clear breath sounds.  No wheezing.  No crackles. 

HEART:  Normal sinus rhythm.  No murmur.  No gallops or rubs. 

ABDOMEN:  Globular, soft, nontender.  No masses. 

EXTREMITIES:  No edema.  No deformities.



MEDICATIONS:  Medications of February 11, 2019, were reviewed.



LABORATORY DATA:  Laboratories of February 11, 2019; white count 8.6, hemoglobin 9.

Sodium 132, potassium 5.1, chloride 104, carbon dioxide 16, BUN 77, creatinine 3.09,

glucose 172, calcium 9.4, phosphorus 3.6, albumin 4.4. 



ASSESSMENT AND PLAN:  

1. Acute kidney injury - hemodynamically-mediated renal dysfunction.  Slowly

improving renal function.  Continue gentle volume repletion - on crystalloids and

colloids.  Hold off any diuretics. 

2. Status post cadaveric renal transplant - awaiting tacrolimus level.  Continue

current 

immunosuppressive regimen.

3. Shortness of breath - the patient has underlying chronic obstructive pulmonary

disease.  Please note that the last cardiac echo showed a normal ejection fraction. 

4. Recheck basic metabolic panel and CBC in a.m.







Job ID:  [id number]

## 2019-02-11 NOTE — PRG
DATE OF SERVICE:  02/11/2019



SUBJECTIVE:  Obie oCllins is a 63-year-old gentleman, this morning, he is better.



OBJECTIVE:  VITAL SIGNS:  His saturations are 97 on 3, respirations 16, temperature

98, pulse 66, and blood pressure 140/67. 

CHEST:  No wheezing or crackles. 

CARDIAC:  Normal S1 and S2.  No gallops. 

ABDOMEN:  No masses.



LABORATORY DATA:  His BUN and creatinine are 77 and 3.0.  White count 8000.  His

hepatobiliary scan was negative.  His chest x-ray showed no acute infiltrate. 



IMPRESSION AND PLAN:  Renal failure, hypertension, pulmonary edema.  No evidence of

pneumonia at this stage. 



Continue present treatment as per primary care physician.  Pulmonary will follow at

a distance.  He is status post renal transplant. 







Job ID:  [id number]

## 2019-02-12 LAB
ALBUMIN SERPL BCG-MCNC: 4.1 G/DL (ref 3.4–4.8)
ANION GAP SERPL CALC-SCNC: 15 MMOL/L (ref 10–20)
BASOPHILS # BLD AUTO: 0 THOU/UL (ref 0–0.2)
BASOPHILS NFR BLD AUTO: 0.5 % (ref 0–1)
BUN SERPL-MCNC: 76 MG/DL (ref 8.4–25.7)
BUN/CREAT SERPL: 29.01
CALCIUM SERPL-MCNC: 9.5 MG/DL (ref 7.8–10.44)
CHLORIDE SERPL-SCNC: 105 MMOL/L (ref 98–107)
CO2 SERPL-SCNC: 20 MMOL/L (ref 23–31)
CREAT CL PREDICTED SERPL C-G-VRATE: 29 ML/MIN (ref 70–130)
EOSINOPHIL # BLD AUTO: 0 THOU/UL (ref 0–0.7)
EOSINOPHIL NFR BLD AUTO: 0.2 % (ref 0–10)
GLUCOSE SERPL-MCNC: 136 MG/DL (ref 80–115)
HGB BLD-MCNC: 9.2 G/DL (ref 14–18)
LYMPHOCYTES # BLD: 1.4 THOU/UL (ref 1.2–3.4)
LYMPHOCYTES NFR BLD AUTO: 13.3 % (ref 21–51)
MCH RBC QN AUTO: 24.8 PG (ref 27–31)
MCV RBC AUTO: 82.2 FL (ref 78–98)
MONOCYTES # BLD AUTO: 1.2 THOU/UL (ref 0.11–0.59)
MONOCYTES NFR BLD AUTO: 11.5 % (ref 0–10)
NEUTROPHILS # BLD AUTO: 7.7 THOU/UL (ref 1.4–6.5)
NEUTROPHILS NFR BLD AUTO: 74.6 % (ref 42–75)
PLATELET # BLD AUTO: 196 THOU/UL (ref 130–400)
POTASSIUM SERPL-SCNC: 4.7 MMOL/L (ref 3.5–5.1)
RBC # BLD AUTO: 3.72 MILL/UL (ref 4.7–6.1)
SODIUM SERPL-SCNC: 135 MMOL/L (ref 136–145)
WBC # BLD AUTO: 10.3 THOU/UL (ref 4.8–10.8)

## 2019-02-12 RX ADMIN — Medication SCH ML: at 08:50

## 2019-02-12 RX ADMIN — Medication SCH ML: at 20:52

## 2019-02-12 RX ADMIN — MOMETASONE FUROATE AND FORMOTEROL FUMARATE DIHYDRATE SCH PUFF: 100; 5 AEROSOL RESPIRATORY (INHALATION) at 20:03

## 2019-02-12 RX ADMIN — MOMETASONE FUROATE AND FORMOTEROL FUMARATE DIHYDRATE SCH PUFF: 100; 5 AEROSOL RESPIRATORY (INHALATION) at 08:49

## 2019-02-12 RX ADMIN — MYCOPHENOLIC ACID SCH MG: 180 TABLET, DELAYED RELEASE ORAL at 08:48

## 2019-02-12 RX ADMIN — SODIUM BICARBONATE SCH MG: 325 TABLET ORAL at 08:48

## 2019-02-12 RX ADMIN — SODIUM BICARBONATE SCH MG: 325 TABLET ORAL at 20:50

## 2019-02-12 RX ADMIN — HYDROCODONE BITARTRATE AND ACETAMINOPHEN PRN TAB: 5; 325 TABLET ORAL at 20:51

## 2019-02-12 RX ADMIN — MYCOPHENOLIC ACID SCH MG: 180 TABLET, DELAYED RELEASE ORAL at 17:44

## 2019-02-12 RX ADMIN — GUAIFENESIN AND DEXTROMETHORPHAN PRN ML: 100; 10 SYRUP ORAL at 23:10

## 2019-02-12 RX ADMIN — SODIUM BICARBONATE SCH MG: 325 TABLET ORAL at 15:46

## 2019-02-12 RX ADMIN — ASPIRIN SCH MG: 81 TABLET ORAL at 08:49

## 2019-02-12 NOTE — PRG
DATE OF SERVICE:  02/12/2019



SUBJECTIVE:  Mr. Collins is a 63-year-old black male with known history of status

post cadaveric renal transplant, and seen by the Renal Service for his acute kidney

injury.  I felt at that time he had hemodynamically-mediated renal dysfunction.  He

was given IV volume repletion with improvement of his renal function; however, last

night, he complained of some shortness of breath/fullness of the chest and for that

reason, the IV fluid was placed on hold. 



Currently, this morning, he is feeling better.  Please note, this patient has

underlying COPD. 



OBJECTIVE:  VITAL SIGNS:  Blood pressure 144/69, heart rate 69, respiratory rate 18,

temperature 96.5, and pulse ox 98%. 

GENERAL:  Awake, alert, and comfortable, not in distress. 

SKIN:  Adequate turgor. 

HEENT:  Slightly pale conjunctivae.  Anicteric sclerae.  No neck mass.  No carotid

bruits.  No JVD. 

CHEST:  No deformities. 

LUNGS:  Clear breath sounds. 

HEART:  Normal sinus rhythm.  No murmur.  No gallops.  No rubs. 

ABDOMEN:  Globular, soft, and nontender.  No masses. 

EXTREMITIES:  No edema.  No deformities.



MEDICATIONS:  Medications of February 12, 2019, reviewed.



LABORATORY DATA:  On February 12, 2019; white count 10.3, hemoglobin 9.2.  Sodium

135, potassium 4.7, chloride 105, carbon dioxide 20, BUN 76, creatinine 2.62,

glucose 136, and calcium 9.5. 



Digoxin level 0.15. 



Tacrolimus level pending.



ASSESSMENT AND PLAN:  

1. Shortness of breath-mild intermittent shortness of breath last night, but

resolved this morning.  I still feel this could be related to his chronic

obstructive pulmonary disease.  IV fluid has been discontinued. 

2. Acute kidney injury-hemodynamically-mediated renal dysfunction.  Creatinine is

much improved with gentle volume repletion.  Most recent creatinine now is 2.62, and

his creatinine was said to have peaked at 3.59.  I would still hold off any

diuretics for him. 

3. Status post cadaveric renal transplant, stable.  We will continue current

immunosuppressive regimen. 

4. Chronic obstructive pulmonary disease.  Continuing current medications, currently

on steroids.  Recheck basic metabolic panel and CBC in a.m. 







Job ID:  [id number]

## 2019-02-12 NOTE — PDOC.PN
- Subjective


Encounter Start Date: 02/12/19


Encounter Start Time: 10:20


Subjective: no sob or palp


-: feels better





- Objective


Resuscitation Status - Order Detail:





02/08/19 12:27


Resuscitation Status Routine 


   Resuscitation Status: FULL: Full Resuscitation


   Discussed with: d/w patient, david is his wife








MAR Reviewed: Yes


Vital Signs & Weight: 


 Vital Signs (12 hours)











  Temp Pulse Resp BP BP Pulse Ox


 


 02/12/19 08:49   68   144/69 H  


 


 02/12/19 08:00  96.5 F L  69  18   144/69 H  98


 


 02/12/19 07:17       94 L


 


 02/12/19 07:13   68  16    94 L


 


 02/12/19 04:00  97.8 F  69  20   165/76 H  93 L


 


 02/12/19 01:15   59 L  16    93 L








 Weight











Weight                         161 lb 4 oz











 Most Recent Monitor Data











Heart Rate from ECG            69


 


NIBP                           104/54


 


NIBP BP-Mean                   70


 


Respiration from ECG           10


 


SpO2                           98














I&O: 


 











 02/11/19 02/12/19 02/13/19





 06:59 06:59 06:59


 


Intake Total 855 3935 


 


Output Total 500 1725 


 


Balance 355 2210 











Result Diagrams: 


 02/12/19 05:12





 02/12/19 05:12





Phys Exam





- Physical Examination


HEENT: PERRLA, moist MMs


Neck: no JVD, supple


Respiratory: no wheezing, no rales


rhonchi+


Cardiovascular: no significant murmur, irregular


Gastrointestinal: soft, non-tender, positive bowel sounds


Musculoskeletal: no edema, pulses present


Neurological: non-focal, moves all 4 limbs


Psychiatric: normal affect, A&O x 3





Dx/Plan


(1) Acute on chronic renal failure


Code(s): N17.9 - ACUTE KIDNEY FAILURE, UNSPECIFIED; N18.9 - CHRONIC KIDNEY 

DISEASE, UNSPECIFIED   Status: Acute   


Qualifiers: 


   Acute renal failure type: unspecified   Chronic kidney disease stage: stage 

3 (moderate)   Qualified Code(s): N17.9 - Acute kidney failure, unspecified; 

N18.3 - Chronic kidney disease, stage 3 (moderate)   





(2) COPD exacerbation


Code(s): J44.1 - CHRONIC OBSTRUCTIVE PULMONARY DISEASE W (ACUTE) EXACERBATION   

Status: Acute   





(3) Acute and chronic respiratory failure with hypoxia


Code(s): J96.21 - ACUTE AND CHRONIC RESPIRATORY FAILURE WITH HYPOXIA   Status: 

Resolved   Comment: Currently improved and at baseline O2 requirement of 3-4L/

min NC   





(4) Cholelithiasis


Code(s): K80.20 - CALCULUS OF GALLBLADDER W/O CHOLECYSTITIS W/O OBSTRUCTION   

Status: Chronic   


Qualifiers: 


   Cholelithiasis location: gallbladder   Cholecystitis presence: without 

cholecystitis   Biliary obstruction: without biliary obstruction   Qualified 

Code(s): K80.20 - Calculus of gallbladder without cholecystitis without 

obstruction   





(5) Atrial fibrillation with controlled ventricular rate


Code(s): I48.91 - UNSPECIFIED ATRIAL FIBRILLATION   Status: Chronic   Comment: 

now in atrial flutter   





(6) CAD (coronary artery disease)


Code(s): I25.10 - ATHSCL HEART DISEASE OF NATIVE CORONARY ARTERY W/O ANG PCTRS 

  Status: Chronic   


Qualifiers: 


   Coronary Disease-Associated Artery/Lesion type: bypass graft   Native vs. 

transplanted heart: native heart   Associated angina: without angina   

Qualified Code(s): I25.810 - Atherosclerosis of coronary artery bypass graft(s) 

without angina pectoris   





(7) Chronic kidney disease, stage III (moderate)


Status: Chronic   





(8) Hypertension


Code(s): I10 - ESSENTIAL (PRIMARY) HYPERTENSION   Status: Chronic   


Qualifiers: 


   Hypertension type: essential hypertension   Qualified Code(s): I10 - 

Essential (primary) hypertension   





(9) Kidney transplant status


Code(s): Z94.0 - KIDNEY TRANSPLANT STATUS   Status: Chronic   Comment: its been 

11 yrs now   





(10) Normocytic anemia


Code(s): D64.9 - ANEMIA, UNSPECIFIED   Status: Chronic   





(11) Pulmonary HTN


Code(s): I27.20 - PULMONARY HYPERTENSION, UNSPECIFIED   Status: Chronic   





- Plan


renal function slowly stabilizing


-: dc plan per , will need close monitoring 


-: has sharier for med mgmt per cardio


-: continue asp, lopressor, hydralazine, imdur


-: prednisone, nebs, prograf and mycophenolate





* .


to amb as tolerated.





Review of Systems





- Medications/Allergies


Allergies/Adverse Reactions: 


 Allergies











Allergy/AdvReac Type Severity Reaction Status Date / Time


 


No Known Drug Allergies Allergy Unknown  Verified 02/25/18 12:37











Medications: 


 Current Medications





Acetaminophen (Tylenol)  650 mg PO Q4H PRN


   PRN Reason: Headache/Fever/Mild Pain (1-3)


   Last Admin: 02/10/19 20:33 Dose:  650 mg


Hydrocodone Bitart/Acetaminophen (Norco 5/325)  1 tab PO Q6H PRN


   PRN Reason: Moderate Pain (4-6)


   Last Admin: 02/11/19 21:09 Dose:  1 tab


Albuterol/Ipratropium (Duoneb)  3 ml NEB [id number] [facility]


   Last Admin: 02/12/19 07:13 Dose:  3 ml


Aspirin (Ecotrin)  81 mg PO DAILY [facility]


   Last Admin: 02/12/19 08:49 Dose:  81 mg


Benzonatate (Tessalon)  100 mg PO TID [facility]


   Last Admin: 02/12/19 08:49 Dose:  100 mg


Calcitriol (Rocaltrol)  0.25 mcg PO DAILY [facility]


   Last Admin: 02/12/19 08:49 Dose:  0.25 mcg


Enoxaparin Sodium (Lovenox)  30 mg SC 0900 [facility]


   Last Admin: 02/12/19 08:49 Dose:  30 mg


Guaifenesin (Mucinex)  600 mg PO Q12HR [facility]


   Last Admin: 02/12/19 08:49 Dose:  600 mg


Guaifenesin/Dextromethorphan (Robitussin Dm)  15 ml PO Q4H PRN


   PRN Reason: Cough


Hydralazine HCl (Apresoline)  25 mg PO BID [facility]


   Last Admin: 02/12/19 08:49 Dose:  25 mg


Isosorbide Mononitrate (Imdur)  60 mg PO DAILY [facility]


   Last Admin: 02/12/19 08:49 Dose:  60 mg


Metoprolol Succinate (Toprol Xl)  25 mg PO DAILY [facility]


   Last Admin: 02/12/19 08:49 Dose:  25 mg


Mometasone Furoate/Formoterol Fumar (Dulera 100 Mcg/5 Mcg Inhaler)  1 puff INH 

BID-RT [facility]


   Last Admin: 02/12/19 08:49 Dose:  1 puff


Mycophenolate Sodium (Myfortic)  360 mg PO BID-AC [facility]


   Last Admin: 02/12/19 08:48 Dose:  360 mg


Ondansetron HCl (Zofran)  4 mg IVP Q6H PRN


   PRN Reason: Nausea/Vomiting


   Last Admin: 02/09/19 14:56 Dose:  4 mg


Prednisone (Prednisone)  5 mg PO QAM-WM [facility]


   Last Admin: 02/12/19 08:48 Dose:  5 mg


Senna/Docusate Sodium (Senokot S)  2 tab PO BIDPRN PRN


   PRN Reason: Constipation


   Last Admin: 02/11/19 11:38 Dose:  2 tab


Sodium Bicarbonate (Bicarbonate, Sodium)  650 mg PO TID [facility]


   Last Admin: 02/12/19 08:48 Dose:  650 mg


Sodium Chloride (Flush - Normal Saline)  10 ml IVF Q12HR [facility]


   Last Admin: 02/12/19 08:50 Dose:  10 ml


Sodium Chloride (Flush - Normal Saline)  10 ml IVF PRN PRN


   PRN Reason: Saline Flush


   Last Admin: 02/10/19 02:42 Dose:  10 ml


Tacrolimus (Prograf)  2 mg PO BID [facility]


   Last Admin: 02/12/19 08:48 Dose:  2 mg

## 2019-02-12 NOTE — CON
DATE OF CONSULTATION:  02/12/2019



HISTORY OF PRESENT ILLNESS:  I am seeing Mr. Collins at our [facility] as an Electrophysiology consultant.  His problems are; 

1. Nonsustained wide-complex tachycardia, likely ventricular tachycardia, 
chronic,

recurrent, and asymptomatic. 

2. Chronic persistent atrial fibrillation/atypical flutter with adequate 
ventricular

rate control. 

3. History of diastolic heart failure with preserved LV systolic function based 
on

an echocardiogram from 02/09/2019 revealing EF 55% to 60%, severe mitral

regurgitation, moderate aortic regurgitation, severe tricuspid regurgitation, 
and

moderate pulmonary regurgitation noted. 

4. History of coronary artery disease with coronary artery bypass grafting 
surgery

in the past. 

5. History of renal transplant with moderate renal insufficiency.  Creatinine at

this point is 2.62, improved from 3.5 on admission. 

6. Advanced COPD.

7. Peripheral vascular disease with prior aortoiliac bypass grafting.

8. Prior history of dialysis access procedure in the past.

9. The patient reports transcatheter valve replacement procedure in [place] 
about 2

months back. 



ALLERGIES:  NONE NOTED.



MEDICATIONS:  At home included;

1. Flomax.

2. Bumex.

3. Toprol-XL.

4. Tacrolimus.

5. Mycophenolate.

6. Hydralazine.

7. Lisinopril.

8. Lipitor.

9. Lasix.

10. Coreg.

11. Symbicort.



SUBJECTIVE:  Mr. Collins is here with complaints of 3 weeks of increased 
weakness,

difficulty getting out of the bed, generalized aching, and subjective sensation 
of

fever.  He denies dyspnea, but continues on 3 L oxygen at home, which.

 He denies PND or orthopnea at this time.  No lower extremity edema.  No

cough.  No burning urination.  No stroke-like symptoms or bleeding issues.  
Rest of

12-point system otherwise unremarkable. 



PAST MEDICAL HISTORY:  As above.



SOCIAL HISTORY:  The patient denies smoking, EtOH, or drug abuse.



FAMILY HISTORY:  Noncontributory.



OBJECTIVE DATA:  VITAL SIGNS:  Blood pressure is 134/63, heart rate 67, and

respirations 16, the patient's temperature is 96.1 degrees Fahrenheit, and the T
-max

from this admission was 98.5. 

GENERAL:  This is an alert and oriented man, in no apparent distress. 

NECK:  Supple.  Jugular veins not distended. 

CHEST:  Coarse without crackles. 

HEART:  Sounds are irregularly irregular.  S1 and S2 are variable.  No murmur or

gallop. 

ABDOMEN:  Benign.  Bowel sounds are positive. 

EXTREMITIES:  Lower extremity without edema, clubbing, or cyanosis.  Pulses are

adequate. 

NEUROLOGIC:  The patient is nonfocal. 

MUSCULOSKELETAL:  Without joint pain or deformity. 

SKIN:  Without rash.



DATABASE:  The telemetry strips reviewed revealing nonsustained wide-complex

arrhythmia run about 18 seconds in duration.  Also continued atrial

fibrillation/coarse atrial flutter is seen.  The baseline EKG does not show 
typical

atrial flutter waves. 



LABORATORY DATA:  Hemoglobin 9.2, white count 10.3, and platelet count 196.  
Sodium

135, potassium 4.7, BUN 76, and creatinine is 2.62.  Blood cultures are 
negative x2

days. 



ASSESSMENT AND PLAN:  Mr. Collins is a 63-year-old man with history of 
diastolic

heart failure, valvular heart disease as detailed above, peripheral and coronary

artery disease as well.  He has chronic atrial fibrillation and in the past, we

opted for medical management with rate control only.  He also has nonsustained

wide-complex arrhythmias, which are asymptomatic.  This also was treated

conservatively since he has multiple comorbidities.  As preserved LV function is

normal symptoms in nonsustained ventricular tachyarrhythmia, hence he is not a 
true

candidate for implantable cardioverter-defibrillator therapy.  Also, at this 
point,

I do not see benefit from electrophysiological studies.  Risk in this

complicated patient is higher than the likely benefit. 



Also, there are no good antiarrhythmic options either due to his comorbidities. 



He also has significant anemia and right now, not on anticoagulation and our 
plan at

this point, would be continue conservative management.  Continue diuresis to 
avoid

diastolic heart failure exacerbation.  Beta blocker therapy to be continued if

tolerated. 



We will follow up with you.







Job ID:  [id number]



MTDD

## 2019-02-13 LAB
ALBUMIN SERPL BCG-MCNC: 4.2 G/DL (ref 3.4–4.8)
ANION GAP SERPL CALC-SCNC: 16 MMOL/L (ref 10–20)
ANISOCYTOSIS BLD QL SMEAR: (no result) (100X)
BUN SERPL-MCNC: 68 MG/DL (ref 8.4–25.7)
BUN/CREAT SERPL: 30.91
CALCIUM SERPL-MCNC: 9.6 MG/DL (ref 7.8–10.44)
CHLORIDE SERPL-SCNC: 105 MMOL/L (ref 98–107)
CO2 SERPL-SCNC: 18 MMOL/L (ref 23–31)
CREAT CL PREDICTED SERPL C-G-VRATE: 37 ML/MIN (ref 70–130)
CREAT CL PREDICTED SERPL C-G-VRATE: 40 ML/MIN (ref 70–130)
GLUCOSE SERPL-MCNC: 129 MG/DL (ref 80–115)
HGB BLD-MCNC: 10.5 G/DL (ref 14–18)
HGB BLD-MCNC: 9.8 G/DL (ref 14–18)
INR PPP: 1.4
MCH RBC QN AUTO: 24.9 PG (ref 27–31)
MCV RBC AUTO: 82.1 FL (ref 78–98)
MDIFF COMPLETE?: YES
PLATELET # BLD AUTO: 200 THOU/UL (ref 130–400)
PLATELET # BLD AUTO: 228 THOU/UL (ref 130–400)
POTASSIUM SERPL-SCNC: 4.6 MMOL/L (ref 3.5–5.1)
PROTHROMBIN TIME: 17.4 SEC (ref 12–14.7)
RBC # BLD AUTO: 3.92 MILL/UL (ref 4.7–6.1)
SODIUM SERPL-SCNC: 134 MMOL/L (ref 136–145)
TARGETS BLD QL SMEAR: (no result) (100X)
WBC # BLD AUTO: 11 THOU/UL (ref 4.8–10.8)

## 2019-02-13 RX ADMIN — SODIUM BICARBONATE SCH MG: 325 TABLET ORAL at 21:13

## 2019-02-13 RX ADMIN — ASPIRIN SCH MG: 81 TABLET ORAL at 08:53

## 2019-02-13 RX ADMIN — HYDROCODONE BITARTRATE AND ACETAMINOPHEN PRN TAB: 5; 325 TABLET ORAL at 12:48

## 2019-02-13 RX ADMIN — MYCOPHENOLIC ACID SCH MG: 180 TABLET, DELAYED RELEASE ORAL at 16:27

## 2019-02-13 RX ADMIN — SODIUM BICARBONATE SCH MG: 325 TABLET ORAL at 11:11

## 2019-02-13 RX ADMIN — Medication SCH ML: at 08:54

## 2019-02-13 RX ADMIN — HYDROCODONE BITARTRATE AND ACETAMINOPHEN PRN TAB: 5; 325 TABLET ORAL at 04:23

## 2019-02-13 RX ADMIN — HYDROCODONE BITARTRATE AND ACETAMINOPHEN PRN TAB: 5; 325 TABLET ORAL at 21:13

## 2019-02-13 RX ADMIN — MOMETASONE FUROATE AND FORMOTEROL FUMARATE DIHYDRATE SCH PUFF: 100; 5 AEROSOL RESPIRATORY (INHALATION) at 20:15

## 2019-02-13 RX ADMIN — SODIUM BICARBONATE SCH MG: 325 TABLET ORAL at 16:13

## 2019-02-13 RX ADMIN — Medication SCH ML: at 21:14

## 2019-02-13 RX ADMIN — MYCOPHENOLIC ACID SCH MG: 180 TABLET, DELAYED RELEASE ORAL at 08:52

## 2019-02-13 RX ADMIN — MOMETASONE FUROATE AND FORMOTEROL FUMARATE DIHYDRATE SCH PUFF: 100; 5 AEROSOL RESPIRATORY (INHALATION) at 07:29

## 2019-02-13 NOTE — PDOC.CTH
Cardiology Progress Note





- Subjective





 EP PROGRESS NOTE: 2/13/19





Seen as follow up for atrial fibrillation, non sustained VT, and arrhythmia 

management. He is resting in bed. 


+ body aches/generalized


- heart racing, palpitations, chest pain, pressure, dizziness, or passing out





No new cardiac/EP concerns or complaint today





- Objective


 Vital Signs











  Temp Pulse Resp BP BP Pulse Ox


 


 02/13/19 13:55   73  20    89 L


 


 02/13/19 08:53   69   165/72 H  


 


 02/13/19 08:00  96.5 F L  69  14   165/72 H  94 L


 


 02/13/19 07:16       91 L


 


 02/13/19 07:14   73  16    91 L


 


 02/13/19 03:25  97.6 F  68  15   165/81 H  93 L








 











Weight                         171 lb 1.6 oz














 











 02/12/19 02/13/19 02/14/19





 06:59 06:59 06:59


 


Intake Total 3935 1222 


 


Output Total 1725 1075 


 


Balance 2210 147 














- Physical Examination


General/Neuro: alert & oriented x3, NAD


Neck: carotid US brisk, no JVD present


Lungs: CTA, unlabored respirations


Heart: PMI normal


Abdomen: NT/ND, soft





- Telemetry


Telemetry Rhythm: AF, rate controlled





- Labs


Result Diagrams: 


 02/13/19 12:42





 02/13/19 12:42


 Troponin/CKMB











CK-MB (CK-2)  1.3 ng/mL (0-6.6)   02/08/19  07:06    


 


Troponin I  0.071 ng/mL (< 0.028)  H  02/08/19  11:11    














- Assessment/Plan





1. Atrial fibrillation, chronic


   - continue conservative rate control. 


   - asymptomatic


   - poor candidate for either ablation and has no good antiarrhythmic options 

with his multiple comorbidities.


2. Wide complex tachycardia


   -Not candidate for ICD


   -Non sustained and asymptomatic


   -Increased metoprolol succinate to 50mg QD


3. Hypertension


   - per cardiology & hospitalist


4. CHADS2-VASC: 3


   - HTN, vascular disease, and heart failure


   -oral anticoagulation is indicated.  He has been on NOAC in the past but 

could not afford it. Recommend warfarin INR 2.0-3.0. 


   -watch for bleeding dyscrasias. 


5. Anemia


6. PVD s/p aorto-iliac bypass


7. CAD s/p CABG


8. H/o renal transplant


9. Diastolic heart failure, EF 55-60%


10. A/C renal insufficiency


11. H/o TAVR in recent past in [place]





Overall, limited options for him d/t multiple severe and chronic comorbidities. 

Continue conservative treatment plan as detailed above.

## 2019-02-13 NOTE — PDOC.PN
- Subjective


Encounter Start Date: 02/13/19


Encounter Start Time: 10:25


Subjective: sitting in chair, feels good


-: no sob or palp





- Objective


Resuscitation Status - Order Detail:





02/08/19 12:27


Resuscitation Status Routine 


   Resuscitation Status: FULL: Full Resuscitation


   Discussed with: d/w patient, david is his wife








MAR Reviewed: Yes


Vital Signs & Weight: 


 Vital Signs (12 hours)











  Temp Pulse Resp BP BP Pulse Ox


 


 02/13/19 08:53   69   165/72 H  


 


 02/13/19 08:00  96.5 F L  69  14   165/72 H  94 L


 


 02/13/19 07:16       91 L


 


 02/13/19 07:14   73  16    91 L


 


 02/13/19 03:25  97.6 F  68  15   165/81 H  93 L


 


 02/13/19 00:40   72  18    91 L








 Weight











Weight                         171 lb 1.6 oz











 Most Recent Monitor Data











Heart Rate from ECG            69


 


NIBP                           104/54


 


NIBP BP-Mean                   70


 


Respiration from ECG           10


 


SpO2                           98














I&O: 


 











 02/12/19 02/13/19 02/14/19





 06:59 06:59 06:59


 


Intake Total 3935 1222 


 


Output Total 1725 1075 


 


Balance 2210 147 











Result Diagrams: 


 02/13/19 04:45





 02/13/19 04:45





Phys Exam





- Physical Examination


HEENT: PERRLA, moist MMs


Neck: no JVD, supple


Respiratory: no wheezing, no rales


Cardiovascular: no significant murmur, irregular


Gastrointestinal: soft, non-tender, positive bowel sounds


Musculoskeletal: no edema, pulses present


Neurological: non-focal, moves all 4 limbs


Psychiatric: normal affect, A&O x 3





Dx/Plan


(1) Acute on chronic renal failure


Code(s): N17.9 - ACUTE KIDNEY FAILURE, UNSPECIFIED; N18.9 - CHRONIC KIDNEY 

DISEASE, UNSPECIFIED   Status: Acute   


Qualifiers: 


   Acute renal failure type: unspecified   Chronic kidney disease stage: stage 

3 (moderate)   Qualified Code(s): N17.9 - Acute kidney failure, unspecified; 

N18.3 - Chronic kidney disease, stage 3 (moderate)   





(2) COPD exacerbation


Code(s): J44.1 - CHRONIC OBSTRUCTIVE PULMONARY DISEASE W (ACUTE) EXACERBATION   

Status: Resolved   





(3) Acute and chronic respiratory failure with hypoxia


Code(s): J96.21 - ACUTE AND CHRONIC RESPIRATORY FAILURE WITH HYPOXIA   Status: 

Resolved   Comment: Currently improved and at baseline O2 requirement of 3-4L/

min NC   





(4) Cholelithiasis


Code(s): K80.20 - CALCULUS OF GALLBLADDER W/O CHOLECYSTITIS W/O OBSTRUCTION   

Status: Chronic   


Qualifiers: 


   Cholelithiasis location: gallbladder   Cholecystitis presence: without 

cholecystitis   Biliary obstruction: without biliary obstruction   Qualified 

Code(s): K80.20 - Calculus of gallbladder without cholecystitis without 

obstruction   





(5) Atrial fibrillation with controlled ventricular rate


Code(s): I48.91 - UNSPECIFIED ATRIAL FIBRILLATION   Status: Chronic   Comment: 

now in atrial flutter   





(6) CAD (coronary artery disease)


Code(s): I25.10 - ATHSCL HEART DISEASE OF NATIVE CORONARY ARTERY W/O ANG PCTRS 

  Status: Chronic   


Qualifiers: 


   Coronary Disease-Associated Artery/Lesion type: bypass graft   Native vs. 

transplanted heart: native heart   Associated angina: without angina   

Qualified Code(s): I25.810 - Atherosclerosis of coronary artery bypass graft(s) 

without angina pectoris   





(7) Chronic kidney disease, stage III (moderate)


Status: Chronic   





(8) Hypertension


Code(s): I10 - ESSENTIAL (PRIMARY) HYPERTENSION   Status: Chronic   


Qualifiers: 


   Hypertension type: essential hypertension   Qualified Code(s): I10 - 

Essential (primary) hypertension   





(9) Kidney transplant status


Code(s): Z94.0 - KIDNEY TRANSPLANT STATUS   Status: Chronic   Comment: its been 

11 yrs now   





(10) Normocytic anemia


Code(s): D64.9 - ANEMIA, UNSPECIFIED   Status: Chronic   





(11) Pulmonary HTN


Code(s): I27.20 - PULMONARY HYPERTENSION, UNSPECIFIED   Status: Chronic   





- Plan


hemostable


-: renal function is improving


-: dc plan in am if ok with all specialists


-: counselled him to amb more, he doesn't want placement


-: He is waiting for CM for help at home





* .


continue asp, lopressor, hydralazine, imdur and soda bicarb


to continue prograf and mycophenolate.





Review of Systems





- Medications/Allergies


Allergies/Adverse Reactions: 


 Allergies











Allergy/AdvReac Type Severity Reaction Status Date / Time


 


No Known Drug Allergies Allergy Unknown  Verified 02/25/18 12:37











Medications: 


 Current Medications





Acetaminophen (Tylenol)  650 mg PO Q4H PRN


   PRN Reason: Headache/Fever/Mild Pain (1-3)


   Last Admin: 02/10/19 20:33 Dose:  650 mg


Hydrocodone Bitart/Acetaminophen (Norco 5/325)  1 tab PO Q6H PRN


   PRN Reason: Moderate Pain (4-6)


   Last Admin: 02/13/19 04:23 Dose:  1 tab


Albuterol/Ipratropium (Duoneb)  3 ml NEB [id number] [facility]


   Last Admin: 02/13/19 07:14 Dose:  3 ml


Aspirin (Ecotrin)  81 mg PO DAILY [facility]


   Last Admin: 02/13/19 08:53 Dose:  81 mg


Benzonatate (Tessalon)  100 mg PO TID [facility]


   Last Admin: 02/13/19 08:52 Dose:  100 mg


Calcitriol (Rocaltrol)  0.25 mcg PO DAILY [facility]


   Last Admin: 02/13/19 08:53 Dose:  0.25 mcg


Enoxaparin Sodium (Lovenox)  30 mg SC 0900 [facility]


   Last Admin: 02/13/19 08:54 Dose:  30 mg


Guaifenesin (Mucinex)  600 mg PO Q12HR [facility]


   Last Admin: 02/13/19 08:52 Dose:  600 mg


Guaifenesin/Dextromethorphan (Robitussin Dm)  15 ml PO Q4H PRN


   PRN Reason: Cough


   Last Admin: 02/12/19 23:10 Dose:  15 ml


Hydralazine HCl (Apresoline)  25 mg PO BID [facility]


   Last Admin: 02/13/19 08:53 Dose:  25 mg


Isosorbide Mononitrate (Imdur)  60 mg PO DAILY [facility]


   Last Admin: 02/13/19 08:53 Dose:  60 mg


Metoprolol Succinate (Toprol Xl)  25 mg PO DAILY [facility]


   Last Admin: 02/13/19 08:53 Dose:  25 mg


Mometasone Furoate/Formoterol Fumar (Dulera 100 Mcg/5 Mcg Inhaler)  1 puff INH 

BID-RT [facility]


   Last Admin: 02/13/19 07:29 Dose:  1 puff


Mycophenolate Sodium (Myfortic)  360 mg PO BID-AC [facility]


   Last Admin: 02/13/19 08:52 Dose:  360 mg


Ondansetron HCl (Zofran)  4 mg IVP Q6H PRN


   PRN Reason: Nausea/Vomiting


   Last Admin: 02/09/19 14:56 Dose:  4 mg


Prednisone (Prednisone)  5 mg PO QAM-WM [facility]


   Last Admin: 02/13/19 08:53 Dose:  5 mg


Senna/Docusate Sodium (Senokot S)  2 tab PO BIDPRN PRN


   PRN Reason: Constipation


   Last Admin: 02/11/19 11:38 Dose:  2 tab


Sodium Bicarbonate (Bicarbonate, Sodium)  650 mg PO TID [facility]


   Last Admin: 02/13/19 11:11 Dose:  650 mg


Sodium Chloride (Flush - Normal Saline)  10 ml IVF Q12HR [facility]


   Last Admin: 02/13/19 08:54 Dose:  10 ml


Sodium Chloride (Flush - Normal Saline)  10 ml IVF PRN PRN


   PRN Reason: Saline Flush


   Last Admin: 02/10/19 02:42 Dose:  10 ml


Tacrolimus (Prograf)  2 mg PO BID [facility]


   Last Admin: 02/13/19 08:52 Dose:  2 mg


Warfarin Sodium (Coumadin)  5 mg PO 1700 [facility]

## 2019-02-13 NOTE — PRG
DATE OF SERVICE:  02/13/2019



SUBJECTIVE:  Mr. Collins is a 63-year-old white black male with known history of

cadaveric renal transplant and seen by the Renal Service for his acute kidney

injury.  The patient was initially complaining of diffuse myalgia.  I felt that this

was related to his statin.  In addition, his acute renal dysfunction was a

hemodynamically-mediated renal dysfunction.  He received IV hydration and there is

now concomitant improvement of the renal function over the last several days.  He

has also been evaluated by Cardiology/EP - Dr. Mayer for his cardiac arrhythmia.  He

was noted to have a nonsustained wide-complex arrhythmias, which he was asymptomatic

and he has had intermittent AFib.  The feeling is medical management. 



No other complaints today.  No chest pain.  Shortness of breath is not any worse.

Please note, this patient also has underlying COPD. 



OBJECTIVE:  VITAL SIGNS:  Blood pressure 165/72, heart rate 69, respiratory rate 14,

temperature 96.5, and pulse ox 94%. 

GENERAL:  Awake, alert, comfortable, not in overt distress. 

SKIN:  Adequate turgor. 

HEENT:  He has pink pinkish conjunctivae.  Anicteric sclerae. 

NECK:  No neck mass.  No carotid bruits.  No JVD. 

CHEST:  No deformities. 

LUNGS:  Decreased breath sounds. 

HEART:  Normal sinus rhythm.  No murmur.  No gallops.  No rubs. 

ABDOMEN:  Globular, soft, nontender.  No masses. 

EXTREMITIES:  No edema.  No deformities.



MEDICATIONS:  Medications of February 13, 2019, was reviewed.



LABORATORY DATA:  Laboratories of February 13, 2019; white count 11, hemoglobin 9.8.

 Sodium 134, potassium 4.6, chloride 105, carbon dioxide 18, BUN 68, creatinine 2.2,

glucose 129, calcium 9.6, and phosphorus 2.0. 



Albumin 4.2.



ASSESSMENT AND PLAN:  

1. Acute kidney injury - hemodynamically-mediated renal dysfunction, much improved

with hydration with crystalloids and colloids.  Currently off his crystalloids.

Creatinine is now 2.2. 

2. Status post cadaveric renal transplant, stable.  We will continue current

immunosuppressive regimen.  Tacrolimus level is pending. 

3. Cardiac arrhythmia - Cardiology following.  Recommendation is medical management. 



Overall agree with current management.







Job ID:  [id number]

## 2019-02-14 LAB
ALBUMIN SERPL BCG-MCNC: 4 G/DL (ref 3.4–4.8)
ANION GAP SERPL CALC-SCNC: 16 MMOL/L (ref 10–20)
BUN SERPL-MCNC: 65 MG/DL (ref 8.4–25.7)
BUN/CREAT SERPL: 31.55
CALCIUM SERPL-MCNC: 9.6 MG/DL (ref 7.8–10.44)
CHLORIDE SERPL-SCNC: 105 MMOL/L (ref 98–107)
CO2 SERPL-SCNC: 19 MMOL/L (ref 23–31)
CREAT CL PREDICTED SERPL C-G-VRATE: 39 ML/MIN (ref 70–130)
GLUCOSE SERPL-MCNC: 174 MG/DL (ref 80–115)
INR PPP: 1.5
POTASSIUM SERPL-SCNC: 4.8 MMOL/L (ref 3.5–5.1)
PROTHROMBIN TIME: 18.1 SEC (ref 12–14.7)
SODIUM SERPL-SCNC: 135 MMOL/L (ref 136–145)

## 2019-02-14 RX ADMIN — Medication SCH ML: at 10:01

## 2019-02-14 RX ADMIN — MOMETASONE FUROATE AND FORMOTEROL FUMARATE DIHYDRATE SCH PUFF: 100; 5 AEROSOL RESPIRATORY (INHALATION) at 06:49

## 2019-02-14 RX ADMIN — SODIUM BICARBONATE SCH MG: 325 TABLET ORAL at 14:40

## 2019-02-14 RX ADMIN — ASPIRIN SCH MG: 81 TABLET ORAL at 09:54

## 2019-02-14 RX ADMIN — Medication SCH ML: at 21:02

## 2019-02-14 RX ADMIN — MYCOPHENOLIC ACID SCH MG: 180 TABLET, DELAYED RELEASE ORAL at 17:34

## 2019-02-14 RX ADMIN — MYCOPHENOLIC ACID SCH MG: 180 TABLET, DELAYED RELEASE ORAL at 09:52

## 2019-02-14 RX ADMIN — SODIUM BICARBONATE SCH MG: 325 TABLET ORAL at 09:59

## 2019-02-14 RX ADMIN — HYDROCODONE BITARTRATE AND ACETAMINOPHEN PRN TAB: 5; 325 TABLET ORAL at 09:52

## 2019-02-14 RX ADMIN — HYDROCODONE BITARTRATE AND ACETAMINOPHEN PRN TAB: 5; 325 TABLET ORAL at 03:19

## 2019-02-14 RX ADMIN — SODIUM BICARBONATE SCH MG: 325 TABLET ORAL at 21:01

## 2019-02-14 RX ADMIN — HYDROCODONE BITARTRATE AND ACETAMINOPHEN PRN TAB: 5; 325 TABLET ORAL at 18:43

## 2019-02-14 RX ADMIN — MOMETASONE FUROATE AND FORMOTEROL FUMARATE DIHYDRATE SCH PUFF: 100; 5 AEROSOL RESPIRATORY (INHALATION) at 18:55

## 2019-02-14 NOTE — PRG
DATE OF SERVICE:  02/14/2019



SUBJECTIVE:  A 63-year-old male with end-stage renal disease status post cadaveric

renal transplant, admitted due to diffuse myalgia and arthralgia.  The patient also

was found to have acute on chronic renal failure necessitating Nephrology consult.

He also was found to have cardiac arrhythmia for which he has been evaluated by

Cardiology and Electrophysiology.  He continues to complain about bilateral hand

pain and swelling. 



OBJECTIVE:  VITAL SIGNS:  Pulse 105, respiratory rate 22, SpO2 97 on 3 L of oxygen

nasal cannula, blood pressure 169/92. 

GENERAL:  Chronically ill-looking male who appears older than stated age.  Afebrile,

anicteric. 

HEENT:  Normocephalic, atraumatic.  Oral mucosa is moist. 

CARDIOVASCULAR:  Regular rhythm.  Heart sounds 1 and 2 are heard. 

RESPIRATORY:  Fair air entry bilateral with no obvious crackle or rhonchi. 

ABDOMEN:  Full, soft, nontender, nondistended with normal bowel sounds. 

MUSCULOSKELETAL:  Fusiform swelling of both phalanges as well as mild swelling of

both hands and wrist noted. 

NEUROLOGIC:  Conscious and alert, oriented x3 with appropriate mental status.



DIAGNOSTIC DATA:  BMP showed sodium 135, potassium 4.8, chloride 105, CO2 19, BUN

65, creatinine 2.06, glucose 174, calcium 9.6, and phosphorus 2.0.  Albumin is 4.0. 



Tacrolimus obtained on 02/11/2019, was markedly elevated at 26.5.



ASSESSMENT:  

1. Acute kidney injury on chronic kidney disease stage 3:  This is most likely

hemodynamically mediated due to Prograf toxicity.  Creatinine is improved with

holding of tacrolimus.  We will recheck renal function as well as tacrolimus in the

morning.  We will also continue gentle IV hydration.  The creatinine is down from

almost 4 to 2.06. 

2. Status post cadaveric renal transplant:  We will continue to hold tacrolimus

while continuing mycophenolate. 

3. Cardiac arrhythmia:  Evaluation and treatment as per Cardiology.

4. Bilateral hand swelling:  Gout is a concern.  We will get uric acid in the

morning.  Given acute kidney injury, the patient might not be in a position to get

colchicine but he can get prednisone.  We would defer to primary care physician for

further evaluation and treatment. 







Job ID:  [id number]

## 2019-02-14 NOTE — PDOC.CTH
Cardiology Progress Note





- Subjective





EP PROGRESS NOTE: 2/14/19





Seen as follow up for atrial fibrillation, non sustained VT, and arrhythmia 

management. He is resting in bed. No bleeding seen since starting coumadin 2/13


+ body aches/generalized


- heart racing, palpitations, chest pain, pressure, dizziness, or passing out





No new cardiac/EP concerns or complaint today





- Objective


 Vital Signs











  Temp Pulse Resp BP BP Pulse Ox


 


 02/14/19 09:55   70   164/70 H  


 


 02/14/19 08:07  98 F  70  14   140/61  94 L


 


 02/14/19 06:47   72  16    90 L


 


 02/14/19 03:06  97.8 F  70  13   154/74 H  93 L


 


 02/14/19 01:54   75  18    91 L








 











Weight                         167 lb 4.8 oz














 











 02/13/19 02/14/19 02/15/19





 06:59 06:59 06:59


 


Intake Total 1222 330 120


 


Output Total 1075 250 


 


Balance 147 80 120














- Physical Examination


General/Neuro: alert & oriented x3, NAD


Neck: carotid US brisk, no JVD present


Lungs: CTA, unlabored respirations


Heart: PMI normal


Abdomen: NT/ND, soft





- Telemetry


Telemetry Rhythm: AF, rate controlled





- Labs


Result Diagrams: 


 02/13/19 12:42





 02/14/19 04:55


 Troponin/CKMB











CK-MB (CK-2)  1.3 ng/mL (0-6.6)   02/08/19  07:06    


 


Troponin I  0.071 ng/mL (< 0.028)  H  02/08/19  11:11    














- Assessment/Plan








1. Atrial fibrillation, chronic


   - continue conservative management with rate control only


   - asymptomatic


   - poor candidate for either ablation and has no good antiarrhythmic options 

with his multiple comorbidities.


2. Wide complex tachycardia


   -Not candidate for ICD at this time


   -Non sustained and asymptomatic


   -Increased metoprolol succinate to 50mg QD


3. Hypertension


   - per cardiology & hospitalist


4. CHADS2-VASC: 3


   - HTN, vascular disease, and heart failure


   -oral anticoagulation is indicated.  He has been on NOAC in the past but 

could not afford it. Recommend warfarin INR 2.0-3.0. 


   -watch for bleeding dyscrasias. 


   - recommend he be set up with coumadin clinic upon discharge. I am concerned 

he may not be complaint with INR monitoring upon DC. If he is not going to 

follow up I would recommend against continuing anticoagulation.


5. Anemia


6. PVD s/p aorto-iliac bypass


7. CAD s/p CABG


8. H/o renal transplant


9. Diastolic heart failure, EF 55-60%


10. A/C renal insufficiency


11. H/o TAVR in recent past in [place]





Overall, limited options for him d/t multiple severe and chronic comorbidities. 

Continue conservative treatment plan as detailed above. Signing off. Contact me 

if further EP input is requested.

## 2019-02-14 NOTE — PDOC.PN
- Subjective


Encounter Start Date: 02/14/19


Encounter Start Time: 08:45


Subjective: no sob


-: c/o severe pain and swelling of hands b/l and right ankle thinks its gout





- Objective


Resuscitation Status - Order Detail:





02/08/19 12:27


Resuscitation Status Routine 


   Resuscitation Status: FULL: Full Resuscitation


   Discussed with: d/w patient, david is his wife








MAR Reviewed: Yes


Vital Signs & Weight: 


 Vital Signs (12 hours)











  Temp Pulse Resp BP BP Pulse Ox


 


 02/14/19 09:55   70   164/70 H  


 


 02/14/19 08:07  98 F  70  14   140/61  94 L


 


 02/14/19 06:47   72  16    90 L


 


 02/14/19 03:06  97.8 F  70  13   154/74 H  93 L


 


 02/14/19 01:54   75  18    91 L








 Weight











Weight                         167 lb 4.8 oz











 Most Recent Monitor Data











Heart Rate from ECG            69


 


NIBP                           104/54


 


NIBP BP-Mean                   70


 


Respiration from ECG           10


 


SpO2                           98














I&O: 


 











 02/13/19 02/14/19 02/15/19





 06:59 06:59 06:59


 


Intake Total 1222 330 


 


Output Total 1075 250 


 


Balance 147 80 











Result Diagrams: 


 02/13/19 12:42





 02/14/19 04:55





Phys Exam





- Physical Examination


HEENT: PERRLA, moist MMs


Neck: no JVD, supple


Respiratory: no wheezing, no rales


Cardiovascular: no significant murmur, irregular


Gastrointestinal: soft, no distention, positive bowel sounds


Musculoskeletal: no edema, pulses present


has fusiform swelling of prox phalanges multiple fingers, wrist right edema


Neurological: non-focal, moves all 4 limbs


Psychiatric: normal affect, A&O x 3





Dx/Plan


(1) Acute on chronic renal failure


Code(s): N17.9 - ACUTE KIDNEY FAILURE, UNSPECIFIED; N18.9 - CHRONIC KIDNEY 

DISEASE, UNSPECIFIED   Status: Acute   


Qualifiers: 


   Acute renal failure type: unspecified   Chronic kidney disease stage: stage 

3 (moderate)   Qualified Code(s): N17.9 - Acute kidney failure, unspecified; 

N18.3 - Chronic kidney disease, stage 3 (moderate)   





(2) COPD exacerbation


Code(s): J44.1 - CHRONIC OBSTRUCTIVE PULMONARY DISEASE W (ACUTE) EXACERBATION   

Status: Resolved   





(3) Acute and chronic respiratory failure with hypoxia


Code(s): J96.21 - ACUTE AND CHRONIC RESPIRATORY FAILURE WITH HYPOXIA   Status: 

Resolved   Comment: Currently improved and at baseline O2 requirement of 3-4L/

min NC   





(4) Cholelithiasis


Code(s): K80.20 - CALCULUS OF GALLBLADDER W/O CHOLECYSTITIS W/O OBSTRUCTION   

Status: Chronic   


Qualifiers: 


   Cholelithiasis location: gallbladder   Cholecystitis presence: without 

cholecystitis   Biliary obstruction: without biliary obstruction   Qualified 

Code(s): K80.20 - Calculus of gallbladder without cholecystitis without 

obstruction   





(5) Atrial fibrillation with controlled ventricular rate


Code(s): I48.91 - UNSPECIFIED ATRIAL FIBRILLATION   Status: Chronic   Comment: 

now in atrial flutter   





(6) CAD (coronary artery disease)


Code(s): I25.10 - ATHSCL HEART DISEASE OF NATIVE CORONARY ARTERY W/O ANG PCTRS 

  Status: Chronic   


Qualifiers: 


   Coronary Disease-Associated Artery/Lesion type: bypass graft   Native vs. 

transplanted heart: native heart   Associated angina: without angina   

Qualified Code(s): I25.810 - Atherosclerosis of coronary artery bypass graft(s) 

without angina pectoris   





(7) Chronic kidney disease, stage III (moderate)


Status: Chronic   





(8) Hypertension


Code(s): I10 - ESSENTIAL (PRIMARY) HYPERTENSION   Status: Chronic   


Qualifiers: 


   Hypertension type: essential hypertension   Qualified Code(s): I10 - 

Essential (primary) hypertension   





(9) Kidney transplant status


Code(s): Z94.0 - KIDNEY TRANSPLANT STATUS   Status: Chronic   Comment: its been 

11 yrs now   





(10) Normocytic anemia


Code(s): D64.9 - ANEMIA, UNSPECIFIED   Status: Chronic   





(11) Pulmonary HTN


Code(s): I27.20 - PULMONARY HYPERTENSION, UNSPECIFIED   Status: Chronic   





- Plan


likely has gout flare up?, iv steroids in view of mando, morphine prn


-: tacrolimus levels are very high,  to review and adjust his prograf 


-: continue mycophenolate, sod bicarb, hydralazine, imdur, toprol xl


-: to amb as tolerated, tx to med floor


-: dc in am when his swelling and pain from inflammed hand/finger/wrist settle





* .








Review of Systems





- Medications/Allergies


Allergies/Adverse Reactions: 


 Allergies











Allergy/AdvReac Type Severity Reaction Status Date / Time


 


No Known Drug Allergies Allergy Unknown  Verified 02/25/18 12:37











Medications: 


 Current Medications





Acetaminophen (Tylenol)  650 mg PO Q4H PRN


   PRN Reason: Headache/Fever/Mild Pain (1-3)


   Last Admin: 02/10/19 20:33 Dose:  650 mg


Hydrocodone Bitart/Acetaminophen (Norco 5/325)  1 tab PO Q6H PRN


   PRN Reason: Moderate Pain (4-6)


   Last Admin: 02/14/19 09:52 Dose:  1 tab


Albuterol/Ipratropium (Duoneb)  3 ml NEB [id number] [facility]


   Last Admin: 02/14/19 06:47 Dose:  3 ml


Aspirin (Ecotrin)  81 mg PO DAILY [facility]


   Last Admin: 02/14/19 09:54 Dose:  81 mg


Benzonatate (Tessalon)  100 mg PO TID [facility]


   Last Admin: 02/14/19 09:54 Dose:  100 mg


Calcitriol (Rocaltrol)  0.25 mcg PO DAILY [facility]


   Last Admin: 02/14/19 09:54 Dose:  0.25 mcg


Enoxaparin Sodium (Lovenox)  30 mg SC 0900 [facility]


   Last Admin: 02/14/19 10:00 Dose:  30 mg


Guaifenesin (Mucinex)  600 mg PO Q12HR [facility]


   Last Admin: 02/14/19 09:54 Dose:  600 mg


Guaifenesin/Dextromethorphan (Robitussin Dm)  15 ml PO Q4H PRN


   PRN Reason: Cough


   Last Admin: 02/12/19 23:10 Dose:  15 ml


Hydralazine HCl (Apresoline)  25 mg PO BID [facility]


   Last Admin: 02/14/19 09:55 Dose:  25 mg


Isosorbide Mononitrate (Imdur)  60 mg PO DAILY [facility]


   Last Admin: 02/14/19 09:54 Dose:  60 mg


Metoprolol Succinate (Toprol Xl)  50 mg PO DAILY [facility]


   Last Admin: 02/14/19 09:59 Dose:  50 mg


Mometasone Furoate/Formoterol Fumar (Dulera 100 Mcg/5 Mcg Inhaler)  1 puff INH 

BID-RT [facility]


   Last Admin: 02/14/19 06:49 Dose:  1 puff


Mycophenolate Sodium (Myfortic)  360 mg PO BID-AC [facility]


   Last Admin: 02/14/19 09:52 Dose:  360 mg


Ondansetron HCl (Zofran)  4 mg IVP Q6H PRN


   PRN Reason: Nausea/Vomiting


   Last Admin: 02/09/19 14:56 Dose:  4 mg


Senna/Docusate Sodium (Senokot S)  2 tab PO BIDPRN PRN


   PRN Reason: Constipation


   Last Admin: 02/11/19 11:38 Dose:  2 tab


Sodium Bicarbonate (Bicarbonate, Sodium)  650 mg PO TID [facility]


   Last Admin: 02/14/19 09:59 Dose:  650 mg


Sodium Chloride (Flush - Normal Saline)  10 ml IVF Q12HR [facility]


   Last Admin: 02/14/19 10:01 Dose:  10 ml


Sodium Chloride (Flush - Normal Saline)  10 ml IVF PRN PRN


   PRN Reason: Saline Flush


   Last Admin: 02/10/19 02:42 Dose:  10 ml


Tacrolimus (Prograf)  2 mg PO BID [facility]


   Last Admin: 02/14/19 10:01 Dose:  Not Given


Warfarin Sodium (Coumadin)  5 mg PO 1700 [facility]


   Last Admin: 02/13/19 16:23 Dose:  5 mg

## 2019-02-15 LAB
ALBUMIN SERPL BCG-MCNC: 4 G/DL (ref 3.4–4.8)
ANION GAP SERPL CALC-SCNC: 17 MMOL/L (ref 10–20)
ANION GAP SERPL CALC-SCNC: 22 MMOL/L (ref 10–20)
BUN SERPL-MCNC: 66 MG/DL (ref 8.4–25.7)
BUN SERPL-MCNC: 72 MG/DL (ref 8.4–25.7)
BUN/CREAT SERPL: 29.2
CALCIUM SERPL-MCNC: 9.8 MG/DL (ref 7.8–10.44)
CALCIUM SERPL-MCNC: 9.9 MG/DL (ref 7.8–10.44)
CHLORIDE SERPL-SCNC: 101 MMOL/L (ref 98–107)
CHLORIDE SERPL-SCNC: 103 MMOL/L (ref 98–107)
CO2 SERPL-SCNC: 17 MMOL/L (ref 23–31)
CO2 SERPL-SCNC: 20 MMOL/L (ref 23–31)
CREAT CL PREDICTED SERPL C-G-VRATE: 33 ML/MIN (ref 70–130)
CREAT CL PREDICTED SERPL C-G-VRATE: 35 ML/MIN (ref 70–130)
GLUCOSE SERPL-MCNC: 223 MG/DL (ref 80–115)
GLUCOSE SERPL-MCNC: 291 MG/DL (ref 80–115)
INR PPP: 1.6
POTASSIUM SERPL-SCNC: 5.4 MMOL/L (ref 3.5–5.1)
POTASSIUM SERPL-SCNC: 6.1 MMOL/L (ref 3.5–5.1)
POTASSIUM SERPL-SCNC: 6.1 MMOL/L (ref 3.5–5.1)
PROTHROMBIN TIME: 19.5 SEC (ref 12–14.7)
SODIUM SERPL-SCNC: 134 MMOL/L (ref 136–145)
SODIUM SERPL-SCNC: 134 MMOL/L (ref 136–145)

## 2019-02-15 RX ADMIN — Medication SCH ML: at 19:52

## 2019-02-15 RX ADMIN — MYCOPHENOLIC ACID SCH MG: 180 TABLET, DELAYED RELEASE ORAL at 08:06

## 2019-02-15 RX ADMIN — MOMETASONE FUROATE AND FORMOTEROL FUMARATE DIHYDRATE SCH PUFF: 100; 5 AEROSOL RESPIRATORY (INHALATION) at 07:02

## 2019-02-15 RX ADMIN — SODIUM BICARBONATE SCH MG: 325 TABLET ORAL at 08:05

## 2019-02-15 RX ADMIN — ASPIRIN SCH MG: 81 TABLET ORAL at 08:05

## 2019-02-15 RX ADMIN — SODIUM BICARBONATE SCH MG: 325 TABLET ORAL at 14:56

## 2019-02-15 RX ADMIN — SODIUM BICARBONATE SCH MG: 325 TABLET ORAL at 19:51

## 2019-02-15 RX ADMIN — HYDROCODONE BITARTRATE AND ACETAMINOPHEN PRN TAB: 5; 325 TABLET ORAL at 14:56

## 2019-02-15 RX ADMIN — Medication SCH ML: at 08:07

## 2019-02-15 RX ADMIN — MOMETASONE FUROATE AND FORMOTEROL FUMARATE DIHYDRATE SCH: 100; 5 AEROSOL RESPIRATORY (INHALATION) at 20:44

## 2019-02-15 RX ADMIN — MYCOPHENOLIC ACID SCH MG: 180 TABLET, DELAYED RELEASE ORAL at 17:20

## 2019-02-15 NOTE — PDOC.PN
- Subjective


Encounter Start Date: 02/15/19


Encounter Start Time: 09:00





Pt seen for followup re: gout flare.  says he feels better.





- Objective


Resuscitation Status - Order Detail:





02/08/19 12:27


Resuscitation Status Routine 


   Resuscitation Status: FULL: Full Resuscitation


   Discussed with: d/w patient, david is his wife








Vital Signs & Weight: 


 Vital Signs (12 hours)











  Temp Pulse Resp BP BP Pulse Ox


 


 02/15/19 15:37   72  20   


 


 02/15/19 11:54  97.5 F L  75  20   153/82 H  98


 


 02/15/19 08:05   78   134/78  


 


 02/15/19 08:00  98.0 F  81  20   134/78  98


 


 02/15/19 07:02   76  16    92 L


 


 02/15/19 06:42   76  16    92 L








 Weight











Weight                         167 lb 4.8 oz











 Most Recent Monitor Data











Heart Rate from ECG            69


 


NIBP                           104/54


 


NIBP BP-Mean                   70


 


Respiration from ECG           10


 


SpO2                           98














I&O: 


 











 02/14/19 02/15/19 02/16/19





 06:59 06:59 06:59


 


Intake Total 330 840 


 


Output Total 250 350 


 


Balance 80 490 











Result Diagrams: 


 02/13/19 12:42





 02/15/19 15:43





Phys Exam





- Physical Examination


Constitutional: NAD


HEENT: moist MMs


Neck: supple


Respiratory: clear to auscultation bilateral


Cardiovascular: RRR


Neurological: moves all 4 limbs


Psychiatric: normal affect





Dx/Plan


(1) Gout flare


Code(s): M10.9 - GOUT, UNSPECIFIED   Status: Acute   


Qualifiers: 


   Gout site: wrist   Gout etiology: due to renal impairment   Laterality: 

right   Qualified Code(s): M10.331 - Gout due to renal impairment, right wrist 

  


Comment: Improving with steroids   





(2) Hyperkalemia


Code(s): E87.5 - HYPERKALEMIA   Status: Acute   Comment: adminsiter kayexalate, 

humulin-R and beta agonist nebs   





(3) Acute on chronic renal failure


Code(s): N17.9 - ACUTE KIDNEY FAILURE, UNSPECIFIED; N18.9 - CHRONIC KIDNEY 

DISEASE, UNSPECIFIED   Status: Acute   


Qualifiers: 


   Acute renal failure type: unspecified   Chronic kidney disease stage: stage 

3 (moderate)   Qualified Code(s): N17.9 - Acute kidney failure, unspecified; 

N18.3 - Chronic kidney disease, stage 3 (moderate)   


Comment: stabilizing   





(4) Acute on chronic diastolic congestive heart failure


Code(s): I50.33 - ACUTE ON CHRONIC DIASTOLIC (CONGESTIVE) HEART FAILURE   Status

: Resolved   





- Plan





* .








Review of Systems





- Review of Systems


Cardiovascular: negative: chest pain, palpitations, orthopnea, paroxysmal 

nocturnal dyspnea, edema, light headedness


Gastrointestinal: negative: Nausea, Vomiting, Abdominal Pain, Diarrhea, 

Constipation, Melena, Hematochezia


Musculoskeletal: Hand Pain.  negative: Neck Pain, Shoulder Pain, Arm Pain, Back 

Pain, Leg Pain, Foot Pain





- Medications/Allergies


Allergies/Adverse Reactions: 


 Allergies











Allergy/AdvReac Type Severity Reaction Status Date / Time


 


No Known Drug Allergies Allergy Unknown  Verified 02/25/18 12:37











Medications: 


 Current Medications





Acetaminophen (Tylenol)  650 mg PO Q4H PRN


   PRN Reason: Headache/Fever/Mild Pain (1-3)


   Last Admin: 02/10/19 20:33 Dose:  650 mg


Hydrocodone Bitart/Acetaminophen (Norco 5/325)  1 tab PO Q6H PRN


   PRN Reason: Moderate Pain (4-6)


   Last Admin: 02/15/19 14:56 Dose:  1 tab


Albuterol/Ipratropium (Duoneb)  3 ml NEB [id number] [facility]


   Last Admin: 02/15/19 15:37 Dose:  3 ml


Aspirin (Ecotrin)  81 mg PO DAILY [facility]


   Last Admin: 02/15/19 08:05 Dose:  81 mg


Benzonatate (Tessalon)  100 mg PO TID [facility]


   Last Admin: 02/15/19 14:57 Dose:  100 mg


Calcitriol (Rocaltrol)  0.25 mcg PO DAILY [facility]


   Last Admin: 02/15/19 08:05 Dose:  0.25 mcg


Enoxaparin Sodium (Lovenox)  30 mg SC 0900 [facility]


   Last Admin: 02/15/19 08:06 Dose:  30 mg


Guaifenesin (Mucinex)  600 mg PO Q12HR [facility]


   Last Admin: 02/15/19 08:05 Dose:  600 mg


Guaifenesin/Dextromethorphan (Robitussin Dm)  15 ml PO Q4H PRN


   PRN Reason: Cough


   Last Admin: 02/12/19 23:10 Dose:  15 ml


Hydralazine HCl (Apresoline)  25 mg PO BID [facility]


   Last Admin: 02/15/19 08:05 Dose:  25 mg


Isosorbide Mononitrate (Imdur)  60 mg PO DAILY [facility]


   Last Admin: 02/15/19 08:06 Dose:  60 mg


Methylprednisolone Sodium Succinate (Solu-Medrol)  20 mg IVP 0400,1200,2000 [facility]


   Last Admin: 02/15/19 12:00 Dose:  20 mg


Metoprolol Succinate (Toprol Xl)  50 mg PO DAILY [facility]


   Last Admin: 02/15/19 08:04 Dose:  50 mg


Mometasone Furoate/Formoterol Fumar (Dulera 100 Mcg/5 Mcg Inhaler)  1 puff INH 

BID-RT [facility]


   Last Admin: 02/15/19 07:02 Dose:  1 puff


Morphine Sulfate (Morphine)  2 mg SLOW IVP Q4H PRN


   PRN Reason: Chest Pain/BP Elevations


   Last Admin: 02/14/19 14:53 Dose:  2 mg


Mycophenolate Sodium (Myfortic)  360 mg PO BID-AC [facility]


   Last Admin: 02/15/19 08:06 Dose:  360 mg


Ondansetron HCl (Zofran)  4 mg IVP Q6H PRN


   PRN Reason: Nausea/Vomiting


   Last Admin: 02/09/19 14:56 Dose:  4 mg


Senna/Docusate Sodium (Senokot S)  2 tab PO BIDPRN PRN


   PRN Reason: Constipation


   Last Admin: 02/11/19 11:38 Dose:  2 tab


Sodium Bicarbonate (Bicarbonate, Sodium)  650 mg PO TID [facility]


   Last Admin: 02/15/19 14:56 Dose:  650 mg


Sodium Chloride (Flush - Normal Saline)  10 ml IVF Q12HR [facility]


   Last Admin: 02/15/19 08:07 Dose:  10 ml


Sodium Chloride (Flush - Normal Saline)  10 ml IVF PRN PRN


   PRN Reason: Saline Flush


   Last Admin: 02/10/19 02:42 Dose:  10 ml


Tacrolimus (Prograf)  2 mg PO BID [facility]


   Last Admin: 02/15/19 10:15 Dose:  2 mg


Warfarin Sodium (Coumadin)  5 mg PO 1700 [facility]


   Last Admin: 02/14/19 17:15 Dose:  5 mg

## 2019-02-15 NOTE — PRG
DATE OF SERVICE:  02/15/2019



SUBJECTIVE:  The patient is a 63-year-old male with known history of end-stage renal

disease, status post transplant; atrial fibrillation; coronary artery disease;

chronic CHF, amongst others, who was admitted due to generalized body aches.  The

patient also was found to have acute kidney injury superimposed on baseline CKD.

Further evaluation showed supratherapeutic Prograf level.  The patient also had

acute gout flare, which he reports is improving.  He reports feeling better today. 



OBJECTIVE:  VITAL SIGNS:  /82, pulse 75, respiratory rate 20, SpO2 of 98 on 3

L of oxygen nasal cannula, and temperature 97.5. 

GENERAL:  Chronically ill-looking male, in no obvious distress.  Afebrile and

anicteric. 

HEENT:  Normocephalic and atraumatic.  Oral mucosa is moist. 

CARDIOVASCULAR:  Regular rhythm and rate with normal heart sounds 1 and 2. 

RESPIRATORY:  Fair air entry bilaterally with no obvious crackle or rhonchi. 

GI:  Abdomen is full, soft, nondistended, nontender with normal bowel sounds. 

MUSCULOSKELETAL:  Mild bilateral hand swelling with some tenderness noted.  There is

no obvious leg edema. 

NEUROLOGIC:  Conscious and alert and oriented x3 with appropriate mental status.



DIAGNOSTIC DATA:  BMP showed sodium 134, potassium 6.1, chloride 101, carbon dioxide

17, anion gap 22, BUN 72, creatinine 2.43, glucose 291, calcium 9.9.  Tacrolimus

level obtained on February 11 was 26.5, repeat is pending at this time. 



IMPRESSION:  

1. Acute kidney injury on chronic kidney disease, stage 3.  This most likely is

hemodynamically mediated due to Prograf toxicity.  Creatinine trended down to 2.06,

but started trending up since restart of Prograf.  Repeat Prograf level is not

available yet.  We will discontinue Prograf until we get a repeat level.  We will

also start the patient on gentle IV hydration. 

2. Metabolic acidosis:  Most likely due to worsening renal function.  It started to

improve with commencement of sodium bicarbonate infusion. 

3. Hyperkalemia:  Most likely due to Prograf toxicity:  We will treat the patient

with dextrose and insulin as well as albuterol and calcium gluconate.  We will also

give the patient Kayexalate.  Treatment with sodium bicarbonate will also help.  We

will recheck serum potassium and treat if needed. 

4. History of cardiac arrhythmia:  Defer treatment to Cardiology.

5. Acute gouty flare:  Improving with treatment by the primary team.

6. Disposition:  The patient continues to require inpatient treatment.  We will

await repeat Prograf level, which is a send out. 







Job ID:  [id number]

## 2019-02-16 LAB
ALBUMIN SERPL BCG-MCNC: 4 G/DL (ref 3.4–4.8)
ANION GAP SERPL CALC-SCNC: 21 MMOL/L (ref 10–20)
ANISOCYTOSIS BLD QL SMEAR: (no result) (100X)
BASOPHILS # BLD AUTO: 0.1 THOU/UL (ref 0–0.2)
BASOPHILS NFR BLD AUTO: 0.5 % (ref 0–1)
BUN SERPL-MCNC: 72 MG/DL (ref 8.4–25.7)
BUN/CREAT SERPL: 31.03
BURR CELLS BLD QL SMEAR: (no result) (100X)
CALCIUM SERPL-MCNC: 9.7 MG/DL (ref 7.8–10.44)
CHLORIDE SERPL-SCNC: 97 MMOL/L (ref 98–107)
CO2 SERPL-SCNC: 21 MMOL/L (ref 23–31)
CREAT CL PREDICTED SERPL C-G-VRATE: 35 ML/MIN (ref 70–130)
EOSINOPHIL # BLD AUTO: 0 THOU/UL (ref 0–0.7)
EOSINOPHIL NFR BLD AUTO: 0.2 % (ref 0–10)
GLUCOSE SERPL-MCNC: 318 MG/DL (ref 80–115)
HGB BLD-MCNC: 8.9 G/DL (ref 14–18)
INR PPP: 1.6
LYMPHOCYTES # BLD: 0.3 THOU/UL (ref 1.2–3.4)
LYMPHOCYTES NFR BLD AUTO: 2.7 % (ref 21–51)
MCH RBC QN AUTO: 24.9 PG (ref 27–31)
MCV RBC AUTO: 83.4 FL (ref 78–98)
MDIFF COMPLETE?: YES
MONOCYTES # BLD AUTO: 1.3 THOU/UL (ref 0.11–0.59)
MONOCYTES NFR BLD AUTO: 10.6 % (ref 0–10)
NEUTROPHILS # BLD AUTO: 10.3 THOU/UL (ref 1.4–6.5)
NEUTROPHILS NFR BLD AUTO: 86.1 % (ref 42–75)
PLATELET # BLD AUTO: 228 THOU/UL (ref 130–400)
POTASSIUM SERPL-SCNC: 4.9 MMOL/L (ref 3.5–5.1)
PROTHROMBIN TIME: 19.4 SEC (ref 12–14.7)
RBC # BLD AUTO: 3.57 MILL/UL (ref 4.7–6.1)
SODIUM SERPL-SCNC: 134 MMOL/L (ref 136–145)
WBC # BLD AUTO: 12 THOU/UL (ref 4.8–10.8)

## 2019-02-16 RX ADMIN — SODIUM BICARBONATE SCH MG: 325 TABLET ORAL at 15:53

## 2019-02-16 RX ADMIN — MOMETASONE FUROATE AND FORMOTEROL FUMARATE DIHYDRATE SCH PUFF: 100; 5 AEROSOL RESPIRATORY (INHALATION) at 07:16

## 2019-02-16 RX ADMIN — Medication SCH: at 20:04

## 2019-02-16 RX ADMIN — ASPIRIN SCH MG: 81 TABLET ORAL at 09:14

## 2019-02-16 RX ADMIN — Medication SCH ML: at 09:15

## 2019-02-16 RX ADMIN — SODIUM BICARBONATE SCH MG: 325 TABLET ORAL at 09:14

## 2019-02-16 RX ADMIN — MYCOPHENOLIC ACID SCH MG: 180 TABLET, DELAYED RELEASE ORAL at 08:10

## 2019-02-16 RX ADMIN — SODIUM BICARBONATE SCH MG: 325 TABLET ORAL at 20:03

## 2019-02-16 RX ADMIN — MOMETASONE FUROATE AND FORMOTEROL FUMARATE DIHYDRATE SCH PUFF: 100; 5 AEROSOL RESPIRATORY (INHALATION) at 18:28

## 2019-02-16 RX ADMIN — MYCOPHENOLIC ACID SCH MG: 180 TABLET, DELAYED RELEASE ORAL at 15:53

## 2019-02-16 NOTE — PDOC.PN
- Subjective


Encounter Start Date: 02/16/19


Encounter Start Time: 09:20





Pt seen for followup re: gout flare.  Feels better.





- Objective


Resuscitation Status - Order Detail:





02/08/19 12:27


Resuscitation Status Routine 


   Resuscitation Status: FULL: Full Resuscitation


   Discussed with: d/w patient, david is his wife








MAR Reviewed: Yes


Vital Signs & Weight: 


 Vital Signs (12 hours)











  Temp Pulse Pulse Pulse Pulse Resp BP


 


 02/16/19 09:14   69      143/75 H


 


 02/16/19 08:52    93  80  84  


 


 02/16/19 08:00       


 


 02/16/19 07:46  97.4 F L  69     18 


 


 02/16/19 07:16   71     20 


 


 02/16/19 06:59       


 


 02/16/19 06:55   71     20 


 


 02/16/19 04:00  98.0 F  66     20 














  BP BP Pulse Ox Pulse Ox Pulse Ox Pulse Ox


 


 02/16/19 09:14      


 


 02/16/19 08:52     92 L  88 L  95


 


 02/16/19 08:00    98   


 


 02/16/19 07:46   143/75 H  98   


 


 02/16/19 07:16    93 L   


 


 02/16/19 06:59    93 L   


 


 02/16/19 06:55    93 L   


 


 02/16/19 04:00  154/75 H   94 L   








 Weight











Weight                         167 lb 4.8 oz











 Most Recent Monitor Data











Heart Rate from ECG            69


 


NIBP                           104/54


 


NIBP BP-Mean                   70


 


Respiration from ECG           10


 


SpO2                           98














I&O: 


 











 02/15/19 02/16/19 02/17/19





 06:59 06:59 06:59


 


Intake Total 840 1500 480


 


Output Total 350 400 250


 


Balance 490 1100 230











Result Diagrams: 


 02/16/19 08:33





 02/16/19 06:08


Additional Labs: 





labs reviewed by me





Phys Exam





- Physical Examination


Constitutional: NAD


HEENT: moist MMs


Neck: supple


Respiratory: clear to auscultation bilateral


Cardiovascular: RRR


Gastrointestinal: soft


Neurological: moves all 4 limbs


Psychiatric: normal affect





Dx/Plan


(1) Gout flare


Code(s): M10.9 - GOUT, UNSPECIFIED   Status: Acute   


Qualifiers: 


   Gout site: wrist   Gout etiology: due to renal impairment   Laterality: 

right   Qualified Code(s): M10.331 - Gout due to renal impairment, right wrist 

  


Comment: continue steroids   





(2) Acute on chronic renal failure


Code(s): N17.9 - ACUTE KIDNEY FAILURE, UNSPECIFIED; N18.9 - CHRONIC KIDNEY 

DISEASE, UNSPECIFIED   Status: Acute   


Qualifiers: 


   Acute renal failure type: unspecified   Chronic kidney disease stage: stage 

3 (moderate)   Qualified Code(s): N17.9 - Acute kidney failure, unspecified; 

N18.3 - Chronic kidney disease, stage 3 (moderate)   


Comment: stabilizing   





(3) Acute on chronic diastolic congestive heart failure


Code(s): I50.33 - ACUTE ON CHRONIC DIASTOLIC (CONGESTIVE) HEART FAILURE   Status

: Resolved   





(4) Hyperkalemia


Code(s): E87.5 - HYPERKALEMIA   Status: Resolved   





- Plan





* .


Prograf on hold, level pending





Review of Systems





- Review of Systems


Cardiovascular: negative: chest pain, palpitations, orthopnea, paroxysmal 

nocturnal dyspnea, edema, light headedness


Gastrointestinal: negative: Nausea, Vomiting, Abdominal Pain, Diarrhea, 

Constipation, Melena, Hematochezia


Musculoskeletal: Hand Pain





- Medications/Allergies


Allergies/Adverse Reactions: 


 Allergies











Allergy/AdvReac Type Severity Reaction Status Date / Time


 


No Known Drug Allergies Allergy Unknown  Verified 02/25/18 12:37











Medications: 


 Current Medications





Acetaminophen (Tylenol)  650 mg PO Q4H PRN


   PRN Reason: Headache/Fever/Mild Pain (1-3)


   Last Admin: 02/10/19 20:33 Dose:  650 mg


Hydrocodone Bitart/Acetaminophen (Norco 5/325)  1 tab PO Q6H PRN


   PRN Reason: Moderate Pain (4-6)


   Last Admin: 02/15/19 14:56 Dose:  1 tab


Albuterol/Ipratropium (Duoneb)  3 ml NEB [id number] [facility]


   Last Admin: 02/16/19 06:55 Dose:  3 ml


Aspirin (Ecotrin)  81 mg PO DAILY [facility]


   Last Admin: 02/16/19 09:14 Dose:  81 mg


Benzonatate (Tessalon)  100 mg PO TID [facility]


   Last Admin: 02/16/19 09:19 Dose:  100 mg


Calcitriol (Rocaltrol)  0.25 mcg PO DAILY [facility]


   Last Admin: 02/16/19 09:14 Dose:  0.25 mcg


Docusate Sodium (Colace)  100 mg PO BID [facility]


   Last Admin: 02/16/19 09:14 Dose:  Not Given


Enoxaparin Sodium (Lovenox)  30 mg SC 0900 [facility]


   Last Admin: 02/16/19 09:31 Dose:  Not Given


Guaifenesin (Mucinex)  600 mg PO Q12HR [facility]


   Last Admin: 02/16/19 09:15 Dose:  600 mg


Guaifenesin/Dextromethorphan (Robitussin Dm)  15 ml PO Q4H PRN


   PRN Reason: Cough


   Last Admin: 02/12/19 23:10 Dose:  15 ml


Hydralazine HCl (Apresoline)  25 mg PO BID [facility]


   Last Admin: 02/16/19 09:14 Dose:  25 mg


Sodium Bicarbonate 75 meq/ (Sodium Chloride)  1,075 mls @ 50 mls/hr IV INF [facility]


   Last Admin: 02/16/19 09:48 Dose:  1,075 mls


Isosorbide Mononitrate (Imdur)  60 mg PO DAILY [facility]


   Last Admin: 02/16/19 09:14 Dose:  60 mg


Methylprednisolone Sodium Succinate (Solu-Medrol)  20 mg IVP 0400,1200,2000 [facility]


   Last Admin: 02/16/19 12:30 Dose:  20 mg


Metoprolol Succinate (Toprol Xl)  50 mg PO DAILY [facility]


   Last Admin: 02/16/19 09:14 Dose:  50 mg


Mometasone Furoate/Formoterol Fumar (Dulera 100 Mcg/5 Mcg Inhaler)  1 puff INH 

BID-RT [facility]


   Last Admin: 02/16/19 07:16 Dose:  1 puff


Morphine Sulfate (Morphine)  2 mg SLOW IVP Q4H PRN


   PRN Reason: Chest Pain/BP Elevations


   Last Admin: 02/14/19 14:53 Dose:  2 mg


Mycophenolate Sodium (Myfortic)  360 mg PO BID-AC [facility]


   Last Admin: 02/16/19 08:10 Dose:  360 mg


Ondansetron HCl (Zofran)  4 mg IVP Q6H PRN


   PRN Reason: Nausea/Vomiting


   Last Admin: 02/09/19 14:56 Dose:  4 mg


Senna/Docusate Sodium (Senokot S)  2 tab PO BIDPRN PRN


   PRN Reason: Constipation


   Last Admin: 02/11/19 11:38 Dose:  2 tab


Sodium Bicarbonate (Bicarbonate, Sodium)  650 mg PO TID [facility]


   Last Admin: 02/16/19 09:14 Dose:  650 mg


Sodium Chloride (Flush - Normal Saline)  10 ml IVF Q12HR [facility]


   Last Admin: 02/16/19 09:15 Dose:  10 ml


Sodium Chloride (Flush - Normal Saline)  10 ml IVF PRN PRN


   PRN Reason: Saline Flush


   Last Admin: 02/10/19 02:42 Dose:  10 ml


Warfarin Sodium (Coumadin)  5 mg PO 1700 [facility]


   Last Admin: 02/15/19 17:20 Dose:  5 mg

## 2019-02-16 NOTE — PRG
DATE OF SERVICE:  02/16/2019



SUBJECTIVE:  A 63-year-old male with end-stage renal disease, status post cadaveric

renal transplant, who was admitted due to generalized body aches and was found to

have DINA on baseline CKD of transplanted kidney.  The patient reports feeling

better.  Continues to make adequate urine.  Denied fever, nausea, vomiting.

Generalized body aches and arthralgia have subsided. 



OBJECTIVE:  VITAL SIGNS:  /75, pulse 69, respiratory rate 18, temperature

97.4, SpO2 98 on oxygen via nasal cannula. 

GENERAL:  Comfortable male, in no obvious distress.  Afebrile and anicteric. 

HEENT:  Normocephalic, atraumatic.  Oral mucosa is moist. 

CARDIOVASCULAR:  Regular rhythm and rate with normal heart sounds one and two. 

RESPIRATORY:  Good air entry bilaterally with no crackles or rhonchi. 

GI:  Abdomen is full, soft, nontender, nondistended with normal bowel sounds. 

MUSCULOSKELETAL:  No edema in the legs noted. 

NEUROLOGIC:  Conscious and alert, oriented x3 with appropriate mental status.



DIAGNOSTIC DATA:  Renal function panel showed sodium 134, potassium 4.9, chloride

97, CO2 21, BUN 72, creatinine 2.32, glucose 318, calcium 9.7, phosphorus 3.5, and

albumin 4. 



ASSESSMENT AND PLAN:  

1. 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

This most likely due to hemodynamic factors related to Prograf toxicity as well as

volume depletion.  The patient is clinically dry.  Creatinine had gone up from 2.06

to 2.4 yesterday after a dose of Prograf.  Of note, Prograf level was

supratherapeutic at 26 before it was held.  Repeat is pending at this time.  We will

continue gentle IV hydration with half NS plus 75 mEq of sodium bicarbonate. 

2. Metabolic acidosis:  Due to acute kidney injury.  We will continue sodium bicarb

containing infusion and monitor. 

3. Hyperkalemia due to Prograf toxicity as well as acute kidney injury.  Treated

adequately.  Potassium today is 4.9.  We will continue to monitor. 

4. Dehydration/volume depletion.  We will continue gentle IV hydration and monitor

closely given history of congestive heart failure. 

5. Acute gout flare:  Improved with treatment.

6. Disposition:  The patient still need inpatient treatment while we await Prograf

level to be able to adjust the dosing.  We will continue to hold Prograf until we

know we have a repeat level. 







Job ID:  [id number]

## 2019-02-17 LAB
ALBUMIN SERPL BCG-MCNC: 3.6 G/DL (ref 3.4–4.8)
ANION GAP SERPL CALC-SCNC: 18 MMOL/L (ref 10–20)
BUN SERPL-MCNC: 71 MG/DL (ref 8.4–25.7)
BUN/CREAT SERPL: 33.97
CALCIUM SERPL-MCNC: 9.3 MG/DL (ref 7.8–10.44)
CHLORIDE SERPL-SCNC: 96 MMOL/L (ref 98–107)
CO2 SERPL-SCNC: 22 MMOL/L (ref 23–31)
CREAT CL PREDICTED SERPL C-G-VRATE: 38 ML/MIN (ref 70–130)
GLUCOSE SERPL-MCNC: 342 MG/DL (ref 80–115)
INR PPP: 1.9
POTASSIUM SERPL-SCNC: 4.6 MMOL/L (ref 3.5–5.1)
PROTHROMBIN TIME: 21.8 SEC (ref 12–14.7)
SODIUM SERPL-SCNC: 131 MMOL/L (ref 136–145)

## 2019-02-17 RX ADMIN — GUAIFENESIN AND DEXTROMETHORPHAN PRN ML: 100; 10 SYRUP ORAL at 23:42

## 2019-02-17 RX ADMIN — Medication SCH: at 08:06

## 2019-02-17 RX ADMIN — SODIUM BICARBONATE SCH MG: 325 TABLET ORAL at 15:22

## 2019-02-17 RX ADMIN — SODIUM BICARBONATE SCH MG: 325 TABLET ORAL at 20:32

## 2019-02-17 RX ADMIN — MYCOPHENOLIC ACID SCH MG: 180 TABLET, DELAYED RELEASE ORAL at 08:03

## 2019-02-17 RX ADMIN — MOMETASONE FUROATE AND FORMOTEROL FUMARATE DIHYDRATE SCH PUFF: 100; 5 AEROSOL RESPIRATORY (INHALATION) at 18:38

## 2019-02-17 RX ADMIN — MYCOPHENOLIC ACID SCH MG: 180 TABLET, DELAYED RELEASE ORAL at 17:09

## 2019-02-17 RX ADMIN — SODIUM BICARBONATE SCH MG: 325 TABLET ORAL at 08:04

## 2019-02-17 RX ADMIN — MOMETASONE FUROATE AND FORMOTEROL FUMARATE DIHYDRATE SCH PUFF: 100; 5 AEROSOL RESPIRATORY (INHALATION) at 07:04

## 2019-02-17 RX ADMIN — Medication SCH: at 20:34

## 2019-02-17 RX ADMIN — ASPIRIN SCH MG: 81 TABLET ORAL at 08:04

## 2019-02-17 NOTE — PDOC.PN
- Subjective


Encounter Start Date: 02/17/19


Encounter Start Time: 09:00





Pt seen for followup re; gout flare.  feels much better.





- Objective


Resuscitation Status - Order Detail:





02/08/19 12:27


Resuscitation Status Routine 


   Resuscitation Status: FULL: Full Resuscitation


   Discussed with: d/w patient, david is his wife








MAR Reviewed: Yes


Vital Signs & Weight: 


 Vital Signs (12 hours)











  Temp Pulse Resp BP BP Pulse Ox


 


 02/17/19 12:07   68  20   


 


 02/17/19 08:04   85   121/73  


 


 02/17/19 08:00       97


 


 02/17/19 07:54  97.4 F L  85  18   121/73  97


 


 02/17/19 07:04   68  20    90 L


 


 02/17/19 06:44       90 L


 


 02/17/19 06:42   68  20    90 L


 


 02/17/19 04:00  97.7 F  68  19   159/79 H  95








 Weight











Weight                         167 lb 4.8 oz











 Most Recent Monitor Data











Heart Rate from ECG            69


 


NIBP                           104/54


 


NIBP BP-Mean                   70


 


Respiration from ECG           10


 


SpO2                           98














I&O: 


 











 02/16/19 02/17/19 02/18/19





 06:59 06:59 06:59


 


Intake Total 1500 2225 240


 


Output Total 400 250 


 


Balance 1100 1975 240











Result Diagrams: 


 02/16/19 08:33





 02/18/19 05:46


Additional Labs: 





labs reviewed by me





Phys Exam





- Physical Examination


Constitutional: NAD


HEENT: moist MMs


Neck: supple


Respiratory: clear to auscultation bilateral


Cardiovascular: RRR


Gastrointestinal: soft


hand swelling improved


Neurological: moves all 4 limbs


Psychiatric: normal affect





Dx/Plan


(1) Gout flare


Code(s): M10.9 - GOUT, UNSPECIFIED   Status: Acute   


Qualifiers: 


   Gout site: wrist   Gout etiology: due to renal impairment   Laterality: 

right   Qualified Code(s): M10.331 - Gout due to renal impairment, right wrist 

  


Comment: Improved, change to oral steroids   





(2) Acute on chronic renal failure


Code(s): N17.9 - ACUTE KIDNEY FAILURE, UNSPECIFIED; N18.9 - CHRONIC KIDNEY 

DISEASE, UNSPECIFIED   Status: Acute   


Qualifiers: 


   Acute renal failure type: unspecified   Chronic kidney disease stage: stage 

3 (moderate)   Qualified Code(s): N17.9 - Acute kidney failure, unspecified; 

N18.3 - Chronic kidney disease, stage 3 (moderate)   


Comment: stabilizing, creatinine 2.09 today   





(3) Acute on chronic diastolic congestive heart failure


Code(s): I50.33 - ACUTE ON CHRONIC DIASTOLIC (CONGESTIVE) HEART FAILURE   Status

: Resolved   





(4) Hyperkalemia


Code(s): E87.5 - HYPERKALEMIA   Status: Resolved   





- Plan





* .








Review of Systems





- Review of Systems


Respiratory: negative: Cough, Shortness of Breath, SOB with Excertion, 

Pleuritic Pain, Wheezing


Cardiovascular: negative: chest pain, palpitations, orthopnea, paroxysmal 

nocturnal dyspnea, edema, light headedness





- Medications/Allergies


Allergies/Adverse Reactions: 


 Allergies











Allergy/AdvReac Type Severity Reaction Status Date / Time


 


No Known Drug Allergies Allergy Unknown  Verified 02/25/18 12:37











Medications: 


 Current Medications





Acetaminophen (Tylenol)  650 mg PO Q4H PRN


   PRN Reason: Headache/Fever/Mild Pain (1-3)


   Last Admin: 02/10/19 20:33 Dose:  650 mg


Hydrocodone Bitart/Acetaminophen (Norco 5/325)  1 tab PO Q6H PRN


   PRN Reason: Moderate Pain (4-6)


   Last Admin: 02/15/19 14:56 Dose:  1 tab


Albuterol/Ipratropium (Duoneb)  3 ml NEB [id number] [facility]


   Last Admin: 02/17/19 12:07 Dose:  3 ml


Aspirin (Ecotrin)  81 mg PO DAILY [facility]


   Last Admin: 02/17/19 08:04 Dose:  81 mg


Benzonatate (Tessalon)  100 mg PO TID [facility]


   Last Admin: 02/17/19 08:05 Dose:  Not Given


Calcitriol (Rocaltrol)  0.25 mcg PO DAILY [facility]


   Last Admin: 02/17/19 08:04 Dose:  0.25 mcg


Docusate Sodium (Colace)  100 mg PO BID [facility]


   Last Admin: 02/17/19 08:05 Dose:  Not Given


Enoxaparin Sodium (Lovenox)  30 mg SC 0900 [facility]


   Last Admin: 02/17/19 08:04 Dose:  Not Given


Guaifenesin (Mucinex)  600 mg PO Q12HR [facility]


   Last Admin: 02/17/19 08:04 Dose:  600 mg


Guaifenesin/Dextromethorphan (Robitussin Dm)  15 ml PO Q4H PRN


   PRN Reason: Cough


   Last Admin: 02/12/19 23:10 Dose:  15 ml


Hydralazine HCl (Apresoline)  25 mg PO BID [facility]


   Last Admin: 02/17/19 08:04 Dose:  25 mg


Sodium Bicarbonate 75 meq/ (Sodium Chloride)  1,075 mls @ 50 mls/hr IV INF [facility]


   Last Admin: 02/17/19 04:08 Dose:  1,075 mls


Isosorbide Mononitrate (Imdur)  60 mg PO DAILY [facility]


   Last Admin: 02/17/19 08:04 Dose:  60 mg


Metoprolol Succinate (Toprol Xl)  50 mg PO DAILY [facility]


   Last Admin: 02/17/19 08:04 Dose:  50 mg


Mometasone Furoate/Formoterol Fumar (Dulera 100 Mcg/5 Mcg Inhaler)  1 puff INH 

BID-RT [facility]


   Last Admin: 02/17/19 07:04 Dose:  1 puff


Morphine Sulfate (Morphine)  2 mg SLOW IVP Q4H PRN


   PRN Reason: Chest Pain/BP Elevations


   Last Admin: 02/14/19 14:53 Dose:  2 mg


Mycophenolate Sodium (Myfortic)  360 mg PO BID-AC [facility]


   Last Admin: 02/17/19 08:03 Dose:  360 mg


Ondansetron HCl (Zofran)  4 mg IVP Q6H PRN


   PRN Reason: Nausea/Vomiting


   Last Admin: 02/09/19 14:56 Dose:  4 mg


Prednisone (Prednisone)  40 mg PO QAM-WM [facility]


Senna/Docusate Sodium (Senokot S)  2 tab PO BIDPRN PRN


   PRN Reason: Constipation


   Last Admin: 02/11/19 11:38 Dose:  2 tab


Sodium Bicarbonate (Bicarbonate, Sodium)  650 mg PO TID [facility]


   Last Admin: 02/17/19 08:04 Dose:  650 mg


Sodium Chloride (Flush - Normal Saline)  10 ml IVF Q12HR [facility]


   Last Admin: 02/17/19 08:06 Dose:  Not Given


Sodium Chloride (Flush - Normal Saline)  10 ml IVF PRN PRN


   PRN Reason: Saline Flush


   Last Admin: 02/10/19 02:42 Dose:  10 ml


Warfarin Sodium (Coumadin)  5 mg PO 1700 [facility]


   Last Admin: 02/16/19 15:53 Dose:  5 mg

## 2019-02-18 LAB
ALBUMIN SERPL BCG-MCNC: 3.4 G/DL (ref 3.4–4.8)
ANION GAP SERPL CALC-SCNC: 23 MMOL/L (ref 10–20)
BUN SERPL-MCNC: 70 MG/DL (ref 8.4–25.7)
BUN/CREAT SERPL: 33.33
CALCIUM SERPL-MCNC: 9.3 MG/DL (ref 7.8–10.44)
CHLORIDE SERPL-SCNC: 93 MMOL/L (ref 98–107)
CO2 SERPL-SCNC: 16 MMOL/L (ref 23–31)
CREAT CL PREDICTED SERPL C-G-VRATE: 38 ML/MIN (ref 70–130)
GLUCOSE SERPL-MCNC: 531 MG/DL (ref 80–115)
INR PPP: 1.8
POTASSIUM SERPL-SCNC: 5.1 MMOL/L (ref 3.5–5.1)
PROTHROMBIN TIME: 21.3 SEC (ref 12–14.7)
SODIUM SERPL-SCNC: 127 MMOL/L (ref 136–145)

## 2019-02-18 RX ADMIN — MOMETASONE FUROATE AND FORMOTEROL FUMARATE DIHYDRATE SCH PUFF: 100; 5 AEROSOL RESPIRATORY (INHALATION) at 06:37

## 2019-02-18 RX ADMIN — SODIUM BICARBONATE SCH MG: 325 TABLET ORAL at 15:13

## 2019-02-18 RX ADMIN — ASPIRIN SCH MG: 81 TABLET ORAL at 08:37

## 2019-02-18 RX ADMIN — MYCOPHENOLIC ACID SCH MG: 180 TABLET, DELAYED RELEASE ORAL at 08:35

## 2019-02-18 RX ADMIN — SODIUM BICARBONATE SCH MG: 325 TABLET ORAL at 20:16

## 2019-02-18 RX ADMIN — Medication SCH ML: at 08:38

## 2019-02-18 RX ADMIN — MYCOPHENOLIC ACID SCH MG: 180 TABLET, DELAYED RELEASE ORAL at 15:13

## 2019-02-18 RX ADMIN — MOMETASONE FUROATE AND FORMOTEROL FUMARATE DIHYDRATE SCH PUFF: 100; 5 AEROSOL RESPIRATORY (INHALATION) at 19:35

## 2019-02-18 RX ADMIN — Medication SCH ML: at 20:18

## 2019-02-18 RX ADMIN — SODIUM BICARBONATE SCH MG: 325 TABLET ORAL at 08:37

## 2019-02-18 NOTE — PDOC.PN
- Subjective


Encounter Start Date: 02/18/19


Encounter Start Time: 08:00





Pt seen for followup re; gout flare.  feels better.





- Objective


Resuscitation Status - Order Detail:





02/08/19 12:27


Resuscitation Status Routine 


   Resuscitation Status: FULL: Full Resuscitation


   Discussed with: d/w patient, david is his wife








MAR Reviewed: Yes


Vital Signs & Weight: 


 Vital Signs (12 hours)











  Temp Pulse Resp BP BP Pulse Ox


 


 02/18/19 13:23   69  16    93 L


 


 02/18/19 12:00  97.4 F L  69  18   168/81 H  97


 


 02/18/19 10:47   76    170/91 H 


 


 02/18/19 08:36   69   176/83 H  


 


 02/18/19 07:55       98


 


 02/18/19 07:33  97.4 F L  69  18   176/83 H  98


 


 02/18/19 06:41       99


 


 02/18/19 06:40   66  16    99


 


 02/18/19 06:37   69  16    99








 Weight











Weight                         167 lb 4.8 oz











 Most Recent Monitor Data











Heart Rate from ECG            69


 


NIBP                           104/54


 


NIBP BP-Mean                   70


 


Respiration from ECG           10


 


SpO2                           98














I&O: 


 











 02/17/19 02/18/19 02/19/19





 06:59 06:59 06:59


 


Intake Total 2625 1550 


 


Output Total 250 600 


 


Balance 2375 950 











Result Diagrams: 


 02/16/19 08:33





 02/19/19 07:47


Additional Labs: 





labs reviewed by me





Phys Exam





- Physical Examination


Constitutional: NAD


HEENT: moist MMs


Neck: supple


Respiratory: clear to auscultation bilateral


Cardiovascular: RRR


Gastrointestinal: soft


Neurological: moves all 4 limbs


Psychiatric: normal affect





Dx/Plan


(1) Gout flare


Code(s): M10.9 - GOUT, UNSPECIFIED   Status: Acute   


Qualifiers: 


   Gout site: wrist   Gout etiology: due to renal impairment   Laterality: 

right   Qualified Code(s): M10.331 - Gout due to renal impairment, right wrist 

  


Comment: Resolving   





(2) Acute on chronic renal failure


Code(s): N17.9 - ACUTE KIDNEY FAILURE, UNSPECIFIED; N18.9 - CHRONIC KIDNEY 

DISEASE, UNSPECIFIED   Status: Acute   


Qualifiers: 


   Acute renal failure type: unspecified   Chronic kidney disease stage: stage 

3 (moderate)   Qualified Code(s): N17.9 - Acute kidney failure, unspecified; 

N18.3 - Chronic kidney disease, stage 3 (moderate)   


Comment: stabilizing, creatinine 2.10 today   





(3) Hyperkalemia


Code(s): E87.5 - HYPERKALEMIA   Status: Resolved   





- Plan





* .


Tacrolimus discontinued.


Likely home 24-48 hours





Review of Systems





- Review of Systems


Cardiovascular: negative: chest pain, palpitations, orthopnea, paroxysmal 

nocturnal dyspnea, edema, light headedness


Gastrointestinal: negative: Nausea, Vomiting, Abdominal Pain, Diarrhea, 

Constipation, Melena, Hematochezia





- Medications/Allergies


Allergies/Adverse Reactions: 


 Allergies











Allergy/AdvReac Type Severity Reaction Status Date / Time


 


No Known Drug Allergies Allergy Unknown  Verified 02/25/18 12:37











Medications: 


 Current Medications





Acetaminophen (Tylenol)  650 mg PO Q4H PRN


   PRN Reason: Headache/Fever/Mild Pain (1-3)


   Last Admin: 02/17/19 23:43 Dose:  650 mg


Hydrocodone Bitart/Acetaminophen (Norco 5/325)  1 tab PO Q6H PRN


   PRN Reason: Moderate Pain (4-6)


   Last Admin: 02/15/19 14:56 Dose:  1 tab


Albuterol/Ipratropium (Duoneb)  3 ml NEB [id number] [facility]


   Last Admin: 02/18/19 13:23 Dose:  3 ml


Aspirin (Ecotrin)  81 mg PO DAILY [facility]


   Last Admin: 02/18/19 08:37 Dose:  81 mg


Benzonatate (Tessalon)  100 mg PO TID [facility]


   Last Admin: 02/18/19 15:13 Dose:  100 mg


Calcitriol (Rocaltrol)  0.25 mcg PO DAILY [facility]


   Last Admin: 02/18/19 08:37 Dose:  0.25 mcg


Docusate Sodium (Colace)  100 mg PO BID [facility]


   Last Admin: 02/18/19 08:37 Dose:  Not Given


Enoxaparin Sodium (Lovenox)  30 mg SC 0900 [facility]


   Last Admin: 02/18/19 08:38 Dose:  Not Given


Guaifenesin (Mucinex)  600 mg PO Q12HR [facility]


   Last Admin: 02/18/19 08:37 Dose:  600 mg


Guaifenesin/Dextromethorphan (Robitussin Dm)  15 ml PO Q4H PRN


   PRN Reason: Cough


   Last Admin: 02/17/19 23:42 Dose:  15 ml


Hydralazine HCl (Apresoline)  25 mg PO BID [facility]


   Last Admin: 02/18/19 08:36 Dose:  25 mg


Sodium Bicarbonate 75 meq/ (Sodium Chloride)  1,075 mls @ 50 mls/hr IV INF [facility]


   Last Admin: 02/18/19 05:41 Dose:  1,075 mls


Isosorbide Mononitrate (Imdur)  60 mg PO DAILY [facility]


   Last Admin: 02/18/19 08:38 Dose:  60 mg


Metoprolol Succinate (Toprol Xl)  50 mg PO DAILY [facility]


   Last Admin: 02/18/19 08:36 Dose:  50 mg


Mometasone Furoate/Formoterol Fumar (Dulera 100 Mcg/5 Mcg Inhaler)  1 puff INH 

BID-RT [facility]


   Last Admin: 02/18/19 06:37 Dose:  1 puff


Morphine Sulfate (Morphine)  2 mg SLOW IVP Q4H PRN


   PRN Reason: Chest Pain/BP Elevations


   Last Admin: 02/18/19 08:44 Dose:  2 mg


Mycophenolate Sodium (Myfortic)  360 mg PO BID-AC [facility]


   Last Admin: 02/18/19 15:13 Dose:  360 mg


Ondansetron HCl (Zofran)  4 mg IVP Q6H PRN


   PRN Reason: Nausea/Vomiting


   Last Admin: 02/09/19 14:56 Dose:  4 mg


Prednisone (Prednisone)  40 mg PO QAM-[facility]


   Last Admin: 02/18/19 08:35 Dose:  40 mg


Senna/Docusate Sodium (Senokot S)  2 tab PO BIDPRN PRN


   PRN Reason: Constipation


   Last Admin: 02/11/19 11:38 Dose:  2 tab


Sodium Bicarbonate (Bicarbonate, Sodium)  650 mg PO TID [facility]


   Last Admin: 02/18/19 15:13 Dose:  650 mg


Sodium Chloride (Flush - Normal Saline)  10 ml IVF Q12HR [facility]


   Last Admin: 02/18/19 08:38 Dose:  Not Given


Sodium Chloride (Flush - Normal Saline)  10 ml IVF PRN PRN


   PRN Reason: Saline Flush


   Last Admin: 02/10/19 02:42 Dose:  10 ml


Warfarin Sodium (Coumadin)  5 mg PO 1700 [facility]


   Last Admin: 02/17/19 17:09 Dose:  5 mg

## 2019-02-19 VITALS — SYSTOLIC BLOOD PRESSURE: 172 MMHG | DIASTOLIC BLOOD PRESSURE: 91 MMHG | TEMPERATURE: 97.7 F

## 2019-02-19 LAB
ALBUMIN SERPL BCG-MCNC: 3.8 G/DL (ref 3.4–4.8)
ANION GAP SERPL CALC-SCNC: 17 MMOL/L (ref 10–20)
BUN SERPL-MCNC: 62 MG/DL (ref 8.4–25.7)
BUN/CREAT SERPL: 34.25
CALCIUM SERPL-MCNC: 9.8 MG/DL (ref 7.8–10.44)
CHLORIDE SERPL-SCNC: 94 MMOL/L (ref 98–107)
CO2 SERPL-SCNC: 23 MMOL/L (ref 23–31)
CREAT CL PREDICTED SERPL C-G-VRATE: 44 ML/MIN (ref 70–130)
GLUCOSE SERPL-MCNC: 416 MG/DL (ref 80–115)
INR PPP: 2
POTASSIUM SERPL-SCNC: 4.5 MMOL/L (ref 3.5–5.1)
PROTHROMBIN TIME: 22.8 SEC (ref 12–14.7)
SODIUM SERPL-SCNC: 129 MMOL/L (ref 136–145)

## 2019-02-19 RX ADMIN — SODIUM BICARBONATE SCH MG: 325 TABLET ORAL at 08:32

## 2019-02-19 RX ADMIN — ASPIRIN SCH MG: 81 TABLET ORAL at 08:31

## 2019-02-19 RX ADMIN — MYCOPHENOLIC ACID SCH MG: 180 TABLET, DELAYED RELEASE ORAL at 18:51

## 2019-02-19 RX ADMIN — MYCOPHENOLIC ACID SCH MG: 180 TABLET, DELAYED RELEASE ORAL at 08:31

## 2019-02-19 RX ADMIN — Medication SCH ML: at 08:33

## 2019-02-19 RX ADMIN — SODIUM BICARBONATE SCH MG: 325 TABLET ORAL at 16:19

## 2019-02-19 RX ADMIN — MOMETASONE FUROATE AND FORMOTEROL FUMARATE DIHYDRATE SCH PUFF: 100; 5 AEROSOL RESPIRATORY (INHALATION) at 07:05

## 2019-02-19 RX ADMIN — MOMETASONE FUROATE AND FORMOTEROL FUMARATE DIHYDRATE SCH PUFF: 100; 5 AEROSOL RESPIRATORY (INHALATION) at 18:27

## 2019-02-19 NOTE — DIS
DATE OF ADMISSION:  02/08/2019



DATE OF DISCHARGE:  02/19/2019



PRIMARY CARE PROVIDER:  Dr. Arthur aDng.



DISCHARGE DIAGNOSES:  

1. Acute hypoxic respiratory failure.

2. Acute kidney injury.

3. Atrial flutter.

4. Chronic diastolic congestive heart failure, NYHA class III, ACC/AHA class C.

5. Nonsustained ventricular tachycardia.

6. Tacrolimus toxicity.

7. Gout flare.

8. Hyperkalemia.

9. Hyponatremia.



CONSULTATIONS DURING THIS HOSPITALIZATION:  

1. Nephrology, Dr. Potter.

2. General Surgery, Dr. Molina.

3. Pulmonology, Dr. Sanchez.

4. Cardiology, Dr. Mendiola.

5. Electrophysiology, Dr. Mayer.



CONDITION OF PATIENT ON THE DAY OF DISCHARGE:  Stable. 



I assessed Mr. Collins on the day of discharge.  He denies any chest pain or

shortness of breath.  Vital signs are stable.  S1 and S2 are heard, regular.  Lungs

are clear to auscultation bilaterally. 



DISCHARGE MEDICATIONS:  

1. ProAir HFA p.r.n.

2. Hydralazine 25 mg 2 times a day.

3. Isosorbide mononitrate 60 mg daily.

4. Mycophenolate 360 mg 2 times a day.

5. Sildenafil 20 mg 3 times a day.

6. Flomax 0.4 mg daily.

7. Nitroglycerin 0.4 mg every 5 minutes as needed.

8. Tylenol p.r.n.

9. Aspirin 81 mg daily.

10. Lasix 40 mg daily.

11. DuoNeb p.r.n.

12. Toprol-XL 50 mg daily.

13. Mometasone/formoterol 2 puffs two times a day.

14. Zofran p.r.n.

15. Prograf dose decreased to 1 mg 2 times a day.

16. Coumadin 5 mg daily, medications prescribed for 1 week, to be continued by

primary care provider depending on INR levels. 



HOSPITAL COURSE:  Mr. Collins is a pleasant 63-year-old gentleman, who was admitted

to [facility] for acute hypoxic respiratory failure

secondary to volume overload.  Please refer to Dr. Silva's history and

physical note dated February 8, 2019, for further details.  He also had diffuse

myalgias, statin was therefore stopped.  He was initially admitted to [facility] and was

treated with BiPAP. 



At the time of admission, there was also suspicion for cholelithiasis with

cholecystitis.  General Surgery Service was consulted.  HIDA scan was normal.  He

also had 2D echocardiogram, which showed left ventricular ejection fraction of 55%

to 60%, moderately enlarged right ventricular cavity, mildly dilated left atrium,

mildly enlarged right atrium size, severe mitral regurgitation, moderate aortic

regurgitation, severe tricuspid regurgitation, and moderate pulmonic regurgitation. 



He was seen by Nephrology Service for acute on chronic renal failure.  Nephrotoxic

medications were held.  Creatinine slowly improved. 



He was seen by Cardiology and Electrophysiology Services for atrial flutter and

nonsustained ventricular tachycardia.  Medical management was recommended.  He was

started on metoprolol succinate.  He improved clinically. 



He was also found to be in tacrolimus toxicity.  It was initially held, subsequently

started at 1 mg 2 times a day. 



Towards the end of the hospitalization, he also developed gout flare which resolved

with steroids. 



Mr. Collins was offered inpatient rehab.  He refused and wished to go home with

home health.  This is being arranged. 



On the day of discharge, Mr. Collins has sodium 129, potassium 4.5, blood urea

nitrogen 62, creatinine 1.81, calcium 9.8, and phosphorus 2.6.  On February 16th, he

had white count 12,000, hemoglobin 8.9, and platelet count 228,000. 



Many thanks for allowing me to participate in your patient's care.  Please feel free

to contact me with any questions or concerns. 



DISCHARGE DESTINATION:  Home with home health through [facility].



TIME SPENT:  Total amount of time spent coordinating this discharge:  33 minutes.







Job ID:  [id number]

## 2019-02-19 NOTE — PRG
DATE OF SERVICE:  02/19/2019



SUBJECTIVE:  Mr. Collins is a 63-year-old black male, who was admitted for diffuse

myalgia.  He was also noted to be in acute kidney injury.  At that time, he was

given empiric volume repletion - crystalloids and colloids, which actually improved

the renal function.  Also, we noted his tacrolimus level was elevated and for that

reason, tacrolimus has been holding for several days.  I have resumed the tacrolimus

at 1 mg p.o. b.i.d. starting tomorrow.  I know that his tacrolimus level on this

February 15, 2019, was 12.9 and back on February 11, 2019 was 26.5.  No other

complaints today.  No chest pain or shortness of breath.  Breathing is much

improved. 



OBJECTIVE:  VITAL SIGNS:  Blood pressure 172/91, heart rate 69, respiratory rate 18,

temperature 97.7, and pulse ox 93%. 

GENERAL:  Noted to be awake, alert, comfortable, not in distress. 

SKIN:  Adequate turgor. 

HEENT:  He has a pinkish conjunctivae.  Anicteric sclerae. 

NECK:  No neck mass.  No carotid bruits.  No JVD. 

CHEST:  No deformities. 

LUNGS:  Clear breath sounds. 

HEART:  Normal sinus rhythm.  No murmur.  No gallops.  No rubs. 

ABDOMEN:  Globular, soft, and nontender.  No masses. 

EXTREMITIES:  No edema.  No deformities.



MEDICATIONS:  Medications of February 19, 2019, reviewed.



LABORATORY DATA:  Laboratories of February 19, 2019; sodium 129, potassium 4.5,

chloride 94, carbon dioxide 23, BUN 62, creatinine 1.81, glucose 416, calcium 9.8,

and phosphorus 2.6. 



ASSESSMENT AND PLAN:  

1. Acute kidney injury - he has superimposed hemodynamically-mediated renal

dysfunction, much improved. 

2. Status post cadaveric renal transplant, stable.  We will resume tacrolimus at 1

mg p.o. b.i.d. starting tomorrow.  Continue current dose of mycophenolate.  No other 

recommendation.  There is no indication for any dialytic intervention.

3. Diffuse myalgia, much improved, off statins. 



Overall agree with current management.







Job ID:  [id number]

## 2019-03-01 ENCOUNTER — HOSPITAL ENCOUNTER (INPATIENT)
Dept: HOSPITAL 92 - ERS | Age: 64
LOS: 2 days | Discharge: HOME | DRG: 638 | End: 2019-03-03
Attending: INTERNAL MEDICINE | Admitting: INTERNAL MEDICINE
Payer: MEDICARE

## 2019-03-01 VITALS — BODY MASS INDEX: 24.5 KG/M2

## 2019-03-01 DIAGNOSIS — Z95.1: ICD-10-CM

## 2019-03-01 DIAGNOSIS — J44.9: ICD-10-CM

## 2019-03-01 DIAGNOSIS — E09.65: Primary | ICD-10-CM

## 2019-03-01 DIAGNOSIS — E11.22: ICD-10-CM

## 2019-03-01 DIAGNOSIS — Z79.899: ICD-10-CM

## 2019-03-01 DIAGNOSIS — Z87.891: ICD-10-CM

## 2019-03-01 DIAGNOSIS — Z82.49: ICD-10-CM

## 2019-03-01 DIAGNOSIS — M10.9: ICD-10-CM

## 2019-03-01 DIAGNOSIS — N18.3: ICD-10-CM

## 2019-03-01 DIAGNOSIS — I13.0: ICD-10-CM

## 2019-03-01 DIAGNOSIS — Z94.0: ICD-10-CM

## 2019-03-01 DIAGNOSIS — Z99.81: ICD-10-CM

## 2019-03-01 DIAGNOSIS — J96.10: ICD-10-CM

## 2019-03-01 DIAGNOSIS — I50.32: ICD-10-CM

## 2019-03-01 DIAGNOSIS — Z79.82: ICD-10-CM

## 2019-03-01 DIAGNOSIS — I48.2: ICD-10-CM

## 2019-03-01 DIAGNOSIS — Z79.01: ICD-10-CM

## 2019-03-01 DIAGNOSIS — I48.92: ICD-10-CM

## 2019-03-01 DIAGNOSIS — E87.2: ICD-10-CM

## 2019-03-01 DIAGNOSIS — T38.0X5A: ICD-10-CM

## 2019-03-01 LAB
ALBUMIN SERPL BCG-MCNC: 4 G/DL (ref 3.4–4.8)
ALP SERPL-CCNC: 175 U/L (ref 40–150)
ALT SERPL W P-5'-P-CCNC: 32 U/L (ref 8–55)
ANION GAP SERPL CALC-SCNC: 18 MMOL/L (ref 10–20)
ANISOCYTOSIS BLD QL SMEAR: (no result) (100X)
AST SERPL-CCNC: 26 U/L (ref 5–34)
BASOPHILS # BLD AUTO: 0 THOU/UL (ref 0–0.2)
BASOPHILS NFR BLD AUTO: 0.1 % (ref 0–1)
BILIRUB SERPL-MCNC: 1.2 MG/DL (ref 0.2–1.2)
BUN SERPL-MCNC: 40 MG/DL (ref 8.4–25.7)
CALCIUM SERPL-MCNC: 9.7 MG/DL (ref 7.8–10.44)
CHLORIDE SERPL-SCNC: 95 MMOL/L (ref 98–107)
CK MB SERPL-MCNC: 5.5 NG/ML (ref 0–6.6)
CO2 SERPL-SCNC: 17 MMOL/L (ref 23–31)
CREAT CL PREDICTED SERPL C-G-VRATE: 0 ML/MIN (ref 70–130)
CRYSTAL-AUWI FLAG: 0 (ref 0–15)
EOSINOPHIL # BLD AUTO: 0 THOU/UL (ref 0–0.7)
EOSINOPHIL NFR BLD AUTO: 0.1 % (ref 0–10)
GLOBULIN SER CALC-MCNC: 3.4 G/DL (ref 2.4–3.5)
GLUCOSE SERPL-MCNC: (no result) MG/DL (ref 80–115)
GLUCOSE UR STRIP-MCNC: >=1000 MG/DL
HEV IGM SER QL: 0.4 (ref 0–7.99)
HGB BLD-MCNC: 11 G/DL (ref 14–18)
HYALINE CASTS #/AREA URNS LPF: (no result) LPF
INR PPP: 1.8
LIPASE SERPL-CCNC: 36 U/L (ref 8–78)
LYMPHOCYTES # BLD: 0.9 THOU/UL (ref 1.2–3.4)
LYMPHOCYTES NFR BLD AUTO: 7.2 % (ref 21–51)
MAGNESIUM SERPL-MCNC: 2.3 MG/DL (ref 1.6–2.6)
MCH RBC QN AUTO: 25 PG (ref 27–31)
MCV RBC AUTO: 84.2 FL (ref 78–98)
MDIFF COMPLETE?: YES
MONOCYTES # BLD AUTO: 0.7 THOU/UL (ref 0.11–0.59)
MONOCYTES NFR BLD AUTO: 6.2 % (ref 0–10)
NEUTROPHILS # BLD AUTO: 10.3 THOU/UL (ref 1.4–6.5)
NEUTROPHILS NFR BLD AUTO: 86.4 % (ref 42–75)
PATHC CAST-AUWI FLAG: 0 (ref 0–2.49)
PLATELET # BLD AUTO: 221 THOU/UL (ref 130–400)
POIKILOCYTOSIS BLD QL SMEAR: (no result) (100X)
POTASSIUM SERPL-SCNC: 6.4 MMOL/L (ref 3.5–5.1)
PROT UR STRIP.AUTO-MCNC: 100 MG/DL
PROTHROMBIN TIME: 20.9 SEC (ref 12–14.7)
RBC # BLD AUTO: 4.39 MILL/UL (ref 4.7–6.1)
RBC UR QL AUTO: (no result) HPF (ref 0–3)
SODIUM SERPL-SCNC: 124 MMOL/L (ref 136–145)
SP GR UR STRIP: 1.02 (ref 1–1.04)
SPERM-AUWI FLAG: 0 (ref 0–9.9)
WBC # BLD AUTO: 11.9 THOU/UL (ref 4.8–10.8)
YEAST-AUWI FLAG: 0 (ref 0–25)

## 2019-03-01 PROCEDURE — 84484 ASSAY OF TROPONIN QUANT: CPT

## 2019-03-01 PROCEDURE — 36416 COLLJ CAPILLARY BLOOD SPEC: CPT

## 2019-03-01 PROCEDURE — 81015 MICROSCOPIC EXAM OF URINE: CPT

## 2019-03-01 PROCEDURE — 96361 HYDRATE IV INFUSION ADD-ON: CPT

## 2019-03-01 PROCEDURE — 82010 KETONE BODYS QUAN: CPT

## 2019-03-01 PROCEDURE — 80197 ASSAY OF TACROLIMUS: CPT

## 2019-03-01 PROCEDURE — 83735 ASSAY OF MAGNESIUM: CPT

## 2019-03-01 PROCEDURE — 36415 COLL VENOUS BLD VENIPUNCTURE: CPT

## 2019-03-01 PROCEDURE — 94640 AIRWAY INHALATION TREATMENT: CPT

## 2019-03-01 PROCEDURE — 94760 N-INVAS EAR/PLS OXIMETRY 1: CPT

## 2019-03-01 PROCEDURE — 94664 DEMO&/EVAL PT USE INHALER: CPT

## 2019-03-01 PROCEDURE — 82553 CREATINE MB FRACTION: CPT

## 2019-03-01 PROCEDURE — 85025 COMPLETE CBC W/AUTO DIFF WBC: CPT

## 2019-03-01 PROCEDURE — 71045 X-RAY EXAM CHEST 1 VIEW: CPT

## 2019-03-01 PROCEDURE — 80053 COMPREHEN METABOLIC PANEL: CPT

## 2019-03-01 PROCEDURE — 81003 URINALYSIS AUTO W/O SCOPE: CPT

## 2019-03-01 PROCEDURE — 85610 PROTHROMBIN TIME: CPT

## 2019-03-01 PROCEDURE — 96374 THER/PROPH/DIAG INJ IV PUSH: CPT

## 2019-03-01 PROCEDURE — 80048 BASIC METABOLIC PNL TOTAL CA: CPT

## 2019-03-01 PROCEDURE — 93005 ELECTROCARDIOGRAM TRACING: CPT

## 2019-03-01 PROCEDURE — 83690 ASSAY OF LIPASE: CPT

## 2019-03-01 RX ADMIN — MYCOPHENOLIC ACID SCH MG: 180 TABLET, DELAYED RELEASE ORAL at 20:37

## 2019-03-01 NOTE — RAD
CHEST 1 VIEW:

 

Date:  03/01/19 

 

INDICATION:

Emergency examination with history of COPD. 

 

COMPARISON:  

Prior exam dated 02/08/19. 

 

FINDINGS:

There are tiny bilateral pleural effusions. There is mild pulmonary vascular congestion. There is mil
d cardiomegaly. No acute osseous abnormality is evident. 

 

IMPRESSION: 

Findings suspicious for mild CHF or volume overload. 

 

 

POS: TPC

## 2019-03-02 LAB
ANION GAP SERPL CALC-SCNC: 15 MMOL/L (ref 10–20)
BASOPHILS # BLD AUTO: 0 THOU/UL (ref 0–0.2)
BASOPHILS NFR BLD AUTO: 0.2 % (ref 0–1)
BUN SERPL-MCNC: 34 MG/DL (ref 8.4–25.7)
CALCIUM SERPL-MCNC: 9.3 MG/DL (ref 7.8–10.44)
CHLORIDE SERPL-SCNC: 103 MMOL/L (ref 98–107)
CO2 SERPL-SCNC: 18 MMOL/L (ref 23–31)
CREAT CL PREDICTED SERPL C-G-VRATE: 60 ML/MIN (ref 70–130)
EOSINOPHIL # BLD AUTO: 0.1 THOU/UL (ref 0–0.7)
EOSINOPHIL NFR BLD AUTO: 0.8 % (ref 0–10)
GLUCOSE SERPL-MCNC: 302 MG/DL (ref 80–115)
HGB BLD-MCNC: 10.2 G/DL (ref 14–18)
INR PPP: 1.8
LYMPHOCYTES # BLD: 1.9 THOU/UL (ref 1.2–3.4)
LYMPHOCYTES NFR BLD AUTO: 17.1 % (ref 21–51)
MCH RBC QN AUTO: 25.5 PG (ref 27–31)
MCV RBC AUTO: 83.6 FL (ref 78–98)
MONOCYTES # BLD AUTO: 0.7 THOU/UL (ref 0.11–0.59)
MONOCYTES NFR BLD AUTO: 6.4 % (ref 0–10)
NEUTROPHILS # BLD AUTO: 8.4 THOU/UL (ref 1.4–6.5)
NEUTROPHILS NFR BLD AUTO: 75.5 % (ref 42–75)
PLATELET # BLD AUTO: 188 THOU/UL (ref 130–400)
POTASSIUM SERPL-SCNC: 3.7 MMOL/L (ref 3.5–5.1)
POTASSIUM SERPL-SCNC: 4.3 MMOL/L (ref 3.5–5.1)
PROTHROMBIN TIME: 21.2 SEC (ref 12–14.7)
RBC # BLD AUTO: 4.01 MILL/UL (ref 4.7–6.1)
SODIUM SERPL-SCNC: 132 MMOL/L (ref 136–145)
WBC # BLD AUTO: 11.1 THOU/UL (ref 4.8–10.8)

## 2019-03-02 RX ADMIN — MOMETASONE FUROATE AND FORMOTEROL FUMARATE DIHYDRATE SCH PUFF: 200; 5 AEROSOL RESPIRATORY (INHALATION) at 19:13

## 2019-03-02 RX ADMIN — MYCOPHENOLIC ACID SCH MG: 180 TABLET, DELAYED RELEASE ORAL at 10:24

## 2019-03-02 RX ADMIN — MYCOPHENOLIC ACID SCH MG: 180 TABLET, DELAYED RELEASE ORAL at 21:03

## 2019-03-02 RX ADMIN — MOMETASONE FUROATE AND FORMOTEROL FUMARATE DIHYDRATE SCH PUFF: 200; 5 AEROSOL RESPIRATORY (INHALATION) at 11:41

## 2019-03-02 RX ADMIN — ASPIRIN SCH MG: 81 TABLET ORAL at 10:24

## 2019-03-02 RX ADMIN — SODIUM BICARBONATE SCH MG: 325 TABLET ORAL at 21:03

## 2019-03-02 RX ADMIN — SODIUM BICARBONATE SCH MG: 325 TABLET ORAL at 15:12

## 2019-03-02 RX ADMIN — SODIUM BICARBONATE SCH MG: 325 TABLET ORAL at 10:24

## 2019-03-02 NOTE — PDOC.PN
- Subjective


Encounter Start Date: 03/02/19


Encounter Start Time: 11:49





Mr. Collins was seen today in follow-up of new onset DM. He is feeling ok. He 

says he " does not know what is going on". He tells me he is not going to take 

insulin.





- Objective


Resuscitation Status - Order Detail:





03/01/19 19:19


Resuscitation Status Routine 


   Resuscitation Status: FULL: Full Resuscitation








MAR Reviewed: Yes


Vital Signs & Weight: 


 Vital Signs (12 hours)











  Temp Pulse Resp BP Pulse Ox


 


 03/02/19 11:41   89  16  


 


 03/02/19 11:35   91  16  


 


 03/02/19 05:20   74   185/90 H 


 


 03/02/19 04:00  97.7 F  74  18  191/86 H  97


 


 03/02/19 03:39   74   


 


 03/02/19 00:00  97.5 F L  74  18  186/84 H  97








 Weight











Weight                         169 lb 4.8 oz














I&O: 


 











 03/01/19 03/02/19 03/03/19





 06:59 06:59 06:59


 


Intake Total  916 


 


Balance  916 











Result Diagrams: 


 03/02/19 05:06





 03/02/19 05:06


Additional Labs: 


 Accuchecks











  03/02/19 03/02/19 03/02/19





  08:08 05:52 00:06


 


POC Glucose  197 H  275 H  Greater than  550 H*














  03/01/19 03/01/19





  20:06 18:26


 


POC Glucose  Greater than  550 H*  Greater than  550 H*














Phys Exam





- Physical Examination


HEENT: PERRLA


Respiratory: no wheezing, no rales, no rhonchi, clear to auscultation bilateral


Cardiovascular: RRR, no significant murmur, no rub


Gastrointestinal: soft, non-tender, positive bowel sounds


Musculoskeletal: pulses present, edema present


2+ pitting edema- bilaterally





Dx/Plan


(1) New onset type 2 diabetes mellitus


Code(s): E11.9 - TYPE 2 DIABETES MELLITUS WITHOUT COMPLICATIONS   Status: Acute

   





(2) Chronic respiratory failure


Code(s): J96.10 - CHRONIC RESPIRATORY FAILURE, UNSP W HYPOXIA OR HYPERCAPNIA   

Status: Chronic   





(3) COPD (chronic obstructive pulmonary disease)


Status: Chronic   





(4) CHF (congestive heart failure)


Code(s): I50.9 - HEART FAILURE, UNSPECIFIED   Status: Chronic   





(5) Chronic kidney disease, stage III (moderate)


Status: Chronic   





(6) Hypertension


Code(s): I10 - ESSENTIAL (PRIMARY) HYPERTENSION   Status: Chronic   


Qualifiers: 


   Hypertension type: essential hypertension   Qualified Code(s): I10 - 

Essential (primary) hypertension   





- Plan





* New Onset DM- his blood glucose is much improved


* Will give a trial of Amaryl and Metformin- I would like to discuss his 

condition with other family members, as I feel the patient has poor insight 

into his condition, I feel he will need additional help understanding his 

disease, and medications. However he told me the only person I can discuss his 

condition with is him ( the patient )


* COPD- stable


* CHF- chronic diastolic heart failure- compensated


* CKD- stable.

## 2019-03-02 NOTE — HP
PRIMARY CARE PHYSICIAN:  Dr. Dang.



CHIEF COMPLAINT:  "My blood sugar is elevated."



HISTORY OF PRESENT ILLNESS:  Mr. Collins is a pleasant 63-year-old gentleman, who

has a history of hypertension.  He has chronic kidney disease and status post kidney

transplant.  He also has chronic respiratory failure due to COPD and is on 3 L of

oxygen at home.  He was recently admitted to our facility, where he was treated for

acute on chronic respiratory failure.  He was discharged home and was doing fine.

He was placed on a steroid taper here at the hospital.  He was seen by his

nephrologist for some routine lab work and was found that his blood sugar was 600.

They called the patient to notify him and a home health nurse came and checked his

blood sugar and noticed that it was reading high and he was instructed to come to

the hospital for evaluation.  The patient states that he had no symptoms.  He did

not have any excessive thirst or increased urination.  He had not felt bad, such as

nausea, vomiting, etc., but wonders why his blood sugar is so high. 



REVIEW OF SYSTEMS:  All systems were reviewed and are negative, except for that

mentioned in the history of present illness. 



PAST MEDICAL HISTORY:  Significant for hypertension.  He is status post end-stage

renal disease with a renal transplant, now he has chronic kidney disease, stage 3.

He has atrial fibrillation and atrial flutter which is permanent.  Pulmonary

hypertension, chronic respiratory failure due to COPD, dyslipidemia, and chronic

diastolic heart failure. 



PAST SURGICAL HISTORY:  He has had a renal transplant; coronary artery bypass

grafting; aortoiliac bypass, bilateral.  He has had a cardioversion for atrial

fibrillation and flutter.  He had a dialysis access placed. 



ALLERGIES:  NO DRUG ALLERGIES.



SOCIAL HISTORY:  He is a nonsmoker and nondrinker.



FAMILY HISTORY:  Significant for hypertension in his brother.



CURRENT MEDICATIONS:  Include;

1. Prednisone, which he is tapering down from 40 mg to 10 mg currently.

2. Symbicort __________ inhaled daily.

3. Tacrolimus 1 mg twice daily.

4. Mycophenolate 360 mg twice a day.

5. Coumadin 5 mg daily.

6. Sildenafil 20 mg three times a day.

7. Flomax 0.4 mg daily.

8. Furosemide 40 mg daily.

9. Hydralazine 25 mg t.i.d.

10. Aspirin 81 mg a day.



PHYSICAL EXAMINATION:

GENERAL:  He is alert and oriented.  He appears to be in no acute distress.  He is

well developed and well nourished, but slightly disheveled in appearance. 

VITAL SIGNS:  Blood pressure is 181/84, heart rate 58, respiratory rate of 16,

temperature is 97.7, and O2 saturation was 97% on 2 L. 

HEENT:  Pupils are equal, round, and reactive.  Extraocular muscles are intact.  His

sclerae anicteric.  Throat, there is no erythema.  No exudates. 

NECK:  No adenopathy.  No bruits. 

LUNGS:  Clear to auscultation.  There is no wheezing, no rales, no rhonchi. 

CARDIOVASCULAR:  His rhythm is irregular.  There are no murmurs, clicks, or rubs. 

ABDOMEN:  Obese.  It is soft.  It is nontender and nondistended.  Positive for bowel

sounds.  There is no rebound, no guarding. 

EXTREMITIES:  He has 1+ pedal edema and some mild venous stasis changes. 

NEUROLOGIC:  His cranial nerves are grossly intact.  His muscle strength is intact

and is grossly nonfocal. 



LAB RESULTS:  Sodium is 124, potassium 6.4, chloride 95, CO2 of 17, BUN of 40,

creatinine 1.98, glucose is greater than 800, and alkaline phosphatase 175.

Urinalysis was negative.  Beta hydroxybutyrate was 0.27.  His white blood cell count

is 11.9, hemoglobin 11, hematocrit is 36.9, and platelet count is 221, and

neutrophils 86.4. 



He had a chest x-ray also done showing some mild changes of congestive heart

failure.  This is by my reading.  He had some increased pulmonary vascular markings

bilaterally. 



ASSESSMENT:  

1. This is a 63-year-old gentleman, who presents with new onset diabetes.  This is

likely steroid induced.  He is acidotic, but there is no anion gap and no beta

hydroxybutyrate present.  Therefore, there is no evidence of diabetic ketoacidosis.

He will be admitted due to extreme elevation in his blood glucose, started on gentle

IV hydration given his history of chronic diastolic heart failure and evidence of

volume overload on exam, and place him on some scheduled subcutaneous long-acting

insulin as well as a sliding scale.  Given his renal insufficiency, the choices in

long-term medications will be limited.  The patient tells me he is going to refuse

taking insulin.  However, we will give him some time to think about it overnight as

I suspect he likely will need to be discharged home on insulin. 

2. Hypertension.  We will continue his usual antihypertensive medications as well as

p.r.n. medications as well. 

3. Chronic kidney disease.  Since he is a renal transplant patient, we will consult

his nephrologist to help with medication choices and renal dosing of medications. 

4. Persistent atrial fibrillation and atrial flutter.  We will continue his current

medications as well as the Coumadin.  Also, give him consult for Coumadin education

and monitor his PT and INR. 

5. Chronic respiratory failure due to chronic obstructive pulmonary disease.

Continue his long-acting beta agonists, supplemental oxygen, and DuoNebs as needed. 







Job ID:  [id number]

## 2019-03-02 NOTE — PRG
DATE OF SERVICE:  03/02/2019



SUBJECTIVE:  Mr. Collins is a 63-year-old black male, who is status post cadaveric

renal transplant and admitted for a new-onset diabetes mellitus.  The patient's last

tacrolimus level was 8.8.  This is after reducing his tacrolimus at 1 mg p.o. b.i.d.

 No other complaints.  No chest pain or shortness of breath.  We are seeing this

patient for management of his renal transplantation. 



OBJECTIVE:  VITAL SIGNS:  Blood pressure is 185/90, heart rate 74, respiratory rate

18, temperature 97.7, pulse ox 97%. 

GENERAL:  Noted to be awake, comfortable, not in distress. 

SKIN:  Adequate turgor. 

HEENT:  Pinkish conjunctivae.  Anicteric sclerae. 

NECK:  No neck mass.  No carotid bruits.  No JVD. 

CHEST:  No deformities. 

LUNGS:  Clear breath sounds.  No wheezing.  No crackles. 

HEART:  Normal sinus rhythm.  No murmur.  No gallops.  No rubs. 

ABDOMEN:  Globular, soft, nontender.  No masses. 

EXTREMITIES:  No edema.  No deformities.



MEDICATIONS:  Medications of March 2, 2019, were reviewed.



LABORATORY DATA:  Laboratories of March 2, 2019; white count 11.1, hemoglobin 10.2.

Sodium 132, potassium 3.7, chloride 103, carbon dioxide 18, BUN 34, creatinine 1.34,

glucose 302, calcium 9.3. 



ASSESSMENT AND PLAN:  

1. Status post cadaveric renal transplant.  Stable renal function.  Creatinine noted

at 1.34.  Continue current immunosuppressive regimen.  Check tacrolimus level in 

a.m.  Please note, at one time, the tacrolimus level was greater than 20 and his

tacrolimus has been adjusted. 

2. New-onset diabetes mellitus-the patient will be started on a diabetic regimen.

He is currently on insulin regimen. 

3. Overall, agree with current management.





Job ID:  [id number]

## 2019-03-03 VITALS — DIASTOLIC BLOOD PRESSURE: 70 MMHG | TEMPERATURE: 97 F | SYSTOLIC BLOOD PRESSURE: 164 MMHG

## 2019-03-03 LAB
INR PPP: 1.7
PROTHROMBIN TIME: 19.8 SEC (ref 12–14.7)

## 2019-03-03 RX ADMIN — MYCOPHENOLIC ACID SCH MG: 180 TABLET, DELAYED RELEASE ORAL at 08:49

## 2019-03-03 RX ADMIN — ASPIRIN SCH MG: 81 TABLET ORAL at 08:49

## 2019-03-03 RX ADMIN — SODIUM BICARBONATE SCH MG: 325 TABLET ORAL at 08:50

## 2019-03-03 RX ADMIN — MOMETASONE FUROATE AND FORMOTEROL FUMARATE DIHYDRATE SCH PUFF: 200; 5 AEROSOL RESPIRATORY (INHALATION) at 07:47

## 2019-03-03 NOTE — PDOC.PN
- Subjective


Encounter Start Date: 03/03/19


Encounter Start Time: 09:26





Mr. Collins was seen today in follow-up of New Onset DM- he does not have any 

complaints today.





- Objective


Resuscitation Status - Order Detail:





03/01/19 19:19


Resuscitation Status Routine 


   Resuscitation Status: FULL: Full Resuscitation








MAR Reviewed: Yes


Vital Signs & Weight: 


 Vital Signs (12 hours)











  Temp Pulse Resp BP Pulse Ox


 


 03/03/19 08:49   76   


 


 03/03/19 07:47   76  16  


 


 03/03/19 07:39      91 L


 


 03/03/19 07:36   76  16  


 


 03/03/19 06:04   79   148/79 H 


 


 03/03/19 04:00  98.1 F  78  20  170/83 H  92 L


 


 03/03/19 00:47   91  18   97


 


 03/03/19 00:03   79   150/83 H  99








 Weight











Weight                         169 lb 9.6 oz














I&O: 


 











 03/02/19 03/03/19 03/04/19





 06:59 06:59 06:59


 


Intake Total 916 960 


 


Output Total  720 


 


Balance 916 240 











Result Diagrams: 


 03/02/19 05:06





 03/02/19 05:06


Additional Labs: 


 Accuchecks











  03/03/19 03/03/19 03/02/19





  08:18 04:50 23:50


 


POC Glucose  143 H  142 H  115 H














  03/02/19 03/02/19 03/02/19





  20:03 17:07 12:24


 


POC Glucose  120 H  94  129 H














Phys Exam





- Physical Examination


HEENT: PERRLA


Respiratory: no wheezing, no rales, no rhonchi, clear to auscultation bilateral


Cardiovascular: RRR, no significant murmur, no rub


Gastrointestinal: soft, non-tender, no distention, positive bowel sounds


Musculoskeletal: no edema, pulses present





Dx/Plan


(1) New onset type 2 diabetes mellitus


Code(s): E11.9 - TYPE 2 DIABETES MELLITUS WITHOUT COMPLICATIONS   Status: Acute

   





(2) Chronic respiratory failure


Code(s): J96.10 - CHRONIC RESPIRATORY FAILURE, UNSP W HYPOXIA OR HYPERCAPNIA   

Status: Chronic   





(3) COPD (chronic obstructive pulmonary disease)


Status: Chronic   





(4) CHF (congestive heart failure)


Code(s): I50.9 - HEART FAILURE, UNSPECIFIED   Status: Chronic   





(5) Chronic kidney disease, stage III (moderate)


Status: Chronic   





(6) Hypertension


Code(s): I10 - ESSENTIAL (PRIMARY) HYPERTENSION   Status: Chronic   


Qualifiers: 


   Hypertension type: essential hypertension   Qualified Code(s): I10 - 

Essential (primary) hypertension   





- Plan





* DM- blood glucose has improved


* COPD- stable


* Stable for discharge home on Metformin.

## 2019-03-03 NOTE — PRG
DATE OF SERVICE:  03/03/2019



SUBJECTIVE:  Mr. Collins is a 63-year-old black male, who was admitted for new

onset diabetes mellitus.  He was started on an insulin regimen.  However, we will

convert him to p.o. metformin at 500 mg p.o. b.i.d.  We are following this patient

for his renal transplantation.  We have continued his current immunosuppressive

regimen.  No changes will be made at present time.  The patient remains

asymptomatic.  He is feeling better.  Case discussed with Dr. Sebastian.  Possibly, the

patient will be discharged today. 



OBJECTIVE:  VITAL SIGNS:  Blood pressure is 148/79, heart rate 76, respiratory rate

16, pulse ox 91%. 

GENERAL:  The patient is awake, alert, comfortable, not in overt distress. 

SKIN:  Adequate turgor. 

HEENT:  He has a pinkish conjunctivae.  Anicteric sclerae. 

NECK:  No neck mass.  No carotid bruits.  No JVD. 

CHEST:  No deformities. 

LUNGS:  Clear breath sounds.  No wheezing.  No crackles. 

HEART:  Normal sinus rhythm.  No murmurs.  No gallops.  No rubs. ABDOMEN:  Globular,

soft, nontender.  No masses. 

EXTREMITIES:  No edema.  No deformities.



MEDICATIONS:  Medications of March 3, 2019, was reviewed.



LABORATORY DATA:  March 2, 2019, glucose 120, sodium 132, potassium 3.7, chloride

103, carbon dioxide 18, BUN 34, creatinine 1.34, glucose 302, calcium 9.3. 



White count 11.1, hemoglobin 10.2.



ASSESSMENT AND PLAN:  

1. Status post cadaveric renal transplant, stable renal function.  Continue current

immunosuppressive regimen.  We will be following up this patient in the renal

clinic. 

We will monitor his tacrolimus level.

2. Diabetes mellitus-metformin 500 mg p.o. b.i.d. was started.

3. Hypertension, continue current BP medications.

4. Agree with current management.







Job ID:  [id number]

## 2019-03-09 ENCOUNTER — HOSPITAL ENCOUNTER (INPATIENT)
Dept: HOSPITAL 92 - ERS | Age: 64
LOS: 20 days | Discharge: HOSPICE | DRG: 871 | End: 2019-03-29
Attending: FAMILY MEDICINE | Admitting: FAMILY MEDICINE
Payer: MEDICARE

## 2019-03-09 VITALS — BODY MASS INDEX: 24.6 KG/M2

## 2019-03-09 DIAGNOSIS — Z95.1: ICD-10-CM

## 2019-03-09 DIAGNOSIS — M10.9: ICD-10-CM

## 2019-03-09 DIAGNOSIS — N18.3: ICD-10-CM

## 2019-03-09 DIAGNOSIS — D63.1: ICD-10-CM

## 2019-03-09 DIAGNOSIS — E87.5: ICD-10-CM

## 2019-03-09 DIAGNOSIS — J96.21: ICD-10-CM

## 2019-03-09 DIAGNOSIS — D68.32: ICD-10-CM

## 2019-03-09 DIAGNOSIS — I47.1: ICD-10-CM

## 2019-03-09 DIAGNOSIS — D62: ICD-10-CM

## 2019-03-09 DIAGNOSIS — I48.2: ICD-10-CM

## 2019-03-09 DIAGNOSIS — Z99.81: ICD-10-CM

## 2019-03-09 DIAGNOSIS — Z79.82: ICD-10-CM

## 2019-03-09 DIAGNOSIS — I82.722: ICD-10-CM

## 2019-03-09 DIAGNOSIS — I47.2: ICD-10-CM

## 2019-03-09 DIAGNOSIS — I48.92: ICD-10-CM

## 2019-03-09 DIAGNOSIS — Z66: ICD-10-CM

## 2019-03-09 DIAGNOSIS — Z53.29: ICD-10-CM

## 2019-03-09 DIAGNOSIS — Z88.0: ICD-10-CM

## 2019-03-09 DIAGNOSIS — K92.2: ICD-10-CM

## 2019-03-09 DIAGNOSIS — M19.90: ICD-10-CM

## 2019-03-09 DIAGNOSIS — J44.1: ICD-10-CM

## 2019-03-09 DIAGNOSIS — R65.20: ICD-10-CM

## 2019-03-09 DIAGNOSIS — Z95.820: ICD-10-CM

## 2019-03-09 DIAGNOSIS — I25.10: ICD-10-CM

## 2019-03-09 DIAGNOSIS — A41.9: Primary | ICD-10-CM

## 2019-03-09 DIAGNOSIS — Z79.01: ICD-10-CM

## 2019-03-09 DIAGNOSIS — T45.515A: ICD-10-CM

## 2019-03-09 DIAGNOSIS — I13.0: ICD-10-CM

## 2019-03-09 DIAGNOSIS — Z94.0: ICD-10-CM

## 2019-03-09 DIAGNOSIS — I08.3: ICD-10-CM

## 2019-03-09 DIAGNOSIS — E87.2: ICD-10-CM

## 2019-03-09 DIAGNOSIS — I42.9: ICD-10-CM

## 2019-03-09 DIAGNOSIS — I50.33: ICD-10-CM

## 2019-03-09 DIAGNOSIS — N17.9: ICD-10-CM

## 2019-03-09 DIAGNOSIS — K80.20: ICD-10-CM

## 2019-03-09 DIAGNOSIS — I27.20: ICD-10-CM

## 2019-03-09 DIAGNOSIS — D69.6: ICD-10-CM

## 2019-03-09 LAB
ALBUMIN SERPL BCG-MCNC: 3.6 G/DL (ref 3.4–4.8)
ALP SERPL-CCNC: 173 U/L (ref 40–150)
ALT SERPL W P-5'-P-CCNC: 14 U/L (ref 8–55)
ANALYZER IN CARDIO: (no result)
ANION GAP SERPL CALC-SCNC: 19 MMOL/L (ref 10–20)
APTT PPP: 31 SEC (ref 22.9–36.1)
AST SERPL-CCNC: 25 U/L (ref 5–34)
BASE EXCESS STD BLDA CALC-SCNC: -5.5 MEQ/L
BASOPHILS # BLD AUTO: 0 THOU/UL (ref 0–0.2)
BASOPHILS NFR BLD AUTO: 0.3 % (ref 0–1)
BILIRUB SERPL-MCNC: 3.3 MG/DL (ref 0.2–1.2)
BUN SERPL-MCNC: 33 MG/DL (ref 8.4–25.7)
CA-I BLDA-SCNC: 1.15 MMOL/L (ref 1.12–1.3)
CALCIUM SERPL-MCNC: 9.5 MG/DL (ref 7.8–10.44)
CHLORIDE SERPL-SCNC: 106 MMOL/L (ref 98–107)
CK MB SERPL-MCNC: 1.5 NG/ML (ref 0–6.6)
CO2 SERPL-SCNC: 15 MMOL/L (ref 23–31)
CREAT CL PREDICTED SERPL C-G-VRATE: 0 ML/MIN (ref 70–130)
EOSINOPHIL # BLD AUTO: 0 THOU/UL (ref 0–0.7)
EOSINOPHIL NFR BLD AUTO: 0.4 % (ref 0–10)
GLOBULIN SER CALC-MCNC: 4.1 G/DL (ref 2.4–3.5)
GLUCOSE SERPL-MCNC: 85 MG/DL (ref 80–115)
HCO3 BLDA-SCNC: 16.1 MEQ/L (ref 22–28)
HCT VFR BLDA CALC: 29 % (ref 42–52)
HGB BLD-MCNC: 9.9 G/DL (ref 14–18)
HGB BLDA-MCNC: 9.8 G/DL (ref 14–18)
INR PPP: 1.4
LYMPHOCYTES # BLD: 1.3 THOU/UL (ref 1.2–3.4)
LYMPHOCYTES NFR BLD AUTO: 12.1 % (ref 21–51)
MCH RBC QN AUTO: 26.5 PG (ref 27–31)
MCV RBC AUTO: 85.5 FL (ref 78–98)
MONOCYTES # BLD AUTO: 1.2 THOU/UL (ref 0.11–0.59)
MONOCYTES NFR BLD AUTO: 11.1 % (ref 0–10)
NEUTROPHILS # BLD AUTO: 8.1 THOU/UL (ref 1.4–6.5)
NEUTROPHILS NFR BLD AUTO: 76.1 % (ref 42–75)
O2 A-A PPRESDIFF RESPIRATORY: 136.74 MM[HG] (ref 0–20)
PCO2 BLDA: 20.9 MMHG (ref 35–45)
PH BLDA: 7.51 [PH] (ref 7.35–7.45)
PLATELET # BLD AUTO: 162 THOU/UL (ref 130–400)
PO2 BLDA: 65.3 MMHG (ref 80–?)
POTASSIUM BLD-SCNC: 4.62 MMOL/L (ref 3.7–5.3)
POTASSIUM SERPL-SCNC: 5.7 MMOL/L (ref 3.5–5.1)
PROTHROMBIN TIME: 17.6 SEC (ref 12–14.7)
RBC # BLD AUTO: 3.73 MILL/UL (ref 4.7–6.1)
SODIUM SERPL-SCNC: 134 MMOL/L (ref 136–145)
SPECIMEN DRAWN FROM PATIENT: (no result)
TROPONIN I SERPL DL<=0.01 NG/ML-MCNC: 0.09 NG/ML (ref ?–0.03)
TROPONIN I SERPL DL<=0.01 NG/ML-MCNC: 0.1 NG/ML (ref ?–0.03)
WBC # BLD AUTO: 10.7 THOU/UL (ref 4.8–10.8)

## 2019-03-09 PROCEDURE — 93005 ELECTROCARDIOGRAM TRACING: CPT

## 2019-03-09 PROCEDURE — 85610 PROTHROMBIN TIME: CPT

## 2019-03-09 PROCEDURE — 84443 ASSAY THYROID STIM HORMONE: CPT

## 2019-03-09 PROCEDURE — 85025 COMPLETE CBC W/AUTO DIFF WBC: CPT

## 2019-03-09 PROCEDURE — P9016 RBC LEUKOCYTES REDUCED: HCPCS

## 2019-03-09 PROCEDURE — 93010 ELECTROCARDIOGRAM REPORT: CPT

## 2019-03-09 PROCEDURE — 36415 COLL VENOUS BLD VENIPUNCTURE: CPT

## 2019-03-09 PROCEDURE — 80048 BASIC METABOLIC PNL TOTAL CA: CPT

## 2019-03-09 PROCEDURE — 86900 BLOOD TYPING SEROLOGIC ABO: CPT

## 2019-03-09 PROCEDURE — 82553 CREATINE MB FRACTION: CPT

## 2019-03-09 PROCEDURE — 80202 ASSAY OF VANCOMYCIN: CPT

## 2019-03-09 PROCEDURE — 84484 ASSAY OF TROPONIN QUANT: CPT

## 2019-03-09 PROCEDURE — 87040 BLOOD CULTURE FOR BACTERIA: CPT

## 2019-03-09 PROCEDURE — 84439 ASSAY OF FREE THYROXINE: CPT

## 2019-03-09 PROCEDURE — 71045 X-RAY EXAM CHEST 1 VIEW: CPT

## 2019-03-09 PROCEDURE — 82728 ASSAY OF FERRITIN: CPT

## 2019-03-09 PROCEDURE — 83550 IRON BINDING TEST: CPT

## 2019-03-09 PROCEDURE — 83880 ASSAY OF NATRIURETIC PEPTIDE: CPT

## 2019-03-09 PROCEDURE — 80053 COMPREHEN METABOLIC PANEL: CPT

## 2019-03-09 PROCEDURE — 87804 INFLUENZA ASSAY W/OPTIC: CPT

## 2019-03-09 PROCEDURE — 85049 AUTOMATED PLATELET COUNT: CPT

## 2019-03-09 PROCEDURE — P9047 ALBUMIN (HUMAN), 25%, 50ML: HCPCS

## 2019-03-09 PROCEDURE — 83605 ASSAY OF LACTIC ACID: CPT

## 2019-03-09 PROCEDURE — 84100 ASSAY OF PHOSPHORUS: CPT

## 2019-03-09 PROCEDURE — 85730 THROMBOPLASTIN TIME PARTIAL: CPT

## 2019-03-09 PROCEDURE — 86901 BLOOD TYPING SEROLOGIC RH(D): CPT

## 2019-03-09 PROCEDURE — 85018 HEMOGLOBIN: CPT

## 2019-03-09 PROCEDURE — 96365 THER/PROPH/DIAG IV INF INIT: CPT

## 2019-03-09 PROCEDURE — 82274 ASSAY TEST FOR BLOOD FECAL: CPT

## 2019-03-09 PROCEDURE — 83735 ASSAY OF MAGNESIUM: CPT

## 2019-03-09 PROCEDURE — 85014 HEMATOCRIT: CPT

## 2019-03-09 PROCEDURE — 96375 TX/PRO/DX INJ NEW DRUG ADDON: CPT

## 2019-03-09 PROCEDURE — 82805 BLOOD GASES W/O2 SATURATION: CPT

## 2019-03-09 PROCEDURE — 96367 TX/PROPH/DG ADDL SEQ IV INF: CPT

## 2019-03-09 PROCEDURE — 83540 ASSAY OF IRON: CPT

## 2019-03-09 PROCEDURE — 86850 RBC ANTIBODY SCREEN: CPT

## 2019-03-09 PROCEDURE — 80197 ASSAY OF TACROLIMUS: CPT

## 2019-03-09 PROCEDURE — 86140 C-REACTIVE PROTEIN: CPT

## 2019-03-09 PROCEDURE — 94640 AIRWAY INHALATION TREATMENT: CPT

## 2019-03-09 PROCEDURE — 96360 HYDRATION IV INFUSION INIT: CPT

## 2019-03-09 PROCEDURE — 36430 TRANSFUSION BLD/BLD COMPNT: CPT

## 2019-03-09 RX ADMIN — HEPARIN SODIUM SCH UNITS: 5000 INJECTION, SOLUTION INTRAVENOUS; SUBCUTANEOUS at 20:15

## 2019-03-09 RX ADMIN — PIPERACILLIN SODIUM, TAZOBACTAM SODIUM SCH MLS: 2; .25 INJECTION, POWDER, LYOPHILIZED, FOR SOLUTION INTRAVENOUS at 09:08

## 2019-03-09 RX ADMIN — PIPERACILLIN SODIUM, TAZOBACTAM SODIUM SCH MLS: 2; .25 INJECTION, POWDER, LYOPHILIZED, FOR SOLUTION INTRAVENOUS at 20:15

## 2019-03-09 RX ADMIN — HEPARIN SODIUM SCH UNITS: 5000 INJECTION, SOLUTION INTRAVENOUS; SUBCUTANEOUS at 09:08

## 2019-03-09 RX ADMIN — PIPERACILLIN SODIUM, TAZOBACTAM SODIUM SCH MLS: 2; .25 INJECTION, POWDER, LYOPHILIZED, FOR SOLUTION INTRAVENOUS at 15:21

## 2019-03-09 RX ADMIN — MOMETASONE FUROATE AND FORMOTEROL FUMARATE DIHYDRATE SCH PUFF: 200; 5 AEROSOL RESPIRATORY (INHALATION) at 19:39

## 2019-03-09 RX ADMIN — HEPARIN SODIUM SCH UNITS: 5000 INJECTION, SOLUTION INTRAVENOUS; SUBCUTANEOUS at 15:21

## 2019-03-09 NOTE — CON
DATE OF CONSULTATION:  



HISTORY OF PRESENT ILLNESS:  Obie is a 63-year-old gentleman well known to us in

numerous admissions to the hospital, presented with shortness of breath, cough, no

fever or chills, placed on BiPAP.  He is in the MICU, reason for consult. 



This morning, he said he is feeling better.



PAST MEDICAL HISTORY:  Well outlined COPD, renal failure, hypertension, arthritis,

status post kidney transplant, status post bypass.  He sees Dr. Potter on a regular

basis.  He says recently discharged from the hospital. 



PAST SURGICAL HISTORY:  Bypass, multiple accesses, renal transplant, and cardiac

surgery. 



SOCIAL HISTORY:  Tobacco, none.  Alcohol, none.



HOME MEDICATIONS:  

1. Dulera 200.

2. DuoNeb.

3. Hydralazine 25 four times a day.

4. Coumadin 5 mg.

5. Sildenafil 20.

6. Lasix 40.

7. Aspirin.

8. Prograf 1 mg twice a day.

9. Metformin 500 twice a day.

10. Myfortic 360 twice a day.



ALLERGIES:  NONE.



REVIEW OF SYSTEMS:  Ten-point negative.



PHYSICAL EXAMINATION:

VITAL SIGNS:  His sats this morning are 95% on 2 L, respiratory rate 18 __________ 

CHEST:  No wheezing.  No crackles. 

CARDIAC:  Normal S1 and S2.  No gallops. 

ABDOMEN:  No masses.



LABORATORY DATA:  Chest x-ray shows no acute infiltrates, cardiomegaly.  BUN and

creatinine were 33 and 1.76.  Troponin is slightly elevated.  PO2 was 60, pCO2 of

20.51 when he arrived.  White count 10,000, hemoglobin and hematocrit are 9 and 31. 



IMPRESSION:  

1. Chronic obstructive pulmonary disease.

2. Respiratory failure.

3. Supraventricular tachycardia.

4. Renal failure, status post transplant.



PLAN:  Pulmonary wise, I have added neb treatments.  Continue supportive care, PT.

We will follow while in the MICU. 



Consultation note, 70 minutes, 50% direct patient care.







Job ID:  [id number]

## 2019-03-09 NOTE — HP
CHIEF COMPLAINT:  Shortness of breath.



HISTORY OF PRESENT ILLNESS:  This patient is a 63-year-old male with a complicated

past medical history which includes history of renal failure with history of renal

transplant.  He also has chronic atrial fibrillation, chronic pulmonary hypertension

and COPD with chronic respiratory failure requiring oxygen, and chronic diastolic

heart failure.  This patient was recently admitted to the hospital with some fluid

retention and respiratory issues.  At some point, he was placed on some steroids,

which then caused severe hyperglycemia.  The patient was then readmitted and the

steroids were discontinued.  The patient now presents back to the hospital with

complaints of fluid retention and shortness of breath.  He reports that the onset

has been gradual over the last 5 or 6 days since his discharge from the hospital,

worse with exertion.  Specifically denies any orthopnea.  He reports these are

similar to his previous admissions with similar findings.  He has been taking his

home medications without relief.  He denies any substantial changes in his diet or

activity that might have precipitated this event. 



REVIEW OF SYSTEMS:  All systems reviewed and all pertinent positives and negatives

noted in the history of present illness. 



PAST MEDICAL HISTORY:  Notable for end-stage renal disease, status post renal

transplant and now a chronic kidney disease stage 3.  He has chronic atrial

fib/atrial flutter, for which he takes anticoagulation.  He has pulmonary

hypertension, COPD, chronic respiratory failure, chronic diastolic heart failure,

dyslipidemia, hypertension.  The patient's echocardiogram from February indicates an

ejection fraction of 55% to 60% with LVH and severe mitral regurgitation, moderate

aortic regurgitation, severe tricuspid regurgitation, and moderate pulmonic

regurgitation.  The patient reports that he had been on beta blockers at one point;

however, he believes they must have been discontinued as he is not taking them now,

but he does not specifically recall any details around that. 



PAST SURGICAL HISTORY:  Renal transplant, coronary artery bypass, aortoiliac bypass

bilaterally, history of cardioversion for atrial fibrillation, and history of

dialysis access placed. 



The patient indicates that he had admitted in [place] a couple of months ago where

he had a valve repaired going through his groin area, it is not clear exactly what

this procedure was.  May need to attempt to get some additional records. 



SOCIAL HISTORY:  The patient is nonsmoker, nondrinker.  He is full code and his

sister, Feli, would be his surrogate decision maker should that become necessary. 



FAMILY HISTORY:  Hypertension.



ALLERGIES:  NO KNOWN DRUG ALLERGIES.



CURRENT MEDICATIONS:  

1. Albuterol 2 puffs q.4 hours p.r.n.

2. Dulera two puffs b.i.d.

3. DuoNeb p.r.n.

4. Acetaminophen p.r.n.

5. Hydralazine 25 mg b.i.d.

6. Coumadin 5 mg p.o. daily.

7. Sildenafil 20 mg t.i.d.

8. Lasix 40 mg daily.

9. Aspirin 81 mg daily.

10. Flomax 0.4 mg daily.

11. Prograf 1 mg b.i.d.

12. Metformin 500 mg b.i.d.

13. Mycophenolate 360 mg b.i.d.



PHYSICAL EXAMINATION:

VITAL SIGNS:  In the emergency room, the patient had a rectal temperature of 101.5,

most recent is 99.3, pulse 99, currently bouncing up to 135 with atrial fibrillation

on the monitor.  /85, pulse 96, O2 saturation 99% on nasal cannula. 

GENERAL APPEARANCE:  Age-appropriate male.  He is in no distress.  He is awake,

alert, oriented, pleasant, and cooperative. 

HEART:  Tachycardic, but sounds regular.  He has no murmurs. 

LUNGS:  Diminished throughout with minimal scattered rales. 

ABDOMEN:  Soft, nontender, and nondistended.  Positive bowel sounds. 

EXTREMITIES:  His left upper extremity has some edema extending down to his hand

without any warmth or erythema.  Lower extremities have 2+ pitting edema

bilaterally. 

NEUROLOGICAL:  The patient appears to be generally intact with no focal deficits. 

PSYCH:  The patient has generally normal affect and behavior and seemed to have

generally good recollection of his recent medical course. 



LABORATORY DATA:  White count 10.7, hemoglobin 9.9, platelets 164.  PT 17.6, INR

1.4.  ABG; pH 7.5, pCO2 21, PO2 65.  Sodium 134, potassium 5.7, chloride 106, CO2 is

15, BUN 33, creatinine 1.76, glucose 85.  Lactic acid 1.9.  Total bilirubin is 3.3.

AST is 25, ALT is 14, alkaline phosphatase is 173.  Troponin 0.096 and 0.098.  BNP

1381.  Flu screen negative.  Left upper extremity Doppler performed from the

emergency department reveals occlusion of the left basilic vein and some eccentric

thrombus suggested in the brachial vein.  His chest x-ray is not formally reported,

but appears to be clear by my read. 



IMPRESSION AND PLAN:  

1. Decompensated acute on chronic diastolic heart failure with volume overload.  The

patient will need some diuresis.  Unclear if the patient really should be on beta

blockers given his significant valvular disease and pulmonary hypertension.  He

indicates it had been discontinued in the past.  We will consult Cardiology to get

some help determining that.  It appears the patient's failure is mostly related to

pulmonary hypertension, some right-sided failure and possibly diastolic failure,

although it is likely a combination of both in this case. 

2. Chronic kidney disease stage 3 with a history of renal transplant.  Currently,

his creatinine is consistent with his typical baseline. 

3. Mild hyperkalemia secondary to the patient's renal disease, should improve with

some diuresis. 

4. Atrial fibrillation/flutter with rapid ventricular response.  His blood pressures

have been borderline low at this point.  We will consider starting him on Cardizem

at 5 mg without a bolus and again see if Cardiology has any input. 

5. Elevated troponins, likely secondary to the atrial fibrillation and chronic

kidney disease and not representing acute ischemic event. 

6. Fever.  The patient has immunosuppression secondary to his anti-rejection

medications, had a rectal temperature of 101.5 in the emergency department.  We will

continue with vancomycin and Zosyn.  We will hold off an any additional Levaquin

given his cardiac rhythm issues.  Continue to monitor for any additional fever.  He

has no overt evidence of specific infection presently. 

7. Left upper extremity deep venous thrombosis, appears to be chronic.  The patient

is on warfarin for his chronic atrial fibrillation.  Although he is subtherapeutic,

we will add some heparin on top of that until he is therapeutic on his INR. 







Job ID:  [id number]

## 2019-03-09 NOTE — CON
DATE OF CONSULTATION:  



REASON FOR CONSULTATION:  Atrial fibrillation.



HISTORY OF PRESENT ILLNESS:  Mr. Collins is a 63-year-old gentleman, who is a

patient of Dr. Juanjo Boggs.  He is well known to the Cardiology Service.  He has

had multiple hospitalizations for various complaints.  He has a history of CAD,

status post bypass surgery in addition to atrial fibrillation/flutter as well as a

renal transplant. 



Most recently, he presented with shortness of breath.  No fevers, chills, or

associated symptoms.  He did have a cough.  He did require BiPAP treatment

temporarily.  He is currently resting comfortably.  He did complain of a sharp pain

that was transient.  No current symptoms present. 



The patient has been worked up in the past extensively by Dr. Mayer and has undergone

cardioversion and ROM by Dr. Juanjo Boggs.  Currently, he is on Coumadin with a

subtherapeutic INR of 1.4.  Compliance has been an issue in the past. 



PAST MEDICAL HISTORY:  CAD, status post bypass surgery; renal transplant; COPD;

renal failure; hypertension; and osteoarthritis. 



HOME MEDICATIONS:  Include:

1. DuoNeb.

2. Hydralazine.

3. Coumadin.

4. Sildenafil.

5. Lasix.

6. Aspirin.

7. Prograf.

8. Metformin.

9. Myfortic.

10. Dulera.



ALLERGIES:  NONE.



REVIEW OF SYSTEMS:  A 10-point review of systems is reviewed as above, otherwise

negative. 



PHYSICAL EXAMINATION:

VITAL SIGNS:  Blood pressure __________, pulse 76, and temperature afebrile. 

GENERAL:  Patient is a pleasant gentleman, who is in no acute distress.  The patient

appears their stated age. 

NEUROLOGIC:  The patient is alert and oriented x3 with no focal neurologic deficits. 

HEENT:  Sclerae without icterus.  Mouth has moist mucous membranes with normal

pallor. 

NECK:  No JVD.  Carotid upstroke brisk.  No bruits bilaterally. 

LUNGS:  Clear to auscultation with unlabored respirations. 

BACK:  No scoliosis or kyphosis. 

CARDIAC:  Irregularly irregular, but rate controlled. 

ABDOMEN:  Soft, nontender, nondistended.  No peritoneal signs present.  No

hepatosplenomegaly.  No abnormal striae. 

EXTREMITIES:  2+ femoral and 2+ dorsalis pedis pulses.  No cyanosis, clubbing, or

edema. 

SKIN:  No gross abnormalities.



LABORATORY DATA:  INR 1.4.  Hemoglobin 9.9, hematocrit 31.9, and platelet count of

162. 



IMPRESSION:  

1. Atrial fibrillation/flutter.

2. Coronary artery disease.

3. Status post bypass surgery.

4. End-stage renal disease, status post renal transplant.



RECOMMENDATIONS:  Mr. Collins has had known previous history of atrial

fibrillation/flutter.  At this point, I would recommend getting his INR above 2.  He

is currently rate controlled.  Beta blocker therapy has been recommended in the

past.  He initially was started on IV Cardizem, then was discontinued.  We will add

low-dose metoprolol 12.5 mg one p.o. b.i.d. 







Job ID:  [id number]

## 2019-03-09 NOTE — RAD
SINGLE VIEW OF THE CHEST:

 

COMPARISON: 

3/1/2019.

 

HISTORY: 

Dyspnea for the last view days and diminished lung sounds.  

 

FINDINGS: 

A single view of the chest shows a normal size cardiomediastinal silhouette.  The patient is status p
ost sternotomy.  There is no evidence of consolidation, mass, or pleural effusion.

 

IMPRESSION: 

No evidence of acute cardiopulmonary disease.

 

POS: [facility]

## 2019-03-09 NOTE — ULT
LEFT UPPER EXTREMITY DOPPLER VENOUS ULTRASOUND WITH GRAY SCALE AND DOPPLER COLOR FLOW IMAGING

SPECTRAL ANALYSIS PERFORMED:

 

INDICATION: 

Left upper extremity edema.

 

FINDINGS: 

There is absence of flow in the basilic vein with increased intraluminal echogenicity.  The patient d
oes report history of clotted basilic vein related to prior dialysis fistula.

 

There is a small, eccentric filling defect within the brachial vein which is not significantly flow-l
imiting.  Otherwise, the imaged venous structures of the left upper extremity are patent.

 

IMPRESSION: 

1.  Occlusion of basilic vein.  The patient reports a chronic history of basilic vein occlusion relat
ed to prior dialysis fistula.  Correlate clinically in this regard.

 

2.  Small eccentric thrombus is suggested within the brachial vein.

 

POS: [facility]

## 2019-03-10 LAB
ANION GAP SERPL CALC-SCNC: 19 MMOL/L (ref 10–20)
ANION GAP SERPL CALC-SCNC: 20 MMOL/L (ref 10–20)
ANION GAP SERPL CALC-SCNC: 23 MMOL/L (ref 10–20)
APTT PPP: 48.2 SEC (ref 22.9–36.1)
BASOPHILS # BLD AUTO: 0 THOU/UL (ref 0–0.2)
BASOPHILS NFR BLD AUTO: 0.5 % (ref 0–1)
BUN SERPL-MCNC: 50 MG/DL (ref 8.4–25.7)
BUN SERPL-MCNC: 57 MG/DL (ref 8.4–25.7)
BUN SERPL-MCNC: 59 MG/DL (ref 8.4–25.7)
CALCIUM SERPL-MCNC: 8.5 MG/DL (ref 7.8–10.44)
CALCIUM SERPL-MCNC: 8.7 MG/DL (ref 7.8–10.44)
CALCIUM SERPL-MCNC: 8.7 MG/DL (ref 7.8–10.44)
CHLORIDE SERPL-SCNC: 102 MMOL/L (ref 98–107)
CHLORIDE SERPL-SCNC: 103 MMOL/L (ref 98–107)
CHLORIDE SERPL-SCNC: 104 MMOL/L (ref 98–107)
CO2 SERPL-SCNC: 12 MMOL/L (ref 23–31)
CO2 SERPL-SCNC: 16 MMOL/L (ref 23–31)
CO2 SERPL-SCNC: 18 MMOL/L (ref 23–31)
CREAT CL PREDICTED SERPL C-G-VRATE: 23 ML/MIN (ref 70–130)
CREAT CL PREDICTED SERPL C-G-VRATE: 25 ML/MIN (ref 70–130)
CREAT CL PREDICTED SERPL C-G-VRATE: 29 ML/MIN (ref 70–130)
EOSINOPHIL # BLD AUTO: 0 THOU/UL (ref 0–0.7)
EOSINOPHIL NFR BLD AUTO: 0.2 % (ref 0–10)
GLUCOSE SERPL-MCNC: 227 MG/DL (ref 80–115)
GLUCOSE SERPL-MCNC: 252 MG/DL (ref 80–115)
GLUCOSE SERPL-MCNC: 312 MG/DL (ref 80–115)
HGB BLD-MCNC: 9.4 G/DL (ref 14–18)
INR PPP: 1.7
LYMPHOCYTES # BLD: 0.3 THOU/UL (ref 1.2–3.4)
LYMPHOCYTES NFR BLD AUTO: 5.1 % (ref 21–51)
MCH RBC QN AUTO: 25 PG (ref 27–31)
MCV RBC AUTO: 88.1 FL (ref 78–98)
MONOCYTES # BLD AUTO: 0.9 THOU/UL (ref 0.11–0.59)
MONOCYTES NFR BLD AUTO: 13.4 % (ref 0–10)
NEUTROPHILS # BLD AUTO: 5.5 THOU/UL (ref 1.4–6.5)
NEUTROPHILS NFR BLD AUTO: 80.8 % (ref 42–75)
PLATELET # BLD AUTO: 155 THOU/UL (ref 130–400)
POTASSIUM SERPL-SCNC: 6 MMOL/L (ref 3.5–5.1)
POTASSIUM SERPL-SCNC: 6.5 MMOL/L (ref 3.5–5.1)
POTASSIUM SERPL-SCNC: 6.6 MMOL/L (ref 3.5–5.1)
PROTHROMBIN TIME: 19.7 SEC (ref 12–14.7)
RBC # BLD AUTO: 3.74 MILL/UL (ref 4.7–6.1)
SODIUM SERPL-SCNC: 129 MMOL/L (ref 136–145)
SODIUM SERPL-SCNC: 132 MMOL/L (ref 136–145)
SODIUM SERPL-SCNC: 136 MMOL/L (ref 136–145)
WBC # BLD AUTO: 6.7 THOU/UL (ref 4.8–10.8)

## 2019-03-10 RX ADMIN — ALBUMIN HUMAN SCH GM: 250 SOLUTION INTRAVENOUS at 23:29

## 2019-03-10 RX ADMIN — VANCOMYCIN HYDROCHLORIDE SCH MLS: 1 INJECTION, SOLUTION INTRAVENOUS at 03:40

## 2019-03-10 RX ADMIN — SODIUM BICARBONATE SCH MG: 325 TABLET ORAL at 09:53

## 2019-03-10 RX ADMIN — HEPARIN SODIUM SCH UNITS: 5000 INJECTION, SOLUTION INTRAVENOUS; SUBCUTANEOUS at 09:52

## 2019-03-10 RX ADMIN — ALBUMIN HUMAN SCH GM: 250 SOLUTION INTRAVENOUS at 11:51

## 2019-03-10 RX ADMIN — ALBUMIN HUMAN SCH GM: 250 SOLUTION INTRAVENOUS at 17:50

## 2019-03-10 RX ADMIN — PIPERACILLIN SODIUM, TAZOBACTAM SODIUM SCH MLS: 2; .25 INJECTION, POWDER, LYOPHILIZED, FOR SOLUTION INTRAVENOUS at 03:07

## 2019-03-10 RX ADMIN — PIPERACILLIN SODIUM, TAZOBACTAM SODIUM SCH MLS: 2; .25 INJECTION, POWDER, LYOPHILIZED, FOR SOLUTION INTRAVENOUS at 09:52

## 2019-03-10 RX ADMIN — PIPERACILLIN SODIUM, TAZOBACTAM SODIUM SCH MLS: 2; .25 INJECTION, POWDER, LYOPHILIZED, FOR SOLUTION INTRAVENOUS at 20:22

## 2019-03-10 RX ADMIN — PIPERACILLIN SODIUM, TAZOBACTAM SODIUM SCH MLS: 2; .25 INJECTION, POWDER, LYOPHILIZED, FOR SOLUTION INTRAVENOUS at 15:27

## 2019-03-10 RX ADMIN — HEPARIN SODIUM SCH UNITS: 5000 INJECTION, SOLUTION INTRAVENOUS; SUBCUTANEOUS at 15:23

## 2019-03-10 RX ADMIN — MOMETASONE FUROATE AND FORMOTEROL FUMARATE DIHYDRATE SCH PUFF: 200; 5 AEROSOL RESPIRATORY (INHALATION) at 18:49

## 2019-03-10 RX ADMIN — SODIUM BICARBONATE SCH MG: 325 TABLET ORAL at 20:25

## 2019-03-10 RX ADMIN — SODIUM BICARBONATE SCH MG: 325 TABLET ORAL at 15:23

## 2019-03-10 RX ADMIN — HEPARIN SODIUM SCH UNITS: 5000 INJECTION, SOLUTION INTRAVENOUS; SUBCUTANEOUS at 20:23

## 2019-03-10 RX ADMIN — MOMETASONE FUROATE AND FORMOTEROL FUMARATE DIHYDRATE SCH PUFF: 200; 5 AEROSOL RESPIRATORY (INHALATION) at 06:51

## 2019-03-10 NOTE — PDOC.PN
- Subjective


Encounter Start Date: 03/10/19


Encounter Start Time: 10:15


-: old records requested/rev





Patient seen and examined. No new complaints. No overnight events





pt is overall doing well, he reports that he is eating lot of fruits





- Objective


Resuscitation Status - Order Detail:





03/09/19 08:16


Resuscitation Status Routine 


   Resuscitation Status: FULL: Full Resuscitation


   Discussed with: patient








MAR Reviewed: Yes


Vital Signs & Weight: 


 Vital Signs (12 hours)











  Temp Pulse Resp Pulse Ox


 


 03/10/19 12:20  96.9 F L   


 


 03/10/19 08:00     98


 


 03/10/19 07:14  98.9 F   


 


 03/10/19 06:51   105 H  18  94 L


 


 03/10/19 06:35     94 L


 


 03/10/19 06:33   105 H  18  94 L


 


 03/10/19 04:00  98.2 F   








 Weight











Weight                         165 lb 12.602 oz











 Most Recent Monitor Data











Heart Rate from ECG            129


 


NIBP                           101/78


 


NIBP BP-Mean                   85


 


Respiration from ECG           22


 


SpO2                           97














I&O: 


 











 03/09/19 03/10/19 03/11/19





 05:59 06:59 06:59


 


Intake Total   


 


Output Total   


 


Balance   











Result Diagrams: 


 03/10/19 05:29





 03/10/19 05:29


Radiology Reviewed by me: Yes


EKG Reviewed by me: Yes (afib with RVR)





Phys Exam





- Physical Examination


Constitutional: NAD


HEENT: PERRLA, moist MMs, sclera anicteric


Neck: no JVD, supple


Respiratory: no wheezing, no rales, no rhonchi


Cardiovascular: no significant murmur, irregular


Gastrointestinal: soft, non-tender, no distention


Musculoskeletal: no edema, pulses present


Neurological: non-focal


Lymphatic: no nodes


Psychiatric: normal affect


Skin: no rash, normal turgor





Dx/Plan


(1) Atrial fibrillation with RVR


Code(s): I48.91 - UNSPECIFIED ATRIAL FIBRILLATION   Status: Acute   





(2) Acute on chronic diastolic ACC/AHA stage C congestive heart failure


Code(s): I50.33 - ACUTE ON CHRONIC DIASTOLIC (CONGESTIVE) HEART FAILURE   Status

: Acute   





(3) Acute worsening of stage 3 chronic kidney disease


Code(s): N18.3 - CHRONIC KIDNEY DISEASE, STAGE 3 (MODERATE)   Status: Acute   





(4) Fever


Code(s): R50.9 - FEVER, UNSPECIFIED   Status: Acute   





(5) Hyperkalemia


Code(s): E87.5 - HYPERKALEMIA   Status: Acute   





(6) Metabolic acidosis


Code(s): E87.2 - ACIDOSIS   Status: Acute   





(7) Brachial vein thrombosis


Code(s): I82.629 - ACUTE EMBOLISM AND THROMBOSIS OF DEEP VN UNSP UP EXTREM   

Status: Chronic   





(8) CAD (coronary artery disease)


Code(s): I25.10 - ATHSCL HEART DISEASE OF NATIVE CORONARY ARTERY W/O ANG PCTRS 

  Status: Chronic   


Qualifiers: 


 





(9) COPD (chronic obstructive pulmonary disease)


Status: Chronic   





(10) Cholelithiasis


Code(s): K80.20 - CALCULUS OF GALLBLADDER W/O CHOLECYSTITIS W/O OBSTRUCTION   

Status: Chronic   


Qualifiers: 


 





(11) Chronic anticoagulation


Code(s): Z79.01 - LONG TERM (CURRENT) USE OF ANTICOAGULANTS   Status: Chronic   





(12) Elevated troponin


Code(s): R74.8 - ABNORMAL LEVELS OF OTHER SERUM ENZYMES   Status: Chronic   





(13) Hypertension


Code(s): I10 - ESSENTIAL (PRIMARY) HYPERTENSION   Status: Chronic   


Qualifiers: 


 





(14) Kidney transplant status


Code(s): Z94.0 - KIDNEY TRANSPLANT STATUS   Status: Chronic   Comment: its been 

11 yrs now   





(15) Normocytic anemia


Code(s): D64.9 - ANEMIA, UNSPECIFIED   Status: Chronic   





(16) Paroxysmal A-fib


Code(s): I48.0 - PAROXYSMAL ATRIAL FIBRILLATION   Status: Chronic   





(17) Severe mitral regurgitation by prior echocardiogram


Code(s): I34.0 - NONRHEUMATIC MITRAL (VALVE) INSUFFICIENCY   Status: Chronic   





(18) Severe tricuspid regurgitation by prior echocardiogram


Code(s): I07.1 - RHEUMATIC TRICUSPID INSUFFICIENCY   Status: Chronic   





- Plan


cont current plan of care





* continue cardizem drip for rate control


* low potassium diet


* start sodium bicarbonate


* nephrology following


* continue empiric antibiotics


* cardiology and pulmonary recommendation appreciated


* medication reviewed as below


* symptomatic treatment.








Review of Systems





- Review of Systems


ENT: negative: Ear Pain, Ear Discharge, Nose Pain, Nose Discharge, Nose 

Congestion, Mouth Pain, Mouth Swelling, Throat Pain, Throat Swelling, Other


Respiratory: negative: Cough, Dry, Shortness of Breath, Hemoptysis, SOB with 

Excertion, Pleuritic Pain, Sputum, Wheezing


Cardiovascular: negative: chest pain, palpitations, orthopnea, paroxysmal 

nocturnal dyspnea, edema, light headedness, other


Gastrointestinal: negative: Nausea, Vomiting, Abdominal Pain, Diarrhea, 

Constipation, Melena, Hematochezia, Other


Genitourinary: negative: Dysuria, Frequency, Incontinence, Hematuria, Retention

, Other


Musculoskeletal: negative: Neck Pain, Shoulder Pain, Arm Pain, Back Pain, Hand 

Pain, Leg Pain, Foot Pain, Other





- Medications/Allergies


Allergies/Adverse Reactions: 


 Allergies











Allergy/AdvReac Type Severity Reaction Status Date / Time


 


No Known Drug Allergies Allergy Unknown  Verified 02/25/18 12:37











Medications: 


 Current Medications





Albumin Human (Albumin 25%)  25 gm IVPB Q6HR [facility]


   Stop: 03/11/19 06:01


   Last Admin: 03/10/19 11:51 Dose:  25 gm


Albuterol/Ipratropium (Duoneb)  3 ml NEB [id number] [facility]


   Last Admin: 03/10/19 06:33 Dose:  3 ml


Heparin Sodium (Porcine) (Heparin)  5,000 units SC TID [facility]


   Last Admin: 03/10/19 09:52 Dose:  5,000 units


Piperacillin Sod/Tazobactam (Sod 2.25 gm/ Sodium Chloride)  100 mls @ 200 mls/

hr IVPB 0300,0900,1500,2100 [facility]


   Last Admin: 03/10/19 09:52 Dose:  100 mls


Diltiazem HCl 125 mg/ Sodium (Chloride)  125 mls @ 5 mls/hr IVPB INF [facility]


   Last Admin: 03/09/19 08:43 Dose:  125 mls


Vancomycin HCl 1 gm/ Device  200 mls @ 200 mls/hr IVPB 0400 [facility]


   Last Admin: 03/10/19 03:40 Dose:  200 mls


Metoprolol Tartrate (Lopressor)  25 mg PO BID [facility]


   Last Admin: 03/10/19 09:53 Dose:  25 mg


Miscellaneous Medication (Pharmacy To Dose)  0 each IVPB DAILYPRN PRN


   PRN Reason: LABS


Mometasone Furoate/Formoterol Fumar (Dulera 200 Mcg/5 Mcg Inhaler)  2 puff INH 

BID-RT [facility]


   Last Admin: 03/10/19 06:51 Dose:  2 puff


Sodium Bicarbonate (Bicarbonate, Sodium)  325 mg PO TID [facility]


   Last Admin: 03/10/19 09:53 Dose:  325 mg


Tramadol HCl (Ultram)  50 mg PO Q6H PRN


   PRN Reason: Moderate Pain (4-6)


   Stop: 03/11/19 21:23


   Last Admin: 03/10/19 06:29 Dose:  50 mg

## 2019-03-10 NOTE — CON
DATE OF CONSULTATION:  



HISTORY OF PRESENT ILLNESS:  Mr. Collins is a 63-year-old black male, who is status

post renal transplant and admitted for shortness of breath.  He was also found to be

in atrial fibrillation.  Cardiology has evaluated the patient. 



He was noted to have atrial fibrillation/flutter.  The recommendation by Cardiology

was beta blocker and start IV Cardizem. 



We are seeing this patient for his acute kidney injury on top of his chronic renal

failure, as well as for the mild hyperkalemia.  He has received treatment for the

hyperkalemia.  He voices no new complaints today. 



REVIEW OF SYSTEMS:  Positive for shortness of breath.  No chest pain.  Positive for

palpitation.  No syncopal episode.  No productive cough.  No fever or chills.  No

diarrhea.  No constipation.  No dysuria.  No urinary frequency. 



MEDICATIONS:  

1. DuoNeb q.6.

2. Diltiazem drip as directed.

3. Heparin 5000 units subcu t.i.d.

4. Lopressor 25 mg p.o. b.i.d.

5. Dulera oral inhaler as directed.

6. Zosyn 2.25 g IV q.6.

7. Sodium bicarbonate 250 mg p.o. t.i.d.

8. Tramadol p.r.n.

9. Vancomycin as directed.

10. Humulin R sliding scale.



PAST MEDICAL HISTORY:  

1. Status post cadaveric renal transplant.

2. Status post CHF secondary to a diastolic dysfunction.

3. The patient has history of COPD.

4. History of atrial fibrillation/flutter.

5. Hyperlipidemia.

6. Hypertension.

7. Gout.

8. Status post acute respiratory failure.

9. Coronary artery disease.

10. History of valvular heart disease.



PAST SURGICAL HISTORY:  Status post cadaveric renal transplant, status post AV

fistula placement, status post colonoscopy, status post cuffed dialysis catheter

placement, status post cardiac cath, status post CABG. 



SOCIAL HISTORY:  The patient lives in [place].  He is .  He is a retired

.  Several children.  No smoking.  No alcohol intake.  Status post blood

transfusion.  No IV drugs. 



ALLERGIES:  ? PENICILLIN.



TRAUMA:  None.



IMMUNIZATION:  Up-to-date.



HOSPITALIZATIONS:  Please see past medical history.



FAMILY HISTORY:  No family history of ESRD.



PHYSICAL EXAMINATION:

VITAL SIGNS:  Blood pressure is noted at 123/99, heart rate 112, respiratory rate

24, O2 saturation 98%. 

GENERAL:  Noted to be awake, alert, comfortable. 

SKIN:  Adequate turgor. 

HEENT:  He has pinkish conjunctivae.  Anicteric sclerae.  No neck mass.  No carotid

bruits.  No JVD. 

CHEST:  No deformities. 

LUNGS:  Decreased breath sounds. 

HEART:  Irregular, tachycardic. 

ABDOMEN:  Globular, soft, nontender. 

EXTREMITIES:  No edema.  No deformities.



LABORATORY DATA:  Laboratories of March 10, 2019; white count 6.7, hemoglobin 9.4.

Sodium 129, potassium 6.6, chloride 104, carbon dioxide 12, BUN 50, creatinine 2.7,

glucose 312, calcium 8.5. 



March 9, 2019; potassium was 5.7, BUN was 30, creatinine 1.76.



ASSESSMENT AND PLAN:  

1. Acute kidney injury - hemodynamically-mediated renal dysfunction.  This patient

has been taking diuretics in the past.  We will discontinue this.  We will give

albumin infusion 25 g IV q.6 for one day.  No indication for any dialytic

intervention. 

2. Mild hyperkalemia.  The patient has received insulin and Lasix.  Check potassium

again this afternoon. 

3. Shortness of breath, multifactorial etiology.  Chest x-ray did not show overt

CHF.  This could be a COPD exacerbation.  Cardiology and Pulmonary is following. 

Agree with current management.







Job ID:  [id number]

## 2019-03-10 NOTE — PDOC.CTH
Cardiology Progress Note





- Subjective





Pt continues with increase HR.  No current symptoms.  





- Objective


 Vital Signs











  Temp Pulse Resp Pulse Ox


 


 03/10/19 07:14  98.9 F   


 


 03/10/19 06:51   105 H  18  94 L


 


 03/10/19 06:35     94 L


 


 03/10/19 06:33   105 H  18  94 L


 


 03/10/19 04:00  98.2 F   


 


 03/10/19 00:12  98.1 F   


 


 03/09/19 23:39   62  18  98


 


 03/09/19 20:00     100








 











Weight                         165 lb 12.602 oz














 











 03/09/19 03/10/19 03/11/19





 05:59 06:59 06:59


 


Intake Total   


 


Output Total   


 


Balance   














- Physical Examination


General/Neuro: alert & oriented x3, NAD


Neck: carotid US brisk, no JVD present


Lungs: CTA, unlabored respirations


Heart: RRR


Abdomen: NT/ND, soft


Extremities: + femoral B





- Labs


Result Diagrams: 


 03/10/19 05:29





 03/10/19 05:29


 Troponin/CKMB











CK-MB (CK-2)  1.5 ng/mL (0-6.6)   03/09/19  02:31    


 


Troponin I  0.090 ng/mL (< 0.028)  H  03/09/19  09:01    














- Assessment/Plan





afib/flutter


CAD


s/p CABG


s/p renal transplant


RI


hyperkalemia





Pt continues with increased HR


Increase BB


Consult EP, nephrology


NPO after MD


inceased K per [place].  It has been addressed.

## 2019-03-10 NOTE — PRG
DATE OF SERVICE:  03/10/2019



SUBJECTIVE:  This morning, he is doing better.  Less shortness of breath.  Less

cough. 



OBJECTIVE:  VITAL SIGNS:  Blood pressure 130/85, pulse 103, respiratory rate 18, and

sats 90%. 

CHEST:  Minimal rhonchi. 

CARDIAC:  Normal S1 and S2.  No gallops. 

ABDOMEN:  No masses.



LABORATORY DATA:  Potassium is 6.6.  White count unremarkable.



IMPRESSION:  

1. Chronic renal failure.

2. Respiratory failure.

3. Chronic obstructive pulmonary disease.

4. Supraventricular tachycardia, status post renal transplant.



PLAN:  Culture negative.  Switch over to oral antibiotics.  Otherwise, continue neb

treatment and Cardizem.  We will follow. 







Job ID:  [id number]

## 2019-03-11 LAB
ANION GAP SERPL CALC-SCNC: 23 MMOL/L (ref 10–20)
APTT PPP: 32.9 SEC (ref 22.9–36.1)
BUN SERPL-MCNC: 65 MG/DL (ref 8.4–25.7)
BURR CELLS BLD QL SMEAR: (no result) (100X)
CALCIUM SERPL-MCNC: 8.5 MG/DL (ref 7.8–10.44)
CHLORIDE SERPL-SCNC: 105 MMOL/L (ref 98–107)
CO2 SERPL-SCNC: 16 MMOL/L (ref 23–31)
CREAT CL PREDICTED SERPL C-G-VRATE: 22 ML/MIN (ref 70–130)
GLUCOSE SERPL-MCNC: 270 MG/DL (ref 80–115)
HGB BLD-MCNC: 9.3 G/DL (ref 14–18)
INR PPP: 1.7
MCH RBC QN AUTO: 24.7 PG (ref 27–31)
MCV RBC AUTO: 85.4 FL (ref 78–98)
MDIFF COMPLETE?: YES
PLATELET # BLD AUTO: 165 THOU/UL (ref 130–400)
POLYCHROMASIA BLD QL SMEAR: (no result) (100X)
POTASSIUM SERPL-SCNC: 5.7 MMOL/L (ref 3.5–5.1)
PROTHROMBIN TIME: 20.4 SEC (ref 12–14.7)
RBC # BLD AUTO: 3.79 MILL/UL (ref 4.7–6.1)
SODIUM SERPL-SCNC: 138 MMOL/L (ref 136–145)
VANCOMYCIN TROUGH SERPL-MCNC: 17 UG/ML
WBC # BLD AUTO: 8.7 THOU/UL (ref 4.8–10.8)

## 2019-03-11 RX ADMIN — AMOXICILLIN AND CLAVULANATE POTASSIUM SCH MG: 250; 125 TABLET, FILM COATED ORAL at 21:25

## 2019-03-11 RX ADMIN — SODIUM BICARBONATE SCH MG: 325 TABLET ORAL at 21:25

## 2019-03-11 RX ADMIN — PIPERACILLIN SODIUM, TAZOBACTAM SODIUM SCH MLS: 2; .25 INJECTION, POWDER, LYOPHILIZED, FOR SOLUTION INTRAVENOUS at 02:29

## 2019-03-11 RX ADMIN — MOMETASONE FUROATE AND FORMOTEROL FUMARATE DIHYDRATE SCH PUFF: 200; 5 AEROSOL RESPIRATORY (INHALATION) at 19:59

## 2019-03-11 RX ADMIN — MOMETASONE FUROATE AND FORMOTEROL FUMARATE DIHYDRATE SCH PUFF: 200; 5 AEROSOL RESPIRATORY (INHALATION) at 06:51

## 2019-03-11 RX ADMIN — HEPARIN SODIUM SCH UNITS: 5000 INJECTION, SOLUTION INTRAVENOUS; SUBCUTANEOUS at 09:38

## 2019-03-11 RX ADMIN — SODIUM BICARBONATE SCH MG: 325 TABLET ORAL at 14:42

## 2019-03-11 RX ADMIN — HEPARIN SODIUM SCH UNITS: 5000 INJECTION, SOLUTION INTRAVENOUS; SUBCUTANEOUS at 21:26

## 2019-03-11 RX ADMIN — MYCOPHENOLIC ACID SCH MG: 180 TABLET, DELAYED RELEASE ORAL at 21:26

## 2019-03-11 RX ADMIN — HEPARIN SODIUM SCH UNITS: 5000 INJECTION, SOLUTION INTRAVENOUS; SUBCUTANEOUS at 14:42

## 2019-03-11 RX ADMIN — ALBUMIN HUMAN SCH GM: 250 SOLUTION INTRAVENOUS at 05:33

## 2019-03-11 RX ADMIN — SODIUM BICARBONATE SCH MG: 325 TABLET ORAL at 09:40

## 2019-03-11 RX ADMIN — VANCOMYCIN HYDROCHLORIDE SCH MLS: 1 INJECTION, SOLUTION INTRAVENOUS at 07:27

## 2019-03-11 RX ADMIN — PIPERACILLIN SODIUM, TAZOBACTAM SODIUM SCH MLS: 2; .25 INJECTION, POWDER, LYOPHILIZED, FOR SOLUTION INTRAVENOUS at 09:38

## 2019-03-11 NOTE — PRG
DATE OF SERVICE:  03/11/2019



SUBJECTIVE:  Chest tube was removed today.



OBJECTIVE:  VITAL SIGNS:  He is afebrile.  Blood pressure 130/95, pulse __________

respirations 18. 

CHEST:  Decreased breath sounds.  No wheezing. 

CARDIAC:  Normal S1 and S2.  No gallops. 

ABDOMEN:  No masses.



LABORATORY DATA:  White count 8000, hemoglobin and hematocrit 9 and 32, platelet

count 165.  Creatinine 3.6. 



ASSESSMENT AND PLAN:  

1. Chronic renal failure.

2. Sepsis syndrome. 



All cultures are negative so far.  I would be inclined to stop the antibiotics.

Cardizem for SVT, supportive care, and PT. 



We will follow.







Job ID:  [id number]

## 2019-03-11 NOTE — PDOC.PN
- Subjective


Encounter Start Date: 03/11/19


Encounter Start Time: 10:30





Patient seen and examined. No new complaints. No overnight events





- Objective


Resuscitation Status - Order Detail:





03/09/19 08:16


Resuscitation Status Routine 


   Resuscitation Status: FULL: Full Resuscitation


   Discussed with: patient








MAR Reviewed: Yes


Vital Signs & Weight: 


 Vital Signs (12 hours)











  Temp Pulse Resp Pulse Ox


 


 03/11/19 11:26  97.5 F L   


 


 03/11/19 07:49     97


 


 03/11/19 07:11  98.0 F   


 


 03/11/19 06:52     95


 


 03/11/19 06:48   91  18  95


 


 03/11/19 03:41  97.6 F   


 


 03/11/19 00:00  97.4 F L   








 Weight











Weight                         164 lb 0.383 oz











 Most Recent Monitor Data











Heart Rate from ECG            102


 


NIBP                           134/95


 


NIBP BP-Mean                   108


 


Respiration from ECG           19


 


SpO2                           90














I&O: 


 











 03/10/19 03/11/19 03/12/19





 06:59 06:59 06:59


 


Intake Total  1680 


 


Output Total  975 


 


Balance  705 











Result Diagrams: 


 03/11/19 06:42





 03/11/19 06:42


EKG Reviewed by me: Yes (afib/flutter)





Phys Exam





- Physical Examination


Constitutional: NAD


HEENT: PERRLA, moist MMs, sclera anicteric


Neck: no JVD, supple


Respiratory: no wheezing, no rales, no rhonchi


Cardiovascular: no significant murmur, irregular


Gastrointestinal: soft, non-tender, no distention


Musculoskeletal: no edema, pulses present


Neurological: non-focal, normal sensation


Lymphatic: no nodes


Psychiatric: normal affect


Skin: no rash, normal turgor





Dx/Plan


(1) Atrial fibrillation with RVR


Code(s): I48.91 - UNSPECIFIED ATRIAL FIBRILLATION   Status: Acute   





(2) Acute on chronic diastolic ACC/AHA stage C congestive heart failure


Code(s): I50.33 - ACUTE ON CHRONIC DIASTOLIC (CONGESTIVE) HEART FAILURE   Status

: Acute   





(3) Acute worsening of stage 3 chronic kidney disease


Code(s): N18.3 - CHRONIC KIDNEY DISEASE, STAGE 3 (MODERATE)   Status: Acute   





(4) Fever


Code(s): R50.9 - FEVER, UNSPECIFIED   Status: Acute   





(5) Hyperkalemia


Code(s): E87.5 - HYPERKALEMIA   Status: Acute   





(6) Metabolic acidosis


Code(s): E87.2 - ACIDOSIS   Status: Acute   





(7) Brachial vein thrombosis


Code(s): I82.629 - ACUTE EMBOLISM AND THROMBOSIS OF DEEP VN UNSP UP EXTREM   

Status: Chronic   





(8) CAD (coronary artery disease)


Code(s): I25.10 - ATHSCL HEART DISEASE OF NATIVE CORONARY ARTERY W/O ANG PCTRS 

  Status: Chronic   


Qualifiers: 


 





(9) COPD (chronic obstructive pulmonary disease)


Status: Chronic   





(10) Cholelithiasis


Code(s): K80.20 - CALCULUS OF GALLBLADDER W/O CHOLECYSTITIS W/O OBSTRUCTION   

Status: Chronic   


Qualifiers: 


 





(11) Chronic anticoagulation


Code(s): Z79.01 - LONG TERM (CURRENT) USE OF ANTICOAGULANTS   Status: Chronic   





(12) Elevated troponin


Code(s): R74.8 - ABNORMAL LEVELS OF OTHER SERUM ENZYMES   Status: Chronic   





(13) Hypertension


Code(s): I10 - ESSENTIAL (PRIMARY) HYPERTENSION   Status: Chronic   


Qualifiers: 


 





(14) Kidney transplant status


Code(s): Z94.0 - KIDNEY TRANSPLANT STATUS   Status: Chronic   Comment: its been 

11 yrs now   





(15) Normocytic anemia


Code(s): D64.9 - ANEMIA, UNSPECIFIED   Status: Chronic   





(16) Paroxysmal A-fib


Code(s): I48.0 - PAROXYSMAL ATRIAL FIBRILLATION   Status: Chronic   





(17) Severe mitral regurgitation by prior echocardiogram


Code(s): I34.0 - NONRHEUMATIC MITRAL (VALVE) INSUFFICIENCY   Status: Chronic   





(18) Severe tricuspid regurgitation by prior echocardiogram


Code(s): I07.1 - RHEUMATIC TRICUSPID INSUFFICIENCY   Status: Chronic   





- Plan


cont current plan of care, continue antibiotics





* medication reviewed as below


* symptomatic treatment


* his heart rate is not well controlled,EP consulted today


* cardiology following


* meanwhile continue current meds


* will transfer to tele today.








Review of Systems





- Review of Systems


ENT: negative: Ear Pain, Ear Discharge, Nose Pain, Nose Discharge, Nose 

Congestion, Mouth Pain, Mouth Swelling, Throat Pain, Throat Swelling, Other


Respiratory: negative: Cough, Dry, Shortness of Breath, Hemoptysis, SOB with 

Excertion, Pleuritic Pain, Sputum, Wheezing


Cardiovascular: negative: chest pain, palpitations, orthopnea, paroxysmal 

nocturnal dyspnea, edema, light headedness, other


Gastrointestinal: negative: Nausea, Vomiting, Abdominal Pain, Diarrhea, 

Constipation, Melena, Hematochezia, Other


Genitourinary: negative: Dysuria, Frequency, Incontinence, Hematuria, Retention

, Other


Musculoskeletal: negative: Neck Pain, Shoulder Pain, Arm Pain, Back Pain, Hand 

Pain, Leg Pain, Foot Pain, Other





- Medications/Allergies


Allergies/Adverse Reactions: 


 Allergies











Allergy/AdvReac Type Severity Reaction Status Date / Time


 


No Known Drug Allergies Allergy Unknown  Verified 02/25/18 12:37











Medications: 


 Current Medications





Albuterol/Ipratropium (Duoneb)  3 ml NEB [id number] [facility]


   Last Admin: 03/11/19 06:48 Dose:  3 ml


Amoxicillin/Clavulanate Potassium (Augmentin)  250 mg PO Q12HR [facility]


Heparin Sodium (Porcine) (Heparin)  5,000 units SC TID [facility]


   Last Admin: 03/11/19 09:38 Dose:  5,000 units


Diltiazem HCl 125 mg/ Sodium (Chloride)  125 mls @ 5 mls/hr IVPB INF [facility]


   Last Admin: 03/09/19 08:43 Dose:  125 mls


Sodium Chloride (Normal Saline 0.9%)  1,000 mls @ 125 mls/hr IV .Q8H [facility]


   Last Admin: 03/11/19 10:20 Dose:  1,000 mls


Metoprolol Tartrate (Lopressor)  25 mg PO BID [facility]


   Last Admin: 03/11/19 09:38 Dose:  25 mg


Miscellaneous Medication (Pharmacy To Dose)  0 each IVPB DAILYPRN PRN


   PRN Reason: LABS


Mometasone Furoate/Formoterol Fumar (Dulera 200 Mcg/5 Mcg Inhaler)  2 puff INH 

BID-RT [facility]


   Last Admin: 03/11/19 06:51 Dose:  2 puff


Mycophenolate Sodium (Myfortic)  360 mg PO BID [facility]


Sodium Bicarbonate (Bicarbonate, Sodium)  325 mg PO TID [facility]


   Last Admin: 03/11/19 09:40 Dose:  325 mg


Tacrolimus (Prograf)  1 mg PO BID [facility]


Tramadol HCl (Ultram)  50 mg PO Q6H PRN


   PRN Reason: Moderate Pain (4-6)


   Stop: 03/11/19 21:23


   Last Admin: 03/10/19 20:23 Dose:  50 mg

## 2019-03-11 NOTE — PDOC.CTH
Cardiology Progress Note





- Subjective





HR better this am.  Pt still not seen by EP





- Objective


 Vital Signs











  Temp Pulse Resp Pulse Ox


 


 03/11/19 03:41  97.6 F   


 


 03/11/19 00:00  97.4 F L   


 


 03/10/19 23:27   84  14  94 L


 


 03/10/19 20:00     96


 


 03/10/19 19:38  97.4 F L   


 


 03/10/19 18:49   83  12  92 L








 











Weight                         165 lb 12.602 oz














 











 03/09/19 03/10/19 03/11/19





 05:59 06:59 06:59


 


Intake Total   1120


 


Output Total   975


 


Balance   145














- Physical Examination


General/Neuro: alert & oriented x3, NAD


Neck: no JVD present


Lungs: CTA, unlabored respirations


Heart: PMI normal, RRR


Abdomen: NT/ND, soft


Extremities: + femoral B





- Labs


Result Diagrams: 


 03/11/19 06:42





 03/11/19 06:42


 Troponin/CKMB











CK-MB (CK-2)  1.5 ng/mL (0-6.6)   03/09/19  02:31    


 


Troponin I  0.090 ng/mL (< 0.028)  H  03/09/19  09:01    














- Assessment/Plan





afib/flutter


CAD


s/p CABG


s/p renal transplant


RI


hyperkalemia





Await recommendations from EP


CV status o/w stable


Renal following

## 2019-03-11 NOTE — PRG
DATE OF SERVICE:  03/11/2019



SUBJECTIVE:  Mr. Collins is a 63-year-old black male with known history of renal

transplant and admitted for shortness of breath.  He had a COPD exacerbation.  His

renal function is noted to still remain unimproved.  He was given albumin infusion.

The plan is to give him normal saline today.  He may just be prerenal.  We will also

have resume his anti-rejection medications.  No other complaints today.  His

shortness of breath is much improved. 



OBJECTIVE:  VITAL SIGNS:  Blood pressure 110/82, heart rate 97, respiratory rate 24,

and pulse ox 90%. 

GENERAL:  Awake, alert, sitting comfortable, not in distress. 

SKIN:  Adequate turgor. 

HEENT:  He has slightly pale conjunctivae.  Anicteric sclerae.  No neck mass.  No

carotid bruits.  No JVD. 

CHEST:  No deformities. 

LUNGS:  Decreased breath sounds.  No wheezing.  No crackles. 

HEART:  Normal sinus rhythm.  No murmur.  No gallops.  No rubs. 

ABDOMEN:  Globular, soft, nontender.  No masses. 

EXTREMITIES:  No edema.



MEDICATIONS:  Medications of March 11, 2019, was reviewed.



LABORATORY DATA:  Laboratories of March 11, 2019; white count 8.7, hemoglobin 9.3.

Sodium 138, potassium 5.7, chloride 105, carbon dioxide 16, BUN 65, creatinine 3.61,

glucose 270, calcium 8.5, phosphorus is 6.2. 



ASSESSMENT AND PLAN:  

1. Status post renal transplant, stable.  We will resume back his immunosuppressive

regimen, CellCept and tacrolimus.  Tacrolimus level is pending. 

2. Acute kidney injury - hemodynamically-mediated renal dysfunction.  We will start

the patient on normal saline at 125 mL/hour.  Hold diuretics for the moment. 

3. Shortness of breath - presumptive chronic obstructive pulmonary disease

exacerbation. 



Overall agree with current management.  Recheck basic metabolic and CBC in a.m.







Job ID:  [id number]

## 2019-03-12 LAB
ANION GAP SERPL CALC-SCNC: 21 MMOL/L (ref 10–20)
BASOPHILS # BLD AUTO: 0 THOU/UL (ref 0–0.2)
BASOPHILS NFR BLD AUTO: 0 % (ref 0–1)
BUN SERPL-MCNC: 74 MG/DL (ref 8.4–25.7)
CALCIUM SERPL-MCNC: 8.4 MG/DL (ref 7.8–10.44)
CHLORIDE SERPL-SCNC: 106 MMOL/L (ref 98–107)
CO2 SERPL-SCNC: 16 MMOL/L (ref 23–31)
CREAT CL PREDICTED SERPL C-G-VRATE: 22 ML/MIN (ref 70–130)
EOSINOPHIL # BLD AUTO: 0 THOU/UL (ref 0–0.7)
EOSINOPHIL NFR BLD AUTO: 0.1 % (ref 0–10)
GLUCOSE SERPL-MCNC: 144 MG/DL (ref 80–115)
HGB BLD-MCNC: 10.5 G/DL (ref 14–18)
HGB BLD-MCNC: 10.7 G/DL (ref 14–18)
LYMPHOCYTES # BLD: 1.3 THOU/UL (ref 1.2–3.4)
LYMPHOCYTES NFR BLD AUTO: 14.6 % (ref 21–51)
MCH RBC QN AUTO: 24.7 PG (ref 27–31)
MCV RBC AUTO: 84.8 FL (ref 78–98)
MDIFF COMPLETE?: YES
MONOCYTES # BLD AUTO: 1.2 THOU/UL (ref 0.11–0.59)
MONOCYTES NFR BLD AUTO: 14.3 % (ref 0–10)
NEUTROPHILS # BLD AUTO: 6.1 THOU/UL (ref 1.4–6.5)
NEUTROPHILS NFR BLD AUTO: 70.9 % (ref 42–75)
OVALOCYTES BLD QL SMEAR: (no result) (100X)
PLATELET # BLD AUTO: 167 THOU/UL (ref 130–400)
PLATELET # BLD AUTO: 174 THOU/UL (ref 130–400)
POLYCHROMASIA BLD QL SMEAR: (no result) (100X)
POTASSIUM SERPL-SCNC: 5.4 MMOL/L (ref 3.5–5.1)
RBC # BLD AUTO: 4.24 MILL/UL (ref 4.7–6.1)
SODIUM SERPL-SCNC: 138 MMOL/L (ref 136–145)
WBC # BLD AUTO: 8.6 THOU/UL (ref 4.8–10.8)

## 2019-03-12 RX ADMIN — MYCOPHENOLIC ACID SCH MG: 180 TABLET, DELAYED RELEASE ORAL at 09:45

## 2019-03-12 RX ADMIN — HEPARIN SODIUM SCH UNITS: 5000 INJECTION, SOLUTION INTRAVENOUS; SUBCUTANEOUS at 15:23

## 2019-03-12 RX ADMIN — HEPARIN SODIUM SCH UNITS: 5000 INJECTION, SOLUTION INTRAVENOUS; SUBCUTANEOUS at 09:46

## 2019-03-12 RX ADMIN — SODIUM BICARBONATE SCH MG: 325 TABLET ORAL at 22:17

## 2019-03-12 RX ADMIN — AMOXICILLIN AND CLAVULANATE POTASSIUM SCH MG: 250; 125 TABLET, FILM COATED ORAL at 22:16

## 2019-03-12 RX ADMIN — MYCOPHENOLIC ACID SCH MG: 180 TABLET, DELAYED RELEASE ORAL at 22:15

## 2019-03-12 RX ADMIN — SODIUM BICARBONATE SCH MG: 325 TABLET ORAL at 15:23

## 2019-03-12 RX ADMIN — MOMETASONE FUROATE AND FORMOTEROL FUMARATE DIHYDRATE SCH PUFF: 200; 5 AEROSOL RESPIRATORY (INHALATION) at 18:58

## 2019-03-12 RX ADMIN — SODIUM BICARBONATE SCH MG: 325 TABLET ORAL at 09:45

## 2019-03-12 RX ADMIN — HEPARIN SODIUM SCH UNITS: 5000 INJECTION, SOLUTION INTRAVENOUS; SUBCUTANEOUS at 22:17

## 2019-03-12 RX ADMIN — MOMETASONE FUROATE AND FORMOTEROL FUMARATE DIHYDRATE SCH PUFF: 200; 5 AEROSOL RESPIRATORY (INHALATION) at 06:45

## 2019-03-12 RX ADMIN — ASPIRIN SCH MG: 81 TABLET ORAL at 09:45

## 2019-03-12 RX ADMIN — AMOXICILLIN AND CLAVULANATE POTASSIUM SCH MG: 250; 125 TABLET, FILM COATED ORAL at 09:45

## 2019-03-12 NOTE — PQF
DATE:        3-12-19                                            ATTN:  DR. TEOFILO CHAN



Please exercise your independent, professional judgment in responding to the 
clarification form. 

Clinical indicators are provided on the bottom of this form for your review



Please check appropriate box(s) to clarify if the following diagnosis has been 
ruled in or  ruled out:

                                                 SEPSIS



[  ] Ruled in diagnosis

     [ X ] Continue to treat        [ X ] Resolved

[  ] Cannot rule out diagnosis

[  ] Other diagnosis ___________

[  ] Unable to determine



In addition, please specify:

Present on Admission (POA):  [  X] Yes             [ ] No             [  ] 
Unable to determine



For continuity of documentation, please document condition throughout progress 
notes and discharge summary.  Thank You.



CLINICAL INDICATORS - SIGNS / SYMPTOMS / LABS



ER DX:  FEVER,  DYSPNEA,  LUE SWELLING, S/P RENAL TRANSPLANT, SEPSIS



H&P:  TEMP: 101.5,  PULSE 99 CURRENTLY BOUNCING UP , FEVER.  HE HAS NO 
OVERT EVIDENCE

       OF SPECIFIC INFECTION PRESENTLY. 



CONSULT NOTE DR. MEJIA 3-11-19:  CHRONIC RENAL FAILURE, SEPSIS SYNDROME



CONSULT NOTE DR. JERONIMO 3-11-19:   FOR NOW, HE IS STILL BEING TREATED FOR ASSUMED 
INFECTIOUS

        SOURCE HOPING THAT HE WILL STABILIZE AFTER TREATMENT FOR THIS.  ALSO HE 
HAS SOME DEGREE 

        OF INFLAMMATORY ARTHRITIS.



PN DR. CHAN 3-12-19:   A FIB WITH RVR,  A/C DIASTOLIC CHF,  CKD3,  FEVER,  
HYPERKALEMIA, 

       METABOLIC ACIDOSIS



ER:   TEMP:   99.1,  103.2,  101.6, 100.3

ER:  RR:  32, 24, 25,  22,  24,  22

ER:  PULSE:   129,  115,  137,  110,  120,  130,  132



RISK FACTORS:   



       H&P:   FEVER.  THE PT HAS IMMUNOSUPPRESSION SECONDARY TO HIS ANI-
REJECTION MEDICATIONS, 

       HAD A RECTAL TEMPT .5 IN ED.  HX OF ESRD S/P RENAL TRANSPLANT AND 
NOW CKD 3,  

       PULMONARY HTN,  CHRONIC  RESPIRATORY FAILURE,  COPD, CHRONIC DIASTOLIC 
HEART FAILURE, 

       DYSLIPIDEMIA, HTN



TREATMENTS:   



      ER:  LEVAQUIN IV,  IVF NS,  VANCOMYCIN IV,  ZOSYN IV,  



(This form is maintained as a part of the permanent medical record)

 2015 [employer] [employer].  All Rights Reserved

[email or web address]    Office:  [phone]

                                                              

[facility]

## 2019-03-12 NOTE — PRG
DATE OF SERVICE:  03/12/2019



SUBJECTIVE:  Obie Collins is a 63-year-old gentleman.  This morning, he is feeling

better.  He still complained of cough.  He tells me he has a postnasal drip

symptoms. 



OBJECTIVE:  VITAL SIGNS:  Temperature 97, pulse 93, respiratory rate 20, blood

pressure 158/96. 

CHEST:  Decreased breath sounds.  No wheezing. 

CARDIAC:  Normal S1 and S2.  No gallop. 

ABDOMEN:  No masses.



LABORATORY DATA:  Creatinine 3.68.



IMPRESSION:  Renal failure, azotemia, status post transplant, supraventricular

tachycardia, bronchitis. 



PLAN:  Await input from Cardiology.  Pulmonary wise, I am going to initiate some

Flonase nasal spray for his postnasal drip symptoms.  Disposition as per primary

care physician. 







Job ID:  [id number]

## 2019-03-12 NOTE — PDOC.CTH
Cardiology Progress Note





- Subjective





EP PROGRESS NOTE: 3/12/19





Seen as follow up for atrial arrhythmias and non sustained VT. He is not having 

any cardiac concerns or complaints today. Feels weak and is bothered by the 

difficulty with drawing his blood of late. 





- Objective


 Vital Signs











  Temp Pulse Resp BP Pulse Ox


 


 03/12/19 12:14  97.5 F L  52 L  20  172/99 H  92 L


 


 03/12/19 08:07  97.5 F L  93  20  158/96 H  93 L


 


 03/12/19 08:00      93 L


 


 03/12/19 06:43   85  16   91 L


 


 03/12/19 04:43  97.3 F L  90  15  130/70  92 L








 











Weight                         169 lb 11.2 oz














 











 03/11/19 03/12/19 03/13/19





 06:59 06:59 06:59


 


Intake Total 1680 2255 


 


Output Total 975 200 


 


Balance 705 2055 














- Physical Examination


General/Neuro: alert & oriented x3, NAD


Neck: carotid US brisk, no JVD present


Lungs: CTA, unlabored respirations


Heart: PMI normal


Abdomen: NT/ND, soft





- Telemetry


Telemetry Rhythm: AFlutter, variable AV conduction





- Labs


Result Diagrams: 


 03/12/19 15:55





 03/12/19 09:24


 Troponin/CKMB











CK-MB (CK-2)  1.5 ng/mL (0-6.6)   03/09/19  02:31    


 


Troponin I  0.090 ng/mL (< 0.028)  H  03/09/19  09:01    














- Assessment/Plan





1. Chronic atrial arrhythmias


   -A Fib and atypical flutter, but cannot rule out right sided flutter. 


   - Continue rate control


2. Right bundle branch block


3. Non sustained ventricular tachycardia


   -continue betablocker therapy


   -asymptomatic


4. CHADS2-VASC: </= 2


   - continue warfarin. appreciate hospitalist management of dosing for OAC


   - goal INR 2.0-3.0


   - remains subtherapeutic but improving, INR 1.7 today. 


5. Hypertension


   -per hospitalist group








Multiple chronic and severe co-morbidities make him a poor ablative candidate 

and he has no good antiarrhythmic options. He remains asymptomatic with his AF. 

Continue rate control and betablocker for his wide complex tachycardia, which 

is likely NSVT. If typical atrial flutter is clearly seen, would consider CTI 

ablation.

## 2019-03-12 NOTE — PDOC.PN
- Subjective


Encounter Start Date: 03/12/19


Encounter Start Time: 09:30





Patient seen and examined. No new complaints. No overnight events





- Objective


Resuscitation Status - Order Detail:





03/09/19 08:16


Resuscitation Status Routine 


   Resuscitation Status: FULL: Full Resuscitation


   Discussed with: patient








MAR Reviewed: Yes


Vital Signs & Weight: 


 Vital Signs (12 hours)











  Temp Pulse Resp BP Pulse Ox


 


 03/12/19 08:07  97.5 F L  93  20  158/96 H  93 L


 


 03/12/19 06:43   85  16   91 L


 


 03/12/19 04:43  97.3 F L  90  15  130/70  92 L


 


 03/12/19 00:30   71  14   92 L








 Weight











Weight                         169 lb 11.2 oz











 Most Recent Monitor Data











Heart Rate from ECG            92


 


NIBP                           147/98


 


NIBP BP-Mean                   114


 


Respiration from ECG           10


 


SpO2                           99














I&O: 


 











 03/11/19 03/12/19 03/13/19





 06:59 06:59 06:59


 


Intake Total 1680 2255 


 


Output Total 975 200 


 


Balance 705 2055 











Result Diagrams: 


 03/11/19 06:42





 03/12/19 09:24


EKG Reviewed by me: Yes (afib)





Phys Exam





- Physical Examination


Constitutional: NAD


HEENT: PERRLA, moist MMs, sclera anicteric


Neck: no JVD, supple


Respiratory: no wheezing, no rales, no rhonchi


Cardiovascular: RRR, no significant murmur, no rub


Gastrointestinal: soft, non-tender, no distention


Musculoskeletal: no edema, pulses present


Neurological: non-focal, normal sensation


Lymphatic: no nodes


Psychiatric: normal affect


Skin: no rash, normal turgor





Dx/Plan


(1) Atrial fibrillation with RVR


Code(s): I48.91 - UNSPECIFIED ATRIAL FIBRILLATION   Status: Acute   





(2) Acute on chronic diastolic ACC/AHA stage C congestive heart failure


Code(s): I50.33 - ACUTE ON CHRONIC DIASTOLIC (CONGESTIVE) HEART FAILURE   Status

: Acute   





(3) Acute worsening of stage 3 chronic kidney disease


Code(s): N18.3 - CHRONIC KIDNEY DISEASE, STAGE 3 (MODERATE)   Status: Acute   





(4) Fever


Code(s): R50.9 - FEVER, UNSPECIFIED   Status: Acute   





(5) Hyperkalemia


Code(s): E87.5 - HYPERKALEMIA   Status: Acute   





(6) Metabolic acidosis


Code(s): E87.2 - ACIDOSIS   Status: Acute   





(7) Brachial vein thrombosis


Code(s): I82.629 - ACUTE EMBOLISM AND THROMBOSIS OF DEEP VN UNSP UP EXTREM   

Status: Chronic   





(8) CAD (coronary artery disease)


Code(s): I25.10 - ATHSCL HEART DISEASE OF NATIVE CORONARY ARTERY W/O ANG PCTRS 

  Status: Chronic   


Qualifiers: 


 





(9) COPD (chronic obstructive pulmonary disease)


Status: Chronic   





(10) Cholelithiasis


Code(s): K80.20 - CALCULUS OF GALLBLADDER W/O CHOLECYSTITIS W/O OBSTRUCTION   

Status: Chronic   


Qualifiers: 


 





(11) Chronic anticoagulation


Code(s): Z79.01 - LONG TERM (CURRENT) USE OF ANTICOAGULANTS   Status: Chronic   





(12) Elevated troponin


Code(s): R74.8 - ABNORMAL LEVELS OF OTHER SERUM ENZYMES   Status: Chronic   





(13) Hypertension


Code(s): I10 - ESSENTIAL (PRIMARY) HYPERTENSION   Status: Chronic   


Qualifiers: 


 





(14) Kidney transplant status


Code(s): Z94.0 - KIDNEY TRANSPLANT STATUS   Status: Chronic   Comment: its been 

11 yrs now   





(15) Normocytic anemia


Code(s): D64.9 - ANEMIA, UNSPECIFIED   Status: Chronic   





(16) Paroxysmal A-fib


Code(s): I48.0 - PAROXYSMAL ATRIAL FIBRILLATION   Status: Chronic   





(17) Severe mitral regurgitation by prior echocardiogram


Code(s): I34.0 - NONRHEUMATIC MITRAL (VALVE) INSUFFICIENCY   Status: Chronic   





(18) Severe tricuspid regurgitation by prior echocardiogram


Code(s): I07.1 - RHEUMATIC TRICUSPID INSUFFICIENCY   Status: Chronic   





- Plan


cont current plan of care





* continue IVF


* monitor renal function


* afib rate controlled with meds


* medication reviewed as below


* symptomatic treatment.








Review of Systems





- Review of Systems


ENT: negative: Ear Pain, Ear Discharge, Nose Pain, Nose Discharge, Nose 

Congestion, Mouth Pain, Mouth Swelling, Throat Pain, Throat Swelling, Other


Respiratory: negative: Cough, Dry, Shortness of Breath, Hemoptysis, SOB with 

Excertion, Pleuritic Pain, Sputum, Wheezing


Cardiovascular: negative: chest pain, palpitations, orthopnea, paroxysmal 

nocturnal dyspnea, edema, light headedness, other


Gastrointestinal: negative: Nausea, Vomiting, Abdominal Pain, Diarrhea, 

Constipation, Melena, Hematochezia, Other


Genitourinary: negative: Dysuria, Frequency, Incontinence, Hematuria, Retention

, Other


Musculoskeletal: negative: Neck Pain, Shoulder Pain, Arm Pain, Back Pain, Hand 

Pain, Leg Pain, Foot Pain, Other





- Medications/Allergies


Allergies/Adverse Reactions: 


 Allergies











Allergy/AdvReac Type Severity Reaction Status Date / Time


 


No Known Drug Allergies Allergy Unknown  Verified 02/25/18 12:37











Medications: 


 Current Medications





Albuterol/Ipratropium (Duoneb)  3 ml NEB [id number]


   Last Admin: 03/12/19 06:43 Dose:  3 ml


Amoxicillin/Clavulanate Potassium (Augmentin)  250 mg PO Q12HR [facility]


   Last Admin: 03/12/19 09:45 Dose:  250 mg


Aspirin (Ecotrin)  81 mg PO DAILY [facility]


   Last Admin: 03/12/19 09:45 Dose:  81 mg


Atorvastatin Calcium (Lipitor)  20 mg PO HS [facility]


Fluticasone Propionate (Flonase Nasal Spray)  1 gm NASAL DAILY [facility]


Heparin Sodium (Porcine) (Heparin)  5,000 units SC TID [facility]


   Last Admin: 03/12/19 09:46 Dose:  5,000 units


Sodium Chloride (Normal Saline 0.9%)  1,000 mls @ 125 mls/hr IV .Q8H [facility]


   Last Admin: 03/12/19 10:41 Dose:  1,000 mls


Metoprolol Succinate (Toprol Xl)  50 mg PO DAILY [facility]


   Last Admin: 03/12/19 09:45 Dose:  50 mg


Mometasone Furoate/Formoterol Fumar (Dulera 200 Mcg/5 Mcg Inhaler)  2 puff INH 

BID-RT [facility]


   Last Admin: 03/12/19 06:45 Dose:  2 puff


Mycophenolate Sodium (Myfortic)  360 mg PO BID [facility]


   Last Admin: 03/12/19 09:45 Dose:  360 mg


Sodium Bicarbonate (Bicarbonate, Sodium)  325 mg PO TID [facility]


   Last Admin: 03/12/19 09:45 Dose:  325 mg


Tacrolimus (Prograf)  1 mg PO BID [facility]


   Last Admin: 03/11/19 21:25 Dose:  1 mg


Warfarin Sodium (Coumadin)  5 mg PO 1700 [facility]

## 2019-03-12 NOTE — PRG
DATE OF SERVICE:  03/12/2019



SUBJECTIVE:  

Mr. Collins is a 63-year-old black male, who is status post renal transplant and

admitted for shortness of breath secondary to a COPD exacerbation.  We were

consulted for management of his renal transplantation as well as for his acute

kidney injury.  My feeling is that he had a superimposed acute kidney injury that

was hemodynamically-mediated renal dysfunction.  He has been started on normal

saline.  He is tolerating the IV fluid.  This morning, he is complaining of some

nausea. 



OBJECTIVE:  VITAL SIGNS:  Blood pressure 158/96, heart rate 93, respiratory rate 20,

temperature 97.5, pulse ox 93 percent. 

GENERAL:  Awake, alert, comfortable, not in overt distress. 

SKIN:  Adequate turgor. 

HEENT:  He has slightly pale conjunctivae.  Anicteric sclerae.   

NECK:  No neck mass.  No carotid bruits.  No JVD. 

CHEST:  No deformities. 

LUNGS:  Clear breath sounds.  No wheezing.  No crackles. 

HEART:  Normal sinus rhythm.  No murmurs, gallops, or rubs. 

ABDOMEN:  Globular, soft.  Nontender, no masses. 

EXTREMITIES:  No edema, no deformities.



MEDICATIONS:  Medications of March 12, 2019, was reviewed.



LABORATORY DATA:  Laboratories of March 11, 2019; white count 8.7, hemoglobin 9.3.

Sodium 138, potassium 5.7, chloride 105, carbon dioxide 16, BUN 65, creatinine 3.61,

calcium 8.5, phosphorus 6.2. 



ASSESSMENT AND PLAN:  

1. Acute kidney injury-superimposed hemodynamically-mediated renal dysfunction.

Holding off diuretics, on normal saline at 125 mL/hour.  We will be rechecking a

basic metabolic studies today. 

2. Nausea-Zofran 4 mg IV x1 dose.

3. Status post renal transplant.  Continuing current immunosuppressive regimen.

Tacrolimus level still pending. 

4. Recheck basic metabolic studies, CBC in a.m. 

5. Chronic obstructive pulmonary disease/shortness of breath, much improved.







Job ID:  [id number]

## 2019-03-13 LAB
ANALYZER IN CARDIO: (no result)
ANION GAP SERPL CALC-SCNC: 21 MMOL/L (ref 10–20)
BASE EXCESS STD BLDA CALC-SCNC: -9 MEQ/L
BUN SERPL-MCNC: 80 MG/DL (ref 8.4–25.7)
CA-I BLDA-SCNC: 1.15 MMOL/L (ref 1.12–1.3)
CALCIUM SERPL-MCNC: 8.9 MG/DL (ref 7.8–10.44)
CHLORIDE SERPL-SCNC: 106 MMOL/L (ref 98–107)
CO2 SERPL-SCNC: 16 MMOL/L (ref 23–31)
CREAT CL PREDICTED SERPL C-G-VRATE: 23 ML/MIN (ref 70–130)
GLUCOSE SERPL-MCNC: 111 MG/DL (ref 80–115)
HCO3 BLDA-SCNC: 16.2 MEQ/L (ref 22–28)
HCT VFR BLDA CALC: 31 % (ref 42–52)
HGB BLD-MCNC: 7.1 G/DL (ref 14–18)
HGB BLD-MCNC: 9.2 G/DL (ref 14–18)
HGB BLDA-MCNC: 10.7 G/DL (ref 14–18)
INR PPP: 1.8
MCH RBC QN AUTO: 25 PG (ref 27–31)
MCV RBC AUTO: 82.9 FL (ref 78–98)
MDIFF COMPLETE?: YES
PCO2 BLDA: 33 MMHG (ref 35–45)
PH BLDA: 7.31 [PH] (ref 7.35–7.45)
PLATELET # BLD AUTO: 175 THOU/UL (ref 130–400)
PO2 BLDA: 34.5 MMHG (ref 80–?)
POLYCHROMASIA BLD QL SMEAR: (no result) (100X)
POTASSIUM BLD-SCNC: 4.9 MMOL/L (ref 3.7–5.3)
POTASSIUM SERPL-SCNC: 4.8 MMOL/L (ref 3.5–5.1)
PROTHROMBIN TIME: 20.7 SEC (ref 12–14.7)
RBC # BLD AUTO: 3.68 MILL/UL (ref 4.7–6.1)
SODIUM SERPL-SCNC: 138 MMOL/L (ref 136–145)
SPECIMEN DRAWN FROM PATIENT: (no result)
WBC # BLD AUTO: 6.5 THOU/UL (ref 4.8–10.8)

## 2019-03-13 PROCEDURE — 30233N1 TRANSFUSION OF NONAUTOLOGOUS RED BLOOD CELLS INTO PERIPHERAL VEIN, PERCUTANEOUS APPROACH: ICD-10-PCS | Performed by: FAMILY MEDICINE

## 2019-03-13 RX ADMIN — MYCOPHENOLIC ACID SCH MG: 180 TABLET, DELAYED RELEASE ORAL at 22:27

## 2019-03-13 RX ADMIN — MOMETASONE FUROATE AND FORMOTEROL FUMARATE DIHYDRATE SCH PUFF: 200; 5 AEROSOL RESPIRATORY (INHALATION) at 07:01

## 2019-03-13 RX ADMIN — ALBUMIN HUMAN SCH GM: 250 SOLUTION INTRAVENOUS at 15:52

## 2019-03-13 RX ADMIN — AMOXICILLIN AND CLAVULANATE POTASSIUM SCH MG: 250; 125 TABLET, FILM COATED ORAL at 11:55

## 2019-03-13 RX ADMIN — HEPARIN SODIUM SCH UNITS: 5000 INJECTION, SOLUTION INTRAVENOUS; SUBCUTANEOUS at 08:48

## 2019-03-13 RX ADMIN — AMOXICILLIN AND CLAVULANATE POTASSIUM SCH MG: 250; 125 TABLET, FILM COATED ORAL at 22:26

## 2019-03-13 RX ADMIN — HEPARIN SODIUM SCH UNITS: 5000 INJECTION, SOLUTION INTRAVENOUS; SUBCUTANEOUS at 15:52

## 2019-03-13 RX ADMIN — ALBUMIN HUMAN SCH GM: 250 SOLUTION INTRAVENOUS at 11:55

## 2019-03-13 RX ADMIN — MYCOPHENOLIC ACID SCH MG: 180 TABLET, DELAYED RELEASE ORAL at 08:51

## 2019-03-13 RX ADMIN — ASPIRIN SCH MG: 81 TABLET ORAL at 08:48

## 2019-03-13 RX ADMIN — SODIUM BICARBONATE SCH MG: 325 TABLET ORAL at 15:52

## 2019-03-13 RX ADMIN — MOMETASONE FUROATE AND FORMOTEROL FUMARATE DIHYDRATE SCH PUFF: 200; 5 AEROSOL RESPIRATORY (INHALATION) at 18:39

## 2019-03-13 RX ADMIN — SODIUM BICARBONATE SCH MG: 325 TABLET ORAL at 08:49

## 2019-03-13 RX ADMIN — SODIUM BICARBONATE SCH MG: 325 TABLET ORAL at 22:25

## 2019-03-13 NOTE — EKG
Test Reason : STAT

Blood Pressure : ***/*** mmHG

Vent. Rate : 095 BPM     Atrial Rate : 267 BPM

   P-R Int : 000 ms          QRS Dur : 128 ms

    QT Int : 398 ms       P-R-T Axes : 000 -03 -88 degrees

   QTc Int : 500 ms

 

Atrial fibrillation

Right bundle branch block

Possible Lateral infarct , age undetermined

Inferior infarct (cited on or before 23-JUL-2014)

Nonspecific ST-T changes

Abnormal ECG

When compared with ECG of 09-MAR-2019 02:41, (Unconfirmed)

No significant change was found

Confirmed by DR. GALINDO ARAMBULA (3) on 3/13/2019 6:40:41 PM

 

Referred By:             Confirmed By:DR. GALINDO ARAMBULA

## 2019-03-13 NOTE — RAD
PORTABLE CHEST:

 

HISTORY:

Cough and congestion.

 

COMPARISON:

03/09/2019

 

FINDINGS:

Heart size is enlarged.  There are atherosclerotic changes with a tortuous aorta.  There has been dev
elopment of bibasilar parenchymal lung changes, slightly more prominent in the right base, raising th
e possibility of some developing infiltrates.

 

IMPRESSION:

1.  Cardiomegaly with some mild vascular prominence.

 

2.  Increased bibasilar lung markings, consistent with atelectasis versus developing infiltrates.

 

3.  Also increased density in the right base, suggesting the possibility that there is some developin
g right effusion.

 

POS: [facility]

## 2019-03-13 NOTE — PRG
DATE OF SERVICE:  03/13/2019



SUBJECTIVE:  Mr. Collins is a 63-year-old black male, who was admitted for

shortness of breath.  Shortness of breath is multifactorial.  The feeling is that

there is a superimposed COPD exacerbation.  He is breathing better.  During this

hospitalization, he was also have developed atrial arrhythmias and nonsustained VT.

Dr. Mayer is following this patient.  No invasive procedure recommended at the

present time.  A chest x-ray done on March 13, 2019, showed also increased density

in the right base suggesting some degree of right pleural effusion.  There is

increased lung markings, but no overt CHF.  The patient is receiving IV fluid due to

the worsening renal dysfunction. 



The patient denies any chest pain or shortness of breath.



OBJECTIVE:  VITAL SIGNS:  Blood pressure 146/99, heart rate is ranging from 75 to

121, and O2 saturation 98%. 

GENERAL:  He is noted to be awake, supine, comfortable, not in overt distress. 

SKIN:  Adequate turgor. 

HEENT:  He has a slightly pale conjunctivae.  Anicteric sclerae. 

NECK:  No neck mass.  No carotid bruits.  No JVD. 

CHEST:  No deformities. 

LUNGS:  Decreased breath sounds. 

HEART:  Normal sinus rhythm.  No murmurs, gallops, or rubs. 

ABDOMEN:  Globular, soft, and nontender.  No masses. 

EXTREMITIES:  No edema.  No deformities.



MEDICATIONS:  Medications of March 13, 2019, was reviewed.



LABORATORY DATA:  Laboratories of March 13, 2019, white count 6.5 and hemoglobin

9.2.  Sodium 138, potassium 4.8, chloride 106, carbon dioxide 16, BUN 80, creatinine

3.69, glucose 111, and calcium 8.9. 



ASSESSMENT AND PLAN:  

1. Acute kidney injury/chronic renal failure - initially being treated for

hemodynamically mediated dysfunction.  However, due to the mild hypoxemia as well as

possible congestive heart failure, the patient has been given Lasix.  My bias is to

continue some degree of volume repletion with this patient.  We will consider giving

him an albumin infusion.  We will discontinue the normal saline.  No indication for

any dialytic intervention. 

2. Status post renal transplant.  Continue current immunosuppressive regimen.

Awaiting for tacrolimus level. 

3. Shortness of breath is multifactorial in etiology.

4. Status post ventricular tachycardia - Dr. Mayer following.  Currently in normal

sinus rhythm. 







Job ID:  [id number]

## 2019-03-13 NOTE — PDOC.PN
- Subjective


Encounter Start Date: 03/13/19


Encounter Start Time: 08:00





last night pt was having dyspnea, so IVF stopped and given lasix, pt felt 

better after that, his heart rate is rapid today, he has edema leg, his 

creatinine is high





- Objective


Resuscitation Status - Order Detail:





03/09/19 08:16


Resuscitation Status Routine 


   Resuscitation Status: FULL: Full Resuscitation


   Discussed with: patient








MAR Reviewed: Yes


Vital Signs & Weight: 


 Vital Signs (12 hours)











  Temp Pulse Resp BP BP Pulse Ox


 


 03/13/19 08:49   121 H   146/99 H  


 


 03/13/19 07:29  97.6 F  121 H  18   146/99 H  88 L


 


 03/13/19 06:59   75  20    90 L


 


 03/13/19 03:50  97.3 F L  125 H  18   145/100 H  92 L


 


 03/13/19 00:29   85  20    90 L








 Weight











Weight                         173 lb 14.4 oz











 Most Recent Monitor Data











Heart Rate from ECG            92


 


NIBP                           147/98


 


NIBP BP-Mean                   114


 


Respiration from ECG           10


 


SpO2                           99














I&O: 


 











 03/12/19 03/13/19 03/14/19





 06:59 06:59 06:59


 


Intake Total 2255 2675 


 


Output Total 200 550 


 


Balance 2055 2125 











Result Diagrams: 


 03/13/19 05:49





 03/13/19 05:49


Radiology Reviewed by me: Yes (chest xray reviewed)


EKG Reviewed by me: Yes (afib)





Phys Exam





- Physical Examination


Constitutional: NAD


HEENT: PERRLA, moist MMs, sclera anicteric


Neck: no JVD, supple


reduced air entry both base, few rales noted


Cardiovascular: no significant murmur, irregular


Gastrointestinal: soft, non-tender, no distention, positive bowel sounds


Musculoskeletal: pulses present, edema present


Neurological: non-focal, normal sensation


Lymphatic: no nodes


Psychiatric: normal affect


Skin: no rash, normal turgor





Dx/Plan


(1) Atrial fibrillation with RVR


Code(s): I48.91 - UNSPECIFIED ATRIAL FIBRILLATION   Status: Acute   





(2) Acute on chronic diastolic ACC/AHA stage C congestive heart failure


Code(s): I50.33 - ACUTE ON CHRONIC DIASTOLIC (CONGESTIVE) HEART FAILURE   Status

: Acute   





(3) Acute worsening of stage 3 chronic kidney disease


Code(s): N18.3 - CHRONIC KIDNEY DISEASE, STAGE 3 (MODERATE)   Status: Acute   





(4) Fever


Code(s): R50.9 - FEVER, UNSPECIFIED   Status: Acute   





(5) Hyperkalemia


Code(s): E87.5 - HYPERKALEMIA   Status: Acute   





(6) Metabolic acidosis


Code(s): E87.2 - ACIDOSIS   Status: Acute   





(7) Brachial vein thrombosis


Code(s): I82.629 - ACUTE EMBOLISM AND THROMBOSIS OF DEEP VN UNSP UP EXTREM   

Status: Chronic   





(8) CAD (coronary artery disease)


Code(s): I25.10 - ATHSCL HEART DISEASE OF NATIVE CORONARY ARTERY W/O ANG PCTRS 

  Status: Chronic   


Qualifiers: 


 





(9) COPD (chronic obstructive pulmonary disease)


Status: Chronic   





(10) Cholelithiasis


Code(s): K80.20 - CALCULUS OF GALLBLADDER W/O CHOLECYSTITIS W/O OBSTRUCTION   

Status: Chronic   


Qualifiers: 


 





(11) Chronic anticoagulation


Code(s): Z79.01 - LONG TERM (CURRENT) USE OF ANTICOAGULANTS   Status: Chronic   





(12) Elevated troponin


Code(s): R74.8 - ABNORMAL LEVELS OF OTHER SERUM ENZYMES   Status: Chronic   





(13) Hypertension


Code(s): I10 - ESSENTIAL (PRIMARY) HYPERTENSION   Status: Chronic   


Qualifiers: 


 





(14) Kidney transplant status


Code(s): Z94.0 - KIDNEY TRANSPLANT STATUS   Status: Chronic   Comment: its been 

11 yrs now   





(15) Normocytic anemia


Code(s): D64.9 - ANEMIA, UNSPECIFIED   Status: Chronic   





(16) Paroxysmal A-fib


Code(s): I48.0 - PAROXYSMAL ATRIAL FIBRILLATION   Status: Chronic   





(17) Severe mitral regurgitation by prior echocardiogram


Code(s): I34.0 - NONRHEUMATIC MITRAL (VALVE) INSUFFICIENCY   Status: Chronic   





(18) Severe tricuspid regurgitation by prior echocardiogram


Code(s): I07.1 - RHEUMATIC TRICUSPID INSUFFICIENCY   Status: Chronic   





- Plan


cont current plan of care, continue antibiotics, respiratory therapy





* pt seems to me volume overloaded, will give dose of lasix


* nephrology started on albumin, will monitor


* as per EP, only rate control, today he has rapid rate


* continue antibiotics as per pulmonary


* will repeat labs tomorrow


* he is not ready for discharge


* medication reviewed as below


* symptomatic treatment.








Review of Systems





- Review of Systems


Eyes: negative: Pain, Vision Change, Conjunctivae Inflammation, Eyelid 

Inflammation, Redness, Other


ENT: negative: Ear Pain, Ear Discharge, Nose Pain, Nose Discharge, Nose 

Congestion, Mouth Pain, Mouth Swelling, Throat Pain, Throat Swelling, Other


Respiratory: Shortness of Breath, SOB with Excertion.  negative: Cough, Dry, 

Hemoptysis, Pleuritic Pain, Sputum, Wheezing


Cardiovascular: edema.  negative: chest pain, palpitations, orthopnea, 

paroxysmal nocturnal dyspnea, light headedness, other


Gastrointestinal: negative: Nausea, Vomiting, Abdominal Pain, Diarrhea, 

Constipation, Melena, Hematochezia, Other


Genitourinary: negative: Dysuria, Frequency, Incontinence, Hematuria, Retention

, Other


Musculoskeletal: negative: Neck Pain, Shoulder Pain, Arm Pain, Back Pain, Hand 

Pain, Leg Pain, Foot Pain, Other





- Medications/Allergies


Allergies/Adverse Reactions: 


 Allergies











Allergy/AdvReac Type Severity Reaction Status Date / Time


 


No Known Drug Allergies Allergy Unknown  Verified 02/25/18 12:37











Medications: 


 Current Medications





Albumin Human (Albumin 25%)  25 gm IVPB Q6H [facility]


   Stop: 03/14/19 04:01


Albuterol/Ipratropium (Duoneb)  3 ml NEB [id number] [facility]


   Last Admin: 03/13/19 06:59 Dose:  3 ml


Amoxicillin/Clavulanate Potassium (Augmentin)  250 mg PO Q12HR [facility]


   Last Admin: 03/12/19 22:16 Dose:  250 mg


Aspirin (Ecotrin)  81 mg PO DAILY [facility]


   Last Admin: 03/13/19 08:48 Dose:  81 mg


Atorvastatin Calcium (Lipitor)  20 mg PO HS [facility]


   Last Admin: 03/12/19 22:17 Dose:  20 mg


Fluticasone Propionate (Flonase Nasal Spray)  0 gm NASAL DAILY [facility]


   Last Admin: 03/13/19 08:50 Dose:  1 applic


Furosemide (Lasix)  40 mg SLOW IVP 1100 [facility]


   Stop: 03/13/19 14:00


Heparin Sodium (Porcine) (Heparin)  5,000 units SC TID [facility]


   Last Admin: 03/13/19 08:48 Dose:  5,000 units


Hydralazine HCl (Apresoline)  25 mg PO BID [facility]


   Last Admin: 03/13/19 08:49 Dose:  25 mg


Metoprolol Succinate (Toprol Xl)  50 mg PO DAILY [facility]


   Last Admin: 03/13/19 08:49 Dose:  50 mg


Mometasone Furoate/Formoterol Fumar (Dulera 200 Mcg/5 Mcg Inhaler)  2 puff INH 

BID-RT [facility]


   Last Admin: 03/13/19 07:01 Dose:  2 puff


Mycophenolate Sodium (Myfortic)  360 mg PO BID [facility]


   Last Admin: 03/13/19 08:51 Dose:  360 mg


Sildenafil Citrate (Revatio)  20 mg PO TID [facility]


   Last Admin: 03/13/19 08:50 Dose:  20 mg


Sodium Bicarbonate (Bicarbonate, Sodium)  325 mg PO TID [facility]


   Last Admin: 03/13/19 08:49 Dose:  325 mg


Tacrolimus (Prograf)  1 mg PO BID [facility]


   Last Admin: 03/12/19 22:16 Dose:  1 mg


Tamsulosin HCl (Flomax)  0.4 mg PO HS [facility]


Warfarin Sodium (Coumadin)  5 mg PO 1700 [facility]


   Last Admin: 03/12/19 16:55 Dose:  5 mg

## 2019-03-13 NOTE — PDOC.CTH
Cardiology Progress Note





- Subjective





EP PROGRESS NOTE: 3/13/19





Seen as follow up for atrial arrhythmias and non sustained VT. He is not having 

any cardiac concerns or complaints today. Feels weak and is bothered by the 

difficulty with drawing his blood of late. Having increased shortness of breath 

today,  especially when lying flat.





- Objective


 Vital Signs











  Temp Pulse Resp BP BP Pulse Ox


 


 03/13/19 15:49  98.2 F  105 H  16   126/88  88 L


 


 03/13/19 13:06   79  18    91 L


 


 03/13/19 11:51  97.8 F  104 H  16   154/79 H  88 L


 


 03/13/19 08:49   121 H   146/99 H  


 


 03/13/19 07:29  97.6 F  121 H  18   146/99 H  88 L


 


 03/13/19 06:59   75  20    90 L








 











Weight                         173 lb 14.4 oz














 











 03/12/19 03/13/19 03/14/19





 06:59 06:59 06:59


 


Intake Total 2255 2675 


 


Output Total 200 550 


 


Balance 2055 2125 














- Physical Examination


General/Neuro: alert & oriented x3, NAD, other: (poor historian)


Neck: carotid US brisk, no JVD present


Lungs: CTA, unlabored respirations


Heart: PMI normal, other: (AF RVR)


Abdomen: NT/ND, soft





- Telemetry


Telemetry Rhythm: A fib





- Labs


Result Diagrams: 


 03/15/19 05:04





 03/15/19 05:04


 Troponin/CKMB











CK-MB (CK-2)  1.5 ng/mL (0-6.6)   03/09/19  02:31    


 


Troponin I  0.090 ng/mL (< 0.028)  H  03/09/19  09:01    














- Assessment/Plan





1. Chronic atrial arrhythmias


   -A Fib and atypical flutter, but cannot rule out right sided flutter. 


   -RVR over HS and this AM. Rate control worsened by fluid overload. 


2. Right bundle branch block


3. Non sustained ventricular tachycardia


   -continue betablocker therapy


   -asymptomatic


4. CHADS2-VASC: </= 2


   - continue warfarin. appreciate hospitalist management of dosing for OAC


   - goal INR 2.0-3.0


   - remains subtherapeutic but improving, INR 1.7 today. 


5. Hypertension


   -per hospitalist group


6. Fluid overload/CHF


   - diuresing today per Dr Lehman





Increased Toprol XL to 75mg QD for improved rate control. Plan for Lasix today 

to raisae. 





Multiple chronic and severe co-morbidities make him a poor ablative candidate 

and he has no good antiarrhythmic options. He remains asymptomatic with his AF. 

Continue rate control and betablocker for his wide complex tachycardia, which 

is likely NSVT. If typical atrial flutter is clearly seen, would consider CTI 

ablation. 








Addendum - Physician





- Physician Attestation


Date/Time: 03/15/19 8990





I personally evaluated the patient and discussed the management with Ms Ardon.


I agree with the History, Examination, Assessment and Plan documented above 

with any addition or exceptions noted below.

## 2019-03-13 NOTE — PDOC.EVN
Event Note





- Event Note


Event Note: 


Patient with tachycardia, drop in H/H from 9 to 7 today. No visible bleeding 

but on subtherapeutic Coumadin and Heparin subcut. Concern for fluid overload 

but getting albumin for kidneys. Will stop Albumin, transfuse 1 unit PRBC. Stop 

all anticoagulants. H/H following transfusion.

## 2019-03-13 NOTE — PRG
DATE OF SERVICE:  03/13/2019



SUBJECTIVE:  Obie Collins is a 63-year-old gentleman, who apparently had some

difficulty breathing last night.  I am surprised he was found to be severely

hypoxemic. 



X-ray now shows a small right-sided effusion, which was not present before. 



This morning, he said he is feeling better.



OBJECTIVE:  VITAL SIGNS:  His sats are 95% on 4 L, blood pressure 146/91, pulse 120,

temperature 97. 

CHEST:  Decreased breath sounds.  No wheezing CARDIAC:  Normal S1 and S2. 

ABDOMEN:  No masses.



IMPRESSION:  

1. Respiratory failure, right-sided pleural effusion.

2. Renal failure, status post transplant.



PLAN:  

1. Continue aggressive PT.

2. Continue antibiotics, neb treatment.

3. Incidentally, his labs are at his baseline.







Job ID:  [id number]

## 2019-03-14 LAB
ANION GAP SERPL CALC-SCNC: 18 MMOL/L (ref 10–20)
BUN SERPL-MCNC: 102 MG/DL (ref 8.4–25.7)
CALCIUM SERPL-MCNC: 8.8 MG/DL (ref 7.8–10.44)
CHLORIDE SERPL-SCNC: 109 MMOL/L (ref 98–107)
CO2 SERPL-SCNC: 19 MMOL/L (ref 23–31)
CREAT CL PREDICTED SERPL C-G-VRATE: 25 ML/MIN (ref 70–130)
GLUCOSE SERPL-MCNC: 133 MG/DL (ref 80–115)
HGB BLD-MCNC: 6.9 G/DL (ref 14–18)
HGB BLD-MCNC: 6.9 G/DL (ref 14–18)
HGB BLD-MCNC: 7 G/DL (ref 14–18)
HGB BLD-MCNC: 7 G/DL (ref 14–18)
INR PPP: 2
MCH RBC QN AUTO: 25.8 PG (ref 27–31)
MCV RBC AUTO: 83.7 FL (ref 78–98)
MDIFF COMPLETE?: YES
PLATELET # BLD AUTO: 140 THOU/UL (ref 130–400)
PLATELET # BLD AUTO: 142 THOU/UL (ref 130–400)
PLATELET # BLD AUTO: 142 THOU/UL (ref 130–400)
PLATELET # BLD AUTO: 143 THOU/UL (ref 130–400)
POTASSIUM SERPL-SCNC: 5 MMOL/L (ref 3.5–5.1)
PROTHROMBIN TIME: 22.7 SEC (ref 12–14.7)
RBC # BLD AUTO: 2.68 MILL/UL (ref 4.7–6.1)
SODIUM SERPL-SCNC: 141 MMOL/L (ref 136–145)
WBC # BLD AUTO: 7.3 THOU/UL (ref 4.8–10.8)

## 2019-03-14 RX ADMIN — AMOXICILLIN AND CLAVULANATE POTASSIUM SCH MG: 250; 125 TABLET, FILM COATED ORAL at 09:25

## 2019-03-14 RX ADMIN — ALBUMIN HUMAN SCH GM: 250 SOLUTION INTRAVENOUS at 21:15

## 2019-03-14 RX ADMIN — MYCOPHENOLIC ACID SCH MG: 180 TABLET, DELAYED RELEASE ORAL at 20:34

## 2019-03-14 RX ADMIN — SODIUM BICARBONATE SCH MG: 325 TABLET ORAL at 14:27

## 2019-03-14 RX ADMIN — ASPIRIN SCH MG: 81 TABLET ORAL at 09:26

## 2019-03-14 RX ADMIN — SODIUM BICARBONATE SCH MG: 325 TABLET ORAL at 09:26

## 2019-03-14 RX ADMIN — MOMETASONE FUROATE AND FORMOTEROL FUMARATE DIHYDRATE SCH PUFF: 200; 5 AEROSOL RESPIRATORY (INHALATION) at 07:04

## 2019-03-14 RX ADMIN — MOMETASONE FUROATE AND FORMOTEROL FUMARATE DIHYDRATE SCH PUFF: 200; 5 AEROSOL RESPIRATORY (INHALATION) at 18:15

## 2019-03-14 RX ADMIN — AMOXICILLIN AND CLAVULANATE POTASSIUM SCH MG: 250; 125 TABLET, FILM COATED ORAL at 20:34

## 2019-03-14 RX ADMIN — ALBUMIN HUMAN SCH GM: 250 SOLUTION INTRAVENOUS at 09:35

## 2019-03-14 RX ADMIN — SODIUM BICARBONATE SCH MG: 325 TABLET ORAL at 20:33

## 2019-03-14 RX ADMIN — MYCOPHENOLIC ACID SCH MG: 180 TABLET, DELAYED RELEASE ORAL at 09:26

## 2019-03-14 RX ADMIN — ALBUMIN HUMAN SCH GM: 250 SOLUTION INTRAVENOUS at 16:06

## 2019-03-14 NOTE — PDOC.PN
- Subjective


Encounter Start Date: 03/14/19


Encounter Start Time: 07:50





yesterday he had bloody BM, his blood count dropped, he got 1 unit of PRBC, he 

has sinus congestion, not feeling good





- Objective


Resuscitation Status - Order Detail:





03/09/19 08:16


Resuscitation Status Routine 


   Resuscitation Status: FULL: Full Resuscitation


   Discussed with: patient








MAR Reviewed: Yes


Vital Signs & Weight: 


 Vital Signs (12 hours)











  Temp Pulse Pulse Resp BP BP BP


 


 03/14/19 09:26   74    141/89 H  


 


 03/14/19 07:34  97.6 F  72   18    137/84


 


 03/14/19 07:05     24 H   


 


 03/14/19 07:04   100   24 H   


 


 03/14/19 04:35  98 F  97   15    132/83


 


 03/14/19 03:22    73  34 H   138/75 


 


 03/13/19 23:52  96.8 F L   72  16   115/70 


 


 03/13/19 22:26   91    120/80  














  Pulse Ox


 


 03/14/19 09:26 


 


 03/14/19 07:34  96


 


 03/14/19 07:05  91 L


 


 03/14/19 07:04  88 L


 


 03/14/19 04:35  96


 


 03/14/19 03:22 


 


 03/13/19 23:52  93 L


 


 03/13/19 22:26 








 Weight











Weight                         172 lb











 Most Recent Monitor Data











Heart Rate from ECG            92


 


NIBP                           147/98


 


NIBP BP-Mean                   114


 


Respiration from ECG           10


 


SpO2                           99














I&O: 


 











 03/13/19 03/14/19 03/15/19





 06:59 06:59 06:59


 


Intake Total 2675 400 


 


Output Total 550 400 


 


Balance 2125 0 











Result Diagrams: 


 03/14/19 04:25





 03/14/19 04:25


EKG Reviewed by me: Yes (afib)





Phys Exam





- Physical Examination


Constitutional: NAD


HEENT: PERRLA, moist MMs, sclera anicteric


Neck: no JVD, supple


Respiratory: no wheezing, no rhonchi


Cardiovascular: no significant murmur, irregular


Gastrointestinal: soft, non-tender, no distention, positive bowel sounds


Musculoskeletal: pulses present, edema present


Neurological: non-focal, normal sensation


Lymphatic: no nodes


Psychiatric: normal affect


Skin: no rash, normal turgor





Dx/Plan


(1) Atrial fibrillation with RVR


Code(s): I48.91 - UNSPECIFIED ATRIAL FIBRILLATION   Status: Acute   





(2) Acute on chronic diastolic ACC/AHA stage C congestive heart failure


Code(s): I50.33 - ACUTE ON CHRONIC DIASTOLIC (CONGESTIVE) HEART FAILURE   Status

: Acute   





(3) Acute worsening of stage 3 chronic kidney disease


Code(s): N18.3 - CHRONIC KIDNEY DISEASE, STAGE 3 (MODERATE)   Status: Acute   





(4) Fever


Code(s): R50.9 - FEVER, UNSPECIFIED   Status: Acute   





(5) Hyperkalemia


Code(s): E87.5 - HYPERKALEMIA   Status: Acute   





(6) Metabolic acidosis


Code(s): E87.2 - ACIDOSIS   Status: Acute   





(7) Brachial vein thrombosis


Code(s): I82.629 - ACUTE EMBOLISM AND THROMBOSIS OF DEEP VN UNSP UP EXTREM   

Status: Chronic   





(8) CAD (coronary artery disease)


Code(s): I25.10 - ATHSCL HEART DISEASE OF NATIVE CORONARY ARTERY W/O ANG PCTRS 

  Status: Chronic   


Qualifiers: 


 





(9) COPD (chronic obstructive pulmonary disease)


Status: Chronic   





(10) Cholelithiasis


Code(s): K80.20 - CALCULUS OF GALLBLADDER W/O CHOLECYSTITIS W/O OBSTRUCTION   

Status: Chronic   


Qualifiers: 


 





(11) Chronic anticoagulation


Code(s): Z79.01 - LONG TERM (CURRENT) USE OF ANTICOAGULANTS   Status: Chronic   





(12) Elevated troponin


Code(s): R74.8 - ABNORMAL LEVELS OF OTHER SERUM ENZYMES   Status: Chronic   





(13) Hypertension


Code(s): I10 - ESSENTIAL (PRIMARY) HYPERTENSION   Status: Chronic   


Qualifiers: 


 





(14) Kidney transplant status


Code(s): Z94.0 - KIDNEY TRANSPLANT STATUS   Status: Chronic   Comment: its been 

11 yrs now   





(15) Normocytic anemia


Code(s): D64.9 - ANEMIA, UNSPECIFIED   Status: Chronic   





(16) Paroxysmal A-fib


Code(s): I48.0 - PAROXYSMAL ATRIAL FIBRILLATION   Status: Chronic   





(17) Severe mitral regurgitation by prior echocardiogram


Code(s): I34.0 - NONRHEUMATIC MITRAL (VALVE) INSUFFICIENCY   Status: Chronic   





(18) Severe tricuspid regurgitation by prior echocardiogram


Code(s): I07.1 - RHEUMATIC TRICUSPID INSUFFICIENCY   Status: Chronic   





(19) GI bleed


Code(s): K92.2 - GASTROINTESTINAL HEMORRHAGE, UNSPECIFIED   Status: Acute   





(20) Anemia due to acute blood loss


Code(s): D62 - ACUTE POSTHEMORRHAGIC ANEMIA   Status: Acute   





- Plan


cont current plan of care





* GI will be consulted


* given 1 unit PRBC


* will give if needed 1 unit for H & H < 7


* will dc heparin


* medication reviewed as below


* symptomatic treatment


* will monitor 


* add flonase nasal spray.








Review of Systems





- Review of Systems


Constitutional: malaise.  negative: fever, chills, sweats, weakness, other


ENT: negative: Ear Pain, Ear Discharge, Nose Pain, Nose Discharge, Nose 

Congestion, Mouth Pain, Mouth Swelling, Throat Pain, Throat Swelling, Other


Respiratory: negative: Cough, Dry, Shortness of Breath, Hemoptysis, SOB with 

Excertion, Pleuritic Pain, Sputum, Wheezing


Cardiovascular: negative: chest pain, palpitations, orthopnea, paroxysmal 

nocturnal dyspnea, edema, light headedness, other


Gastrointestinal: Hematochezia.  negative: Nausea, Vomiting, Abdominal Pain, 

Diarrhea, Constipation, Melena, Other


Genitourinary: negative: Dysuria, Frequency, Incontinence, Hematuria, Retention

, Other


Musculoskeletal: negative: Neck Pain, Shoulder Pain, Arm Pain, Back Pain, Hand 

Pain, Leg Pain, Foot Pain, Other





- Medications/Allergies


Allergies/Adverse Reactions: 


 Allergies











Allergy/AdvReac Type Severity Reaction Status Date / Time


 


No Known Drug Allergies Allergy Unknown  Verified 02/25/18 12:37











Medications: 


 Current Medications





Albumin Human (Albumin 25%)  25 gm IVPB 0400,1000,1600,2200 [facility]


   Stop: 03/15/19 04:01


   Last Admin: 03/14/19 09:35 Dose:  25 gm


Albuterol/Ipratropium (Duoneb)  3 ml NEB [id number] [facility]


   Last Admin: 03/14/19 07:05 Dose:  3 ml


Amoxicillin/Clavulanate Potassium (Augmentin)  250 mg PO Q12HR [facility]


   Last Admin: 03/14/19 09:25 Dose:  250 mg


Aspirin (Ecotrin)  81 mg PO DAILY [facility]


   Last Admin: 03/14/19 09:26 Dose:  81 mg


Atorvastatin Calcium (Lipitor)  20 mg PO HS [facility]


   Last Admin: 03/13/19 22:26 Dose:  20 mg


Fluticasone Propionate (Flonase Nasal Spray)  0 gm NASAL DAILY [facility]


   Last Admin: 03/14/19 09:24 Dose:  1 applic


Fluticasone Propionate (Flonase Nasal Spray)  0 gm NASAL DAILY [facility]


Hydralazine HCl (Apresoline)  25 mg PO BID [facility]


   Last Admin: 03/14/19 09:26 Dose:  25 mg


Metoprolol Succinate (Toprol Xl)  75 mg PO DAILY [facility]


   Last Admin: 03/14/19 09:26 Dose:  75 mg


Mometasone Furoate/Formoterol Fumar (Dulera 200 Mcg/5 Mcg Inhaler)  2 puff INH 

BID-RT [facility]


   Last Admin: 03/14/19 07:04 Dose:  2 puff


Mycophenolate Sodium (Myfortic)  360 mg PO BID [facility]


   Last Admin: 03/14/19 09:26 Dose:  360 mg


Sildenafil Citrate (Revatio)  20 mg PO TID [facility]


   Last Admin: 03/14/19 09:25 Dose:  20 mg


Sodium Bicarbonate (Bicarbonate, Sodium)  325 mg PO TID [facility]


   Last Admin: 03/14/19 09:26 Dose:  325 mg


Tacrolimus (Prograf)  1 mg PO BID [facility]


   Last Admin: 03/14/19 09:27 Dose:  1 mg


Tamsulosin HCl (Flomax)  0.4 mg PO HS [facility]


   Last Admin: 03/13/19 22:26 Dose:  0.4 mg


Tramadol HCl (Ultram)  50 mg PO Q6H [facility]


   PRN Reason: Pain

## 2019-03-14 NOTE — PDOC.CTH
Cardiology Progress Note





- Subjective





EP PROGRESS NOTE: 3/14/19





Seen as follow up for atrial arrhythmias and non sustained VT. He is not having 

any cardiac concerns or complaints today. Feels weak and is bothered by the 

difficulty with drawing his blood of late. Breathing is improved today








- Objective


 Vital Signs











  Temp Pulse Pulse Resp BP BP BP


 


 03/14/19 13:46   81   24 H   


 


 03/14/19 13:00       


 


 03/14/19 11:09  98.4 F  83   22 H    138/86


 


 03/14/19 09:26   74    141/89 H  


 


 03/14/19 07:34  97.6 F  72   18    137/84


 


 03/14/19 07:05     24 H   


 


 03/14/19 07:04   100   24 H   


 


 03/14/19 04:35  98 F  97   15    132/83


 


 03/14/19 03:22    73  34 H   138/75 














  Pulse Ox


 


 03/14/19 13:46  88 L


 


 03/14/19 13:00  92 L


 


 03/14/19 11:09  95


 


 03/14/19 09:26 


 


 03/14/19 07:34  96


 


 03/14/19 07:05  91 L


 


 03/14/19 07:04  88 L


 


 03/14/19 04:35  96


 


 03/14/19 03:22 








 











Weight                         172 lb














 











 03/13/19 03/14/19 03/15/19





 06:59 06:59 06:59


 


Intake Total 2675 400 


 


Output Total 550 400 


 


Balance 2125 0 














- Physical Examination


General/Neuro: alert & oriented x3, NAD


Neck: carotid US brisk, no JVD present


Lungs: CTA, unlabored respirations


Heart: PMI normal


Abdomen: NT/ND, soft





- Telemetry


Telemetry Rhythm: AF





- Labs


Result Diagrams: 


 03/14/19 12:59





 03/14/19 04:25


 Troponin/CKMB











CK-MB (CK-2)  1.5 ng/mL (0-6.6)   03/09/19  02:31    


 


Troponin I  0.090 ng/mL (< 0.028)  H  03/09/19  09:01    














- Assessment/Plan





1. Chronic atrial arrhythmias


   -A Fib and atypical flutter, but cannot rule out right sided flutter. 


   -RVR over HS and this AM. Rate control worsened by fluid overload. 


2. Right bundle branch block


3. Non sustained ventricular tachycardia


   -continue betablocker therapy


   -asymptomatic


4. CHADS2-VASC: </= 2


   - continue warfarin. appreciate hospitalist management of dosing for OAC


   - goal INR 2.0-3.0


   - remains subtherapeutic but improving, INR 1.7 today. 


5. Hypertension


   -per hospitalist group


6. Fluid overload/CHF


   - diuresing today per Dr Lehman





Increased Toprol XL to 75mg QD on 3/13.Rate control is improved today. 





Multiple chronic and severe co-morbidities make him a poor ablative candidate 

and he has no good antiarrhythmic options. He remains asymptomatic with his AF. 

Continue rate control and betablocker for his wide complex tachycardia, which 

is likely NSVT. If typical atrial flutter is clearly seen, would consider CTI 

ablation. 





EP signing off. Continue rate control. Please reach out if further evaluation 

is indicated or new rhythm issues are seen.

## 2019-03-14 NOTE — PRG
DATE OF SERVICE:  03/14/2019



SUBJECTIVE:  Mr. Collins is a 63-year-old black male, followed up for his renal

transplant.  In addition, he had a superimposed acute kidney injury on top of his

chronic renal failure.  I have decided to give him albumin.  Creatinine is slightly

improved from 3.6 to a most recent value of 3.3.  He was also on a diuretic due to

increased lung markings from the chest x-ray.  He is complaining of moderate pain on

the bilateral feet.  Tramadol 50 mg q.6 will be ordered.  No other complaints.  No

chest pain.  Positive for shortness of breath last night. 



PHYSICAL EXAMINATION:

VITAL SIGNS:  Blood pressure 141/89, heart rate 72, respiratory rate 18, temperature

97.6, and pulse ox 96%. 

GENERAL:  Awake, alert, supine, and comfortable, not in distress. 

SKIN:  Adequate turgor. 

HEENT:  He has a pale conjunctivae.  Anicteric sclerae. 

NECK:  No neck mass.  No carotid bruits.  No JVD. 

CHEST:  No deformities. 

LUNGS:  Decreased breath sounds. 

HEART:  Normal sinus rhythm.  No murmurs.  No gallops.  No rubs. 

ABDOMEN:  Globular, soft, and nontender.  No masses. 

EXTREMITIES:  No edema.  No deformities.



MEDICATIONS:  Medications of March 14, 2019, reviewed.



LABORATORY DATA:  Laboratories of March 14, 2019; hemoglobin 7.  Sodium 141,

potassium 5, chloride 109, carbon dioxide 19, , creatinine 3.37, glucose 133,

and calcium 8.8. 



ASSESSMENT AND PLAN:  

1. Acute kidney injury/chronic renal failure, stabilizing renal function with

initiation of albumin infusion.  Continue judicious use of diuretics as needed. 

2. Status post renal transplant, continuing current immunosuppressive regimen with

this patient.  Tacrolimus level on March 11, 2019, was noted at 16.4.  For this

reason, we will be holding off his tacrolimus temporarily. 

3. Shortness of breath, multifactorial etiology.  Chronic obstructive pulmonary

disease exacerbation/congestive heart failure  ? Continue supportive care.  Continue

DuoNeb. 

4. Chronic foot pain - tramadol 50 mg q.6 p.r.n.

5. Anemia, p.r.n. blood transfusion.







Job ID:  [id number]

## 2019-03-14 NOTE — PRG
DATE OF SERVICE:  03/14/2019



SUBJECTIVE:  This morning, he said he is having difficulty breathing though his

saturations are 97% on 4 L. 



OBJECTIVE:  VITAL SIGNS:  Temperature 96, blood pressure 141/89, and respiratory

rate 18. 

CHEST:  Decreased breath sounds.  No wheezing. 

CARDIAC:  Normal S1 and S2.  No gallops. 

ABDOMEN:  Soft.



DIAGNOSTIC DATA:  X-ray yesterday showed a small pleural effusion.



LABORATORY DATA:  Hemoglobin and hematocrit 7 and 22, platelet count is normal.  BUN

and creatinine are 102 and 3.37.  He is getting albumin this morning. 



IMPRESSION:  Renal failure, status post transplant, right pleural effusion, chronic

obstructive pulmonary disease. 



PLAN:  At this stage, continue supportive care, neb treatments. 



We will follow.







Job ID:  [id number]

## 2019-03-15 LAB
ALBUMIN SERPL BCG-MCNC: 4.1 G/DL (ref 3.4–4.8)
ALP SERPL-CCNC: 142 U/L (ref 40–150)
ALT SERPL W P-5'-P-CCNC: 137 U/L (ref 8–55)
ANION GAP SERPL CALC-SCNC: 18 MMOL/L (ref 10–20)
AST SERPL-CCNC: 84 U/L (ref 5–34)
BASOPHILS # BLD AUTO: 0 THOU/UL (ref 0–0.2)
BASOPHILS NFR BLD AUTO: 0.1 % (ref 0–1)
BILIRUB SERPL-MCNC: 1.1 MG/DL (ref 0.2–1.2)
BUN SERPL-MCNC: 101 MG/DL (ref 8.4–25.7)
CALCIUM SERPL-MCNC: 9.4 MG/DL (ref 7.8–10.44)
CHLORIDE SERPL-SCNC: 108 MMOL/L (ref 98–107)
CO2 SERPL-SCNC: 18 MMOL/L (ref 23–31)
CREAT CL PREDICTED SERPL C-G-VRATE: 27 ML/MIN (ref 70–130)
EOSINOPHIL # BLD AUTO: 0.1 THOU/UL (ref 0–0.7)
EOSINOPHIL NFR BLD AUTO: 1.3 % (ref 0–10)
GLOBULIN SER CALC-MCNC: 2.4 G/DL (ref 2.4–3.5)
GLUCOSE SERPL-MCNC: 172 MG/DL (ref 80–115)
HGB BLD-MCNC: 7.1 G/DL (ref 14–18)
INR PPP: 2
LYMPHOCYTES # BLD: 1.7 THOU/UL (ref 1.2–3.4)
LYMPHOCYTES NFR BLD AUTO: 23.8 % (ref 21–51)
MCH RBC QN AUTO: 26 PG (ref 27–31)
MCV RBC AUTO: 84.5 FL (ref 78–98)
MONOCYTES # BLD AUTO: 1 THOU/UL (ref 0.11–0.59)
MONOCYTES NFR BLD AUTO: 14.2 % (ref 0–10)
NEUTROPHILS # BLD AUTO: 4.2 THOU/UL (ref 1.4–6.5)
NEUTROPHILS NFR BLD AUTO: 60.5 % (ref 42–75)
PLATELET # BLD AUTO: 120 THOU/UL (ref 130–400)
POTASSIUM SERPL-SCNC: 5 MMOL/L (ref 3.5–5.1)
PROTHROMBIN TIME: 22.3 SEC (ref 12–14.7)
RBC # BLD AUTO: 2.73 MILL/UL (ref 4.7–6.1)
SODIUM SERPL-SCNC: 139 MMOL/L (ref 136–145)
WBC # BLD AUTO: 7 THOU/UL (ref 4.8–10.8)

## 2019-03-15 RX ADMIN — MYCOPHENOLIC ACID SCH MG: 180 TABLET, DELAYED RELEASE ORAL at 08:33

## 2019-03-15 RX ADMIN — MYCOPHENOLIC ACID SCH MG: 180 TABLET, DELAYED RELEASE ORAL at 21:01

## 2019-03-15 RX ADMIN — ALBUMIN HUMAN SCH GM: 250 SOLUTION INTRAVENOUS at 07:15

## 2019-03-15 RX ADMIN — ALBUMIN HUMAN SCH GM: 250 SOLUTION INTRAVENOUS at 12:33

## 2019-03-15 RX ADMIN — SODIUM BICARBONATE SCH MG: 325 TABLET ORAL at 08:33

## 2019-03-15 RX ADMIN — AMOXICILLIN AND CLAVULANATE POTASSIUM SCH MG: 250; 125 TABLET, FILM COATED ORAL at 08:33

## 2019-03-15 RX ADMIN — ASPIRIN SCH MG: 81 TABLET ORAL at 08:33

## 2019-03-15 RX ADMIN — SODIUM BICARBONATE SCH MG: 325 TABLET ORAL at 15:05

## 2019-03-15 RX ADMIN — ALBUMIN HUMAN SCH GM: 250 SOLUTION INTRAVENOUS at 04:40

## 2019-03-15 RX ADMIN — MOMETASONE FUROATE AND FORMOTEROL FUMARATE DIHYDRATE SCH PUFF: 200; 5 AEROSOL RESPIRATORY (INHALATION) at 19:01

## 2019-03-15 RX ADMIN — AMOXICILLIN AND CLAVULANATE POTASSIUM SCH MG: 250; 125 TABLET, FILM COATED ORAL at 21:02

## 2019-03-15 RX ADMIN — SODIUM BICARBONATE SCH MG: 325 TABLET ORAL at 21:01

## 2019-03-15 RX ADMIN — ALBUMIN HUMAN SCH GM: 250 SOLUTION INTRAVENOUS at 21:00

## 2019-03-15 RX ADMIN — MOMETASONE FUROATE AND FORMOTEROL FUMARATE DIHYDRATE SCH PUFF: 200; 5 AEROSOL RESPIRATORY (INHALATION) at 06:58

## 2019-03-15 NOTE — PRG
DATE OF SERVICE:  03/15/2019



SUBJECTIVE:  This morning, he denies any shortness of breath.



OBJECTIVE:  VITAL SIGNS:  Apparently, sats were low on a Venti mask, it is 96,

respirations 16,  temperature is 97, blood pressure 115/88. 

CHEST:  Decreased breath sounds, right greater than left. 

CARDIAC:  Normal S1, S2.   

ABDOMEN:  No masses.



LABORATORY DATA:  White count is normal.  H and H are 7 and 23.  INR is 2.

Creatinine is 3, BUN is 18. 



ASSESSMENT:  

1. Chronic renal failure, status post failed transplant.

2. Pleural effusion.

3. Supraventricular tachycardia.

4. Respiratory failure.

5. Chronic obstructive pulmonary disease.



PLAN:  Continue empiric antibiotics, supportive care. 

We will follow.







Job ID:  [id number]

## 2019-03-15 NOTE — PDOC.PN
- Subjective


Encounter Start Date: 03/15/19


Encounter Start Time: 08:00





Patient seen and examined. No new complaints. No overnight events





- Objective


Resuscitation Status - Order Detail:





03/09/19 08:16


Resuscitation Status Routine 


   Resuscitation Status: FULL: Full Resuscitation


   Discussed with: patient








MAR Reviewed: Yes


Vital Signs & Weight: 


 Vital Signs (12 hours)











  Temp Pulse Resp BP Pulse Ox


 


 03/15/19 12:26   68  18   92 L


 


 03/15/19 11:40  96.7 F L  68  17  163/98 H  93 L


 


 03/15/19 07:05  97.6 F  69  16  151/88 H  96


 


 03/15/19 06:58   78  24 H   82 L


 


 03/15/19 06:57   82  18   82 L


 


 03/15/19 04:30      96


 


 03/15/19 04:16  97.4 F L  70  17  156/84 H 








 Weight











Weight                         173 lb 12.8 oz











 Most Recent Monitor Data











Heart Rate from ECG            92


 


NIBP                           147/98


 


NIBP BP-Mean                   114


 


Respiration from ECG           10


 


SpO2                           99














I&O: 


 











 03/14/19 03/15/19 03/16/19





 06:59 06:59 06:59


 


Intake Total 400 1500 


 


Output Total 400 700 


 


Balance 0 800 











Result Diagrams: 


 03/15/19 05:04





 03/15/19 05:04


EKG Reviewed by me: Yes (afib)





Phys Exam





- Physical Examination


Constitutional: NAD


HEENT: PERRLA, moist MMs, oral pharynx no lesions


Neck: no JVD, supple


Respiratory: no wheezing, no rales, no rhonchi


Cardiovascular: no significant murmur, irregular


Gastrointestinal: soft, non-tender, no distention


Musculoskeletal: no edema, pulses present


Neurological: non-focal, normal sensation


Lymphatic: no nodes


Psychiatric: normal affect


Skin: no rash, normal turgor





Dx/Plan


(1) Atrial fibrillation with RVR


Code(s): I48.91 - UNSPECIFIED ATRIAL FIBRILLATION   Status: Acute   





(2) Acute on chronic diastolic ACC/AHA stage C congestive heart failure


Code(s): I50.33 - ACUTE ON CHRONIC DIASTOLIC (CONGESTIVE) HEART FAILURE   Status

: Acute   





(3) Acute worsening of stage 3 chronic kidney disease


Code(s): N18.3 - CHRONIC KIDNEY DISEASE, STAGE 3 (MODERATE)   Status: Acute   





(4) Fever


Code(s): R50.9 - FEVER, UNSPECIFIED   Status: Acute   





(5) Hyperkalemia


Code(s): E87.5 - HYPERKALEMIA   Status: Acute   





(6) Metabolic acidosis


Code(s): E87.2 - ACIDOSIS   Status: Acute   





(7) Brachial vein thrombosis


Code(s): I82.629 - ACUTE EMBOLISM AND THROMBOSIS OF DEEP VN UNSP UP EXTREM   

Status: Chronic   





(8) CAD (coronary artery disease)


Code(s): I25.10 - ATHSCL HEART DISEASE OF NATIVE CORONARY ARTERY W/O ANG PCTRS 

  Status: Chronic   


Qualifiers: 


 





(9) COPD (chronic obstructive pulmonary disease)


Status: Chronic   





(10) Cholelithiasis


Code(s): K80.20 - CALCULUS OF GALLBLADDER W/O CHOLECYSTITIS W/O OBSTRUCTION   

Status: Chronic   


Qualifiers: 


 





(11) Chronic anticoagulation


Code(s): Z79.01 - LONG TERM (CURRENT) USE OF ANTICOAGULANTS   Status: Chronic   





(12) Elevated troponin


Code(s): R74.8 - ABNORMAL LEVELS OF OTHER SERUM ENZYMES   Status: Chronic   





(13) Hypertension


Code(s): I10 - ESSENTIAL (PRIMARY) HYPERTENSION   Status: Chronic   


Qualifiers: 


 





(14) Kidney transplant status


Code(s): Z94.0 - KIDNEY TRANSPLANT STATUS   Status: Chronic   Comment: its been 

11 yrs now   





(15) Normocytic anemia


Code(s): D64.9 - ANEMIA, UNSPECIFIED   Status: Chronic   





(16) Paroxysmal A-fib


Code(s): I48.0 - PAROXYSMAL ATRIAL FIBRILLATION   Status: Chronic   





(17) Severe mitral regurgitation by prior echocardiogram


Code(s): I34.0 - NONRHEUMATIC MITRAL (VALVE) INSUFFICIENCY   Status: Chronic   





(18) Severe tricuspid regurgitation by prior echocardiogram


Code(s): I07.1 - RHEUMATIC TRICUSPID INSUFFICIENCY   Status: Chronic   





(19) GI bleed


Code(s): K92.2 - GASTROINTESTINAL HEMORRHAGE, UNSPECIFIED   Status: Acute   





(20) Anemia due to acute blood loss


Code(s): D62 - ACUTE POSTHEMORRHAGIC ANEMIA   Status: Acute   





- Plan


cont current plan of care





* pt refuses blood transfusion


* medication reviewed as below


* symptomatic treatment


* monitor labs.








Review of Systems





- Review of Systems


ENT: negative: Ear Pain, Ear Discharge, Nose Pain, Nose Discharge, Nose 

Congestion, Mouth Pain, Mouth Swelling, Throat Pain, Throat Swelling, Other


Respiratory: negative: Cough, Dry, Shortness of Breath, Hemoptysis, SOB with 

Excertion, Pleuritic Pain, Sputum, Wheezing


Cardiovascular: negative: chest pain, palpitations, orthopnea, paroxysmal 

nocturnal dyspnea, edema, light headedness, other


Gastrointestinal: negative: Nausea, Vomiting, Abdominal Pain, Diarrhea, 

Constipation, Melena, Hematochezia, Other


Genitourinary: negative: Dysuria, Frequency, Incontinence, Hematuria, Retention

, Other


Musculoskeletal: negative: Neck Pain, Shoulder Pain, Arm Pain, Back Pain, Hand 

Pain, Leg Pain, Foot Pain, Other





- Medications/Allergies


Allergies/Adverse Reactions: 


 Allergies











Allergy/AdvReac Type Severity Reaction Status Date / Time


 


No Known Drug Allergies Allergy Unknown  Verified 02/25/18 12:37











Medications: 


 Current Medications





Albumin Human (Albumin 25%)  25 gm IVPB Q6H [facility]


   Stop: 03/16/19 01:01


   Last Admin: 03/15/19 12:33 Dose:  25 gm


Albuterol/Ipratropium (Duoneb)  3 ml NEB [id number] [facility]


   Last Admin: 03/15/19 12:26 Dose:  3 ml


Amoxicillin/Clavulanate Potassium (Augmentin)  250 mg PO Q12HR [facility]


   Last Admin: 03/15/19 08:33 Dose:  250 mg


Aspirin (Ecotrin)  81 mg PO DAILY [facility]


   Last Admin: 03/15/19 08:33 Dose:  81 mg


Atorvastatin Calcium (Lipitor)  20 mg PO HS [facility]


   Last Admin: 03/14/19 20:38 Dose:  20 mg


Fluticasone Propionate (Flonase Nasal Spray)  0 gm NASAL DAILY [facility]


   Last Admin: 03/15/19 08:32 Dose:  1 applic


Fluticasone Propionate (Flonase Nasal Spray)  0 gm NASAL DAILY [facility]


   Last Admin: 03/15/19 08:32 Dose:  Not Given


Hydralazine HCl (Apresoline)  25 mg PO BID [facility]


   Last Admin: 03/15/19 08:33 Dose:  25 mg


Metoprolol Succinate (Toprol Xl)  75 mg PO DAILY [facility]


   Last Admin: 03/15/19 08:32 Dose:  75 mg


Mometasone Furoate/Formoterol Fumar (Dulera 200 Mcg/5 Mcg Inhaler)  2 puff INH 

BID-RT [facility]


   Last Admin: 03/15/19 06:58 Dose:  2 puff


Mycophenolate Sodium (Myfortic)  360 mg PO BID [facility]


   Last Admin: 03/15/19 08:33 Dose:  360 mg


Sildenafil Citrate (Revatio)  20 mg PO TID [facility]


   Last Admin: 03/15/19 08:33 Dose:  20 mg


Sodium Bicarbonate (Bicarbonate, Sodium)  325 mg PO TID [facility]


   Last Admin: 03/15/19 08:33 Dose:  325 mg


Tamsulosin HCl (Flomax)  0.4 mg PO HS [facility]


   Last Admin: 03/14/19 20:34 Dose:  0.4 mg


Tramadol HCl (Ultram)  50 mg PO Q6H [facility]


   PRN Reason: Pain


   Last Admin: 03/14/19 20:32 Dose:  50 mg

## 2019-03-15 NOTE — PRG
DATE OF SERVICE:  03/15/2019



SUBJECTIVE:  Mr. Collins is a 63-year-old black male who was seen by the Renal

Service for his acute kidney injury on top of his chronic renal failure.  In

addition, he is being followed up for his renal transplant.  The patient was

initially admitted for shortness of breath.  The shortness of breath was felt to be

multifactorial - mild CHF with COPD exacerbation.  His renal function in the interim

has minimally improved.  He has received albumin infusion as well as normal saline.

Due to worsening shortness of breath, he has also been placed on furosemide.

Furthermore, he had an episode of GI bleed.  Gastroenterologist has been consulted

and he is being evaluated.  The patient declined any endoscopy at the present time.

He is still mildly short of breath today.  No chest pain. 



OBJECTIVE:  VITAL SIGNS:  Blood pressure 156/84, heart rate 78, respiratory rate 24,

O2 saturation 82% - will be converted to a Ventimask. 

GENERAL:  Noted to be awake, can follow commands.  Oriented. 

SKIN:  Adequate turgor. 

HEENT:  Slightly pale conjunctivae.  Anicteric sclerae.  No neck mass.  No carotid

bruits.  No JVD. 

CHEST:  No deformities. 

LUNGS:  Decreased breath sounds. 

HEART:  Normal sinus rhythm.  No murmurs, gallops, or rubs. 

ABDOMEN:  Globular, soft, and nontender.  No masses. 

EXTREMITIES:  No edema.  No deformities.



MEDICATIONS:  Medications of 03/15/2019 was reviewed.



LABORATORY DATA:  Laboratories of 03/15/2019; white count 7, hemoglobin 7.1.  Sodium

139, potassium 5, chloride 108, carbon dioxide 18, , creatinine 3.08, and

glucose 172.  AST 84, .  On 03/11/2019, tacrolimus level 16.4. 



ASSESSMENT AND PLAN:  

1. Acute kidney injury/chronic renal failure.  I still suspect a

hemodynamically-mediated renal dysfunction.  The patient is currently receiving

albumin infusion.  There is some degree of improvement of the renal function.  There

is no indication for any dialytic intervention. 

2. Anemia.  Continue supportive care.  The patient most likely has gastrointestinal

bleed.  Please note, the patient's Coumadin is on hold. 

3. Status post renal transplant.  My last tacrolimus level was noted to be elevated.

 For this reason, tacrolimus is now plays on hold.  He is currently on Myfortic. 

4. Shortness of breath, multifactorial etiology.  Underlying chronic obstructive

pulmonary disease with ?of mild congestive heart failure. 

5. The patient is being followed up by Cardiology as well as by Pulmonary.

6. ?Gastrointestinal bleed.  Continue supportive care, p.r.n. blood transfusion.  GI

following. 

7. Overall prognosis remains guarded.







Job ID:  [id number]

## 2019-03-15 NOTE — CON
DATE OF CONSULTATION:  03/14/2019



REASON FOR CONSULTATION:  GI bleeding and anemia.



HISTORY OF PRESENT ILLNESS:  Obie Collins is a 63-year-old  man,

who was admitted to the hospital 6 days ago on 03/09/2019.  He has a significant

past medical history of end-stage renal disease, status post renal transplant,

chronic atrial fibrillation for which he is on Coumadin.  He has pulmonary

hypertension, COPD with chronic respiratory failure, on home oxygen.  He has severe

mitral and tricuspid regurgitations and moderate aortic regurgitation.  He has a

history of aortoiliac bypass and coronary artery bypass surgery.  He was admitted to

the hospital 6 days ago with worsening fluid retention and shortness of breath over

the prior week.  He has been treated with diuretics and respiratory support.

Overall, it appears he has been stable, though he is requiring facemask ventilation

today to keep his saturations above 90%.  Yesterday by report, he evidently passed a

single bloody bowel movement.  The patient himself did not see this and cannot

really tell me exactly what it looked like or how much it was.  His blood count did

drop from 9.2 down to 6.9 from yesterday till today.  He has received 1 unit of RBC

transfusion.  Hemoglobin this afternoon is stable at 7.0.  He has remained otherwise

hemodynamically stable.  His BUN and creatinine are both rising over the course of

the hospitalization.  The patient himself feels like he has undergone EGD and

colonoscopy at some point in the distant past, but there is no record of that here.

He was evaluated by my GI colleague, Dr. Cisse last May 2018 for acute on chronic

anemia.  At that time, the decision was made not to pursue endoscopic investigation,

his hemoglobin had stabilized and due to the patient's numerous comorbidities. 



REVIEW OF SYSTEMS:  Full review of systems including constitutional, head, eyes,

ears, nose, throat, GI, , cardiovascular, respiratory, musculoskeletal, neurologic

systems is negative except as noted in the HPI. 



PAST MEDICAL HISTORY:  End-stage renal disease, status post renal transplant, now

chronic kidney disease, stage 3.  Chronic atrial fibrillation/atrial flutter, on

Coumadin.  Pulmonary hypertension, COPD, chronic respiratory failure, on home

oxygen.  Chronic diastolic heart failure.  Dyslipidemia, hypertension, severe mitral

regurgitation, moderate aortic regurgitation, severe tricuspid regurgitation,

moderate pulmonic regurgitation.  Coronary artery bypass surgery, aortoiliac bypass

surgery bilaterally. 



SOCIAL HISTORY:  No smoking or alcohol use.



FAMILY HISTORY:  Hypertension.



ALLERGIES:  NO KNOWN DRUG ALLERGIES.



MEDICATIONS:  

1. IV albumin 25 g q.6 hours.

2. DuoNeb q.6 hours.

3. Augmentin 250 mg p.o. q.12 hours.

4. Aspirin 81 mg daily.

5. Lipitor.

6. Flonase.

7. Hydralazine 25 mg b.i.d.

8. Metoprolol 75 mg daily.

9. Dulera inhaler two puffs b.i.d.

10. Mycophenolate 360 mg b.i.d.

11. Revatio 20 mg t.i.d.

12. Flomax.

13. Ultram.

14. Coumadin (discontinued yesterday afternoon).

15. Subcu heparin (discontinued yesterday afternoon).



PHYSICAL EXAMINATION:

VITAL SIGNS:  Temperature 98.2, pulse 71, blood pressure 150/87.  Oxygen saturation

is 88% on 4 L nasal cannula, 95% on 15 L by Venturi face mask. 

GENERAL:  Chronically ill 63-year-old man, sitting up in bed, in mild respiratory

distress. 

SKIN:  No jaundice.  No rashes are palpable. 

EYES:  No scleral icterus.  Extraocular movements are intact. 

ENT:  Mucous membranes moist.  No oral lesions. 

LYMPH:  No submandibular or supraclavicular lymphadenopathy.  Thyroid nontender to

palpation. 

HEART:  Regular rate and rhythm. 

LUNGS:  Faint wheezing bilaterally.  Bibasilar crackles.  Mild tachypnea. 

ABDOMEN:  Mild distention, tympanitic to percussion.  Soft and nontender to

palpation throughout.  No masses or organomegaly appreciated. 

EXTREMITIES:  No peripheral edema. 

VESSELS:  Radial pulses 2+ bilaterally 

NEUROLOGIC:  Cranial nerves 2 through 12 intact bilaterally.  No focal deficits.



LABORATORY STUDIES:  Hemoglobin was 9.2 yesterday, dropped to 6.9 this morning.

This afternoon, it is stable at 7.0.  WBC 7.3, platelets 142.  INR 2.0, ,

creatinine rising over this course of the hospitalization from 1.76 on admission now

to 3.37.  BNP is 1380.  Total bilirubin 3.3, alkaline phosphatase 173, AST 25, ALT

14, albumin 3.6. 



ASSESSMENT AND PLAN:  

1. Gastrointestinal bleeding, evidently single episode of blood in the stool

yesterday. 

2. Acute on chronic anemia.  It appears the patient's baseline hemoglobin is

somewhere between 8 and 10.  It did drop acutely to 6.9 this morning, but seems to

stabilize this afternoon.  This likely does represent acute blood loss from episode

of gastrointestinal bleeding. 

3. Severe chronic obstructive pulmonary disease with chronic respiratory failure and

current hypoxia. 

4. Congestive heart failure.

5. Atrial fibrillation, on Coumadin. 

This is a difficult situation.  I discussed it thoroughly with the patient.  He did

have an acute drop in hemoglobin commensurate with evidently a witnessed episode of

bleeding.  We discussed a wide differential for GI bleeding lesion.  Normally,

endoscopic investigation would be recommended.  On the other hand, the patient would

certainly be quite high risk for any endoscopic procedure, primary risk would be

cardiopulmonary, and he might need to be intubated during or after the procedure.

This is not a risk he is really willing to entertain.  He declines any endoscopic

investigation for now, and I think that is very reasonable.  With regard to the need

for anticoagulation, it is difficult to know what to do at this point.  I would

continue to hold it for now, trend the H and H, and see if bleeding of something

that becomes recurrent.  But for now, no plan for any endoscopic investigation.

Trend H and H and transfuse as needed.  GI can follow along tomorrow. 



Thank you for the consultation.  Please call anytime with questions or concerns.







Job ID:  [id number]

## 2019-03-15 NOTE — PRG
DATE OF SERVICE:  03/15/2019



SUBJECTIVE:  Mr. Collins says he is feeling about the same.  He does feel that his

breathing is a little easier today.  He had a single bowel movement overnight, which

nursing described as dark, no hematochezia.  His hemoglobin is stable this morning

at 7.1. 



OBJECTIVE:  VITAL SIGNS:  Temperature 97.6, pulse 69, blood pressure 151/88, 96%

oxygen saturation on 4 L nasal cannula, overnight, he was desaturating down to 82%. 

GENERAL:  No acute distress. 

HEART:  Regular rate and rhythm. 

LUNGS:  Bibasilar crackles. 

ABDOMEN:  Soft, nontender to palpation. 

EXTREMITIES:  No peripheral edema.



LABORATORY STUDIES:  Hemoglobin 7.1, WBC 7.0, platelets 120.  INR is 2.0.  Sodium

139, potassium 5.0, , creatinine 3.08, total bilirubin 1.1, alkaline

phosphatase 142, AST 84, . 



ASSESSMENT/PLAN:  

1. Gastrointestinal bleeding.

2. Acute-on-chronic anemia, stable this morning.  I again discussed the situation

with the patient.  Thankfully, his hemoglobin has stabilized this morning.  Whatever

was bleeding does seem to be resolving.  He remains quite high risk for any

endoscopic procedure and he declines that possibility at this time and I think this

is reasonable. 

3. Gastroenterology will sign off, but please call back with any questions or

concerns. 







Job ID:  [id number]

## 2019-03-16 LAB
ANION GAP SERPL CALC-SCNC: 16 MMOL/L (ref 10–20)
ANISOCYTOSIS BLD QL SMEAR: (no result) (100X)
BUN SERPL-MCNC: 91 MG/DL (ref 8.4–25.7)
CALCIUM SERPL-MCNC: 9.9 MG/DL (ref 7.8–10.44)
CHLORIDE SERPL-SCNC: 108 MMOL/L (ref 98–107)
CO2 SERPL-SCNC: 20 MMOL/L (ref 23–31)
CREAT CL PREDICTED SERPL C-G-VRATE: 32 ML/MIN (ref 70–130)
GLUCOSE SERPL-MCNC: 133 MG/DL (ref 80–115)
HGB BLD-MCNC: 7.1 G/DL (ref 14–18)
HGB BLD-MCNC: 7.2 G/DL (ref 14–18)
INR PPP: 1.9
IRON SERPL-MCNC: 20 UG/DL (ref 65–175)
MCH RBC QN AUTO: 26 PG (ref 27–31)
MCV RBC AUTO: 82.6 FL (ref 78–98)
MDIFF COMPLETE?: YES
PLATELET # BLD AUTO: 94 THOU/UL (ref 130–400)
PLATELET # BLD AUTO: 98 THOU/UL (ref 130–400)
POLYCHROMASIA BLD QL SMEAR: (no result) (100X)
POTASSIUM SERPL-SCNC: 4.9 MMOL/L (ref 3.5–5.1)
PROTHROMBIN TIME: 21.5 SEC (ref 12–14.7)
RBC # BLD AUTO: 2.71 MILL/UL (ref 4.7–6.1)
SCHISTOCYTES BLD QL SMEAR: (no result) (100X)
SODIUM SERPL-SCNC: 139 MMOL/L (ref 136–145)
UIBC SERPL-MCNC: 181 MCG/DL (ref 261–462)
WBC # BLD AUTO: 6.7 THOU/UL (ref 4.8–10.8)

## 2019-03-16 RX ADMIN — MYCOPHENOLIC ACID SCH MG: 180 TABLET, DELAYED RELEASE ORAL at 20:38

## 2019-03-16 RX ADMIN — ALBUMIN HUMAN SCH GM: 250 SOLUTION INTRAVENOUS at 02:38

## 2019-03-16 RX ADMIN — ASPIRIN SCH MG: 81 TABLET ORAL at 08:34

## 2019-03-16 RX ADMIN — MOMETASONE FUROATE AND FORMOTEROL FUMARATE DIHYDRATE SCH PUFF: 200; 5 AEROSOL RESPIRATORY (INHALATION) at 19:11

## 2019-03-16 RX ADMIN — MOMETASONE FUROATE AND FORMOTEROL FUMARATE DIHYDRATE SCH PUFF: 200; 5 AEROSOL RESPIRATORY (INHALATION) at 07:45

## 2019-03-16 RX ADMIN — SODIUM BICARBONATE SCH MG: 325 TABLET ORAL at 20:38

## 2019-03-16 RX ADMIN — SODIUM BICARBONATE SCH MG: 325 TABLET ORAL at 15:11

## 2019-03-16 RX ADMIN — AMOXICILLIN AND CLAVULANATE POTASSIUM SCH MG: 250; 125 TABLET, FILM COATED ORAL at 20:37

## 2019-03-16 RX ADMIN — SODIUM BICARBONATE SCH MG: 325 TABLET ORAL at 08:34

## 2019-03-16 RX ADMIN — AMOXICILLIN AND CLAVULANATE POTASSIUM SCH MG: 250; 125 TABLET, FILM COATED ORAL at 08:33

## 2019-03-16 RX ADMIN — CYANOCOBALAMIN TAB 1000 MCG SCH MCG: 1000 TAB at 08:34

## 2019-03-16 RX ADMIN — MYCOPHENOLIC ACID SCH MG: 180 TABLET, DELAYED RELEASE ORAL at 08:33

## 2019-03-16 NOTE — PRG
DATE OF SERVICE:  03/16/2019



SUBJECTIVE:  Obie Collins was receiving a bed bath when I evaluated him.  He had no

complaints.  Said, he is comfortable.  Said, he slept well.  Denied shortness of

breath. 



OBJECTIVE:  VITAL SIGNS:  Heart rate 71, respiratory rate 18, oximetry is 90, blood

pressure 157/62.  Intake and output positive 1030. 

LUNGS:  Clear. 

HEART:  Regular rhythm. 

ABDOMEN:  Soft.



LABORATORY DATA:  Hemoglobin yesterday was 7.1, today at 7.1; white count 6.7; and

platelets 94,000.  Sodium 139, potassium 4.9, chloride 108, bicarb 20, BUN 91,

creatinine 2.59. 



IMPRESSION:  

1. Renal failure.

2. Status post renal transplant.

3. Anemia, that is stable.  Scheduled to get a unit of packed cells today.

4. Pulmonary edema on the last radiograph, clinically stable.  When I evaluated him

today. 

5. Chronic obstructive pulmonary disease without bronchospasm at this time.

6. We will continue to follow the other physicians caring for him.







Job ID:  [id number]

## 2019-03-16 NOTE — EKG
Test Reason : 

Blood Pressure : ***/*** mmHG

Vent. Rate : 113 BPM     Atrial Rate : 113 BPM

   P-R Int : 000 ms          QRS Dur : 130 ms

    QT Int : 362 ms       P-R-T Axes : 000 018 153 degrees

   QTc Int : 496 ms

 

Atrial fibrillation with rapid ventricular response with premature ventricular or aberrantly conducte
d complexes

Right bundle branch block

Possible Inferior infarct , age undetermined

T wave abnormality, consider lateral ischemia or digitalis effect

Abnormal ECG

 

Confirmed by ALCIDES TEJADA ([id number]),  MAURICIO GRAVES ([id number]) on 3/16/2019 10:37:28 AM

 

Referred By:             Confirmed By:ALCIDES TEJADA

## 2019-03-16 NOTE — PRG
DATE OF SERVICE:  03/16/2019



SUBJECTIVE:  Mr. Collins is a 63-year-old black male, who was seen by the Renal

Service for his acute kidney injury.  He was initially admitted for shortness of

breath.  The shortness of breath is multifactorial.  He most likely has COPD and

some degree of CHF.  He has been given volume repletion due to the worsening

creatinine.  Creatinine is slowly improving.  Also noted that tacrolimus level has

also been elevated.  For that reason, I placed tacrolimus level on hold.  I will

probably resume this at a small dose in a few days.  No other complaints today.

Still with some mild shortness of breath. 



OBJECTIVE:  VITAL SIGNS:  Blood pressure is noted at 157/86, heart rate 72,

respiratory rate 24, and pulse ox 98%. 

GENERAL:  Awake, alert, supine, comfortable, not in overt distress. 

SKIN:  Adequate turgor. 

HEENT:  He has a slightly pale conjunctivae.  Anicteric sclerae.  No neck mass.  No

carotid bruits.  No JVD. 

CHEST:  No deformities. 

LUNGS:  Decreased breath sounds. 

HEART:  Normal sinus rhythm.  No murmurs, gallops, or rubs. 

ABDOMEN:  Globular, soft, nontender.  No masses. 

EXTREMITIES:  No edema.  No deformities.



MEDICATIONS:  Medications of March 16, 2019 was reviewed.



LABORATORY DATA:  Laboratories of March 16, 2019; white count 6.7, hemoglobin 7.1.

Sodium 139, potassium 4.9, chloride 108, carbon dioxide 20, BUN 91, creatinine 2.59,

calcium 9.9. 



ASSESSMENT AND PLAN:  

1. Acute kidney injury/chronic renal failure - improving renal function.  The

patient is status post volume repletion with crystalloids and colloids.  Currently,

IV fluids on hold due to the shortness of breath. 

2. Shortness of breath, multifactorial etiology.  The patient has been converted to

IV Lasix 40 mg IV daily by his cardiologist.  Continue supportive care.  Continue to

observe renal function. 

3. Status post cadaveric renal transplant.  My last tacrolimus level was noted at

16.4, and for this reason, the tacrolimus has been placed on hold.  My bias is to

probably restart him on a smaller dose of tacrolimus at 0.5 mg b.i.d.  He used to

take tacrolimus previously at 1 mg p.o. b.i.d.  My plan is to start 0.5 mg p.o.

b.i.d., and I will repeat tacrolimus level in a few days time.  I plan to resume his

tacrolimus tomorrow at 0.5 mg tablet b.i.d. 

4. No indication for any dialytic intervention.  Continue supportive care.

5. Anemia.  We will transfuse 1 unit of packed RBC.

6. Recheck basic metabolic and CBC in a.m.







Job ID:  [id number]

## 2019-03-16 NOTE — PDOC.PN
- Subjective


Encounter Start Date: 03/16/19


Encounter Start Time: 07:40





Patient seen and examined. No new complaints. No overnight events





- Objective


Resuscitation Status - Order Detail:





03/09/19 08:16


Resuscitation Status Routine 


   Resuscitation Status: FULL: Full Resuscitation


   Discussed with: patient








MAR Reviewed: Yes


Vital Signs & Weight: 


 Vital Signs (12 hours)











  Temp Pulse Resp BP Pulse Ox


 


 03/16/19 07:45   72  24 H   98


 


 03/16/19 07:20      98


 


 03/16/19 07:15   72  24 H   98


 


 03/16/19 07:00  97.9 F  69  16  157/86 H  94 L


 


 03/16/19 04:40  96.3 F L  69  20  169/87 H  93 L


 


 03/16/19 04:25  97.6 F  81  18  155/87 H 


 


 03/16/19 00:52   78  18   93 L








 Weight











Weight                         178 lb 2 oz











 Most Recent Monitor Data











Heart Rate from ECG            92


 


NIBP                           147/98


 


NIBP BP-Mean                   114


 


Respiration from ECG           10


 


SpO2                           99














I&O: 


 











 03/15/19 03/16/19 03/17/19





 06:59 06:59 06:59


 


Intake Total 1500 1280 


 


Output Total 700 250 


 


Balance 800 1030 











Result Diagrams: 


 03/16/19 04:38





 03/16/19 04:38


EKG Reviewed by me: Yes (afib)





Phys Exam





- Physical Examination


Constitutional: NAD


HEENT: PERRLA, moist MMs, sclera anicteric


Neck: no JVD, supple


Respiratory: no wheezing, no rales, no rhonchi


Cardiovascular: no significant murmur, irregular


Gastrointestinal: soft, non-tender, no distention


trace edema+


Neurological: non-focal, normal sensation


Lymphatic: no nodes


Psychiatric: normal affect, A&O x 3


Skin: no rash, normal turgor





Dx/Plan


(1) Atrial fibrillation with RVR


Code(s): I48.91 - UNSPECIFIED ATRIAL FIBRILLATION   Status: Acute   





(2) Acute on chronic diastolic ACC/AHA stage C congestive heart failure


Code(s): I50.33 - ACUTE ON CHRONIC DIASTOLIC (CONGESTIVE) HEART FAILURE   Status

: Acute   





(3) Acute worsening of stage 3 chronic kidney disease


Code(s): N18.3 - CHRONIC KIDNEY DISEASE, STAGE 3 (MODERATE)   Status: Acute   





(4) Fever


Code(s): R50.9 - FEVER, UNSPECIFIED   Status: Acute   





(5) Hyperkalemia


Code(s): E87.5 - HYPERKALEMIA   Status: Acute   





(6) Metabolic acidosis


Code(s): E87.2 - ACIDOSIS   Status: Acute   





(7) Brachial vein thrombosis


Code(s): I82.629 - ACUTE EMBOLISM AND THROMBOSIS OF DEEP VN UNSP UP EXTREM   

Status: Chronic   





(8) CAD (coronary artery disease)


Code(s): I25.10 - ATHSCL HEART DISEASE OF NATIVE CORONARY ARTERY W/O ANG PCTRS 

  Status: Chronic   


Qualifiers: 


 





(9) COPD (chronic obstructive pulmonary disease)


Status: Chronic   





(10) Cholelithiasis


Code(s): K80.20 - CALCULUS OF GALLBLADDER W/O CHOLECYSTITIS W/O OBSTRUCTION   

Status: Chronic   


Qualifiers: 


 





(11) Chronic anticoagulation


Code(s): Z79.01 - LONG TERM (CURRENT) USE OF ANTICOAGULANTS   Status: Chronic   





(12) Elevated troponin


Code(s): R74.8 - ABNORMAL LEVELS OF OTHER SERUM ENZYMES   Status: Chronic   





(13) Hypertension


Code(s): I10 - ESSENTIAL (PRIMARY) HYPERTENSION   Status: Chronic   


Qualifiers: 


 





(14) Kidney transplant status


Code(s): Z94.0 - KIDNEY TRANSPLANT STATUS   Status: Chronic   Comment: its been 

11 yrs now   





(15) Normocytic anemia


Code(s): D64.9 - ANEMIA, UNSPECIFIED   Status: Chronic   





(16) Paroxysmal A-fib


Code(s): I48.0 - PAROXYSMAL ATRIAL FIBRILLATION   Status: Chronic   





(17) Severe mitral regurgitation by prior echocardiogram


Code(s): I34.0 - NONRHEUMATIC MITRAL (VALVE) INSUFFICIENCY   Status: Chronic   





(18) Severe tricuspid regurgitation by prior echocardiogram


Code(s): I07.1 - RHEUMATIC TRICUSPID INSUFFICIENCY   Status: Chronic   





(19) GI bleed


Code(s): K92.2 - GASTROINTESTINAL HEMORRHAGE, UNSPECIFIED   Status: Acute   





(20) Anemia due to acute blood loss


Code(s): D62 - ACUTE POSTHEMORRHAGIC ANEMIA   Status: Acute   





(21) Thrombocytopenia


Code(s): D69.6 - THROMBOCYTOPENIA, UNSPECIFIED   Status: Acute   





- Plan


cont current plan of care, continue antibiotics, respiratory therapy





* will give procrit


* add ferrous sulfate


* medication reviewed as below


* symptomatic treatment


* renal function improving.








Review of Systems





- Review of Systems


ENT: negative: Ear Pain, Ear Discharge, Nose Pain, Nose Discharge, Nose 

Congestion, Mouth Pain, Mouth Swelling, Throat Pain, Throat Swelling, Other


Respiratory: negative: Cough, Dry, Shortness of Breath, Hemoptysis, SOB with 

Excertion, Pleuritic Pain, Sputum, Wheezing


Cardiovascular: negative: chest pain, palpitations, orthopnea, paroxysmal 

nocturnal dyspnea, edema, light headedness, other


Gastrointestinal: negative: Nausea, Vomiting, Abdominal Pain, Diarrhea, 

Constipation, Melena, Hematochezia, Other


Genitourinary: negative: Dysuria, Frequency, Incontinence, Hematuria, Retention

, Other


Musculoskeletal: negative: Neck Pain, Shoulder Pain, Arm Pain, Back Pain, Hand 

Pain, Leg Pain, Foot Pain, Other





- Medications/Allergies


Allergies/Adverse Reactions: 


 Allergies











Allergy/AdvReac Type Severity Reaction Status Date / Time


 


No Known Drug Allergies Allergy Unknown  Verified 02/25/18 12:37











Medications: 


 Current Medications





Albuterol/Ipratropium (Duoneb)  3 ml NEB [id number] [facility]


   Last Admin: 03/16/19 07:15 Dose:  3 ml


Amoxicillin/Clavulanate Potassium (Augmentin)  250 mg PO Q12HR [facility]


   Last Admin: 03/16/19 08:33 Dose:  250 mg


Aspirin (Ecotrin)  81 mg PO DAILY [facility]


   Last Admin: 03/16/19 08:34 Dose:  81 mg


Atorvastatin Calcium (Lipitor)  20 mg PO HS [facility]


   Last Admin: 03/15/19 21:00 Dose:  20 mg


Cyanocobalamin (Vitamin B-12)  1,000 mcg PO DAILY [facility]


   Last Admin: 03/16/19 08:34 Dose:  1,000 mcg


Ferrous Sulfate (Feosol)  325 mg PO QA-[facility]


   Last Admin: 03/16/19 08:34 Dose:  325 mg


Fluticasone Propionate (Flonase Nasal Spray)  0 gm NASAL DAILY [facility]


   Last Admin: 03/16/19 07:22 Dose:  Not Given


Fluticasone Propionate (Flonase Nasal Spray)  0 gm NASAL DAILY [facility]


   Last Admin: 03/16/19 08:33 Dose:  2 spr


Folic Acid (Folvite)  1 mg PO DAILY [facility]


   Last Admin: 03/16/19 08:34 Dose:  1 mg


Furosemide (Lasix)  40 mg SLOW IVP 0900 [facility]


   Stop: 03/16/19 11:00


   Last Admin: 03/16/19 08:32 Dose:  40 mg


Hydralazine HCl (Apresoline)  25 mg PO BID [facility]


   Last Admin: 03/16/19 08:34 Dose:  25 mg


Metoprolol Succinate (Toprol Xl)  75 mg PO DAILY [facility]


   Last Admin: 03/16/19 08:34 Dose:  75 mg


Mometasone Furoate/Formoterol Fumar (Dulera 200 Mcg/5 Mcg Inhaler)  2 puff INH 

BID-RT [facility]


   Last Admin: 03/16/19 07:45 Dose:  2 puff


Mycophenolate Sodium (Myfortic)  360 mg PO BID [facility]


   Last Admin: 03/16/19 08:33 Dose:  360 mg


Sildenafil Citrate (Revatio)  20 mg PO TID [facility]


   Last Admin: 03/16/19 08:33 Dose:  20 mg


Sodium Bicarbonate (Bicarbonate, Sodium)  325 mg PO TID [facility]


   Last Admin: 03/16/19 08:34 Dose:  325 mg


Tamsulosin HCl (Flomax)  0.4 mg PO HS [facility]


   Last Admin: 03/15/19 21:01 Dose:  0.4 mg


Tramadol HCl (Ultram)  50 mg PO Q6H PRN


   PRN Reason: Pain


   Last Admin: 03/14/19 20:32 Dose:  50 mg

## 2019-03-17 LAB
ANION GAP SERPL CALC-SCNC: 19 MMOL/L (ref 10–20)
BUN SERPL-MCNC: 84 MG/DL (ref 8.4–25.7)
CALCIUM SERPL-MCNC: 10 MG/DL (ref 7.8–10.44)
CHLORIDE SERPL-SCNC: 108 MMOL/L (ref 98–107)
CO2 SERPL-SCNC: 18 MMOL/L (ref 23–31)
CREAT CL PREDICTED SERPL C-G-VRATE: 38 ML/MIN (ref 70–130)
GLUCOSE SERPL-MCNC: 132 MG/DL (ref 80–115)
HGB BLD-MCNC: 7.4 G/DL (ref 14–18)
INR PPP: 1.6
MACROCYTES BLD QL SMEAR: (no result) (100X)
MCH RBC QN AUTO: 26.2 PG (ref 27–31)
MCV RBC AUTO: 83.5 FL (ref 78–98)
MDIFF COMPLETE?: YES
PLATELET # BLD AUTO: 116 THOU/UL (ref 130–400)
POIKILOCYTOSIS BLD QL SMEAR: (no result) (100X)
POLYCHROMASIA BLD QL SMEAR: (no result) (100X)
POTASSIUM SERPL-SCNC: 4.9 MMOL/L (ref 3.5–5.1)
PROTHROMBIN TIME: 19.4 SEC (ref 12–14.7)
RBC # BLD AUTO: 2.83 MILL/UL (ref 4.7–6.1)
SODIUM SERPL-SCNC: 140 MMOL/L (ref 136–145)
WBC # BLD AUTO: 7.4 THOU/UL (ref 4.8–10.8)

## 2019-03-17 RX ADMIN — HYDROCODONE BITARTRATE AND ACETAMINOPHEN PRN TAB: 5; 325 TABLET ORAL at 21:14

## 2019-03-17 RX ADMIN — HYDROCODONE BITARTRATE AND ACETAMINOPHEN PRN TAB: 5; 325 TABLET ORAL at 00:22

## 2019-03-17 RX ADMIN — CYANOCOBALAMIN TAB 1000 MCG SCH MCG: 1000 TAB at 08:15

## 2019-03-17 RX ADMIN — MYCOPHENOLIC ACID SCH MG: 180 TABLET, DELAYED RELEASE ORAL at 08:16

## 2019-03-17 RX ADMIN — SODIUM BICARBONATE SCH MG: 325 TABLET ORAL at 21:10

## 2019-03-17 RX ADMIN — SODIUM BICARBONATE SCH MG: 325 TABLET ORAL at 15:17

## 2019-03-17 RX ADMIN — SODIUM BICARBONATE SCH MG: 325 TABLET ORAL at 08:17

## 2019-03-17 RX ADMIN — AMOXICILLIN AND CLAVULANATE POTASSIUM SCH MG: 250; 125 TABLET, FILM COATED ORAL at 08:15

## 2019-03-17 RX ADMIN — MOMETASONE FUROATE AND FORMOTEROL FUMARATE DIHYDRATE SCH PUFF: 200; 5 AEROSOL RESPIRATORY (INHALATION) at 07:02

## 2019-03-17 RX ADMIN — AMOXICILLIN AND CLAVULANATE POTASSIUM SCH MG: 250; 125 TABLET, FILM COATED ORAL at 21:09

## 2019-03-17 RX ADMIN — ASPIRIN SCH MG: 81 TABLET ORAL at 08:15

## 2019-03-17 RX ADMIN — MYCOPHENOLIC ACID SCH MG: 180 TABLET, DELAYED RELEASE ORAL at 21:10

## 2019-03-17 RX ADMIN — MOMETASONE FUROATE AND FORMOTEROL FUMARATE DIHYDRATE SCH PUFF: 200; 5 AEROSOL RESPIRATORY (INHALATION) at 18:55

## 2019-03-17 RX ADMIN — HYDROCODONE BITARTRATE AND ACETAMINOPHEN PRN TAB: 5; 325 TABLET ORAL at 12:01

## 2019-03-17 NOTE — PDOC.PN
- Subjective


Encounter Start Date: 03/17/19


Encounter Start Time: 08:40





Patient seen and examined. No new complaints. No overnight events





- Objective


Resuscitation Status - Order Detail:





03/09/19 08:16


Resuscitation Status Routine 


   Resuscitation Status: FULL: Full Resuscitation


   Discussed with: patient








MAR Reviewed: Yes


Vital Signs & Weight: 


 Vital Signs (12 hours)











  Temp Pulse Resp BP BP Pulse Ox


 


 03/17/19 08:16   71    


 


 03/17/19 08:05  97.1 F L  71  20  152/80 H   97


 


 03/17/19 07:02   71  16   


 


 03/17/19 06:51   71  16   


 


 03/17/19 04:15  97.4 F L  71  22 H   141/75 H  92 L


 


 03/17/19 00:33   68  22 H    91 L








 Weight











Weight                         179 lb 6 oz











 Most Recent Monitor Data











Heart Rate from ECG            92


 


NIBP                           147/98


 


NIBP BP-Mean                   114


 


Respiration from ECG           10


 


SpO2                           99














I&O: 


 











 03/16/19 03/17/19 03/18/19





 06:59 06:59 06:59


 


Intake Total 1280 300 


 


Output Total 250 1450 


 


Balance 1030 -1150 











Result Diagrams: 


 03/17/19 05:21





 03/17/19 05:21


EKG Reviewed by me: Yes (afib)





Phys Exam





- Physical Examination


Constitutional: NAD


HEENT: PERRLA, moist MMs, sclera anicteric


Neck: no JVD, supple


Respiratory: no wheezing, no rales, no rhonchi


Cardiovascular: no significant murmur, irregular


Gastrointestinal: soft, non-tender, no distention, positive bowel sounds


Musculoskeletal: no edema, pulses present


Neurological: non-focal


Lymphatic: no nodes


Psychiatric: normal affect


Skin: no rash, normal turgor





Dx/Plan


(1) Atrial fibrillation with RVR


Code(s): I48.91 - UNSPECIFIED ATRIAL FIBRILLATION   Status: Acute   





(2) Acute on chronic diastolic ACC/AHA stage C congestive heart failure


Code(s): I50.33 - ACUTE ON CHRONIC DIASTOLIC (CONGESTIVE) HEART FAILURE   Status

: Acute   





(3) Acute worsening of stage 3 chronic kidney disease


Code(s): N18.3 - CHRONIC KIDNEY DISEASE, STAGE 3 (MODERATE)   Status: Acute   





(4) Fever


Code(s): R50.9 - FEVER, UNSPECIFIED   Status: Acute   





(5) Hyperkalemia


Code(s): E87.5 - HYPERKALEMIA   Status: Acute   





(6) Metabolic acidosis


Code(s): E87.2 - ACIDOSIS   Status: Acute   





(7) Brachial vein thrombosis


Code(s): I82.629 - ACUTE EMBOLISM AND THROMBOSIS OF DEEP VN UNSP UP EXTREM   

Status: Chronic   





(8) CAD (coronary artery disease)


Code(s): I25.10 - ATHSCL HEART DISEASE OF NATIVE CORONARY ARTERY W/O ANG PCTRS 

  Status: Chronic   


Qualifiers: 


 





(9) COPD (chronic obstructive pulmonary disease)


Status: Chronic   





(10) Cholelithiasis


Code(s): K80.20 - CALCULUS OF GALLBLADDER W/O CHOLECYSTITIS W/O OBSTRUCTION   

Status: Chronic   


Qualifiers: 


 





(11) Chronic anticoagulation


Code(s): Z79.01 - LONG TERM (CURRENT) USE OF ANTICOAGULANTS   Status: Chronic   





(12) Elevated troponin


Code(s): R74.8 - ABNORMAL LEVELS OF OTHER SERUM ENZYMES   Status: Chronic   





(13) Hypertension


Code(s): I10 - ESSENTIAL (PRIMARY) HYPERTENSION   Status: Chronic   


Qualifiers: 


 





(14) Kidney transplant status


Code(s): Z94.0 - KIDNEY TRANSPLANT STATUS   Status: Chronic   Comment: its been 

11 yrs now   





(15) Normocytic anemia


Code(s): D64.9 - ANEMIA, UNSPECIFIED   Status: Chronic   





(16) Paroxysmal A-fib


Code(s): I48.0 - PAROXYSMAL ATRIAL FIBRILLATION   Status: Chronic   





(17) Severe mitral regurgitation by prior echocardiogram


Code(s): I34.0 - NONRHEUMATIC MITRAL (VALVE) INSUFFICIENCY   Status: Chronic   





(18) Severe tricuspid regurgitation by prior echocardiogram


Code(s): I07.1 - RHEUMATIC TRICUSPID INSUFFICIENCY   Status: Chronic   





(19) GI bleed


Code(s): K92.2 - GASTROINTESTINAL HEMORRHAGE, UNSPECIFIED   Status: Acute   





(20) Anemia due to acute blood loss


Code(s): D62 - ACUTE POSTHEMORRHAGIC ANEMIA   Status: Acute   





(21) Thrombocytopenia


Code(s): D69.6 - THROMBOCYTOPENIA, UNSPECIFIED   Status: Acute   





- Plan


cont current plan of care





* pt clinically improving


* renal function improving


* will consider discharge when all consultant are ok, may be tomorrow


* his rate controlled.


* medication reviewed as below


* symptomatic treatment








Review of Systems





- Review of Systems


ENT: negative: Ear Pain, Ear Discharge, Nose Pain, Nose Discharge, Nose 

Congestion, Mouth Pain, Mouth Swelling, Throat Pain, Throat Swelling, Other


Respiratory: negative: Cough, Dry, Shortness of Breath, Hemoptysis, SOB with 

Excertion, Pleuritic Pain, Sputum, Wheezing


Cardiovascular: negative: chest pain, palpitations, orthopnea, paroxysmal 

nocturnal dyspnea, edema, light headedness, other


Gastrointestinal: negative: Nausea, Vomiting, Abdominal Pain, Diarrhea, 

Constipation, Melena, Hematochezia, Other


Genitourinary: negative: Dysuria, Frequency, Incontinence, Hematuria, Retention

, Other


Musculoskeletal: negative: Neck Pain, Shoulder Pain, Arm Pain, Back Pain, Hand 

Pain, Leg Pain, Foot Pain, Other


Skin: negative: Rash, Lesions, Elliot, Bruising, Other





- Medications/Allergies


Allergies/Adverse Reactions: 


 Allergies











Allergy/AdvReac Type Severity Reaction Status Date / Time


 


No Known Drug Allergies Allergy Unknown  Verified 02/25/18 12:37











Medications: 


 Current Medications





Hydrocodone Bitart/Acetaminophen (Norco 5/325)  1 tab PO Q4H PRN


   PRN Reason: Moderate Pain (4-6)


   Last Admin: 03/17/19 00:22 Dose:  1 tab


Albuterol/Ipratropium (Duoneb)  3 ml NEB [id number] [facility]


   Last Admin: 03/17/19 06:51 Dose:  3 ml


Amoxicillin/Clavulanate Potassium (Augmentin)  250 mg PO Q12HR [facility]


   Last Admin: 03/17/19 08:15 Dose:  250 mg


Artificial Tears (Tears Renewed 15ml Bottle)  2 drop EA EYE PRN PRN


   PRN Reason: Dry Eyes


Aspirin (Ecotrin)  81 mg PO DAILY [facility]


   Last Admin: 03/17/19 08:15 Dose:  81 mg


Atorvastatin Calcium (Lipitor)  20 mg PO HS [facility]


   Last Admin: 03/16/19 20:38 Dose:  20 mg


Bisacodyl (Dulcolax)  10 mg NM DAILYPRN PRN


   PRN Reason: Constipation


Cyanocobalamin (Vitamin B-12)  1,000 mcg PO DAILY [facility]


   Last Admin: 03/17/19 08:15 Dose:  1,000 mcg


Ferrous Sulfate (Feosol)  325 mg PO QA-[facility]


   Last Admin: 03/17/19 08:15 Dose:  325 mg


Fluticasone Propionate (Flonase Nasal Spray)  0 gm NASAL DAILY [facility]


   Last Admin: 03/17/19 08:43 Dose:  Not Given


Fluticasone Propionate (Flonase Nasal Spray)  0 gm NASAL DAILY [facility]


   Last Admin: 03/17/19 08:15 Dose:  2 spr


Folic Acid (Folvite)  1 mg PO DAILY [facility]


   Last Admin: 03/17/19 08:16 Dose:  1 mg


Guaifenesin (Robitussin Sf)  200 mg PO Q4H PRN


   PRN Reason: Cough


Hydralazine HCl (Apresoline)  25 mg PO BID [facility]


   Last Admin: 03/17/19 08:16 Dose:  25 mg


Hydralazine HCl (Apresoline)  10 mg SLOW IVP Q4H PRN


   PRN Reason: SBP > 180 and HR < 70


Loperamide HCl (Imodium)  2 mg PO PRN PRN


   PRN Reason: Diarrhea/Loose Stools


Loratadine (Claritin)  10 mg PO DAILYPRN PRN


   PRN Reason: Sinus Symptoms


Metoprolol Succinate (Toprol Xl)  75 mg PO DAILY [facility]


   Last Admin: 03/17/19 08:16 Dose:  75 mg


Mineral Oil/White Petrolatum (Eucerin Cream)  0 gm TOP BIDPRN PRN


   PRN Reason: Dry Skin


Mometasone Furoate/Formoterol Fumar (Dulera 200 Mcg/5 Mcg Inhaler)  2 puff INH 

BID-RT [facility]


   Last Admin: 03/17/19 07:02 Dose:  2 puff


Mycophenolate Sodium (Myfortic)  360 mg PO BID [facility]


   Last Admin: 03/17/19 08:16 Dose:  360 mg


Nitroglycerin (Nitrostat)  0.4 mg SL Q5MIN PRN


   PRN Reason: Chest Pain


Ondansetron HCl (Zofran Odt)  4 mg PO Q6H PRN


   PRN Reason: Nausea/Vomiting


Ondansetron HCl (Zofran)  4 mg IVP Q6H PRN


   PRN Reason: Nausea/Vomiting


Senna/Docusate Sodium (Senokot S)  2 tab PO BID PRN


   PRN Reason: Constipation


Sildenafil Citrate (Revatio)  20 mg PO TID [facility]


   Last Admin: 03/17/19 08:17 Dose:  20 mg


Sodium Bicarbonate (Bicarbonate, Sodium)  325 mg PO TID [facility]


   Last Admin: 03/17/19 08:17 Dose:  325 mg


Sodium Chloride ([place] Nasal Spray 0.65%)  0 ml EA NARE QIDPRN PRN


   PRN Reason: Nasal Congestion


   Last Admin: 03/16/19 20:44 Dose:  1 spr


Tacrolimus (Prograf)  0.5 mg PO BID [facility]


   Last Admin: 03/17/19 08:46 Dose:  0.5 mg


Tamsulosin HCl (Flomax)  0.4 mg PO HS [facility]


   Last Admin: 03/16/19 20:38 Dose:  0.4 mg


Throat Lozenges (Cepastat Lozenges)  1 carissa PO Q2H PRN


   PRN Reason: Sore Throat


Tramadol HCl (Ultram)  50 mg PO Q6H PRN


   PRN Reason: Pain


   Last Admin: 03/16/19 15:12 Dose:  50 mg


Zolpidem Tartrate (Ambien)  5 mg PO HSPRN PRN


   PRN Reason: Insomnia

## 2019-03-17 NOTE — PRG
DATE OF SERVICE:  03/17/2019



SUBJECTIVE:  Mr. Collins has no new complaints.



OBJECTIVE:  VITAL SIGNS:  He is afebrile.  Heart rate is 71, respiratory rate is 20,

oximetry is 96, blood pressure is 161/88. 

HEENT:  He is still on a Venti mask. 

LUNGS:  Remarkable for fine crackles at both lung bases. 

HEART:  Regular rhythm. 

ABDOMEN:  Soft. 

EXTREMITIES:  Without edema.



LABORATORY DATA:  White count 7.4; hemoglobin 7.4, 7.2 yesterday, and 7.1 yesterday

in the morning; platelets 116,000.  Sodium 140, potassium 4.9, chloride 108, bicarb

18, BUN 84, creatinine 2.25. 



IMPRESSION:  

1. Status post renal transplant.

2. Acute on chronic kidney disease.

3. Mild hyperchloremic acidosis.

4. Anemia of chronic disease.

5. Mild pulmonary edema.

6. Chronic obstructive pulmonary disease without bronchospasm currently.



PLAN:  Continue current care.  I do not feel given his O2 requirements that he is

ready for discharge. 



Intake and output today was negative 1150.  He is tolerating the negative fluid

balance.  I do not feel his weights are accurate given that he dropped a liter and

gained a pound. 







Job ID:  [id number]

## 2019-03-17 NOTE — PRG
DATE OF SERVICE:  03/17/2019



SUBJECTIVE:  Mr. Collins is a 63-year-old black male, was initially admitted for

shortness of breath.  This was noted to be multifactorial etiology - COPD/CHF.  We

are following him up for his acute kidney injury on top of his chronic renal

failure.  We are also managing his renal transplant medications.  Recently, I noted

tacrolimus level was elevated.  For that reason, we held off tacrolimus in the last

few days.  I will be restarting him on a smaller dose today.  No other complaints

today.  He still has a baseline shortness of breath.  Cardiology is following.  He

is currently receiving IV diuretics, furosemide 40 mg tablet daily. 



He slept well last night.



OBJECTIVE:  VITAL SIGNS:  Blood pressure 141/75, heart rate 71, respiratory rate 22,

temperature 97.4, and pulse ox 92%. 

GENERAL:  Awake, supine, comfortable, not in overt distress. 

SKIN:  Adequate turgor. 

HEENT:  He has a slightly pale conjunctivae.  Anicteric sclerae.  No neck mass.  No

carotid bruits.  No JVD. 

CHEST:  No deformities. 

LUNGS:  Decreased breath sounds. 

HEART:  Normal sinus rhythm.  No murmurs, gallops, or rubs. 

ABDOMEN:  Globular, soft, nontender, no masses. 

EXTREMITIES:  No edema.  No deformities.



MEDICATIONS:  Medications of March 17, 2019, was reviewed.



LABORATORY DATA:  Laboratories of March 16, 2019, hemoglobin 7.2; March 17, 2019,

sodium 140, potassium 4.9, chloride 108, carbon dioxide 18, BUN 84, creatinine 2.25,

glucose 132, calcium 10.  Ferritin 230. 



ASSESSMENT AND PLAN:  

1. Status post cadaveric renal transplant.  Creatinine noted at 2.25, which is

slightly improved from yesterday.  Continue current management.  Continue current

diuretic regimen.  We restarted him back on tacrolimus at 0.5 mg tablet b.i.d.  We

will repeat the tacrolimus level in a few days. 

2. Acute kidney injury - a superimposed hemodynamically-mediated renal dysfunction,

slowly improving.  The patient is currently being diuresed and he is tolerating it.

Please note that the patient's creatinine peaked at a value of 3.69 and is now

currently at 2.25.  There is no indication for any dialytic intervention. 

3. Shortness of breath, multifactorial etiology.  Continue supportive care on

diuretics. 

4. Gastrointestinal bleed, stable H and H.  The patient has declined blood

transfusion. 

5. Recheck basic metabolic and CBC in a.m.







Job ID:  [id number]

## 2019-03-18 LAB
ANION GAP SERPL CALC-SCNC: 16 MMOL/L (ref 10–20)
ANISOCYTOSIS BLD QL SMEAR: (no result) (100X)
BUN SERPL-MCNC: 72 MG/DL (ref 8.4–25.7)
CALCIUM SERPL-MCNC: 10.2 MG/DL (ref 7.8–10.44)
CHLORIDE SERPL-SCNC: 109 MMOL/L (ref 98–107)
CO2 SERPL-SCNC: 20 MMOL/L (ref 23–31)
CREAT CL PREDICTED SERPL C-G-VRATE: 40 ML/MIN (ref 70–130)
GLUCOSE SERPL-MCNC: 114 MG/DL (ref 80–115)
HGB BLD-MCNC: 7.4 G/DL (ref 14–18)
HGB BLD-MCNC: 7.6 G/DL (ref 14–18)
INR PPP: 1.6
MCH RBC QN AUTO: 26.7 PG (ref 27–31)
MCV RBC AUTO: 86.6 FL (ref 78–98)
MDIFF COMPLETE?: YES
PLATELET # BLD AUTO: 133 THOU/UL (ref 130–400)
PLATELET # BLD AUTO: 158 THOU/UL (ref 130–400)
POLYCHROMASIA BLD QL SMEAR: (no result) (100X)
POTASSIUM SERPL-SCNC: 5 MMOL/L (ref 3.5–5.1)
PROTHROMBIN TIME: 19.1 SEC (ref 12–14.7)
RBC # BLD AUTO: 2.78 MILL/UL (ref 4.7–6.1)
SCHISTOCYTES BLD QL SMEAR: (no result) (100X)
SODIUM SERPL-SCNC: 140 MMOL/L (ref 136–145)
WBC # BLD AUTO: 8 THOU/UL (ref 4.8–10.8)

## 2019-03-18 RX ADMIN — MOMETASONE FUROATE AND FORMOTEROL FUMARATE DIHYDRATE SCH PUFF: 200; 5 AEROSOL RESPIRATORY (INHALATION) at 18:09

## 2019-03-18 RX ADMIN — SODIUM BICARBONATE SCH MG: 325 TABLET ORAL at 08:10

## 2019-03-18 RX ADMIN — CYANOCOBALAMIN TAB 1000 MCG SCH MCG: 1000 TAB at 08:11

## 2019-03-18 RX ADMIN — MYCOPHENOLIC ACID SCH MG: 180 TABLET, DELAYED RELEASE ORAL at 08:12

## 2019-03-18 RX ADMIN — MYCOPHENOLIC ACID SCH MG: 180 TABLET, DELAYED RELEASE ORAL at 20:28

## 2019-03-18 RX ADMIN — ASPIRIN SCH MG: 81 TABLET ORAL at 08:10

## 2019-03-18 RX ADMIN — AMOXICILLIN AND CLAVULANATE POTASSIUM SCH MG: 250; 125 TABLET, FILM COATED ORAL at 08:16

## 2019-03-18 RX ADMIN — AMOXICILLIN AND CLAVULANATE POTASSIUM SCH MG: 250; 125 TABLET, FILM COATED ORAL at 20:28

## 2019-03-18 RX ADMIN — MOMETASONE FUROATE AND FORMOTEROL FUMARATE DIHYDRATE SCH PUFF: 200; 5 AEROSOL RESPIRATORY (INHALATION) at 08:02

## 2019-03-18 RX ADMIN — SODIUM BICARBONATE SCH MG: 325 TABLET ORAL at 20:28

## 2019-03-18 RX ADMIN — SODIUM BICARBONATE SCH MG: 325 TABLET ORAL at 16:10

## 2019-03-18 NOTE — RAD
FOUR VIEWS LEFT ELBOW:

 

INDICATION: 

Left elbow pain.

 

FINDINGS: 

There is osteoarthritis as well as scattered heterotopic ossification about the left elbow.  There ar
e surgical clips and vascular calcifications seen.  Olecranon enthesophyte is present.  Prominence of
 the posterior soft tissues is noted.

 

IMPRESSION: 

1.  Osteoarthritis of the left elbow.  No acute fracture is seen.

 

2.  Soft tissue prominence, correlate clinically.

 

POS: [facility]

## 2019-03-18 NOTE — PRG
DATE OF SERVICE:  03/18/2019



SERVICE:  Renal Medicine.



SUBJECTIVE:  Mr. Collins is a 63-year-old black male, who is status post cadaveric

renal transplant, seen by the Renal Service for his acute kidney injury on top of

his chronic renal failure.  He had a superimposed prerenal azotemia and renal

function is slowly improving.  In addition, his tacrolimus level was also noted to

be elevated on for that reason, we decreased the tacrolimus  intake to 0.5 mg tablet

b.i.d.  Furthermore, he was admitted for shortness of breath.  This is

multifactorial in etiology, which includes COPD and CHF.  No new complaints today.

He has also an episode of GI bleed.  He declined any blood transfusion or endoscopy

at the present time. 



OBJECTIVE:  VITAL SIGNS:  Blood pressure is 146/78, heart rate is 70, respiratory

rate 20, temperature 97.6, and pulse ox 94%. 

GENERAL:  Awake, supine, comfortable, not in overt distress. 

SKIN:  Adequate turgor. 

HEENT:  He has a pale conjunctivae.  Anicteric sclerae. 

NECK:  No neck mass.  No carotid bruits.  No JVD. 

CHEST:  No deformities. 

LUNGS:  Decreased breath sounds. 

HEART:  Normal sinus rhythm.  No murmur.  No gallops.  No rubs. 

ABDOMEN:  Globular, soft, and nontender.  No masses. 

EXTREMITIES:  No edema.  No deformities.



MEDICATIONS:  Medications of March 18, 2019, was reviewed.



LABORATORY DATA:  Laboratories of March 18, 2019; white count 8, hemoglobin 7.4.

Sodium 140, potassium 5, chloride 109, carbon dioxide 20, BUN 72, creatinine 2.15,

GFR 38 mL/minutes, and calcium 10.2. 



ASSESSMENT AND PLAN:  

1. Status post cadaveric renal transplant.  Continue increased dose of tacrolimus at

0.5 mg tablet b.i.d.  We will recheck tacrolimus level in a few days. 

2. Shortness of breath, multifactorial in etiology.  Chronic obstructive pulmonary

disease exacerbation and congestive heart failure, currently on furosemide 40 mg

tablet once a day.  Shortness of breath is improving. 

3. Anemia.  Continue to observe.  No indication for any blood transfusion.  The

patient is declining blood transfusion.  GI is also following this patient for his

gastrointestinal bleed, which has stabilized. 

4. Acute kidney injury - superimposed hemodynamically-mediated renal dysfunction.

Stable and actually slowly improving.  He seems to be tolerating the current

diuretic regimen.  Overall, agree with current management. 







Job ID:  [id number]

## 2019-03-18 NOTE — PRG
DATE OF SERVICE:  03/18/2019



SUBJECTIVE:  A 63-year-old gentleman complaining of significant joint pains, both

elbows and wrists are bothering him and kind of swollen. 



OBJECTIVE:  VITAL SIGNS:  Saturations are 95% on 3 L nasal O2, respiratory rate 20,

temperature 97, blood pressure 146/78. 

CHEST:  Decreased breath sounds.  No wheezing. 

CARDIAC:  Normal S1, S2.  No gallops or masses.



LABORATORY DATA:  Creatinine 2.1.  White count 8000, H and H 7 and 24, platelet

count normal. 



IMPRESSION:  Renal failure, azotemia, small pleural effusion, arthritis, probably

gout. 



PLAN:  Started low-dose colchicine for his pain. 



Otherwise, continue supportive care, PT.  Disposition as per primary care physician.







Job ID:  [id number]

## 2019-03-18 NOTE — PDOC.PN
- Subjective


Encounter Start Date: 03/18/19


Encounter Start Time: 08:00





pt has left elbow pain, cardiology cleared and stated that he is baseline and 

can be discharged, but pt does not want to go today as he has lot of elbow pain

, pulmonary started on colchicine





- Objective


Resuscitation Status - Order Detail:





03/09/19 08:16


Resuscitation Status Routine 


   Resuscitation Status: FULL: Full Resuscitation


   Discussed with: patient








MAR Reviewed: Yes


Vital Signs & Weight: 


 Vital Signs (12 hours)











  Temp Pulse Resp BP Pulse Ox


 


 03/18/19 08:11   77   


 


 03/18/19 08:00  97.7 F  70  19  183/86 H  98


 


 03/18/19 07:59   77  20  


 


 03/18/19 04:00  97.6 F  70  20  146/78 H  94 L


 


 03/18/19 00:00  97.4 F L  72  20  155/83 H  91 L


 


 03/17/19 23:21   80  16   93 L








 Weight











Weight                         174 lb











 Most Recent Monitor Data











Heart Rate from ECG            92


 


NIBP                           147/98


 


NIBP BP-Mean                   114


 


Respiration from ECG           10


 


SpO2                           99














I&O: 


 











 03/17/19 03/18/19 03/19/19





 06:59 06:59 06:59


 


Intake Total 300 600 


 


Output Total 1450 1275 


 


Balance -1150 -675 











Result Diagrams: 


 03/18/19 04:59





 03/18/19 04:59


EKG Reviewed by me: Yes (afib)





Phys Exam





- Physical Examination


Constitutional: NAD


HEENT: PERRLA, moist MMs, sclera anicteric


Neck: no JVD, supple


Respiratory: no wheezing, no rales, no rhonchi


Cardiovascular: no significant murmur, irregular


Gastrointestinal: soft, non-tender, no distention, positive bowel sounds


Musculoskeletal: no edema, pulses present


left elbow tender


Neurological: non-focal, normal sensation


Lymphatic: no nodes


Psychiatric: normal affect, A&O x 3


Skin: no rash, normal turgor





Dx/Plan


(1) Atrial fibrillation with RVR


Code(s): I48.91 - UNSPECIFIED ATRIAL FIBRILLATION   Status: Acute   





(2) Acute on chronic diastolic ACC/AHA stage C congestive heart failure


Code(s): I50.33 - ACUTE ON CHRONIC DIASTOLIC (CONGESTIVE) HEART FAILURE   Status

: Acute   





(3) Acute worsening of stage 3 chronic kidney disease


Code(s): N18.3 - CHRONIC KIDNEY DISEASE, STAGE 3 (MODERATE)   Status: Acute   





(4) Fever


Code(s): R50.9 - FEVER, UNSPECIFIED   Status: Acute   





(5) Hyperkalemia


Code(s): E87.5 - HYPERKALEMIA   Status: Acute   





(6) Metabolic acidosis


Code(s): E87.2 - ACIDOSIS   Status: Acute   





(7) Brachial vein thrombosis


Code(s): I82.629 - ACUTE EMBOLISM AND THROMBOSIS OF DEEP VN UNSP UP EXTREM   

Status: Chronic   





(8) CAD (coronary artery disease)


Code(s): I25.10 - ATHSCL HEART DISEASE OF NATIVE CORONARY ARTERY W/O ANG PCTRS 

  Status: Chronic   


Qualifiers: 


 





(9) COPD (chronic obstructive pulmonary disease)


Status: Chronic   





(10) Cholelithiasis


Code(s): K80.20 - CALCULUS OF GALLBLADDER W/O CHOLECYSTITIS W/O OBSTRUCTION   

Status: Chronic   


Qualifiers: 


 





(11) Chronic anticoagulation


Code(s): Z79.01 - LONG TERM (CURRENT) USE OF ANTICOAGULANTS   Status: Chronic   





(12) Elevated troponin


Code(s): R74.8 - ABNORMAL LEVELS OF OTHER SERUM ENZYMES   Status: Chronic   





(13) Hypertension


Code(s): I10 - ESSENTIAL (PRIMARY) HYPERTENSION   Status: Chronic   


Qualifiers: 


 





(14) Kidney transplant status


Code(s): Z94.0 - KIDNEY TRANSPLANT STATUS   Status: Chronic   Comment: its been 

11 yrs now   





(15) Normocytic anemia


Code(s): D64.9 - ANEMIA, UNSPECIFIED   Status: Chronic   





(16) Paroxysmal A-fib


Code(s): I48.0 - PAROXYSMAL ATRIAL FIBRILLATION   Status: Chronic   





(17) Severe mitral regurgitation by prior echocardiogram


Code(s): I34.0 - NONRHEUMATIC MITRAL (VALVE) INSUFFICIENCY   Status: Chronic   





(18) Severe tricuspid regurgitation by prior echocardiogram


Code(s): I07.1 - RHEUMATIC TRICUSPID INSUFFICIENCY   Status: Chronic   





(19) GI bleed


Code(s): K92.2 - GASTROINTESTINAL HEMORRHAGE, UNSPECIFIED   Status: Acute   





(20) Anemia due to acute blood loss


Code(s): D62 - ACUTE POSTHEMORRHAGIC ANEMIA   Status: Acute   





(21) Thrombocytopenia


Code(s): D69.6 - THROMBOCYTOPENIA, UNSPECIFIED   Status: Acute   





- Plan


cont current plan of care, continue antibiotics, PT/OT, respiratory therapy





* colchicine started by pulmonary


* will get elbow xray


* titrate his oxygen requirement


* medication reviewed as below


* symptomatic treatment


* check crp.








Review of Systems





- Review of Systems


ENT: negative: Ear Pain, Ear Discharge, Nose Pain, Nose Discharge, Nose 

Congestion, Mouth Pain, Mouth Swelling, Throat Pain, Throat Swelling, Other


Respiratory: negative: Cough, Dry, Shortness of Breath, Hemoptysis, SOB with 

Excertion, Pleuritic Pain, Sputum, Wheezing


Cardiovascular: negative: chest pain, palpitations, orthopnea, paroxysmal 

nocturnal dyspnea, edema, light headedness, other


Gastrointestinal: negative: Nausea, Vomiting, Abdominal Pain, Diarrhea, 

Constipation, Melena, Hematochezia, Other


Genitourinary: negative: Dysuria, Frequency, Incontinence, Hematuria, Retention

, Other


Musculoskeletal: Arm Pain.  negative: Neck Pain, Shoulder Pain, Back Pain, Hand 

Pain, Leg Pain, Foot Pain, Other





- Medications/Allergies


Allergies/Adverse Reactions: 


 Allergies











Allergy/AdvReac Type Severity Reaction Status Date / Time


 


No Known Drug Allergies Allergy Unknown  Verified 02/25/18 12:37











Medications: 


 Current Medications





Hydrocodone Bitart/Acetaminophen (Norco 5/325)  1 tab PO Q4H PRN


   PRN Reason: Moderate Pain (4-6)


   Last Admin: 03/17/19 21:14 Dose:  1 tab


Albuterol/Ipratropium (Duoneb)  3 ml NEB [id number] [facility]


   Last Admin: 03/18/19 07:59 Dose:  3 ml


Amoxicillin/Clavulanate Potassium (Augmentin)  250 mg PO Q12HR [facility]


   Last Admin: 03/18/19 08:16 Dose:  250 mg


Artificial Tears (Tears Renewed 15ml Bottle)  2 drop EA EYE PRN PRN


   PRN Reason: Dry Eyes


Aspirin (Ecotrin)  81 mg PO DAILY [facility]


   Last Admin: 03/18/19 08:10 Dose:  81 mg


Atorvastatin Calcium (Lipitor)  20 mg PO HS [facility]


   Last Admin: 03/17/19 21:09 Dose:  20 mg


Bisacodyl (Dulcolax)  10 mg ME DAILYPRN PRN


   PRN Reason: Constipation


Colchicine (Colcrys)  0.6 mg PO BID [facility]


Cyanocobalamin (Vitamin B-12)  1,000 mcg PO DAILY [facility]


   Last Admin: 03/18/19 08:11 Dose:  1,000 mcg


Ferrous Sulfate (Feosol)  325 mg PO QA-[facility]


   Last Admin: 03/18/19 08:10 Dose:  325 mg


Fluticasone Propionate (Flonase Nasal Spray)  0 gm NASAL DAILY [facility]


   Last Admin: 03/18/19 08:11 Dose:  2 applic


Fluticasone Propionate (Flonase Nasal Spray)  0 gm NASAL DAILY [facility]


   Last Admin: 03/18/19 08:18 Dose:  Not Given


Folic Acid (Folvite)  1 mg PO DAILY [facility]


   Last Admin: 03/18/19 08:11 Dose:  1 mg


Furosemide (Lasix)  40 mg PO DAILY-AC [facility]


Guaifenesin (Robitussin Sf)  200 mg PO Q4H PRN


   PRN Reason: Cough


Hydralazine HCl (Apresoline)  25 mg PO BID [facility]


   Last Admin: 03/18/19 08:11 Dose:  25 mg


Hydralazine HCl (Apresoline)  10 mg SLOW IVP Q4H PRN


   PRN Reason: SBP > 180 and HR < 70


Loperamide HCl (Imodium)  2 mg PO PRN PRN


   PRN Reason: Diarrhea/Loose Stools


Loratadine (Claritin)  10 mg PO DAILYPRN PRN


   PRN Reason: Sinus Symptoms


Metoprolol Succinate (Toprol Xl)  75 mg PO DAILY [facility]


   Last Admin: 03/18/19 08:10 Dose:  75 mg


Mineral Oil/White Petrolatum (Eucerin Cream)  0 gm TOP BIDPRN PRN


   PRN Reason: Dry Skin


Mometasone Furoate/Formoterol Fumar (Dulera 200 Mcg/5 Mcg Inhaler)  2 puff INH 

BID-RT [facility]


   Last Admin: 03/18/19 08:02 Dose:  2 puff


Mycophenolate Sodium (Myfortic)  360 mg PO BID [facility]


   Last Admin: 03/18/19 08:12 Dose:  360 mg


Nitroglycerin (Nitrostat)  0.4 mg SL Q5MIN PRN


   PRN Reason: Chest Pain


Ondansetron HCl (Zofran Odt)  4 mg PO Q6H PRN


   PRN Reason: Nausea/Vomiting


Ondansetron HCl (Zofran)  4 mg IVP Q6H PRN


   PRN Reason: Nausea/Vomiting


Senna/Docusate Sodium (Senokot S)  2 tab PO BID PRN


   PRN Reason: Constipation


Sildenafil Citrate (Revatio)  20 mg PO TID [facility]


   Last Admin: 03/18/19 08:55 Dose:  20 mg


Sodium Bicarbonate (Bicarbonate, Sodium)  325 mg PO TID [facility]


   Last Admin: 03/18/19 08:10 Dose:  325 mg


Sodium Chloride (Ocean Nasal Spray 0.65%)  0 ml EA NARE QIDPRN PRN


   PRN Reason: Nasal Congestion


   Last Admin: 03/16/19 20:44 Dose:  1 spr


Tacrolimus (Prograf)  0.5 mg PO BID [facility]


   Last Admin: 03/18/19 08:12 Dose:  0.5 mg


Tamsulosin HCl (Flomax)  0.4 mg PO HS [facility]


   Last Admin: 03/17/19 21:10 Dose:  0.4 mg


Throat Lozenges (Cepastat Lozenges)  1 carissa PO Q2H PRN


   PRN Reason: Sore Throat


Tramadol HCl (Ultram)  50 mg PO Q6H PRN


   PRN Reason: Pain


   Last Admin: 03/16/19 15:12 Dose:  50 mg


Zolpidem Tartrate (Ambien)  5 mg PO HSPRN PRN


   PRN Reason: Insomnia

## 2019-03-19 LAB
INR PPP: 1.6
PROTHROMBIN TIME: 18.7 SEC (ref 12–14.7)

## 2019-03-19 RX ADMIN — SODIUM BICARBONATE SCH MG: 325 TABLET ORAL at 20:20

## 2019-03-19 RX ADMIN — SODIUM BICARBONATE SCH MG: 325 TABLET ORAL at 09:35

## 2019-03-19 RX ADMIN — CYANOCOBALAMIN TAB 1000 MCG SCH MCG: 1000 TAB at 09:35

## 2019-03-19 RX ADMIN — AMOXICILLIN AND CLAVULANATE POTASSIUM SCH MG: 250; 125 TABLET, FILM COATED ORAL at 11:40

## 2019-03-19 RX ADMIN — HYDROCODONE BITARTRATE AND ACETAMINOPHEN PRN TAB: 5; 325 TABLET ORAL at 03:27

## 2019-03-19 RX ADMIN — MYCOPHENOLIC ACID SCH MG: 180 TABLET, DELAYED RELEASE ORAL at 11:41

## 2019-03-19 RX ADMIN — ASPIRIN SCH MG: 81 TABLET ORAL at 09:35

## 2019-03-19 RX ADMIN — MYCOPHENOLIC ACID SCH MG: 180 TABLET, DELAYED RELEASE ORAL at 20:19

## 2019-03-19 RX ADMIN — MOMETASONE FUROATE AND FORMOTEROL FUMARATE DIHYDRATE SCH PUFF: 200; 5 AEROSOL RESPIRATORY (INHALATION) at 06:57

## 2019-03-19 RX ADMIN — SODIUM BICARBONATE SCH MG: 325 TABLET ORAL at 18:38

## 2019-03-19 RX ADMIN — AMOXICILLIN AND CLAVULANATE POTASSIUM SCH MG: 250; 125 TABLET, FILM COATED ORAL at 20:19

## 2019-03-19 RX ADMIN — MOMETASONE FUROATE AND FORMOTEROL FUMARATE DIHYDRATE SCH PUFF: 200; 5 AEROSOL RESPIRATORY (INHALATION) at 18:17

## 2019-03-19 NOTE — PRG
DATE OF SERVICE:  03/19/2019



SERVICE:  Renal Medicine.



SUBJECTIVE:  Mr. Collins is a 63-year-old black male, who was seen by the renal

Service for his acute kidney injury on top of his chronic renal failure.  He also is

being followed up for management of his renal transplantation.  Creatinine has been

slowly improving overtime with adjustment of his medications.  He had to be resumed

back with his furosemide due to his underlying CHF.  No other complaints today.

Please note, this patient has significant history of COPD also. 



OBJECTIVE:  VITAL SIGNS:  Blood pressure 145/73, heart rate 70, respiratory rate 18,

temperature 96, and pulse ox 90% on Venturi mask. 

GENERAL:  The patient is awake, supine, comfortable, not in overt distress. 

SKIN:  Adequate turgor. 

HEENT:  He has a slightly pale conjunctivae.  Anicteric sclerae. 

NECK:  No neck mass.  No carotid bruits.  No JVD. 

CHEST:  No deformities. 

LUNGS:  Decreased breath sounds. 

HEART:  Normal sinus rhythm.  No murmur.  No gallops.  No rubs. 

ABDOMEN:  Globular, soft, nontender.  No masses. 

EXTREMITIES:  No edema.  No deformities.



MEDICATIONS:  Medications of March 19, 2019, was reviewed.



LABORATORY DATA:  Laboratories of March 18, 2019, hemoglobin was 7.6.  Sodium 140,

potassium 5, chloride 109, carbon dioxide 20, BUN 72, creatinine 2.15, calcium 10.2.

 C-reactive protein is 11.3. 



ASSESSMENT AND PLAN:  

1. Acute kidney injury/chronic renal failure.  Stabilizing renal function.  Last

creatinine of 2.15, is much improved from a peak creatinine of 3.69.  Continue

current management.  Continue judicious use of diuretics. 

2. Status post cadaveric renal transplant - tacrolimus level has been adjusted to

0.5 mg p.o. b.i.d.  Consider rechecking tacrolimus level in a.m.  Continue current

immunosuppressive regimen. 

3. Shortness of breath, multifactorial; chronic obstructive pulmonary

disease/congestive heart failure.  Continue current diuretic regimen.  Continue neb

treatment. 

4. Anemia status post gastrointestinal bleed, declining blood transfusion.

5. Recheck basic metabolic and CBC in a.m.







Job ID:  [id number]

## 2019-03-19 NOTE — RAD
FRONTAL VIEW CHEST SERIES:

 

Comparison: 3-13-19

 

Indications: CHF. 

 

FINDINGS: 

Two frontal views provided. 

 

There is progressive density of the mid inferior right chest indicating increase in volume pleural fl
uid. There is also progressive left pleural fluid. Evidence of enlargement of the cardiac silhouette 
and pulmonary vasculature indicates CHF with edema. Chest otherwise similar. 

 

IMPRESSION: 

Progressive findings of decompensated CHF with bilateral pleural fluid, right greater than left. Cont
inued imaging follow up is recommended. 

 

POS: JULISSA

## 2019-03-19 NOTE — PDOC.PN
- Subjective


Encounter Start Date: 03/19/19


Encounter Start Time: 07:40





pt is hypoxic and has dyspnea, today chest xray worse, he does not want snu/

rehab on discharge





- Objective


Resuscitation Status - Order Detail:





03/09/19 08:16


Resuscitation Status Routine 


   Resuscitation Status: FULL: Full Resuscitation


   Discussed with: patient








MAR Reviewed: Yes


Vital Signs & Weight: 


 Vital Signs (12 hours)











  Temp Pulse Resp BP Pulse Ox


 


 03/19/19 08:00  96.0 F L  70  18  145/73 H  90 L


 


 03/19/19 06:51   72  20   98


 


 03/19/19 04:00  97.7 F  76  20  137/86  95


 


 03/19/19 00:01   101 H  20   99








 Weight











Weight                         173 lb 9.6 oz











 Most Recent Monitor Data











Heart Rate from ECG            92


 


NIBP                           147/98


 


NIBP BP-Mean                   114


 


Respiration from ECG           10


 


SpO2                           99














I&O: 


 











 03/18/19 03/19/19 03/20/19





 06:59 06:59 06:59


 


Intake Total 600 720 


 


Output Total 1275 1400 


 


Balance -675 -680 











Result Diagrams: 


 03/18/19 15:03





 03/18/19 04:59


Radiology Reviewed by me: Yes (chest xray reviewed)


EKG Reviewed by me: Yes





Phys Exam





- Physical Examination


Constitutional: NAD


HEENT: PERRLA, moist MMs, sclera anicteric


Neck: no JVD, supple


Respiratory: no wheezing, no rhonchi


rales on right side


Cardiovascular: no significant murmur, irregular


Gastrointestinal: soft, non-tender, no distention, positive bowel sounds


Musculoskeletal: no edema, pulses present


Neurological: non-focal, normal sensation


Lymphatic: no nodes


Psychiatric: normal affect


Skin: no rash, normal turgor





Dx/Plan


(1) Atrial fibrillation with RVR


Code(s): I48.91 - UNSPECIFIED ATRIAL FIBRILLATION   Status: Acute   





(2) Acute on chronic diastolic ACC/AHA stage C congestive heart failure


Code(s): I50.33 - ACUTE ON CHRONIC DIASTOLIC (CONGESTIVE) HEART FAILURE   Status

: Acute   





(3) Acute worsening of stage 3 chronic kidney disease


Code(s): N18.3 - CHRONIC KIDNEY DISEASE, STAGE 3 (MODERATE)   Status: Acute   





(4) Fever


Code(s): R50.9 - FEVER, UNSPECIFIED   Status: Acute   





(5) Hyperkalemia


Code(s): E87.5 - HYPERKALEMIA   Status: Acute   





(6) Metabolic acidosis


Code(s): E87.2 - ACIDOSIS   Status: Acute   





(7) Brachial vein thrombosis


Code(s): I82.629 - ACUTE EMBOLISM AND THROMBOSIS OF DEEP VN UNSP UP EXTREM   

Status: Chronic   





(8) CAD (coronary artery disease)


Code(s): I25.10 - ATHSCL HEART DISEASE OF NATIVE CORONARY ARTERY W/O ANG PCTRS 

  Status: Chronic   


Qualifiers: 


 





(9) COPD (chronic obstructive pulmonary disease)


Status: Chronic   





(10) Cholelithiasis


Code(s): K80.20 - CALCULUS OF GALLBLADDER W/O CHOLECYSTITIS W/O OBSTRUCTION   

Status: Chronic   


Qualifiers: 


 





(11) Chronic anticoagulation


Code(s): Z79.01 - LONG TERM (CURRENT) USE OF ANTICOAGULANTS   Status: Chronic   





(12) Elevated troponin


Code(s): R74.8 - ABNORMAL LEVELS OF OTHER SERUM ENZYMES   Status: Chronic   





(13) Hypertension


Code(s): I10 - ESSENTIAL (PRIMARY) HYPERTENSION   Status: Chronic   


Qualifiers: 


 





(14) Kidney transplant status


Code(s): Z94.0 - KIDNEY TRANSPLANT STATUS   Status: Chronic   Comment: its been 

11 yrs now   





(15) Normocytic anemia


Code(s): D64.9 - ANEMIA, UNSPECIFIED   Status: Chronic   





(16) Paroxysmal A-fib


Code(s): I48.0 - PAROXYSMAL ATRIAL FIBRILLATION   Status: Chronic   





(17) Severe mitral regurgitation by prior echocardiogram


Code(s): I34.0 - NONRHEUMATIC MITRAL (VALVE) INSUFFICIENCY   Status: Chronic   





(18) Severe tricuspid regurgitation by prior echocardiogram


Code(s): I07.1 - RHEUMATIC TRICUSPID INSUFFICIENCY   Status: Chronic   





(19) GI bleed


Code(s): K92.2 - GASTROINTESTINAL HEMORRHAGE, UNSPECIFIED   Status: Acute   





(20) Anemia due to acute blood loss


Code(s): D62 - ACUTE POSTHEMORRHAGIC ANEMIA   Status: Acute   





(21) Thrombocytopenia


Code(s): D69.6 - THROMBOCYTOPENIA, UNSPECIFIED   Status: Acute   





- Plan


cont current plan of care, continue antibiotics, respiratory therapy





* unfortunately he is not good enough to go home to take care of himself and at 

the same time he refuses to go to rehab/snu


* he is high risk for readmission


* seems like this is his new baseline which is more worse and doubt he can 

manage himself at home.


* medication reviewed as below


* symptomatic treatment








Review of Systems





- Review of Systems


Respiratory: Shortness of Breath, SOB with Excertion.  negative: Cough, Dry, 

Hemoptysis, Pleuritic Pain, Sputum, Wheezing


Cardiovascular: negative: chest pain, palpitations, orthopnea, paroxysmal 

nocturnal dyspnea, edema, light headedness, other


Gastrointestinal: negative: Nausea, Vomiting, Abdominal Pain, Diarrhea, 

Constipation, Melena, Hematochezia, Other


Genitourinary: negative: Dysuria, Frequency, Incontinence, Hematuria, Retention

, Other


Musculoskeletal: negative: Neck Pain, Shoulder Pain, Arm Pain, Back Pain, Hand 

Pain, Leg Pain, Foot Pain, Other


Skin: negative: Rash, Lesions, Elliot, Bruising, Other





- Medications/Allergies


Allergies/Adverse Reactions: 


 Allergies











Allergy/AdvReac Type Severity Reaction Status Date / Time


 


No Known Drug Allergies Allergy Unknown  Verified 02/25/18 12:37











Medications: 


 Current Medications





Hydrocodone Bitart/Acetaminophen (Norco 5/325)  1 tab PO Q4H PRN


   PRN Reason: Moderate Pain (4-6)


   Last Admin: 03/19/19 03:27 Dose:  1 tab


Albuterol/Ipratropium (Duoneb)  3 ml NEB [id number] [facility]


   Last Admin: 03/19/19 06:51 Dose:  3 ml


Amoxicillin/Clavulanate Potassium (Augmentin)  250 mg PO Q12HR [facility]


   Last Admin: 03/19/19 11:40 Dose:  250 mg


Artificial Tears (Tears Renewed 15ml Bottle)  2 drop EA EYE PRN PRN


   PRN Reason: Dry Eyes


Aspirin (Ecotrin)  81 mg PO DAILY [facility]


   Last Admin: 03/19/19 09:35 Dose:  81 mg


Atorvastatin Calcium (Lipitor)  20 mg PO HS [facility]


   Last Admin: 03/18/19 20:28 Dose:  20 mg


Bisacodyl (Dulcolax)  10 mg NM DAILYPRN PRN


   PRN Reason: Constipation


Colchicine (Colcrys)  0.6 mg PO BID [facility]


   Last Admin: 03/19/19 09:34 Dose:  0.6 mg


Cyanocobalamin (Vitamin B-12)  1,000 mcg PO DAILY [facility]


   Last Admin: 03/19/19 09:35 Dose:  1,000 mcg


Ferrous Sulfate (Feosol)  325 mg PO QA-[facility]


   Last Admin: 03/19/19 09:35 Dose:  325 mg


Fluticasone Propionate (Flonase Nasal Spray)  0 gm NASAL DAILY [facility]


   Last Admin: 03/19/19 11:43 Dose:  2 applic


Fluticasone Propionate (Flonase Nasal Spray)  0 gm NASAL DAILY [facility]


   Last Admin: 03/19/19 11:31 Dose:  Not Given


Folic Acid (Folvite)  1 mg PO DAILY [facility]


   Last Admin: 03/19/19 09:35 Dose:  1 mg


Furosemide (Lasix)  40 mg PO DAILY-AC [facility]


   Last Admin: 03/19/19 09:35 Dose:  40 mg


Guaifenesin (Robitussin Sf)  200 mg PO Q4H PRN


   PRN Reason: Cough


Hydralazine HCl (Apresoline)  25 mg PO BID [facility]


   Last Admin: 03/19/19 09:35 Dose:  25 mg


Hydralazine HCl (Apresoline)  10 mg SLOW IVP Q4H PRN


   PRN Reason: SBP > 180 and HR < 70


Loperamide HCl (Imodium)  2 mg PO PRN PRN


   PRN Reason: Diarrhea/Loose Stools


Loratadine (Claritin)  10 mg PO DAILYPRN PRN


   PRN Reason: Sinus Symptoms


Metoprolol Succinate (Toprol Xl)  100 mg PO DAILY [facility]


Mineral Oil/White Petrolatum (Eucerin Cream)  0 gm TOP BIDPRN PRN


   PRN Reason: Dry Skin


Mometasone Furoate/Formoterol Fumar (Dulera 200 Mcg/5 Mcg Inhaler)  2 puff INH 

BID-RT [facility]


   Last Admin: 03/19/19 06:57 Dose:  2 puff


Mycophenolate Sodium (Myfortic)  360 mg PO BID [facility]


   Last Admin: 03/19/19 11:41 Dose:  360 mg


Nitroglycerin (Nitrostat)  0.4 mg SL Q5MIN PRN


   PRN Reason: Chest Pain


Ondansetron HCl (Zofran Odt)  4 mg PO Q6H PRN


   PRN Reason: Nausea/Vomiting


Ondansetron HCl (Zofran)  4 mg IVP Q6H PRN


   PRN Reason: Nausea/Vomiting


Senna/Docusate Sodium (Senokot S)  2 tab PO BID PRN


   PRN Reason: Constipation


Sildenafil Citrate (Revatio)  20 mg PO TID [facility]


   Last Admin: 03/19/19 11:42 Dose:  20 mg


Sodium Bicarbonate (Bicarbonate, Sodium)  325 mg PO TID [facility]


   Last Admin: 03/19/19 09:35 Dose:  325 mg


Sodium Chloride (Ocean Nasal Spray 0.65%)  0 ml EA NARE QIDPRN PRN


   PRN Reason: Nasal Congestion


   Last Admin: 03/16/19 20:44 Dose:  1 spr


Tacrolimus (Prograf)  0.5 mg PO BID [facility]


   Last Admin: 03/19/19 11:41 Dose:  0.5 mg


Tamsulosin HCl (Flomax)  0.4 mg PO HS [facility]


   Last Admin: 03/18/19 20:28 Dose:  0.4 mg


Throat Lozenges (Cepastat Lozenges)  1 carissa PO Q2H PRN


   PRN Reason: Sore Throat


Tramadol HCl (Ultram)  50 mg PO Q6H PRN


   PRN Reason: Pain


   Last Admin: 03/16/19 15:12 Dose:  50 mg


Zolpidem Tartrate (Ambien)  5 mg PO HSPRN PRN


   PRN Reason: Insomnia

## 2019-03-19 NOTE — PRG
DATE OF SERVICE:  03/19/2019



SUBJECTIVE:  This morning, he is still having some elbow pain.



OBJECTIVE:  VITAL SIGNS:  Saturations are 90% on a Ventimask, temperature 96, pulse

70, respiratory rate 18, and blood pressure 145/73. 

GENERAL:  He is weak. 

CHEST:  Decreased breath sounds.  No wheezing. 

CARDIAC:  Normal S1 and S2.  No gallops. 

ABDOMEN:  No masses.



IMPRESSION:  Status post renal transplant, renal failure, chronic obstructive

pulmonary disease, congestive heart failure, and joint pain. 



PLAN:  X-ray is being ordered for his hypoxemia.  Otherwise, continue diuretics. 



We will follow.







Job ID:  [id number]

## 2019-03-20 LAB
ANION GAP SERPL CALC-SCNC: 21 MMOL/L (ref 10–20)
ANISOCYTOSIS BLD QL SMEAR: (no result) (100X)
BUN SERPL-MCNC: 69 MG/DL (ref 8.4–25.7)
CALCIUM SERPL-MCNC: 9.5 MG/DL (ref 7.8–10.44)
CHLORIDE SERPL-SCNC: 108 MMOL/L (ref 98–107)
CO2 SERPL-SCNC: 16 MMOL/L (ref 23–31)
CREAT CL PREDICTED SERPL C-G-VRATE: 38 ML/MIN (ref 70–130)
GLUCOSE SERPL-MCNC: 100 MG/DL (ref 80–115)
HGB BLD-MCNC: 7.4 G/DL (ref 14–18)
HGB BLD-MCNC: 7.6 G/DL (ref 14–18)
INR PPP: 1.5
MCH RBC QN AUTO: 26.3 PG (ref 27–31)
MCV RBC AUTO: 87.9 FL (ref 78–98)
MDIFF COMPLETE?: YES
PLATELET # BLD AUTO: 195 THOU/UL (ref 130–400)
PLATELET # BLD AUTO: 230 THOU/UL (ref 130–400)
POTASSIUM SERPL-SCNC: 5.1 MMOL/L (ref 3.5–5.1)
PROTHROMBIN TIME: 18.5 SEC (ref 12–14.7)
RBC # BLD AUTO: 2.87 MILL/UL (ref 4.7–6.1)
SODIUM SERPL-SCNC: 140 MMOL/L (ref 136–145)
WBC # BLD AUTO: 8.5 THOU/UL (ref 4.8–10.8)

## 2019-03-20 RX ADMIN — ASPIRIN SCH MG: 81 TABLET ORAL at 09:24

## 2019-03-20 RX ADMIN — AMOXICILLIN AND CLAVULANATE POTASSIUM SCH MG: 250; 125 TABLET, FILM COATED ORAL at 11:37

## 2019-03-20 RX ADMIN — AMOXICILLIN AND CLAVULANATE POTASSIUM SCH MG: 250; 125 TABLET, FILM COATED ORAL at 20:16

## 2019-03-20 RX ADMIN — MYCOPHENOLIC ACID SCH MG: 180 TABLET, DELAYED RELEASE ORAL at 09:25

## 2019-03-20 RX ADMIN — SODIUM BICARBONATE SCH MG: 325 TABLET ORAL at 15:30

## 2019-03-20 RX ADMIN — CYANOCOBALAMIN TAB 1000 MCG SCH MCG: 1000 TAB at 09:24

## 2019-03-20 RX ADMIN — MOMETASONE FUROATE AND FORMOTEROL FUMARATE DIHYDRATE SCH PUFF: 200; 5 AEROSOL RESPIRATORY (INHALATION) at 18:55

## 2019-03-20 RX ADMIN — MOMETASONE FUROATE AND FORMOTEROL FUMARATE DIHYDRATE SCH PUFF: 200; 5 AEROSOL RESPIRATORY (INHALATION) at 07:19

## 2019-03-20 RX ADMIN — SODIUM BICARBONATE SCH MG: 325 TABLET ORAL at 09:23

## 2019-03-20 RX ADMIN — MYCOPHENOLIC ACID SCH MG: 180 TABLET, DELAYED RELEASE ORAL at 20:12

## 2019-03-20 RX ADMIN — SODIUM BICARBONATE SCH MG: 325 TABLET ORAL at 20:17

## 2019-03-20 NOTE — PRG
DATE OF SERVICE:  03/20/2019



SUBJECTIVE:  This morning, he is awake, alert, and responsive.  He is less short of

breath. 



OBJECTIVE:  VITAL SIGNS:  Sats on a Ventimask 98%, respiratory rate 18, temperature

97, and blood pressure 140/80. 

CHEST:  Decreased breath sounds.  No wheezing. 

CARDIAC:  Normal S1 and S2.  No gallops. 

ABDOMEN:  No masses.



LABORATORY DATA:  X-ray yesterday shows worsening bilateral pleural effusion.  His

BUN and creatinine are 69 and 2.19. 



His diuretics were increased.  He is going to need increased dose of diuretics to

control his bilateral pleural effusion. 



Pulmonary wise, continue neb treatments and supportive care.







Job ID:  [id number]

## 2019-03-20 NOTE — PRG
DATE OF SERVICE:  03/20/2019



SERVICE:  Renal Medicine.



SUBJECTIVE:  Mr. Collins is a 63-year-old black male, followed up for his acute

kidney injury on top of his chronic renal failure.  We still continue to manage his

medication from his renal transplant.  Again, the patient still has shortness of

breath.  This is multifactorial.  This is from COPD exacerbation/CHF from a possible

diastolic dysfunction. 



His chest x-ray done on March 19, 2019, showed progressive findings of decompensated

CHF with bilateral pleural fluid.  Renal function remained stable.  He continues to

be on diuretics. 



No new complaints today.  No chest pain.



OBJECTIVE:  VITAL SIGNS:  Blood pressure 140/80, heart rate 76, respiratory rate 18,

temperature 97.1, and pulse ox 98%. 

GENERAL EXAM:  Awake, alert, comfortable, not in overt distress. 

SKIN:  Adequate turgor. 

HEENT:  He has slightly pale conjunctivae.  Anicteric sclerae. 

NECK:  No neck mass.  No carotid bruits.  No JVD. 

CHEST:  No deformities. 

LUNGS:  Decreased breath sounds. 

HEART:  Normal sinus rhythm.  No murmurs, gallops, or rubs. 

ABDOMEN:  Globular, soft, nontender.  No masses. 

EXTREMITIES:  No edema.



MEDICATIONS:  Medications of March 20, 2019, was reviewed.



LABORATORY DATA:  Laboratories of March 20, 2019, white count 8.5, hemoglobin 7.6.

Sodium 140, potassium 5.1, chloride 108, carbon dioxide 16, BUN 69, creatinine 2.19,

GFR 37 mL/minute, glucose 100, calcium 9.5. 



ASSESSMENT AND PLAN:  

1. Chronic renal failure/acute kidney injury.  Stable renal function.  Creatinine

2.19.  He is stable for the last several days.  There is no indication for any

dialytic intervention.  He currently is at stage III chronic renal failure.

Continue current diuretic regimen. 

2. Shortness of breath, multifactorial etiology.  Continue current diuretic regimen.

 Continue neb treatment. 

3. Status post cadaveric renal transplant.  The last tacrolimus was noted at 17.3 on

March 15.  He was placed on hold with this tacrolimus for 2 to 3 days and we have

resumed him at a lower dosage of 0.5 mg b.i.d.  I will be rechecking or have

rechecked another tacrolimus level with this patient. 



Overall prognosis remains guarded.



ADDENDUM:  Anemia-stable.  Declining blood transfusion.







Job ID:  [id number]

## 2019-03-20 NOTE — PDOC.PN
- Subjective


Encounter Start Date: 03/20/19


Encounter Start Time: 08:10





pt still has dyspnea, and requires high flow oxygen





- Objective


Resuscitation Status - Order Detail:





03/09/19 08:16


Resuscitation Status Routine 


   Resuscitation Status: FULL: Full Resuscitation


   Discussed with: patient








Vital Signs & Weight: 


 Vital Signs (12 hours)











  Temp Pulse Resp BP Pulse Ox


 


 03/20/19 10:48      96


 


 03/20/19 07:55  97.1 F L  76  18  140/80  98


 


 03/20/19 07:20      95


 


 03/20/19 07:17   68  20   95


 


 03/20/19 04:00  97.9 F  76  20  135/80  96


 


 03/19/19 23:19   67  18   96








 Weight











Weight                         172 lb 4.8 oz











 Most Recent Monitor Data











Heart Rate from ECG            92


 


NIBP                           147/98


 


NIBP BP-Mean                   114


 


Respiration from ECG           10


 


SpO2                           99














I&O: 


 











 03/19/19 03/20/19 03/21/19





 06:59 06:59 06:59


 


Intake Total 720 50 


 


Output Total 1400 675 


 


Balance -680 -625 











Result Diagrams: 


 03/20/19 04:53





 03/20/19 04:53


EKG Reviewed by me: Yes (afib)





Phys Exam





- Physical Examination


Constitutional: NAD


HEENT: PERRLA, moist MMs, sclera anicteric


Neck: no JVD, supple


Respiratory: no wheezing, no rhonchi


rales noted


Cardiovascular: no significant murmur, irregular


Gastrointestinal: soft, non-tender, no distention, positive bowel sounds


Musculoskeletal: no edema, pulses present


Neurological: non-focal, normal sensation


Lymphatic: no nodes


Psychiatric: normal affect


Skin: no rash, normal turgor





Dx/Plan


(1) Atrial fibrillation with RVR


Code(s): I48.91 - UNSPECIFIED ATRIAL FIBRILLATION   Status: Acute   





(2) Acute on chronic diastolic ACC/AHA stage C congestive heart failure


Code(s): I50.33 - ACUTE ON CHRONIC DIASTOLIC (CONGESTIVE) HEART FAILURE   Status

: Acute   





(3) Acute worsening of stage 3 chronic kidney disease


Code(s): N18.3 - CHRONIC KIDNEY DISEASE, STAGE 3 (MODERATE)   Status: Acute   





(4) Fever


Code(s): R50.9 - FEVER, UNSPECIFIED   Status: Acute   





(5) Hyperkalemia


Code(s): E87.5 - HYPERKALEMIA   Status: Acute   





(6) Metabolic acidosis


Code(s): E87.2 - ACIDOSIS   Status: Acute   





(7) Brachial vein thrombosis


Code(s): I82.629 - ACUTE EMBOLISM AND THROMBOSIS OF DEEP VN UNSP UP EXTREM   

Status: Chronic   





(8) CAD (coronary artery disease)


Code(s): I25.10 - ATHSCL HEART DISEASE OF NATIVE CORONARY ARTERY W/O ANG PCTRS 

  Status: Chronic   


Qualifiers: 


 





(9) COPD (chronic obstructive pulmonary disease)


Status: Chronic   





(10) Cholelithiasis


Code(s): K80.20 - CALCULUS OF GALLBLADDER W/O CHOLECYSTITIS W/O OBSTRUCTION   

Status: Chronic   


Qualifiers: 


 





(11) Chronic anticoagulation


Code(s): Z79.01 - LONG TERM (CURRENT) USE OF ANTICOAGULANTS   Status: Chronic   





(12) Elevated troponin


Code(s): R74.8 - ABNORMAL LEVELS OF OTHER SERUM ENZYMES   Status: Chronic   





(13) Hypertension


Code(s): I10 - ESSENTIAL (PRIMARY) HYPERTENSION   Status: Chronic   


Qualifiers: 


 





(14) Kidney transplant status


Code(s): Z94.0 - KIDNEY TRANSPLANT STATUS   Status: Chronic   Comment: its been 

11 yrs now   





(15) Normocytic anemia


Code(s): D64.9 - ANEMIA, UNSPECIFIED   Status: Chronic   





(16) Paroxysmal A-fib


Code(s): I48.0 - PAROXYSMAL ATRIAL FIBRILLATION   Status: Chronic   





(17) Severe mitral regurgitation by prior echocardiogram


Code(s): I34.0 - NONRHEUMATIC MITRAL (VALVE) INSUFFICIENCY   Status: Chronic   





(18) Severe tricuspid regurgitation by prior echocardiogram


Code(s): I07.1 - RHEUMATIC TRICUSPID INSUFFICIENCY   Status: Chronic   





(19) GI bleed


Code(s): K92.2 - GASTROINTESTINAL HEMORRHAGE, UNSPECIFIED   Status: Acute   





(20) Anemia due to acute blood loss


Code(s): D62 - ACUTE POSTHEMORRHAGIC ANEMIA   Status: Acute   





(21) Thrombocytopenia


Code(s): D69.6 - THROMBOCYTOPENIA, UNSPECIFIED   Status: Acute   





- Plan


cont current plan of care, respiratory therapy





* continue lasix


* medication reviewed as below


* symptomatic treatment


* clinically he is not appear to be ready for discharge yet


* cardiology, nephrology, pulmonary following.








Review of Systems





- Review of Systems


Eyes: negative: Pain, Vision Change, Conjunctivae Inflammation, Eyelid 

Inflammation, Redness, Other


ENT: negative: Ear Pain, Ear Discharge, Nose Pain, Nose Discharge, Nose 

Congestion, Mouth Pain, Mouth Swelling, Throat Pain, Throat Swelling, Other


Respiratory: Shortness of Breath.  negative: Cough, Dry, Hemoptysis, SOB with 

Excertion, Pleuritic Pain, Sputum, Wheezing


Cardiovascular: negative: chest pain, palpitations, orthopnea, paroxysmal 

nocturnal dyspnea, edema, light headedness, other


Gastrointestinal: negative: Nausea, Vomiting, Abdominal Pain, Diarrhea, 

Constipation, Melena, Hematochezia, Other


Genitourinary: negative: Dysuria, Frequency, Incontinence, Hematuria, Retention

, Other


Musculoskeletal: negative: Neck Pain, Shoulder Pain, Arm Pain, Back Pain, Hand 

Pain, Leg Pain, Foot Pain, Other


Skin: negative: Rash, Lesions, Elliot, Bruising, Other





- Medications/Allergies


Allergies/Adverse Reactions: 


 Allergies











Allergy/AdvReac Type Severity Reaction Status Date / Time


 


No Known Drug Allergies Allergy Unknown  Verified 02/25/18 12:37











Medications: 


 Current Medications





Hydrocodone Bitart/Acetaminophen (Norco 5/325)  1 tab PO Q4H PRN


   PRN Reason: Moderate Pain (4-6)


   Last Admin: 03/19/19 03:27 Dose:  1 tab


Albuterol/Ipratropium (Duoneb)  3 ml NEB [id number] [facility]


   Last Admin: 03/20/19 07:17 Dose:  3 ml


Amoxicillin/Clavulanate Potassium (Augmentin)  250 mg PO Q12HR [facility]


   Last Admin: 03/19/19 20:19 Dose:  250 mg


Artificial Tears (Tears Renewed 15ml Bottle)  2 drop EA EYE PRN PRN


   PRN Reason: Dry Eyes


Aspirin (Ecotrin)  81 mg PO DAILY [facility]


   Last Admin: 03/20/19 09:24 Dose:  81 mg


Atorvastatin Calcium (Lipitor)  20 mg PO HS [facility]


   Last Admin: 03/19/19 20:20 Dose:  20 mg


Bisacodyl (Dulcolax)  10 mg [place] DAILYPRN PRN


   PRN Reason: Constipation


Colchicine (Colcrys)  0.6 mg PO BID [facility]


   Last Admin: 03/20/19 09:24 Dose:  0.6 mg


Cyanocobalamin (Vitamin B-12)  1,000 mcg PO DAILY [facility]


   Last Admin: 03/20/19 09:24 Dose:  1,000 mcg


Ferrous Sulfate (Feosol)  325 mg PO QA-[facility]


   Last Admin: 03/20/19 09:24 Dose:  325 mg


Fluticasone Propionate (Flonase Nasal Spray)  0 gm NASAL DAILY [facility]


   Last Admin: 03/20/19 09:27 Dose:  2 spr


Folic Acid (Folvite)  1 mg PO DAILY [facility]


   Last Admin: 03/20/19 09:24 Dose:  1 mg


Furosemide (Lasix)  40 mg PO 0900 [facility]


   Last Admin: 03/20/19 09:24 Dose:  40 mg


Furosemide (Lasix)  40 mg SLOW IVP ONE [facility]


   Stop: 03/20/19 11:00


Guaifenesin (Robitussin Sf)  200 mg PO Q4H PRN


   PRN Reason: Cough


Hydralazine HCl (Apresoline)  25 mg PO BID [facility]


   Last Admin: 03/20/19 09:23 Dose:  25 mg


Hydralazine HCl (Apresoline)  10 mg SLOW IVP Q4H PRN


   PRN Reason: SBP > 180 and HR < 70


Loperamide HCl (Imodium)  2 mg PO PRN PRN


   PRN Reason: Diarrhea/Loose Stools


Loratadine (Claritin)  10 mg PO DAILYPRN PRN


   PRN Reason: Sinus Symptoms


Metoprolol Succinate (Toprol Xl)  100 mg PO DAILY [facility]


   Last Admin: 03/20/19 09:23 Dose:  100 mg


Mineral Oil/White Petrolatum (Eucerin Cream)  0 gm TOP BIDPRN PRN


   PRN Reason: Dry Skin


Mometasone Furoate/Formoterol Fumar (Dulera 200 Mcg/5 Mcg Inhaler)  2 puff INH 

BID-RT [facility]


   Last Admin: 03/20/19 07:19 Dose:  2 puff


Mycophenolate Sodium (Myfortic)  360 mg PO BID [facility]


   Last Admin: 03/20/19 09:25 Dose:  360 mg


Nitroglycerin (Nitrostat)  0.4 mg SL Q5MIN PRN


   PRN Reason: Chest Pain


Ondansetron HCl (Zofran Odt)  4 mg PO Q6H PRN


   PRN Reason: Nausea/Vomiting


Ondansetron HCl (Zofran)  4 mg IVP Q6H PRN


   PRN Reason: Nausea/Vomiting


Senna/Docusate Sodium (Senokot S)  2 tab PO BID PRN


   PRN Reason: Constipation


Sildenafil Citrate (Revatio)  20 mg PO TID [facility]


   Last Admin: 03/20/19 09:24 Dose:  20 mg


Sodium Bicarbonate (Bicarbonate, Sodium)  325 mg PO TID [facility]


   Last Admin: 03/20/19 09:23 Dose:  325 mg


Sodium Chloride (Ocean Nasal Spray 0.65%)  0 ml EA NARE QIDPRN PRN


   PRN Reason: Nasal Congestion


   Last Admin: 03/16/19 20:44 Dose:  1 spr


Tacrolimus (Prograf)  0.5 mg PO BID [facility]


   Last Admin: 03/20/19 10:25 Dose:  0.5 mg


Tamsulosin HCl (Flomax)  0.4 mg PO HS [facility]


   Last Admin: 03/19/19 20:19 Dose:  0.4 mg


Throat Lozenges (Cepastat Lozenges)  1 carissa PO Q2H PRN


   PRN Reason: Sore Throat


Tramadol HCl (Ultram)  50 mg PO Q6H PRN


   PRN Reason: Pain


   Last Admin: 03/16/19 15:12 Dose:  50 mg


Zolpidem Tartrate (Ambien)  5 mg PO HSPRN PRN


   PRN Reason: Insomnia

## 2019-03-21 LAB
ANION GAP SERPL CALC-SCNC: 15 MMOL/L (ref 10–20)
ANION GAP SERPL CALC-SCNC: 18 MMOL/L (ref 10–20)
BUN SERPL-MCNC: 64 MG/DL (ref 8.4–25.7)
BUN SERPL-MCNC: 64 MG/DL (ref 8.4–25.7)
CALCIUM SERPL-MCNC: 8.9 MG/DL (ref 7.8–10.44)
CALCIUM SERPL-MCNC: 9 MG/DL (ref 7.8–10.44)
CHLORIDE SERPL-SCNC: 110 MMOL/L (ref 98–107)
CHLORIDE SERPL-SCNC: 110 MMOL/L (ref 98–107)
CO2 SERPL-SCNC: 19 MMOL/L (ref 23–31)
CO2 SERPL-SCNC: 20 MMOL/L (ref 23–31)
CREAT CL PREDICTED SERPL C-G-VRATE: 37 ML/MIN (ref 70–130)
CREAT CL PREDICTED SERPL C-G-VRATE: 37 ML/MIN (ref 70–130)
GLUCOSE SERPL-MCNC: 95 MG/DL (ref 80–115)
GLUCOSE SERPL-MCNC: 96 MG/DL (ref 80–115)
HGB BLD-MCNC: 7.2 G/DL (ref 14–18)
INR PPP: 1.4
MAGNESIUM SERPL-MCNC: 2 MG/DL (ref 1.6–2.6)
MCH RBC QN AUTO: 25.9 PG (ref 27–31)
MCV RBC AUTO: 87.4 FL (ref 78–98)
MDIFF COMPLETE?: YES
PLATELET # BLD AUTO: 236 THOU/UL (ref 130–400)
POTASSIUM SERPL-SCNC: 4.5 MMOL/L (ref 3.5–5.1)
POTASSIUM SERPL-SCNC: 4.6 MMOL/L (ref 3.5–5.1)
PROTHROMBIN TIME: 17.6 SEC (ref 12–14.7)
RBC # BLD AUTO: 2.8 MILL/UL (ref 4.7–6.1)
SODIUM SERPL-SCNC: 140 MMOL/L (ref 136–145)
SODIUM SERPL-SCNC: 142 MMOL/L (ref 136–145)
WBC # BLD AUTO: 6.1 THOU/UL (ref 4.8–10.8)

## 2019-03-21 RX ADMIN — ASPIRIN SCH MG: 81 TABLET ORAL at 09:08

## 2019-03-21 RX ADMIN — AMOXICILLIN AND CLAVULANATE POTASSIUM SCH MG: 250; 125 TABLET, FILM COATED ORAL at 09:09

## 2019-03-21 RX ADMIN — HYDROCODONE BITARTRATE AND ACETAMINOPHEN PRN TAB: 5; 325 TABLET ORAL at 22:35

## 2019-03-21 RX ADMIN — MOMETASONE FUROATE AND FORMOTEROL FUMARATE DIHYDRATE SCH PUFF: 200; 5 AEROSOL RESPIRATORY (INHALATION) at 07:10

## 2019-03-21 RX ADMIN — MYCOPHENOLIC ACID SCH MG: 180 TABLET, DELAYED RELEASE ORAL at 20:46

## 2019-03-21 RX ADMIN — AMOXICILLIN AND CLAVULANATE POTASSIUM SCH MG: 250; 125 TABLET, FILM COATED ORAL at 20:46

## 2019-03-21 RX ADMIN — SODIUM BICARBONATE SCH MG: 325 TABLET ORAL at 20:46

## 2019-03-21 RX ADMIN — SODIUM BICARBONATE SCH MG: 325 TABLET ORAL at 15:33

## 2019-03-21 RX ADMIN — SODIUM BICARBONATE SCH MG: 325 TABLET ORAL at 09:07

## 2019-03-21 RX ADMIN — MOMETASONE FUROATE AND FORMOTEROL FUMARATE DIHYDRATE SCH PUFF: 200; 5 AEROSOL RESPIRATORY (INHALATION) at 18:14

## 2019-03-21 RX ADMIN — MYCOPHENOLIC ACID SCH MG: 180 TABLET, DELAYED RELEASE ORAL at 09:08

## 2019-03-21 RX ADMIN — CYANOCOBALAMIN TAB 1000 MCG SCH MCG: 1000 TAB at 09:06

## 2019-03-21 NOTE — PRG
DATE OF SERVICE:  03/21/2019



SUBJECTIVE:  Mr. Collins is a 63-year-old black male, followed by the Renal Service

for his acute kidney injury on top of his chronic renal failure.  We also follow him

for his need for management of his renal transplantation medications.  He was

initially admitted for shortness of breath secondary to a combined COPD exacerbation

and CHF.  For the last few days, his breathing is better.  The patient continues to

be on maintenance diuretics.  He is now currently on Lasix at 40 mg IV q.12.  In

addition, renal function is holding steady. 



No new complaints today.  No chest pain.



OBJECTIVE:  VITAL SIGNS:  Blood pressure is 133/73, heart rate 67, respiratory rate

18, temperature 97, and pulse ox 94%. 

GENERAL EXAM:  Awake, alert, supine, comfortable, not in distress. 

SKIN:  Adequate turgor. 

HEENT:  Slightly pale conjunctivae.  Anicteric sclerae. 

NECK:  No neck mass.  No carotid bruits.  No JVD. 

CHEST:  No deformities. 

LUNGS:  Decreased breath sounds.  No wheezing. 

HEART:  Normal sinus rhythm.  No murmur.  No gallops or rubs. 

ABDOMEN:  Globular, soft, nontender.  No masses. 

EXTREMITIES:  No edema.  No deformities.



MEDICATIONS:  Medications of March 21, 2019, was reviewed.



LABORATORY DATA:  Laboratories of March 21, 2019; white count 6.1, hemoglobin 7.2.

Sodium 140, potassium 4.5, chloride 110, carbon dioxide 20, BUN 64, creatinine 2.24,

glucose 95, calcium 8.9. 



ASSESSMENT AND PLAN:  

1. Shortness of breath, multifactorial etiology.  Currently, on neb treatment.  In

addition, diuretics has been adjusted upwards from 40 mg IV daily to q.12. 

2. Acute kidney injury/chronic renal failure-superimposed prerenal azotemia, stable

renal function.  Creatinine is noted to be plateauing.  No indication for any

dialytic intervention. 

3. Status post cadaveric renal transplant.  My last tacrolimus level was noted at

17.3 on March 15, 2019.  Tacrolimus was placed on hold for few days.  We have

restarted him back at a smaller dose of 0.5 mg tablet b.i.d.  Recheck of the

tacrolimus level has been done. 

4. Anemia-p.r.n. blood transfusion-of interest, this patient has declined blood

transfusion in the past. 

5. Overall, I agree with current management.  We will recheck basic metabolic panel

and CBC in a.m. 







Job ID:  [id number]

## 2019-03-21 NOTE — PDOC.PN
- Subjective


Encounter Start Date: 03/21/19


Encounter Start Time: 08:00





Patient seen and examined. No new complaints. No overnight events





- Objective


Resuscitation Status - Order Detail:





03/09/19 08:16


Resuscitation Status Routine 


   Resuscitation Status: FULL: Full Resuscitation


   Discussed with: patient








MAR Reviewed: Yes


Vital Signs & Weight: 


 Vital Signs (12 hours)











  Temp Pulse Resp BP Pulse Ox


 


 03/21/19 07:27  97.0 F L  67  18  133/73  98


 


 03/21/19 07:14      94 L


 


 03/21/19 07:10   67  20   94 L


 


 03/21/19 04:00  97.8 F  71  18  137/75  94 L


 


 03/21/19 00:07   71  16   99


 


 03/20/19 23:53  97.2 F L  68  20  124/76  96








 Weight











Weight                         172 lb 6.4 oz











 Most Recent Monitor Data











Heart Rate from ECG            92


 


NIBP                           147/98


 


NIBP BP-Mean                   114


 


Respiration from ECG           10


 


SpO2                           99














I&O: 


 











 03/20/19 03/21/19 03/22/19





 06:59 06:59 06:59


 


Intake Total 50 120 


 


Output Total 675 200 


 


Balance -625 -80 











Result Diagrams: 


 03/21/19 05:42





 03/21/19 05:42





Phys Exam





- Physical Examination


Constitutional: NAD


HEENT: PERRLA, moist MMs, sclera anicteric


Neck: no JVD, supple


Respiratory: no wheezing, no rales, no rhonchi


Cardiovascular: no significant murmur, no rub, irregular


Gastrointestinal: soft, non-tender, no distention, positive bowel sounds


Musculoskeletal: no edema, pulses present


Neurological: non-focal, normal sensation


Psychiatric: normal affect, A&O x 3


Skin: no rash, normal turgor





Dx/Plan


(1) Atrial fibrillation with RVR


Code(s): I48.91 - UNSPECIFIED ATRIAL FIBRILLATION   Status: Acute   





(2) Acute on chronic diastolic ACC/AHA stage C congestive heart failure


Code(s): I50.33 - ACUTE ON CHRONIC DIASTOLIC (CONGESTIVE) HEART FAILURE   Status

: Acute   





(3) Acute worsening of stage 3 chronic kidney disease


Code(s): N18.3 - CHRONIC KIDNEY DISEASE, STAGE 3 (MODERATE)   Status: Acute   





(4) Fever


Code(s): R50.9 - FEVER, UNSPECIFIED   Status: Acute   





(5) Hyperkalemia


Code(s): E87.5 - HYPERKALEMIA   Status: Acute   





(6) Metabolic acidosis


Code(s): E87.2 - ACIDOSIS   Status: Acute   





(7) Brachial vein thrombosis


Code(s): I82.629 - ACUTE EMBOLISM AND THROMBOSIS OF DEEP VN UNSP UP EXTREM   

Status: Chronic   





(8) CAD (coronary artery disease)


Code(s): I25.10 - ATHSCL HEART DISEASE OF NATIVE CORONARY ARTERY W/O ANG PCTRS 

  Status: Chronic   


Qualifiers: 


 





(9) COPD (chronic obstructive pulmonary disease)


Status: Chronic   





(10) Cholelithiasis


Code(s): K80.20 - CALCULUS OF GALLBLADDER W/O CHOLECYSTITIS W/O OBSTRUCTION   

Status: Chronic   


Qualifiers: 


 





(11) Chronic anticoagulation


Code(s): Z79.01 - LONG TERM (CURRENT) USE OF ANTICOAGULANTS   Status: Chronic   





(12) Elevated troponin


Code(s): R74.8 - ABNORMAL LEVELS OF OTHER SERUM ENZYMES   Status: Chronic   





(13) Hypertension


Code(s): I10 - ESSENTIAL (PRIMARY) HYPERTENSION   Status: Chronic   


Qualifiers: 


 





(14) Kidney transplant status


Code(s): Z94.0 - KIDNEY TRANSPLANT STATUS   Status: Chronic   Comment: its been 

11 yrs now   





(15) Normocytic anemia


Code(s): D64.9 - ANEMIA, UNSPECIFIED   Status: Chronic   





(16) Paroxysmal A-fib


Code(s): I48.0 - PAROXYSMAL ATRIAL FIBRILLATION   Status: Chronic   





(17) Severe mitral regurgitation by prior echocardiogram


Code(s): I34.0 - NONRHEUMATIC MITRAL (VALVE) INSUFFICIENCY   Status: Chronic   





(18) Severe tricuspid regurgitation by prior echocardiogram


Code(s): I07.1 - RHEUMATIC TRICUSPID INSUFFICIENCY   Status: Chronic   





(19) GI bleed


Code(s): K92.2 - GASTROINTESTINAL HEMORRHAGE, UNSPECIFIED   Status: Acute   





(20) Anemia due to acute blood loss


Code(s): D62 - ACUTE POSTHEMORRHAGIC ANEMIA   Status: Acute   





(21) Thrombocytopenia


Code(s): D69.6 - THROMBOCYTOPENIA, UNSPECIFIED   Status: Acute   





- Plan


cont current plan of care, respiratory therapy





* medication reviewed as below


* symptomatic treatment


* seems like pt is baseline and he needs to follow up with all consultants 

after discharge


* if all consultants are ok, will consider discharge.








Review of Systems





- Review of Systems


ENT: negative: Ear Pain, Ear Discharge, Nose Pain, Nose Discharge, Nose 

Congestion, Mouth Pain, Mouth Swelling, Throat Pain, Throat Swelling, Other


Respiratory: negative: Cough, Dry, Shortness of Breath, Hemoptysis, SOB with 

Excertion, Pleuritic Pain, Sputum, Wheezing


Cardiovascular: negative: chest pain, palpitations, orthopnea, paroxysmal 

nocturnal dyspnea, edema, light headedness, other


Gastrointestinal: negative: Nausea, Vomiting, Abdominal Pain, Diarrhea, 

Constipation, Melena, Hematochezia, Other


Genitourinary: negative: Dysuria, Frequency, Incontinence, Hematuria, Retention

, Other


Musculoskeletal: negative: Neck Pain, Shoulder Pain, Arm Pain, Back Pain, Hand 

Pain, Leg Pain, Foot Pain, Other





- Medications/Allergies


Allergies/Adverse Reactions: 


 Allergies











Allergy/AdvReac Type Severity Reaction Status Date / Time


 


No Known Drug Allergies Allergy Unknown  Verified 02/25/18 12:37











Medications: 


 Current Medications





Hydrocodone Bitart/Acetaminophen (Norco 5/325)  1 tab PO Q4H PRN


   PRN Reason: Moderate Pain (4-6)


   Last Admin: 03/19/19 03:27 Dose:  1 tab


Albuterol/Ipratropium (Duoneb)  3 ml NEB [id number]


   Last Admin: 03/21/19 07:10 Dose:  3 ml


Amoxicillin/Clavulanate Potassium (Augmentin)  250 mg PO Q12HR [facility]


   Last Admin: 03/21/19 09:09 Dose:  250 mg


Artificial Tears (Tears Renewed 15ml Bottle)  2 drop EA EYE PRN PRN


   PRN Reason: Dry Eyes


Aspirin (Ecotrin)  81 mg PO DAILY [facility]


   Last Admin: 03/21/19 09:08 Dose:  81 mg


Atorvastatin Calcium (Lipitor)  20 mg PO HS [facility]


   Last Admin: 03/20/19 20:13 Dose:  20 mg


Bisacodyl (Dulcolax)  10 mg [place] DAILYPRN PRN


   PRN Reason: Constipation


Colchicine (Colcrys)  0.6 mg PO BID [facility]


   Last Admin: 03/21/19 09:07 Dose:  0.6 mg


Cyanocobalamin (Vitamin B-12)  1,000 mcg PO DAILY [facility]


   Last Admin: 03/21/19 09:06 Dose:  1,000 mcg


Ferrous Sulfate (Feosol)  325 mg PO QAM-[facility]


   Last Admin: 03/21/19 09:07 Dose:  325 mg


Fluticasone Propionate (Flonase Nasal Spray)  0 gm NASAL DAILY [facility]


   Last Admin: 03/21/19 09:09 Dose:  2 spr


Folic Acid (Folvite)  1 mg PO DAILY [facility]


   Last Admin: 03/21/19 09:07 Dose:  1 mg


Furosemide (Lasix)  40 mg SLOW IVP 0600,1400 [facility]


Furosemide (Lasix)  40 mg SLOW IVP 0900 [facility]


   Stop: 03/21/19 11:00


Guaifenesin (Robitussin Sf)  200 mg PO Q4H PRN


   PRN Reason: Cough


Hydralazine HCl (Apresoline)  25 mg PO BID [facility]


   Last Admin: 03/21/19 09:07 Dose:  25 mg


Hydralazine HCl (Apresoline)  10 mg SLOW IVP Q4H PRN


   PRN Reason: SBP > 180 and HR < 70


Loperamide HCl (Imodium)  2 mg PO PRN PRN


   PRN Reason: Diarrhea/Loose Stools


Loratadine (Claritin)  10 mg PO DAILYPRN PRN


   PRN Reason: Sinus Symptoms


Metoprolol Succinate (Toprol Xl)  100 mg PO DAILY [facility]


   Last Admin: 03/21/19 09:07 Dose:  100 mg


Mineral Oil/White Petrolatum (Eucerin Cream)  0 gm TOP BIDPRN PRN


   PRN Reason: Dry Skin


Mometasone Furoate/Formoterol Fumar (Dulera 200 Mcg/5 Mcg Inhaler)  2 puff INH 

BID-RT [facility]


   Last Admin: 03/21/19 07:10 Dose:  2 puff


Mycophenolate Sodium (Myfortic)  360 mg PO BID [facility]


   Last Admin: 03/21/19 09:08 Dose:  360 mg


Nitroglycerin (Nitrostat)  0.4 mg SL Q5MIN PRN


   PRN Reason: Chest Pain


Ondansetron HCl (Zofran Odt)  4 mg PO Q6H PRN


   PRN Reason: Nausea/Vomiting


Ondansetron HCl (Zofran)  4 mg IVP Q6H PRN


   PRN Reason: Nausea/Vomiting


Senna/Docusate Sodium (Senokot S)  2 tab PO BID PRN


   PRN Reason: Constipation


Sildenafil Citrate (Revatio)  20 mg PO TID [facility]


   Last Admin: 03/21/19 09:08 Dose:  20 mg


Sodium Bicarbonate (Bicarbonate, Sodium)  325 mg PO TID [facility]


   Last Admin: 03/21/19 09:07 Dose:  325 mg


Sodium Chloride ([place]cean Nasal Spray 0.65%)  0 ml EA NARE QIDPRN PRN


   PRN Reason: Nasal Congestion


   Last Admin: 03/16/19 20:44 Dose:  1 spr


Tacrolimus (Prograf)  0.5 mg PO BID [facility]


   Last Admin: 03/21/19 09:08 Dose:  0.5 mg


Tamsulosin HCl (Flomax)  0.4 mg PO HS [facility]


   Last Admin: 03/20/19 20:13 Dose:  0.4 mg


Throat Lozenges (Cepastat Lozenges)  1 carissa PO Q2H PRN


   PRN Reason: Sore Throat


Tramadol HCl (Ultram)  50 mg PO Q6H PRN


   PRN Reason: Pain


   Last Admin: 03/16/19 15:12 Dose:  50 mg


Zolpidem Tartrate (Ambien)  5 mg PO HSPRN PRN


   PRN Reason: Insomnia

## 2019-03-21 NOTE — PRG
DATE OF SERVICE:  03/21/2019



SUBJECTIVE:  Obie Collins is a 63-year-old gentleman in room 266.



OBJECTIVE:  VITAL SIGNS:  This morning, his sats are 90% on 50% Ventimask, temp 
is

97, pulse 80, blood pressure _132\78_________. 

GENERAL:  He has difficulty breathing. 

CHEST:  Decreased breath sounds.  No wheezing. 

CARDIAC:  Normal S1, S2.  No gallops. 

ABDOMEN:  No masses.



IMPRESSION:  Respiratory failure, bilateral pleural effusion, congestive heart

failure, chronic renal failure status post transplant. 



He appears to have improved. 



He is to be switched over from Ventimask to low-flow O2. 



Pulmonary will follow.







Job ID:  [id number]



[id number]

## 2019-03-21 NOTE — DIS
DATE OF ADMISSION:  03/09/2019



DATE OF DISCHARGE:  03/21/2019



PRIMARY CARE PHYSICIAN:  Dr. Arthur Dang.



DISCHARGE DISPOSITION:  Home.



PRIMARY DISCHARGE DIAGNOSES:  Acute on chronic diastolic congestive heart 
failure,

stage 3 acute on chronic kidney failure, baseline chronic kidney disease stage 3
,

acute gastrointestinal bleed, anemia due to acute blood loss, atrial 
fibrillation

with rapid ventricular response, sepsis, metabolic acidosis, hyperkalemia, and

thrombocytopenia. 



SECONDARY DISCHARGE DIAGNOSES:  Severe tricuspid regurgitation; severe mitral

regurgitation; atrial fibrillation; normocytic anemia; history of kidney

transplantation; hypertension; chronically elevated troponin; chronic 
obstructive

pulmonary disease; chronic anticoagulation; chronic respiratory failure, on home

oxygen therapy; cholelithiasis; history of brachial vein thrombosis; chronic

diastolic heart failure; chronic kidney disease stage 3; and anemia of renal

disease. 



PRIMARY PROCEDURE/OPERATION:  None.



RADIOLOGICAL INVESTIGATION:  Chest x-ray, vascular ultrasound of lower extremity
,

and repeat chest x-ray. 



SIGNIFICANT LABORATORY DATA:  WBC 6.1, hemoglobin 7.2, platelet 236.  INR 1.4.

Sodium 140, creatinine 2.24, calcium 8.9. 



DISCHARGE MEDICATIONS:  

1. ProAir HFA two puffs q.4 hourly p.r.n.

2. Hydralazine 25 mg b.i.d.

3. Myfortic 360 mg b.i.d.

4. Viagra 20 mg t.i.d.

5. Flomax 0.4 mg daily.

6. Augmentin 250 mg p.o. b.i.d. for 5 days.

7. Tylenol 650 mg q.4 hourly p.r.n.

8. Aspirin 81 mg daily.

9. Lipitor 20 mg at bedtime.

10. Colchicine 0.6 mg daily.

11. Vitamin B12 of 1000 mcg p.o. daily.

12. Ferrous sulfate 325 mg p.o. daily.

13. Folic acid 1 mg daily.

14. Lasix 40 mg p.o. daily.

15. DuoNeb q.6 hourly as needed.

16. Metformin 500 mg b.i.d.

17. Toprol-XL 75 mg p.o. daily.

18. Dulera 2 puff inhalation b.i.d.

19. Sodium bicarbonate 325 mg t.i.d.

20. Prograf 0.5 mg b.i.d.

21. Warfarin 5 mg as directed.



CONTRAINDICATION:  None.



CODE STATUS:  Full code.



INPATIENT CONSULTANT:  Nephrology, Dr. Potter was following; Pulmonary, Dr. Sanchez 
was

following; Cardiology, Dr. Boggs was following. 



TEST RESULT PENDING ON DISCHARGE:  None.



ALLERGIES:  NO KNOWN DRUG ALLERGIES.



DISCHARGE PLAN:  Posthospital, the patient will follow up with all consultant as

instructed as well as primary care physician. 



HOSPITAL COURSE:  A 63-year-old male with multiple medical problems, who was

admitted by Dr. Toussaint on March 9, 2019, please see his H and P for further

details.  On admission, the patient was having acute respiratory failure with

hypoxia secondary to acute on chronic diastolic heart failure.  At the same time
,

the patient was also having acute on chronic renal failure.  The patient was 
also

having atrial fibrillation with rapid ventricular response.  The patient was 
treated

with Lasix for decompensated heart failure and after that, the patient's renal

function gotten worse.  The patient also had hyperkalemia that is why we treated

conservatively and medically.  His atrial fibrillation was fast and that is why 
we

consulted electrophysiologist. 



During this admission regarding congestive heart failure, we treated him with 
Lasix.

 His congestive heart failure got better and subsequently got worse because of 
he

was given IV fluids for renal failure and albumin for renal failure, but we 
have to

stop IV fluid and we have to restart on Lasix and after that, he became 
euvolemic

and compensated. 



Regarding kidney failure, initially we suspected cardiorenal, but Dr. Potter 
started

giving him IV fluid and IV albumin to make his kidney function back to baseline
, but

with that, the patient developed congestive heart failure worsening and that is 
why

we stopped IV fluid and started giving him Lasix.  After giving Lasix, the 
patient's

renal function continued to improve towards baseline and currently, the patient 
is

up to his baseline. 



While in hospital, abnormal electrolytes were corrected. 



Regarding atrial fibrillation, we consulted electrophysiologist.  The patient 
was

not a good candidate for any ablation procedure and that is why he was treated

medically.  Initially, he was getting warfarin, but subsequently he developed GI

bleed and that is why we discontinued warfarin and we started it upon 
discharge. 



Regarding GI bleed, he had blood loss anemia, we tried to give him blood

transfusion, but the patient refused to get blood transfusion and GI was not

thinking that the patient is a good candidate for any procedure and that is why 
he

was treated conservatively.  We hold warfarin therapy, but as his hemoglobin

remained stable and he did not have any further GI bleed, we restarted warfarin 
on

discharge. 



Initially, he was meeting sepsis criteria and that is why he was given 
antibiotic

therapy.  Source of infection is not clear, but we are suspecting from possibly

bronchitis, which was treated with vancomycin and Zosyn and subsequently 
changed to

Augmentin.  He will finish complete course of therapy. 



Regarding blood pressure medication, we have adjusted as able.  The patient was

initially requiring high-flow oxygen, but by the time of discharge, the patient 
is

up to his baseline. 



This patient is continuously at high risk for recurrent admission because of

multiple comorbidities and an end-stage of multiple medical problem. 



The patient is seen and examined at bedside today.  If all consultants are okay,

then we will consider discharging him home later on today.



Job ID:  [id number]



[email or web address]

## 2019-03-22 LAB
ANION GAP SERPL CALC-SCNC: 16 MMOL/L (ref 10–20)
BUN SERPL-MCNC: 60 MG/DL (ref 8.4–25.7)
CALCIUM SERPL-MCNC: 9 MG/DL (ref 7.8–10.44)
CHLORIDE SERPL-SCNC: 110 MMOL/L (ref 98–107)
CO2 SERPL-SCNC: 19 MMOL/L (ref 23–31)
CREAT CL PREDICTED SERPL C-G-VRATE: 33 ML/MIN (ref 70–130)
GLUCOSE SERPL-MCNC: 89 MG/DL (ref 80–115)
HGB BLD-MCNC: 7.6 G/DL (ref 14–18)
HGB BLD-MCNC: 8.1 G/DL (ref 14–18)
INR PPP: 1.4
MCH RBC QN AUTO: 26.4 PG (ref 27–31)
MCV RBC AUTO: 87.9 FL (ref 78–98)
MDIFF COMPLETE?: YES
PLATELET # BLD AUTO: 307 THOU/UL (ref 130–400)
PLATELET # BLD AUTO: 314 THOU/UL (ref 130–400)
POTASSIUM SERPL-SCNC: 4.5 MMOL/L (ref 3.5–5.1)
PROTHROMBIN TIME: 17.2 SEC (ref 12–14.7)
RBC # BLD AUTO: 2.89 MILL/UL (ref 4.7–6.1)
SODIUM SERPL-SCNC: 140 MMOL/L (ref 136–145)
WBC # BLD AUTO: 7.4 THOU/UL (ref 4.8–10.8)

## 2019-03-22 RX ADMIN — SODIUM BICARBONATE SCH MG: 325 TABLET ORAL at 22:03

## 2019-03-22 RX ADMIN — SODIUM BICARBONATE SCH MG: 325 TABLET ORAL at 14:32

## 2019-03-22 RX ADMIN — CYANOCOBALAMIN TAB 1000 MCG SCH MCG: 1000 TAB at 09:14

## 2019-03-22 RX ADMIN — MYCOPHENOLIC ACID SCH MG: 180 TABLET, DELAYED RELEASE ORAL at 09:13

## 2019-03-22 RX ADMIN — MYCOPHENOLIC ACID SCH MG: 180 TABLET, DELAYED RELEASE ORAL at 22:03

## 2019-03-22 RX ADMIN — MOMETASONE FUROATE AND FORMOTEROL FUMARATE DIHYDRATE SCH PUFF: 200; 5 AEROSOL RESPIRATORY (INHALATION) at 10:48

## 2019-03-22 RX ADMIN — ASPIRIN SCH MG: 81 TABLET ORAL at 09:13

## 2019-03-22 RX ADMIN — MOMETASONE FUROATE AND FORMOTEROL FUMARATE DIHYDRATE SCH PUFF: 200; 5 AEROSOL RESPIRATORY (INHALATION) at 19:18

## 2019-03-22 RX ADMIN — SODIUM BICARBONATE SCH MG: 325 TABLET ORAL at 09:14

## 2019-03-22 RX ADMIN — HYDROCODONE BITARTRATE AND ACETAMINOPHEN PRN TAB: 5; 325 TABLET ORAL at 18:18

## 2019-03-22 NOTE — PDOC.PN
- Subjective


Encounter Start Date: 03/22/19


Encounter Start Time: 08:00





Patient seen and examined. No new complaints. No overnight events





- Objective


Resuscitation Status - Order Detail:





03/09/19 08:16


Resuscitation Status Routine 


   Resuscitation Status: FULL: Full Resuscitation


   Discussed with: patient








MAR Reviewed: Yes


Vital Signs & Weight: 


 Vital Signs (12 hours)











  Temp Pulse Resp BP Pulse Ox


 


 03/22/19 08:00  97.3 F L  65  20  153/89 H  94 L


 


 03/22/19 04:00  97.4 F L  66  12  137/69  92 L


 


 03/21/19 22:25   72  20  








 Weight











Weight                         178 lb











 Most Recent Monitor Data











Heart Rate from ECG            92


 


NIBP                           147/98


 


NIBP BP-Mean                   114


 


Respiration from ECG           10


 


SpO2                           99














I&O: 


 











 03/21/19 03/22/19 03/23/19





 06:59 06:59 06:59


 


Intake Total 120  


 


Output Total 200  


 


Balance -80  











Result Diagrams: 


 03/22/19 06:01





 03/22/19 06:01


EKG Reviewed by me: Yes





Phys Exam





- Physical Examination


Constitutional: NAD


HEENT: PERRLA, moist MMs, sclera anicteric


Neck: no JVD, supple


Respiratory: no wheezing, no rhonchi


Cardiovascular: no significant murmur, irregular


Gastrointestinal: soft, non-tender, no distention, positive bowel sounds


Musculoskeletal: no edema, pulses present


Neurological: non-focal, normal sensation, moves all 4 limbs


Psychiatric: normal affect, A&O x 3


Skin: no rash, normal turgor





Dx/Plan


(1) Atrial fibrillation with RVR


Code(s): I48.91 - UNSPECIFIED ATRIAL FIBRILLATION   Status: Acute   





(2) Acute on chronic diastolic ACC/AHA stage C congestive heart failure


Code(s): I50.33 - ACUTE ON CHRONIC DIASTOLIC (CONGESTIVE) HEART FAILURE   Status

: Acute   





(3) Acute worsening of stage 3 chronic kidney disease


Code(s): N18.3 - CHRONIC KIDNEY DISEASE, STAGE 3 (MODERATE)   Status: Acute   





(4) Fever


Code(s): R50.9 - FEVER, UNSPECIFIED   Status: Acute   





(5) Hyperkalemia


Code(s): E87.5 - HYPERKALEMIA   Status: Acute   





(6) Metabolic acidosis


Code(s): E87.2 - ACIDOSIS   Status: Acute   





(7) Brachial vein thrombosis


Code(s): I82.629 - ACUTE EMBOLISM AND THROMBOSIS OF DEEP VN UNSP UP EXTREM   

Status: Chronic   





(8) CAD (coronary artery disease)


Code(s): I25.10 - ATHSCL HEART DISEASE OF NATIVE CORONARY ARTERY W/O ANG PCTRS 

  Status: Chronic   


Qualifiers: 


 





(9) COPD (chronic obstructive pulmonary disease)


Status: Chronic   





(10) Cholelithiasis


Code(s): K80.20 - CALCULUS OF GALLBLADDER W/O CHOLECYSTITIS W/O OBSTRUCTION   

Status: Chronic   


Qualifiers: 


 





(11) Chronic anticoagulation


Code(s): Z79.01 - LONG TERM (CURRENT) USE OF ANTICOAGULANTS   Status: Chronic   





(12) Elevated troponin


Code(s): R74.8 - ABNORMAL LEVELS OF OTHER SERUM ENZYMES   Status: Chronic   





(13) Hypertension


Code(s): I10 - ESSENTIAL (PRIMARY) HYPERTENSION   Status: Chronic   


Qualifiers: 


 





(14) Kidney transplant status


Code(s): Z94.0 - KIDNEY TRANSPLANT STATUS   Status: Chronic   Comment: its been 

11 yrs now   





(15) Normocytic anemia


Code(s): D64.9 - ANEMIA, UNSPECIFIED   Status: Chronic   





(16) Paroxysmal A-fib


Code(s): I48.0 - PAROXYSMAL ATRIAL FIBRILLATION   Status: Chronic   





(17) Severe mitral regurgitation by prior echocardiogram


Code(s): I34.0 - NONRHEUMATIC MITRAL (VALVE) INSUFFICIENCY   Status: Chronic   





(18) Severe tricuspid regurgitation by prior echocardiogram


Code(s): I07.1 - RHEUMATIC TRICUSPID INSUFFICIENCY   Status: Chronic   





(19) GI bleed


Code(s): K92.2 - GASTROINTESTINAL HEMORRHAGE, UNSPECIFIED   Status: Acute   





(20) Anemia due to acute blood loss


Code(s): D62 - ACUTE POSTHEMORRHAGIC ANEMIA   Status: Acute   





(21) Thrombocytopenia


Code(s): D69.6 - THROMBOCYTOPENIA, UNSPECIFIED   Status: Acute   





- Plan


cont current plan of care





* resume warfarin


* seems now pt is upto his baseline


* medication reviewed as below


* symptomatic treatment


* if all consultants are ok, will consider discharge.








Review of Systems





- Review of Systems


ENT: negative: Ear Pain, Ear Discharge, Nose Pain, Nose Discharge, Nose 

Congestion, Mouth Pain, Mouth Swelling, Throat Pain, Throat Swelling, Other


Respiratory: negative: Cough, Dry, Shortness of Breath, Hemoptysis, SOB with 

Excertion, Pleuritic Pain, Sputum, Wheezing


Cardiovascular: negative: chest pain, palpitations, orthopnea, paroxysmal 

nocturnal dyspnea, edema, light headedness, other


Gastrointestinal: negative: Nausea, Vomiting, Abdominal Pain, Diarrhea, 

Constipation, Melena, Hematochezia, Other


Genitourinary: negative: Dysuria, Frequency, Incontinence, Hematuria, Retention

, Other


Musculoskeletal: negative: Neck Pain, Shoulder Pain, Arm Pain, Back Pain, Hand 

Pain, Leg Pain, Foot Pain, Other





- Medications/Allergies


Allergies/Adverse Reactions: 


 Allergies











Allergy/AdvReac Type Severity Reaction Status Date / Time


 


No Known Drug Allergies Allergy Unknown  Verified 02/25/18 12:37











Medications: 


 Current Medications





Hydrocodone Bitart/Acetaminophen (Norco 5/325)  1 tab PO Q4H PRN


   PRN Reason: Moderate Pain (4-6)


   Last Admin: 03/21/19 22:35 Dose:  1 tab


Albuterol/Ipratropium (Duoneb)  3 ml NEB C2OB-[facility]


   Last Admin: 03/21/19 22:25 Dose:  3 ml


Artificial Tears (Tears Renewed 15ml Bottle)  2 drop EA EYE PRN PRN


   PRN Reason: Dry Eyes


Aspirin (Ecotrin)  81 mg PO DAILY [facility]


   Last Admin: 03/22/19 09:13 Dose:  81 mg


Atorvastatin Calcium (Lipitor)  20 mg PO HS [facility]


   Last Admin: 03/21/19 20:45 Dose:  20 mg


Bisacodyl (Dulcolax)  10 mg TX DAILYPRN PRN


   PRN Reason: Constipation


Colchicine (Colcrys)  0.6 mg PO BID [facility]


   Last Admin: 03/22/19 09:13 Dose:  0.6 mg


Cyanocobalamin (Vitamin B-12)  1,000 mcg PO DAILY [facility]


   Last Admin: 03/22/19 09:14 Dose:  1,000 mcg


Ferrous Sulfate (Feosol)  325 mg PO QA-[facility]


   Last Admin: 03/22/19 09:14 Dose:  325 mg


Fluticasone Propionate (Flonase Nasal Spray)  0 gm NASAL DAILY [facility]


   Last Admin: 03/22/19 09:15 Dose:  2 spr


Folic Acid (Folvite)  1 mg PO DAILY [facility]


   Last Admin: 03/22/19 09:13 Dose:  1 mg


Furosemide (Lasix)  40 mg SLOW IVP 0600,1400 [facility]


   Last Admin: 03/22/19 05:16 Dose:  40 mg


Guaifenesin (Robitussin Sf)  200 mg PO Q4H PRN


   PRN Reason: Cough


Hydralazine HCl (Apresoline)  25 mg PO BID [facility]


   Last Admin: 03/22/19 09:14 Dose:  25 mg


Hydralazine HCl (Apresoline)  10 mg SLOW IVP Q4H PRN


   PRN Reason: SBP > 180 and HR < 70


Loperamide HCl (Imodium)  2 mg PO PRN PRN


   PRN Reason: Diarrhea/Loose Stools


Loratadine (Claritin)  10 mg PO DAILYPRN PRN


   PRN Reason: Sinus Symptoms


Metoprolol Succinate (Toprol Xl)  100 mg PO DAILY [facility]


   Last Admin: 03/22/19 09:14 Dose:  100 mg


Mineral Oil/White Petrolatum (Eucerin Cream)  0 gm TOP BIDPRN PRN


   PRN Reason: Dry Skin


Mometasone Furoate/Formoterol Fumar (Dulera 200 Mcg/5 Mcg Inhaler)  2 puff INH 

BID-[facility]


   Last Admin: 03/21/19 18:14 Dose:  2 puff


Mycophenolate Sodium (Myfortic)  360 mg PO BID [facility]


   Last Admin: 03/22/19 09:13 Dose:  360 mg


Nitroglycerin (Nitrostat)  0.4 mg SL Q5MIN PRN


   PRN Reason: Chest Pain


Ondansetron HCl (Zofran Odt)  4 mg PO Q6H PRN


   PRN Reason: Nausea/Vomiting


Ondansetron HCl (Zofran)  4 mg IVP Q6H PRN


   PRN Reason: Nausea/Vomiting


Senna/Docusate Sodium (Senokot S)  2 tab PO BID PRN


   PRN Reason: Constipation


Sildenafil Citrate (Revatio)  20 mg PO TID [facility]


   Last Admin: 03/22/19 09:13 Dose:  20 mg


Sodium Bicarbonate (Bicarbonate, Sodium)  325 mg PO TID [facility]


   Last Admin: 03/22/19 09:14 Dose:  325 mg


Sodium Chloride ([place] Nasal Spray 0.65%)  0 ml EA NARE QIDPRN PRN


   PRN Reason: Nasal Congestion


   Last Admin: 03/16/19 20:44 Dose:  1 spr


Tacrolimus (Prograf)  0.5 mg PO BID [facility]


   Last Admin: 03/22/19 09:14 Dose:  0.5 mg


Tamsulosin HCl (Flomax)  0.4 mg PO [facility]


   Last Admin: 03/21/19 20:45 Dose:  0.4 mg


Throat Lozenges (Cepastat Lozenges)  1 carissa PO Q2H PRN


   PRN Reason: Sore Throat


Tramadol HCl (Ultram)  50 mg PO Q6H PRN


   PRN Reason: Pain


   Last Admin: 03/16/19 15:12 Dose:  50 mg


Warfarin Sodium (Coumadin)  5 mg PO 1700 SANTI


Zolpidem Tartrate (Ambien)  5 mg PO HSPRN PRN


   PRN Reason: Insomnia

## 2019-03-22 NOTE — PRG
DATE OF SERVICE:  03/22/2019



SERVICE:  Renal Medicine.



SUBJECTIVE:  Mr. Collins is a 63-year-old black male with acute kidney

injury/chronic renal failure, admitted for shortness of breath.  He was found to

have COPD exacerbation with CHF.  His diuretics have been recently adjusted 
upwards

by his cardiologist.  His breathing is stable.  He has also been found to be 
anemic

and has declined blood transfusion or an endoscopic procedure.  No other 
complaints

today. 



OBJECTIVE:  VITAL SIGNS:  Blood pressure 153/89, heart rate 65, respiratory 
rate 20,

temperature 97.3, and pulse ox 94%. 

GENERAL:  The patient is awake, alert, supine, comfortable, not in overt 
distress. 

SKIN:  Adequate turgor. 

HEENT:  Slightly pale conjunctivae.  Anicteric sclerae. 

NECK:  No neck mass.  No carotid bruits.  No JVD. 

CHEST:  No deformities. 

LUNGS:  Decreased breath sounds. 

HEART:  Normal sinus rhythm.  No murmurs.  No gallops.  No rubs. 

ABDOMEN:  Globular, soft, and nontender.  No masses. 

EXTREMITIES:  No edema.  No deformities.



MEDICATIONS:  Medications of March 22, 2019, was reviewed.



LABORATORY DATA:  Laboratories of March 22, 2019; white count 7.4, hemoglobin 
7.6.

Sodium 140, potassium 4.5, chloride 110, carbon dioxide 19, BUN 60, creatinine 
2.57,

glucose 89, and calcium 9.0. 



ASSESSMENT AND PLAN:  

1. Acute kidney injury/chronic renal failure, slightly increase in creatinine at

2.57.  Yesterday, this was 2.24.  This may be a reflection of the current 
diuretic

regimen.  Currently, on Lasix 40 mg IV q.12.  If the renal function will further

worsen, consider decreasing the diuretic regimen. 

2. Anemia, stable.  No indication for any blood transfusion.

3. Status post cadaveric renal transplant.  Awaiting repeat tacrolimus level.

Please note, due to the high level previously, tacrolimus was decreased to 0.5 
mg

tablet b.i.d.  Overall, continue supportive care. 

4. Shortness of breath - multifactorial - on neb treatment and on diuretics.



Case discussed with Dr. Sanchez - continue to elvi - if needed consider 
dialytic intervention.







Job ID:  [id number]



MTDD

## 2019-03-22 NOTE — PRG
DATE OF SERVICE:  03/22/2019



SUBJECTIVE:  Obie Collins is a 63-year-old gentleman.



OBJECTIVE:  VITAL SIGNS:  Saturations 94% on 4 L, respiratory rate 20, temperature

97, blood pressure 153/89. 

CHEST:  Decreased breath sounds.  Bilateral rhonchi. 

CARDIAC:  Normal S1 and S2.  No gallops. 

ABDOMEN:  No masses.



LABORATORY DATA:  His creatinine is 2, BUN is 60.



IMPRESSION:  

1. Cardiomyopathy.

2. Chronic renal failure, status post renal transplant. 

3. Atrial arrhythmias, SVT.

4. Bilateral pleural effusion.



PLAN:  Discussed with the patient that I am not going to tap his pleural effusion

because his fusion is going to come right back.  His arthritis is better on

colchicine.  His problem is cardiomyopathy and chronic renal failure. 



He is probably going to need low-flow O2 at home. 



Disposition is mainly as per Cardiology and Nephrology.







Job ID:  [id number]

## 2019-03-23 LAB
ANION GAP SERPL CALC-SCNC: 19 MMOL/L (ref 10–20)
BASOPHILS # BLD AUTO: 0 THOU/UL (ref 0–0.2)
BASOPHILS NFR BLD AUTO: 0 % (ref 0–1)
BUN SERPL-MCNC: 59 MG/DL (ref 8.4–25.7)
CALCIUM SERPL-MCNC: 9.3 MG/DL (ref 7.8–10.44)
CHLORIDE SERPL-SCNC: 110 MMOL/L (ref 98–107)
CO2 SERPL-SCNC: 17 MMOL/L (ref 23–31)
CREAT CL PREDICTED SERPL C-G-VRATE: 29 ML/MIN (ref 70–130)
EOSINOPHIL # BLD AUTO: 0 THOU/UL (ref 0–0.7)
EOSINOPHIL NFR BLD AUTO: 0.3 % (ref 0–10)
GLUCOSE SERPL-MCNC: 77 MG/DL (ref 80–115)
HGB BLD-MCNC: 8 G/DL (ref 14–18)
INR PPP: 1.3
LYMPHOCYTES # BLD: 1.2 THOU/UL (ref 1.2–3.4)
LYMPHOCYTES NFR BLD AUTO: 14.5 % (ref 21–51)
MCH RBC QN AUTO: 25.9 PG (ref 27–31)
MCV RBC AUTO: 87.2 FL (ref 78–98)
MONOCYTES # BLD AUTO: 1.1 THOU/UL (ref 0.11–0.59)
MONOCYTES NFR BLD AUTO: 13.8 % (ref 0–10)
NEUTROPHILS # BLD AUTO: 5.8 THOU/UL (ref 1.4–6.5)
NEUTROPHILS NFR BLD AUTO: 71.3 % (ref 42–75)
PLATELET # BLD AUTO: 301 THOU/UL (ref 130–400)
POTASSIUM SERPL-SCNC: 4.8 MMOL/L (ref 3.5–5.1)
PROTHROMBIN TIME: 16.5 SEC (ref 12–14.7)
RBC # BLD AUTO: 3.08 MILL/UL (ref 4.7–6.1)
SODIUM SERPL-SCNC: 141 MMOL/L (ref 136–145)
WBC # BLD AUTO: 8.1 THOU/UL (ref 4.8–10.8)

## 2019-03-23 RX ADMIN — MYCOPHENOLIC ACID SCH MG: 180 TABLET, DELAYED RELEASE ORAL at 21:19

## 2019-03-23 RX ADMIN — Medication SCH ML: at 21:24

## 2019-03-23 RX ADMIN — SODIUM BICARBONATE SCH MG: 325 TABLET ORAL at 15:32

## 2019-03-23 RX ADMIN — SODIUM BICARBONATE SCH MG: 325 TABLET ORAL at 08:55

## 2019-03-23 RX ADMIN — MOMETASONE FUROATE AND FORMOTEROL FUMARATE DIHYDRATE SCH PUFF: 200; 5 AEROSOL RESPIRATORY (INHALATION) at 06:57

## 2019-03-23 RX ADMIN — ASPIRIN SCH MG: 81 TABLET ORAL at 08:54

## 2019-03-23 RX ADMIN — SODIUM BICARBONATE SCH MG: 325 TABLET ORAL at 21:19

## 2019-03-23 RX ADMIN — MYCOPHENOLIC ACID SCH MG: 180 TABLET, DELAYED RELEASE ORAL at 08:55

## 2019-03-23 RX ADMIN — CYANOCOBALAMIN TAB 1000 MCG SCH MCG: 1000 TAB at 08:55

## 2019-03-23 RX ADMIN — MOMETASONE FUROATE AND FORMOTEROL FUMARATE DIHYDRATE SCH PUFF: 200; 5 AEROSOL RESPIRATORY (INHALATION) at 19:21

## 2019-03-23 RX ADMIN — HYDROCODONE BITARTRATE AND ACETAMINOPHEN PRN TAB: 5; 325 TABLET ORAL at 17:35

## 2019-03-23 NOTE — PRG
DATE OF SERVICE:  03/23/2019



SUBJECTIVE:  Mr. Collins was eating lunch.  He is in no distress.  Has no acute

complaints. 



OBJECTIVE:  VITAL SIGNS:  Temperature 97.7, pulse 64, respirations 18, O2 saturation

92% on 4 L, and blood pressure 132/80. 

HEENT:  Unremarkable. 

NECK:  No JVD. 

LUNGS:  Diminished breath sounds at both bases. 

CARDIAC:  S1 and S2.  Regular. 

ABDOMEN:  Soft. 

EXTREMITIES:  No edema.



LABORATORY DATA:  White blood cell count 8.1, hematocrit 26.9, and platelet count

301.  INR is 1.3.  Sodium 141, potassium 4.8, chloride 110, CO2 of 17, BUN 59,

creatinine 2.8, glucose 77. 



ASSESSMENT:  

1. Cardiomyopathy.

2. Chronic renal failure.

3. History of renal transplant.

4. Bilateral effusions.

5. Atrial arrhythmias.



PLAN:  The patient's pleural effusions should continue to be managed with fluid

restriction and diuresis.  There are no acute pulmonary issues otherwise.  We will

recheck intermittently. 







Job ID:  [id number]

## 2019-03-23 NOTE — PDOC.PN
- Subjective


Encounter Start Date: 03/23/19


Encounter Start Time: 08:10





Patient seen and examined. No new complaints. No overnight events





- Objective


Resuscitation Status - Order Detail:





03/09/19 08:16


Resuscitation Status Routine 


   Resuscitation Status: FULL: Full Resuscitation


   Discussed with: patient








MAR Reviewed: Yes


Vital Signs & Weight: 


 Vital Signs (12 hours)











  Temp Pulse Resp BP BP Pulse Ox


 


 03/23/19 07:58  97.7 F  67  18   145/88 H  99


 


 03/23/19 06:57   72  12   


 


 03/23/19 06:48       99


 


 03/23/19 06:46   72  12   


 


 03/23/19 04:00  97.2 F L  64  12   132/90  94 L


 


 03/22/19 23:11       92 L


 


 03/22/19 22:03   69   133/80  








 Weight











Admit Weight                   162 lb 11.218 oz


 


Weight                         170 lb











 Most Recent Monitor Data











Heart Rate from ECG            92


 


NIBP                           147/98


 


NIBP BP-Mean                   114


 


Respiration from ECG           10


 


SpO2                           99














I&O: 


 











 03/22/19 03/23/19 03/24/19





 06:59 06:59 06:59


 


Intake Total  1660 


 


Balance  1660 











Result Diagrams: 


 03/22/19 13:54





 03/22/19 06:01


EKG Reviewed by me: Yes





Phys Exam





- Physical Examination


Constitutional: NAD


HEENT: PERRLA, moist MMs, sclera anicteric


Neck: no JVD, supple


Respiratory: no wheezing, no rales, no rhonchi


Cardiovascular: no significant murmur, irregular


Gastrointestinal: soft, non-tender, no distention, positive bowel sounds


Musculoskeletal: no edema, pulses present


Neurological: non-focal, normal sensation


Lymphatic: no nodes


Psychiatric: normal affect, A&O x 3


Skin: no rash, normal turgor





Dx/Plan


(1) Atrial fibrillation with RVR


Code(s): I48.91 - UNSPECIFIED ATRIAL FIBRILLATION   Status: Acute   





(2) Acute on chronic diastolic ACC/AHA stage C congestive heart failure


Code(s): I50.33 - ACUTE ON CHRONIC DIASTOLIC (CONGESTIVE) HEART FAILURE   Status

: Acute   





(3) Acute worsening of stage 3 chronic kidney disease


Code(s): N18.3 - CHRONIC KIDNEY DISEASE, STAGE 3 (MODERATE)   Status: Acute   





(4) Fever


Code(s): R50.9 - FEVER, UNSPECIFIED   Status: Acute   





(5) Hyperkalemia


Code(s): E87.5 - HYPERKALEMIA   Status: Acute   





(6) Metabolic acidosis


Code(s): E87.2 - ACIDOSIS   Status: Acute   





(7) Brachial vein thrombosis


Code(s): I82.629 - ACUTE EMBOLISM AND THROMBOSIS OF DEEP VN UNSP UP EXTREM   

Status: Chronic   





(8) CAD (coronary artery disease)


Code(s): I25.10 - ATHSCL HEART DISEASE OF NATIVE CORONARY ARTERY W/O ANG PCTRS 

  Status: Chronic   


Qualifiers: 


 





(9) COPD (chronic obstructive pulmonary disease)


Status: Chronic   





(10) Cholelithiasis


Code(s): K80.20 - CALCULUS OF GALLBLADDER W/O CHOLECYSTITIS W/O OBSTRUCTION   

Status: Chronic   


Qualifiers: 


 





(11) Chronic anticoagulation


Code(s): Z79.01 - LONG TERM (CURRENT) USE OF ANTICOAGULANTS   Status: Chronic   





(12) Elevated troponin


Code(s): R74.8 - ABNORMAL LEVELS OF OTHER SERUM ENZYMES   Status: Chronic   





(13) Hypertension


Code(s): I10 - ESSENTIAL (PRIMARY) HYPERTENSION   Status: Chronic   


Qualifiers: 


 





(14) Kidney transplant status


Code(s): Z94.0 - KIDNEY TRANSPLANT STATUS   Status: Chronic   Comment: its been 

11 yrs now   





(15) Normocytic anemia


Code(s): D64.9 - ANEMIA, UNSPECIFIED   Status: Chronic   





(16) Paroxysmal A-fib


Code(s): I48.0 - PAROXYSMAL ATRIAL FIBRILLATION   Status: Chronic   





(17) Severe mitral regurgitation by prior echocardiogram


Code(s): I34.0 - NONRHEUMATIC MITRAL (VALVE) INSUFFICIENCY   Status: Chronic   





(18) Severe tricuspid regurgitation by prior echocardiogram


Code(s): I07.1 - RHEUMATIC TRICUSPID INSUFFICIENCY   Status: Chronic   





(19) GI bleed


Code(s): K92.2 - GASTROINTESTINAL HEMORRHAGE, UNSPECIFIED   Status: Acute   





(20) Anemia due to acute blood loss


Code(s): D62 - ACUTE POSTHEMORRHAGIC ANEMIA   Status: Acute   





(21) Thrombocytopenia


Code(s): D69.6 - THROMBOCYTOPENIA, UNSPECIFIED   Status: Acute   





- Plan


cont current plan of care, respiratory therapy





* medication reviewed as below


* symptomatic treatment


* pt continue to improve 


* will monitor.


* no new meds at this time








Review of Systems





- Review of Systems


ENT: negative: Ear Pain, Ear Discharge, Nose Pain, Nose Discharge, Nose 

Congestion, Mouth Pain, Mouth Swelling, Throat Pain, Throat Swelling, Other


Respiratory: negative: Cough, Dry, Shortness of Breath, Hemoptysis, SOB with 

Excertion, Pleuritic Pain, Sputum, Wheezing


Cardiovascular: negative: chest pain, palpitations, orthopnea, paroxysmal 

nocturnal dyspnea, edema, light headedness, other


Gastrointestinal: negative: Nausea, Vomiting, Abdominal Pain, Diarrhea, 

Constipation, Melena, Hematochezia, Other


Genitourinary: negative: Dysuria, Frequency, Incontinence, Hematuria, Retention

, Other


Musculoskeletal: negative: Neck Pain, Shoulder Pain, Arm Pain, Back Pain, Hand 

Pain, Leg Pain, Foot Pain, Other





- Medications/Allergies


Allergies/Adverse Reactions: 


 Allergies











Allergy/AdvReac Type Severity Reaction Status Date / Time


 


No Known Drug Allergies Allergy Unknown  Verified 02/25/18 12:37











Medications: 


 Current Medications





Hydrocodone Bitart/Acetaminophen (Norco 5/325)  1 tab PO Q4H PRN


   PRN Reason: Moderate Pain (4-6)


   Last Admin: 03/22/19 18:18 Dose:  1 tab


Albuterol/Ipratropium (Duoneb)  3 ml NEB [id number] [facility]


   Last Admin: 03/23/19 06:46 Dose:  3 ml


Artificial Tears (Tears Renewed 15ml Bottle)  2 drop EA EYE PRN PRN


   PRN Reason: Dry Eyes


Aspirin (Ecotrin)  81 mg PO DAILY [facility]


   Last Admin: 03/22/19 09:13 Dose:  81 mg


Atorvastatin Calcium (Lipitor)  20 mg PO HS [facility]


   Last Admin: 03/22/19 22:03 Dose:  20 mg


Bisacodyl (Dulcolax)  10 mg TN DAILYPRN PRN


   PRN Reason: Constipation


Colchicine (Colcrys)  0.6 mg PO BID [facility]


   Last Admin: 03/22/19 22:02 Dose:  0.6 mg


Cyanocobalamin (Vitamin B-12)  1,000 mcg PO DAILY [facility]


   Last Admin: 03/22/19 09:14 Dose:  1,000 mcg


Ferrous Sulfate (Feosol)  325 mg PO [facility]


   Last Admin: 03/22/19 09:14 Dose:  325 mg


Fluticasone Propionate (Flonase Nasal Spray)  0 gm NASAL DAILY [facility]


   Last Admin: 03/22/19 09:15 Dose:  2 spr


Folic Acid (Folvite)  1 mg PO DAILY [facility]


   Last Admin: 03/22/19 09:13 Dose:  1 mg


Guaifenesin (Robitussin Sf)  200 mg PO Q4H PRN


   PRN Reason: Cough


Hydralazine HCl (Apresoline)  25 mg PO BID [facility]


   Last Admin: 03/22/19 22:03 Dose:  25 mg


Hydralazine HCl (Apresoline)  10 mg SLOW IVP Q4H PRN


   PRN Reason: SBP > 180 and HR < 70


Loperamide HCl (Imodium)  2 mg PO PRN PRN


   PRN Reason: Diarrhea/Loose Stools


Loratadine (Claritin)  10 mg PO DAILYPRN PRN


   PRN Reason: Sinus Symptoms


Metoprolol Succinate (Toprol Xl)  100 mg PO DAILY [facility]


   Last Admin: 03/22/19 09:14 Dose:  100 mg


Mineral Oil/White Petrolatum (Eucerin Cream)  0 gm TOP BIDPRN PRN


   PRN Reason: Dry Skin


Mometasone Furoate/Formoterol Fumar (Dulera 200 Mcg/5 Mcg Inhaler)  2 puff INH 

BID-RT [facility]


   Last Admin: 03/23/19 06:57 Dose:  2 puff


Mycophenolate Sodium (Myfortic)  360 mg PO BID [facility]


   Last Admin: 03/22/19 22:03 Dose:  360 mg


Nitroglycerin (Nitrostat)  0.4 mg SL Q5MIN PRN


   PRN Reason: Chest Pain


Ondansetron HCl (Zofran Odt)  4 mg PO Q6H PRN


   PRN Reason: Nausea/Vomiting


Ondansetron HCl (Zofran)  4 mg IVP Q6H PRN


   PRN Reason: Nausea/Vomiting


Senna/Docusate Sodium (Senokot S)  2 tab PO BID PRN


   PRN Reason: Constipation


Sildenafil Citrate (Revatio)  20 mg PO TID [facility]


   Last Admin: 03/22/19 22:02 Dose:  20 mg


Sodium Bicarbonate (Bicarbonate, Sodium)  325 mg PO TID [facility]


   Last Admin: 03/22/19 22:03 Dose:  325 mg


Sodium Chloride (Ocean Nasal Spray 0.65%)  0 ml EA NARE QIDPRN PRN


   PRN Reason: Nasal Congestion


   Last Admin: 03/16/19 20:44 Dose:  1 spr


Tacrolimus (Prograf)  0.5 mg PO BID [facility]


   Last Admin: 03/22/19 22:03 Dose:  0.5 mg


Tamsulosin HCl (Flomax)  0.4 mg PO HS [facility]


   Last Admin: 03/22/19 22:03 Dose:  0.4 mg


Throat Lozenges (Cepastat Lozenges)  1 carissa PO Q2H PRN


   PRN Reason: Sore Throat


Tramadol HCl (Ultram)  50 mg PO Q6H PRN


   PRN Reason: Pain


   Last Admin: 03/16/19 15:12 Dose:  50 mg


Warfarin Sodium (Coumadin)  7.5 mg PO 1700 [facility]


   Last Admin: 03/22/19 17:04 Dose:  7.5 mg


Zolpidem Tartrate (Ambien)  5 mg PO HSPRN PRN


   PRN Reason: Insomnia

## 2019-03-23 NOTE — PDOC.CTH
Cardiology Progress Note





- Subjective





The pt seen and examined.  No overnight events.  He cont. having intermittent 

SOB with movement





- Objective


 Vital Signs











  Temp Pulse Resp BP Pulse Ox


 


 03/23/19 15:33  97.8 F  68  16  159/79 H  92 L


 


 03/23/19 14:05   69  16  


 


 03/23/19 11:21   64  18  132/80  92 L


 


 03/23/19 08:55      99


 


 03/23/19 07:58  97.7 F  67  18  145/88 H  99


 


 03/23/19 06:57   72  12  


 


 03/23/19 06:48      99


 


 03/23/19 06:46   72  12  








 











Admit Weight                   162 lb 11.218 oz


 


Weight                         170 lb














 











 03/22/19 03/23/19 03/24/19





 06:59 06:59 06:59


 


Intake Total  1660 


 


Balance  1660 














- Physical Examination


General/Neuro: alert & oriented x3


Neck: no JVD present


Lungs: other: (coarse and diminished at bases)


Heart: other: (irregualr)


Abdomen: soft


Extremities: other: (No edema)





- Telemetry


Telemetry Rhythm: Afib/Aflutter 





- Labs


Result Diagrams: 


 03/23/19 08:26





 03/23/19 08:26


 Troponin/CKMB











CK-MB (CK-2)  1.5 ng/mL (0-6.6)   03/09/19  02:31    


 


Troponin I  0.090 ng/mL (< 0.028)  H  03/09/19  09:01    














- Assessment/Plan





1. Afib/Aflutter - well controlled HR with Metoprolol 100mg qd; On Coumadin, 

which dosage will be managed by pharmacy


2. Acute on Chronic diastolic HF - stable; On Lasix 40mg PO qd and Bblocker; 

not on ACE/ARB 2/2 hx of CKD


3. CAD with hx of CABG - stable; On BBlocker, ASA 81mg qd, and Statin


4. HTN - stable


5. DINA on CKD - managed by nephrologist


6. S/p renal transplant - 


7. COPD - on 3LNC


8. Brachial vein thrombosis - on Coumadin 


9. Severe MR - 


10. GI bleed - 


11. Thrombocytopenia - 


13. Anemia 2/2 CKD - On Iron supplement 


MAR reviewed





* Echo on 02/09/2019 showed EF 55-60%, mod ERV, mild dilated LA, mod AR, severe 

TR, mod NE and severe MR. 





Pt. seen and eval. by me. I agree with the A/P by the NP. gjm





Review of Systems





- Review of Systems


Constitutional: reports: no symptoms reported


EENTM: reports: no symptoms reported


Respiratory: reports: no symptoms reported


Cardiac (ROS): reports: no symptoms reported


ABD/GI: reports: no symptoms reported


: reports: no symptoms reported


Musculoskeletal: reports: no symptoms reported


Skin: reports: no symptoms reported

## 2019-03-23 NOTE — PRG
DATE OF SERVICE:  03/23/2019



SUBJECTIVE:  Mr. Collins is a 63-year-old black male, followed up for his acute

kidney injury on top of his chronic renal failure.  He has a fluctuating creatinine

that there is a superimposed hemodynamically-mediated renal dysfunction secondary to

the diuretics.  Adjustment of his diuretics was done today.  Case discussed with Dr. Sanchez and the feeling is he needs to be in some form of a diuretic regimen.  His

breathing is actually stable.  We are also following him up for management of his

renal transplant medications. 



OBJECTIVE:  VITAL SIGNS:  Blood pressure 145/88, heart rate 67, respiratory rate 18,

temperature 97.7, and pulse ox 99%. 

GENERAL:  Noted to be awake, alert, and comfortable, not in overt distress. 

SKIN:  Adequate turgor. 

HEENT:  He has a slightly pale conjunctivae.  Anicteric sclerae. 

NECK:  No neck mass.  No carotid bruits.  No JVD. 

CHEST:  No deformities. 

LUNGS:  Decreased breath sounds. 

HEART:  Normal sinus rhythm.  No murmur.  No gallops.  No rubs. 

ABDOMEN:  Globular, soft, and nontender.  No masses. 

EXTREMITIES:  No edema.  No deformities.



MEDICATIONS:  Medications of March 23, 2019, reviewed.



LABORATORY DATA:  Laboratories of March 23, 2019; white count 8.1 and hemoglobin 8.

Sodium 141, potassium 4.8, chloride 110, carbon dioxide 17, BUN 59, creatinine 2.76,

and GFR 28 mL/minute. 



On March 20, 2019, tacrolimus level was noted to be at 18.



ASSESSMENT AND PLAN:  

1. Status post cadaveric renal transplant.  Last tacrolimus level was noted at 18.

My plan is to hold off the tacrolimus temporarily.  Please note, he is currently on

tacrolimus at 0.5 mg p.o. b.i.d.  My plan is to as previously mentioned, hold off

tacrolimus for a couple days, then resume probably at 2.5 mg b.i.d. 

2. Shortness of breath - multifactorial etiology, clinically improved.  I have

changed the IV diuretics.  Cardiology discontinued the IV Lasix.  I will resume at

least a 40 mg tablet once a day. 

3. Anemia, slowly improving.  No evidence of recurrent gastrointestinal bleed. 



Overall agree with current management.  Case discussed with Dr. Sanchez.  Should the

renal function further worsen, we could always consider dialytic intervention. 







Job ID:  [id number]

## 2019-03-24 LAB
ANION GAP SERPL CALC-SCNC: 18 MMOL/L (ref 10–20)
BUN SERPL-MCNC: 58 MG/DL (ref 8.4–25.7)
CALCIUM SERPL-MCNC: 9.3 MG/DL (ref 7.8–10.44)
CHLORIDE SERPL-SCNC: 109 MMOL/L (ref 98–107)
CO2 SERPL-SCNC: 17 MMOL/L (ref 23–31)
CREAT CL PREDICTED SERPL C-G-VRATE: 28 ML/MIN (ref 70–130)
GLUCOSE SERPL-MCNC: 86 MG/DL (ref 80–115)
HGB BLD-MCNC: 7.6 G/DL (ref 14–18)
HGB BLD-MCNC: 8.3 G/DL (ref 14–18)
INR PPP: 1.4
MCH RBC QN AUTO: 26.4 PG (ref 27–31)
MCV RBC AUTO: 88.5 FL (ref 78–98)
MDIFF COMPLETE?: YES
PLATELET # BLD AUTO: 302 THOU/UL (ref 130–400)
PLATELET # BLD AUTO: 322 THOU/UL (ref 130–400)
POTASSIUM SERPL-SCNC: 5 MMOL/L (ref 3.5–5.1)
PROTHROMBIN TIME: 17.6 SEC (ref 12–14.7)
RBC # BLD AUTO: 2.87 MILL/UL (ref 4.7–6.1)
SODIUM SERPL-SCNC: 139 MMOL/L (ref 136–145)
WBC # BLD AUTO: 8.4 THOU/UL (ref 4.8–10.8)

## 2019-03-24 RX ADMIN — MYCOPHENOLIC ACID SCH MG: 180 TABLET, DELAYED RELEASE ORAL at 21:11

## 2019-03-24 RX ADMIN — MOMETASONE FUROATE AND FORMOTEROL FUMARATE DIHYDRATE SCH PUFF: 200; 5 AEROSOL RESPIRATORY (INHALATION) at 07:37

## 2019-03-24 RX ADMIN — MOMETASONE FUROATE AND FORMOTEROL FUMARATE DIHYDRATE SCH PUFF: 200; 5 AEROSOL RESPIRATORY (INHALATION) at 19:23

## 2019-03-24 RX ADMIN — CYANOCOBALAMIN TAB 1000 MCG SCH MCG: 1000 TAB at 08:03

## 2019-03-24 RX ADMIN — SODIUM BICARBONATE SCH MG: 325 TABLET ORAL at 08:03

## 2019-03-24 RX ADMIN — ASPIRIN SCH MG: 81 TABLET ORAL at 08:04

## 2019-03-24 RX ADMIN — MYCOPHENOLIC ACID SCH MG: 180 TABLET, DELAYED RELEASE ORAL at 08:04

## 2019-03-24 RX ADMIN — SODIUM BICARBONATE SCH MG: 325 TABLET ORAL at 15:02

## 2019-03-24 RX ADMIN — Medication SCH ML: at 08:07

## 2019-03-24 RX ADMIN — SODIUM BICARBONATE SCH MG: 325 TABLET ORAL at 21:11

## 2019-03-24 RX ADMIN — Medication SCH ML: at 21:12

## 2019-03-24 NOTE — PDOC.CTH
Cardiology Progress Note





- Subjective





The pt seen and examined.  No overnight events.  No cardiac complaints.  He 

cont. having intermittent SOB





- Objective


 Vital Signs











  Temp Pulse Resp BP Pulse Ox


 


 03/24/19 14:45   64  16  


 


 03/24/19 11:43  97.6 F  64  16  153/76 H  94 L


 


 03/24/19 08:03      92 L


 


 03/24/19 07:37   58 L  16  


 


 03/24/19 07:28      92 L


 


 03/24/19 07:27   58 L  16  


 


 03/24/19 07:24  98.2 F  58 L  20  160/81 H  92 L


 


 03/24/19 04:00  97.8 F  74  19  156/91 H  98








 











Admit Weight                   162 lb 11.218 oz


 


Weight                         170 lb














 











 03/23/19 03/24/19 03/25/19





 06:59 06:59 06:59


 


Intake Total 1660 1500 


 


Output Total  1270 


 


Balance 1660 230 














- Physical Examination


General/Neuro: alert & oriented x3


Neck: no JVD present


Lungs: other: (diminished at bases)


Heart: other: (irregular)


Abdomen: soft


Extremities: other: (No edema)





- Telemetry


Telemetry Rhythm: Afib/Aflutter





- Labs


Result Diagrams: 


 03/24/19 12:38





 03/24/19 05:17


 Troponin/CKMB











CK-MB (CK-2)  1.5 ng/mL (0-6.6)   03/09/19  02:31    


 


Troponin I  0.090 ng/mL (< 0.028)  H  03/09/19  09:01    














- Assessment/Plan





1. Afib/Aflutter - well controlled HR with Metoprolol 100mg qd; On Coumadin, 

which dosage will be managed by pharmacy


2. Acute on Chronic diastolic HF - stable; On Lasix 40mg PO qd and Bblocker; 

not on ACE/ARB 2/2 hx of CKD


3. CAD with hx of CABG - stable; On BBlocker, ASA 81mg qd, and Statin


4. HTN - stable


5. DINA on CKD - managed by nephrologist


6. S/p renal transplant - 


7. COPD - on 3LNC


8. Brachial vein thrombosis - on Coumadin 


9. Severe MR - 


10. GI bleed - 


11. Thrombocytopenia - 


13. Anemia 2/2 CKD - On Iron supplement 


MAR reviewed





* Echo on 02/09/2019 showed EF 55-60%, mod ERV, mild dilated LA, mod AR, severe 

TR, mod RI and severe MR. 


* From tomorrow, Dr Boggs will follow the pt.





Pt. seen and eval. by me. I agree with the A/P by the NP. we have discussed the 

pt. and plan.





Review of Systems





- Review of Systems


Constitutional: reports: no symptoms reported


EENTM: reports: no symptoms reported


Respiratory: reports: no symptoms reported


Cardiac (ROS): reports: no symptoms reported


ABD/GI: reports: no symptoms reported


: reports: no symptoms reported


Musculoskeletal: reports: no symptoms reported

## 2019-03-24 NOTE — PRG
DATE OF SERVICE:  03/24/2019



SUBJECTIVE:  Mr. Collins is a 63-year-old black male, seen by the Renal Service for

his acute kidney injury on top of his chronic renal failure.  He has been diuresed

recently due to his CHF.  Adjustment of his Lasix has been done.  He is now

currently on once-a-day dosing of furosemide.  He still has intermittent shortness

of breath, but most of the time, he remains stable. 



No other complaints today.



OBJECTIVE:  VITAL SIGNS:  Blood pressure 160/81, heart rate 58, respiratory rate 16,

O2 saturation 92%. 

GENERAL:  The patient is awake, alert, comfortable, not in overt distress. 

SKIN:  Adequate turgor. 

HEENT:  He has slightly pale conjunctivae.  Anicteric sclerae. 

NECK:  No neck mass.  No carotid bruits.  No JVD. 

CHEST:  No deformities. 

LUNGS:  Decreased breath sounds. 

HEART:  Normal sinus rhythm.  No murmur.  No gallops.  No rubs. 

ABDOMEN:  Globular, soft, and nontender.  No masses. 

EXTREMITIES:  No edema.  No deformities.



MEDICATIONS:  Medication of March 24, 2019, were reviewed.



LABORATORY DATA:  Laboratories of March 24, 2019; white count 8.4, hemoglobin 7.6,

hematocrit 25.4.  Sodium 139, potassium 5, chloride 109, carbon dioxide 17, BUN 58,

creatinine 2.89, calcium is 9.3. 



ASSESSMENT AND PLAN:  

1. Acute kidney injury/chronic renal failure-creatinine is slightly high at 2.89.

Yesterday, this was 2.76.  My plan is to maintain him on the current diuretic

regimen of Lasix 40 mg tablet once a day.  Continue to observe. 

2. Chronic renal failure/status post renal transplant-my last tacrolimus level was

noted at 18.  For this reason, we have discontinued his tacrolimus.  I plan to

recheck another level in a few days' time. 

3. Shortness of breath-combined congestive heart failure/chronic obstructive

pulmonary disease exacerbation.  Currently on the diuretic regimen. 

4. Anemia-continue to observe.  No indication for any blood transfusion. 



In addition, this patient has declined blood transfusion in the past. 



We will be rechecking another CBC and basic metabolic panel in a.m.







Job ID:  [id number]

## 2019-03-24 NOTE — PDOC.PN
- Subjective


Encounter Start Date: 03/24/19


Encounter Start Time: 07:10





Patient seen and examined. No new complaints. No overnight events





- Objective


Resuscitation Status - Order Detail:





03/09/19 08:16


Resuscitation Status Routine 


   Resuscitation Status: FULL: Full Resuscitation


   Discussed with: patient








MAR Reviewed: Yes


Vital Signs & Weight: 


 Vital Signs (12 hours)











  Temp Pulse Resp BP Pulse Ox


 


 03/24/19 11:43  97.6 F  64  16  153/76 H  94 L


 


 03/24/19 08:03      92 L


 


 03/24/19 07:37   58 L  16  


 


 03/24/19 07:28      92 L


 


 03/24/19 07:27   58 L  16  


 


 03/24/19 07:24  98.2 F  58 L  20  160/81 H  92 L


 


 03/24/19 04:00  97.8 F  74  19  156/91 H  98








 Weight











Admit Weight                   162 lb 11.218 oz


 


Weight                         170 lb











 Most Recent Monitor Data











Heart Rate from ECG            92


 


NIBP                           147/98


 


NIBP BP-Mean                   114


 


Respiration from ECG           10


 


SpO2                           99














I&O: 


 











 03/23/19 03/24/19 03/25/19





 06:59 06:59 06:59


 


Intake Total 1660 1500 


 


Output Total  1270 


 


Balance 1660 230 











Result Diagrams: 


 03/24/19 05:17





 03/24/19 05:17


EKG Reviewed by me: Yes





Phys Exam





- Physical Examination


Constitutional: NAD


HEENT: PERRLA, moist MMs, sclera anicteric


Neck: no JVD, supple


Respiratory: no wheezing, no rales, no rhonchi


Cardiovascular: no significant murmur, irregular


Gastrointestinal: soft, non-tender, no distention


Musculoskeletal: no edema, pulses present


Neurological: non-focal, normal sensation


Lymphatic: no nodes


Psychiatric: normal affect


Skin: no rash, normal turgor





Dx/Plan


(1) Atrial fibrillation with RVR


Code(s): I48.91 - UNSPECIFIED ATRIAL FIBRILLATION   Status: Acute   





(2) Acute on chronic diastolic ACC/AHA stage C congestive heart failure


Code(s): I50.33 - ACUTE ON CHRONIC DIASTOLIC (CONGESTIVE) HEART FAILURE   Status

: Acute   





(3) Acute worsening of stage 3 chronic kidney disease


Code(s): N18.3 - CHRONIC KIDNEY DISEASE, STAGE 3 (MODERATE)   Status: Acute   





(4) Fever


Code(s): R50.9 - FEVER, UNSPECIFIED   Status: Acute   





(5) Hyperkalemia


Code(s): E87.5 - HYPERKALEMIA   Status: Acute   





(6) Metabolic acidosis


Code(s): E87.2 - ACIDOSIS   Status: Acute   





(7) Brachial vein thrombosis


Code(s): I82.629 - ACUTE EMBOLISM AND THROMBOSIS OF DEEP VN UNSP UP EXTREM   

Status: Chronic   





(8) CAD (coronary artery disease)


Code(s): I25.10 - ATHSCL HEART DISEASE OF NATIVE CORONARY ARTERY W/O ANG PCTRS 

  Status: Chronic   


Qualifiers: 


 





(9) COPD (chronic obstructive pulmonary disease)


Status: Chronic   





(10) Cholelithiasis


Code(s): K80.20 - CALCULUS OF GALLBLADDER W/O CHOLECYSTITIS W/O OBSTRUCTION   

Status: Chronic   


Qualifiers: 


 





(11) Chronic anticoagulation


Code(s): Z79.01 - LONG TERM (CURRENT) USE OF ANTICOAGULANTS   Status: Chronic   





(12) Elevated troponin


Code(s): R74.8 - ABNORMAL LEVELS OF OTHER SERUM ENZYMES   Status: Chronic   





(13) Hypertension


Code(s): I10 - ESSENTIAL (PRIMARY) HYPERTENSION   Status: Chronic   


Qualifiers: 


 





(14) Kidney transplant status


Code(s): Z94.0 - KIDNEY TRANSPLANT STATUS   Status: Chronic   Comment: its been 

11 yrs now   





(15) Normocytic anemia


Code(s): D64.9 - ANEMIA, UNSPECIFIED   Status: Chronic   





(16) Paroxysmal A-fib


Code(s): I48.0 - PAROXYSMAL ATRIAL FIBRILLATION   Status: Chronic   





(17) Severe mitral regurgitation by prior echocardiogram


Code(s): I34.0 - NONRHEUMATIC MITRAL (VALVE) INSUFFICIENCY   Status: Chronic   





(18) Severe tricuspid regurgitation by prior echocardiogram


Code(s): I07.1 - RHEUMATIC TRICUSPID INSUFFICIENCY   Status: Chronic   





(19) GI bleed


Code(s): K92.2 - GASTROINTESTINAL HEMORRHAGE, UNSPECIFIED   Status: Acute   





(20) Anemia due to acute blood loss


Code(s): D62 - ACUTE POSTHEMORRHAGIC ANEMIA   Status: Acute   





(21) Thrombocytopenia


Code(s): D69.6 - THROMBOCYTOPENIA, UNSPECIFIED   Status: Acute   





- Plan


cont current plan of care





* reduce lasix for elevated creatinine


* repeat labs tomorrow


* will consider discharge when all consultants are OK


* medication reviewed as below


* symptomatic treatment.








Review of Systems





- Review of Systems


ENT: negative: Ear Pain, Ear Discharge, Nose Pain, Nose Discharge, Nose 

Congestion, Mouth Pain, Mouth Swelling, Throat Pain, Throat Swelling, Other


Respiratory: negative: Cough, Dry, Shortness of Breath, Hemoptysis, SOB with 

Excertion, Pleuritic Pain, Sputum, Wheezing


Cardiovascular: negative: chest pain, palpitations, orthopnea, paroxysmal 

nocturnal dyspnea, edema, light headedness, other


Gastrointestinal: negative: Nausea, Vomiting, Abdominal Pain, Diarrhea, 

Constipation, Melena, Hematochezia, Other


Genitourinary: negative: Dysuria, Frequency, Incontinence, Hematuria, Retention

, Other


Musculoskeletal: negative: Neck Pain, Shoulder Pain, Arm Pain, Back Pain, Hand 

Pain, Leg Pain, Foot Pain, Other





- Medications/Allergies


Allergies/Adverse Reactions: 


 Allergies











Allergy/AdvReac Type Severity Reaction Status Date / Time


 


No Known Drug Allergies Allergy Unknown  Verified 02/25/18 12:37











Medications: 


 Current Medications





Hydrocodone Bitart/Acetaminophen (Norco 5/325)  1 tab PO Q4H PRN


   PRN Reason: Moderate Pain (4-6)


   Last Admin: 03/23/19 17:35 Dose:  1 tab


Albuterol/Ipratropium (Duoneb)  3 ml NEB [id number] [facility]


   Last Admin: 03/24/19 07:27 Dose:  3 ml


Artificial Tears (Tears Renewed 15ml Bottle)  2 drop EA EYE PRN PRN


   PRN Reason: Dry Eyes


Aspirin (Ecotrin)  81 mg PO DAILY [facility]


   Last Admin: 03/24/19 08:04 Dose:  81 mg


Atorvastatin Calcium (Lipitor)  20 mg PO HS [facility]


   Last Admin: 03/23/19 21:18 Dose:  20 mg


Bisacodyl (Dulcolax)  10 mg RI DAILYPRN PRN


   PRN Reason: Constipation


Colchicine (Colcrys)  0.6 mg PO BID [facility]


   Last Admin: 03/24/19 08:03 Dose:  0.6 mg


Cyanocobalamin (Vitamin B-12)  1,000 mcg PO DAILY [facility]


   Last Admin: 03/24/19 08:03 Dose:  1,000 mcg


Ferrous Sulfate (Feosol)  325 mg PO QAM-[facility]


   Last Admin: 03/24/19 08:03 Dose:  325 mg


Fluticasone Propionate (Flonase Nasal Spray)  0 gm NASAL DAILY [facility]


   Last Admin: 03/24/19 08:04 Dose:  2 spr


Folic Acid (Folvite)  1 mg PO DAILY [facility]


   Last Admin: 03/24/19 08:03 Dose:  1 mg


Furosemide (Lasix)  40 mg PO DAILY-AC [facility]


   Last Admin: 03/24/19 08:03 Dose:  40 mg


Guaifenesin (Robitussin Sf)  200 mg PO Q4H PRN


   PRN Reason: Cough


Hydralazine HCl (Apresoline)  25 mg PO BID [facility]


   Last Admin: 03/24/19 08:03 Dose:  25 mg


Hydralazine HCl (Apresoline)  10 mg SLOW IVP Q4H PRN


   PRN Reason: SBP > 180 and HR < 70


Loperamide HCl (Imodium)  2 mg PO PRN PRN


   PRN Reason: Diarrhea/Loose Stools


Loratadine (Claritin)  10 mg PO DAILYPRN PRN


   PRN Reason: Sinus Symptoms


Metoprolol Succinate (Toprol Xl)  100 mg PO DAILY [facility]


   Last Admin: 03/24/19 08:03 Dose:  100 mg


Mineral Oil/White Petrolatum (Eucerin Cream)  0 gm TOP BIDPRN PRN


   PRN Reason: Dry Skin


Miscellaneous Medication (Pharmacy To Dose)  0 each PO PRN PRN


   PRN Reason: PHARM TO DOSE WARFARIN


Mometasone Furoate/Formoterol Fumar (Dulera 200 Mcg/5 Mcg Inhaler)  2 puff INH 

BID-RT [facility]


   Last Admin: 03/24/19 07:37 Dose:  2 puff


Mycophenolate Sodium (Myfortic)  360 mg PO BID [facility]


   Last Admin: 03/24/19 08:04 Dose:  360 mg


Nitroglycerin (Nitrostat)  0.4 mg SL Q5MIN PRN


   PRN Reason: Chest Pain


Ondansetron HCl (Zofran Odt)  4 mg PO Q6H PRN


   PRN Reason: Nausea/Vomiting


Ondansetron HCl (Zofran)  4 mg IVP Q6H PRN


   PRN Reason: Nausea/Vomiting


Senna/Docusate Sodium (Senokot S)  2 tab PO BID PRN


   PRN Reason: Constipation


Sildenafil Citrate (Revatio)  20 mg PO TID [facility]


   Last Admin: 03/24/19 08:03 Dose:  20 mg


Sodium Bicarbonate (Bicarbonate, Sodium)  325 mg PO TID [facility]


   Last Admin: 03/24/19 08:03 Dose:  325 mg


Sodium Chloride (Ocean Nasal Spray 0.65%)  0 ml EA NARE QIDPRN PRN


   PRN Reason: Nasal Congestion


   Last Admin: 03/16/19 20:44 Dose:  1 spr


Sodium Chloride (Flush - Normal Saline)  10 ml IVF Q12HR [facility]


   Last Admin: 03/24/19 08:07 Dose:  10 ml


Sodium Chloride (Flush - Normal Saline)  10 ml IVF PRN PRN


   PRN Reason: Saline Flush


Tamsulosin HCl (Flomax)  0.4 mg PO HS [facility]


   Last Admin: 03/23/19 21:20 Dose:  0.4 mg


Throat Lozenges (Cepastat Lozenges)  1 carissa PO Q2H PRN


   PRN Reason: Sore Throat


Tramadol HCl (Ultram)  50 mg PO Q6H PRN


   PRN Reason: Pain


Warfarin Sodium (Coumadin)  10 mg PO 1700 [facility]


Zolpidem Tartrate (Ambien)  5 mg PO HSPRN PRN


   PRN Reason: Insomnia

## 2019-03-25 LAB
ANION GAP SERPL CALC-SCNC: 20 MMOL/L (ref 10–20)
BUN SERPL-MCNC: 56 MG/DL (ref 8.4–25.7)
CALCIUM SERPL-MCNC: 9.4 MG/DL (ref 7.8–10.44)
CHLORIDE SERPL-SCNC: 110 MMOL/L (ref 98–107)
CO2 SERPL-SCNC: 16 MMOL/L (ref 23–31)
CREAT CL PREDICTED SERPL C-G-VRATE: 29 ML/MIN (ref 70–130)
GLUCOSE SERPL-MCNC: 93 MG/DL (ref 80–115)
HGB BLD-MCNC: 8.4 G/DL (ref 14–18)
INR PPP: 1.8
MCH RBC QN AUTO: 25.7 PG (ref 27–31)
MCV RBC AUTO: 88.6 FL (ref 78–98)
MDIFF COMPLETE?: YES
PLATELET # BLD AUTO: 311 THOU/UL (ref 130–400)
POTASSIUM SERPL-SCNC: 4.8 MMOL/L (ref 3.5–5.1)
PROTHROMBIN TIME: 20.8 SEC (ref 12–14.7)
RBC # BLD AUTO: 3.26 MILL/UL (ref 4.7–6.1)
SODIUM SERPL-SCNC: 141 MMOL/L (ref 136–145)
WBC # BLD AUTO: 9.1 THOU/UL (ref 4.8–10.8)

## 2019-03-25 RX ADMIN — MYCOPHENOLIC ACID SCH MG: 180 TABLET, DELAYED RELEASE ORAL at 19:53

## 2019-03-25 RX ADMIN — SODIUM BICARBONATE SCH MG: 325 TABLET ORAL at 15:33

## 2019-03-25 RX ADMIN — SODIUM BICARBONATE SCH MG: 325 TABLET ORAL at 19:53

## 2019-03-25 RX ADMIN — MYCOPHENOLIC ACID SCH MG: 180 TABLET, DELAYED RELEASE ORAL at 08:01

## 2019-03-25 RX ADMIN — SODIUM BICARBONATE SCH MG: 325 TABLET ORAL at 08:01

## 2019-03-25 RX ADMIN — Medication SCH ML: at 08:02

## 2019-03-25 RX ADMIN — MOMETASONE FUROATE AND FORMOTEROL FUMARATE DIHYDRATE SCH PUFF: 200; 5 AEROSOL RESPIRATORY (INHALATION) at 06:59

## 2019-03-25 RX ADMIN — MOMETASONE FUROATE AND FORMOTEROL FUMARATE DIHYDRATE SCH PUFF: 200; 5 AEROSOL RESPIRATORY (INHALATION) at 18:49

## 2019-03-25 RX ADMIN — Medication SCH ML: at 19:54

## 2019-03-25 RX ADMIN — ASPIRIN SCH MG: 81 TABLET ORAL at 08:01

## 2019-03-25 RX ADMIN — CYANOCOBALAMIN TAB 1000 MCG SCH MCG: 1000 TAB at 08:01

## 2019-03-25 NOTE — PRG
DATE OF SERVICE:  03/25/2019



SUBJECTIVE:  Mr. Collins is a 63-year-old black male, followed up for his acute

kidney injury on top of chronic renal failure.  We are also managing his transplant

medications.  Recently, his tacrolimus is above therapeutic and currently is on

hold. 



No other complaints today.  Shortness of breath stable.  He has been maintained on

diuretics due to the shortness of breath, which is secondary to combined COPD/CHF. 



OBJECTIVE:  VITAL SIGNS:  Blood pressure is 138/68, heart rate 65, respiratory rate

18, temperature 97.5, and pulse ox 92%. 

GENERAL:  Awake, supine, comfortable, not in distress. 

SKIN:  Adequate turgor. 

HEENT:  Pinkish conjunctivae.  Anicteric sclerae.  No neck mass.  No carotid bruits.

 No JVD. 

CHEST:  No deformities. 

LUNGS:  Decreased breath sounds. 

HEART:  Normal sinus rhythm.  No murmurs, gallops, or rubs. 

ABDOMEN:  Globular, soft, nontender.  No masses. 

EXTREMITIES:  No edema.  No deformities.



MEDICATIONS:  Medications of March 25, 2019 reviewed.



LABORATORY DATA:  Laboratories of March 25, 2019; white count is 9.1, hemoglobin is

8.4.  Sodium 141, potassium 4.8, chloride 110, carbon dioxide 16, BUN 56, creatinine

2.86, GFR 27 mL/minute, calcium 9.4. 



ASSESSMENT AND PLAN:  

1. Acute kidney injury/chronic renal failure.  Stabilizing renal function.  Continue

current dose of Lasix at 40 mg tablet once a day. 

2. Status post renal transplant - consider restarting back tacrolimus at 2.5 mg

tablet b.i.d. starting tomorrow.  After few days, we will recheck tacrolimus level

again once he is on the current tacrolimus dosing. 

3. Shortness of breath, multifactorial etiology.  Congestive heart failure/chronic

obstructive pulmonary disease.  We will maintain his current diuretic regimen of 

Lasix 40 mg tablet once a day.

4. Anemia stabilizing.  Recheck basic metabolic panel and CBC in a.m.







Job ID:  [id number]

## 2019-03-25 NOTE — PDOC.PN
- Subjective


Encounter Start Date: 03/25/19


Encounter Start Time: 07:40





last night he had NSVT and pt was asymptomatic, no chest pain, seems improving





- Objective


Resuscitation Status - Order Detail:





03/09/19 08:16


Resuscitation Status Routine 


   Resuscitation Status: FULL: Full Resuscitation


   Discussed with: patient








Vital Signs & Weight: 


 Vital Signs (12 hours)











  Temp Pulse Resp BP Pulse Ox


 


 03/25/19 08:01      92 L


 


 03/25/19 07:25  97.5 F L  65  18  138/68  92 L


 


 03/25/19 06:58   75  18   90 L


 


 03/25/19 04:00  97.1 F L  65  14  141/75 H  92 L


 


 03/25/19 00:25   79  20  








 Weight











Admit Weight                   162 lb 11.218 oz


 


Weight                         172 lb











 Most Recent Monitor Data











Heart Rate from ECG            92


 


NIBP                           147/98


 


NIBP BP-Mean                   114


 


Respiration from ECG           10


 


SpO2                           99














I&O: 


 











 03/24/19 03/25/19 03/26/19





 06:59 06:59 06:59


 


Intake Total 1500 865 


 


Output Total 1270 400 


 


Balance 230 465 











Result Diagrams: 


 03/25/19 06:25





 03/25/19 06:05


EKG Reviewed by me: Yes





Phys Exam





- Physical Examination


Constitutional: NAD


HEENT: PERRLA, moist MMs, sclera anicteric


Neck: no JVD, supple


Respiratory: no wheezing, no rales, no rhonchi


Cardiovascular: no significant murmur, irregular


Gastrointestinal: soft, non-tender, no distention, positive bowel sounds


Musculoskeletal: no edema, pulses present


Neurological: non-focal


Lymphatic: no nodes


Psychiatric: normal affect


Skin: no rash, normal turgor





Dx/Plan


(1) Atrial fibrillation with RVR


Code(s): I48.91 - UNSPECIFIED ATRIAL FIBRILLATION   Status: Acute   





(2) Acute on chronic diastolic ACC/AHA stage C congestive heart failure


Code(s): I50.33 - ACUTE ON CHRONIC DIASTOLIC (CONGESTIVE) HEART FAILURE   Status

: Acute   





(3) Acute worsening of stage 3 chronic kidney disease


Code(s): N18.3 - CHRONIC KIDNEY DISEASE, STAGE 3 (MODERATE)   Status: Acute   





(4) Hyperkalemia


Code(s): E87.5 - HYPERKALEMIA   Status: Acute   





(5) Metabolic acidosis


Code(s): E87.2 - ACIDOSIS   Status: Acute   





(6) Brachial vein thrombosis


Code(s): I82.629 - ACUTE EMBOLISM AND THROMBOSIS OF DEEP VN UNSP UP EXTREM   

Status: Chronic   





(7) CAD (coronary artery disease)


Code(s): I25.10 - ATHSCL HEART DISEASE OF NATIVE CORONARY ARTERY W/O ANG PCTRS 

  Status: Chronic   


Qualifiers: 


 





(8) COPD (chronic obstructive pulmonary disease)


Status: Chronic   





(9) Cholelithiasis


Code(s): K80.20 - CALCULUS OF GALLBLADDER W/O CHOLECYSTITIS W/O OBSTRUCTION   

Status: Chronic   


Qualifiers: 


 





(10) Chronic anticoagulation


Code(s): Z79.01 - LONG TERM (CURRENT) USE OF ANTICOAGULANTS   Status: Chronic   





(11) Elevated troponin


Code(s): R74.8 - ABNORMAL LEVELS OF OTHER SERUM ENZYMES   Status: Chronic   





(12) Hypertension


Code(s): I10 - ESSENTIAL (PRIMARY) HYPERTENSION   Status: Chronic   


Qualifiers: 


 





(13) Kidney transplant status


Code(s): Z94.0 - KIDNEY TRANSPLANT STATUS   Status: Chronic   Comment: its been 

11 yrs now   





(14) Normocytic anemia


Code(s): D64.9 - ANEMIA, UNSPECIFIED   Status: Chronic   





(15) Paroxysmal A-fib


Code(s): I48.0 - PAROXYSMAL ATRIAL FIBRILLATION   Status: Chronic   





(16) Severe mitral regurgitation by prior echocardiogram


Code(s): I34.0 - NONRHEUMATIC MITRAL (VALVE) INSUFFICIENCY   Status: Chronic   





(17) Severe tricuspid regurgitation by prior echocardiogram


Code(s): I07.1 - RHEUMATIC TRICUSPID INSUFFICIENCY   Status: Chronic   





(18) GI bleed


Code(s): K92.2 - GASTROINTESTINAL HEMORRHAGE, UNSPECIFIED   Status: Resolved   





(19) Anemia due to acute blood loss


Code(s): D62 - ACUTE POSTHEMORRHAGIC ANEMIA   Status: Resolved   





(20) Thrombocytopenia


Code(s): D69.6 - THROMBOCYTOPENIA, UNSPECIFIED   Status: Acute   





- Plan


cont current plan of care





* medication reviewed as below


* symptomatic treatment


* once all consultants are ok, will consider discharge


* check magnesium level


* overall stable and improving.








Review of Systems





- Review of Systems


ENT: negative: Ear Pain, Ear Discharge, Nose Pain, Nose Discharge, Nose 

Congestion, Mouth Pain, Mouth Swelling, Throat Pain, Throat Swelling, Other


Respiratory: negative: Cough, Dry, Shortness of Breath, Hemoptysis, SOB with 

Excertion, Pleuritic Pain, Sputum, Wheezing


Cardiovascular: negative: chest pain, palpitations, orthopnea, paroxysmal 

nocturnal dyspnea, edema, light headedness, other


Gastrointestinal: negative: Nausea, Vomiting, Abdominal Pain, Diarrhea, 

Constipation, Melena, Hematochezia, Other


Genitourinary: negative: Dysuria, Frequency, Incontinence, Hematuria, Retention

, Other


Musculoskeletal: negative: Neck Pain, Shoulder Pain, Arm Pain, Back Pain, Hand 

Pain, Leg Pain, Foot Pain, Other





- Medications/Allergies


Allergies/Adverse Reactions: 


 Allergies











Allergy/AdvReac Type Severity Reaction Status Date / Time


 


No Known Drug Allergies Allergy Unknown  Verified 02/25/18 12:37











Medications: 


 Current Medications





Hydrocodone Bitart/Acetaminophen (Norco 5/325)  1 tab PO Q4H PRN


   PRN Reason: Moderate Pain (4-6)


   Last Admin: 03/23/19 17:35 Dose:  1 tab


Albuterol/Ipratropium (Duoneb)  3 ml NEB [id number]


   Last Admin: 03/25/19 06:58 Dose:  3 ml


Alprazolam (Xanax)  0.5 mg PO HSPRN PRN


   PRN Reason: Anxiety/Insomnia


Artificial Tears (Tears Renewed 15ml Bottle)  2 drop EA EYE PRN PRN


   PRN Reason: Dry Eyes


Aspirin (Ecotrin)  81 mg PO DAILY [facility]


   Last Admin: 03/25/19 08:01 Dose:  81 mg


Atorvastatin Calcium (Lipitor)  20 mg PO HS [facility]


   Last Admin: 03/24/19 21:10 Dose:  20 mg


Bisacodyl (Dulcolax)  10 mg IN DAILYPRN PRN


   PRN Reason: Constipation


Colchicine (Colcrys)  0.6 mg PO BID [facility]


   Last Admin: 03/25/19 08:01 Dose:  0.6 mg


Cyanocobalamin (Vitamin B-12)  1,000 mcg PO DAILY [facility]


   Last Admin: 03/25/19 08:01 Dose:  1,000 mcg


Ferrous Sulfate (Feosol)  325 mg PO QAM-[facility]


   Last Admin: 03/25/19 08:01 Dose:  325 mg


Fluticasone Propionate (Flonase Nasal Spray)  0 gm NASAL DAILY [facility]


   Last Admin: 03/25/19 08:02 Dose:  2 spr


Folic Acid (Folvite)  1 mg PO DAILY [facility]


   Last Admin: 03/25/19 08:01 Dose:  1 mg


Furosemide (Lasix)  40 mg PO DAILY[facility]


   Last Admin: 03/25/19 08:01 Dose:  40 mg


Guaifenesin (Robitussin Sf)  200 mg PO Q4H PRN


   PRN Reason: Cough


Hydralazine HCl (Apresoline)  25 mg PO BID [facility]


   Last Admin: 03/25/19 08:01 Dose:  25 mg


Hydralazine HCl (Apresoline)  10 mg SLOW IVP Q4H PRN


   PRN Reason: SBP > 180 and HR < 70


Loperamide HCl (Imodium)  2 mg PO PRN PRN


   PRN Reason: Diarrhea/Loose Stools


Loratadine (Claritin)  10 mg PO DAILYPRN PRN


   PRN Reason: Sinus Symptoms


Metoprolol Succinate (Toprol Xl)  100 mg PO DAILY [facility]


   Last Admin: 03/25/19 08:01 Dose:  100 mg


Mineral Oil/White Petrolatum (Eucerin Cream)  0 gm TOP BIDPRN PRN


   PRN Reason: Dry Skin


Miscellaneous Medication (Pharmacy To Dose)  0 each PO PRN PRN


   PRN Reason: PHARM TO DOSE WARFARIN


Mometasone Furoate/Formoterol Fumar (Dulera 200 Mcg/5 Mcg Inhaler)  2 puff INH 

BID-[facility]


   Last Admin: 03/25/19 06:59 Dose:  2 puff


Mycophenolate Sodium (Myfortic)  360 mg PO BID [facility]


   Last Admin: 03/25/19 08:01 Dose:  360 mg


Nitroglycerin (Nitrostat)  0.4 mg SL Q5MIN PRN


   PRN Reason: Chest Pain


Ondansetron HCl (Zofran Odt)  4 mg PO Q6H PRN


   PRN Reason: Nausea/Vomiting


Ondansetron HCl (Zofran)  4 mg IVP Q6H PRN


   PRN Reason: Nausea/Vomiting


Senna/Docusate Sodium (Senokot S)  2 tab PO BID PRN


   PRN Reason: Constipation


Sildenafil Citrate (Revatio)  20 mg PO TID [facility]


   Last Admin: 03/25/19 08:01 Dose:  20 mg


Sodium Bicarbonate (Bicarbonate, Sodium)  325 mg PO TID [facility]


   Last Admin: 03/25/19 08:01 Dose:  325 mg


Sodium Chloride (Ocean Nasal Spray 0.65%)  0 ml EA NARE QIDPRN PRN


   PRN Reason: Nasal Congestion


   Last Admin: 03/16/19 20:44 Dose:  1 spr


Sodium Chloride (Flush - Normal Saline)  10 ml IVF Q12HR [facility]


   Last Admin: 03/25/19 08:02 Dose:  10 ml


Sodium Chloride (Flush - Normal Saline)  10 ml IVF PRN PRN


   PRN Reason: Saline Flush


Tamsulosin HCl (Flomax)  0.4 mg PO HS [facility]


   Last Admin: 03/24/19 21:10 Dose:  0.4 mg


Throat Lozenges (Cepastat Lozenges)  1 carissa PO Q2H PRN


   PRN Reason: Sore Throat


Tramadol HCl (Ultram)  50 mg PO Q6H PRN


   PRN Reason: Pain


Warfarin Sodium (Coumadin)  10 mg PO 1700 [facility]


   Last Admin: 03/24/19 16:42 Dose:  10 mg


Zolpidem Tartrate (Ambien)  5 mg PO HSPRN PRN


   PRN Reason: Insomnia

## 2019-03-26 LAB
ANION GAP SERPL CALC-SCNC: 19 MMOL/L (ref 10–20)
BASOPHILS # BLD AUTO: 0 THOU/UL (ref 0–0.2)
BASOPHILS NFR BLD AUTO: 0.1 % (ref 0–1)
BUN SERPL-MCNC: 55 MG/DL (ref 8.4–25.7)
CALCIUM SERPL-MCNC: 9.3 MG/DL (ref 7.8–10.44)
CHLORIDE SERPL-SCNC: 111 MMOL/L (ref 98–107)
CO2 SERPL-SCNC: 17 MMOL/L (ref 23–31)
CREAT CL PREDICTED SERPL C-G-VRATE: 29 ML/MIN (ref 70–130)
EOSINOPHIL # BLD AUTO: 0 THOU/UL (ref 0–0.7)
EOSINOPHIL NFR BLD AUTO: 0.6 % (ref 0–10)
GLUCOSE SERPL-MCNC: 75 MG/DL (ref 80–115)
HGB BLD-MCNC: 8.2 G/DL (ref 14–18)
HGB BLD-MCNC: 8.7 G/DL (ref 14–18)
INR PPP: 2.8
LYMPHOCYTES # BLD: 0.8 THOU/UL (ref 1.2–3.4)
LYMPHOCYTES NFR BLD AUTO: 9.9 % (ref 21–51)
MCH RBC QN AUTO: 26.8 PG (ref 27–31)
MCV RBC AUTO: 88.6 FL (ref 78–98)
MONOCYTES # BLD AUTO: 1 THOU/UL (ref 0.11–0.59)
MONOCYTES NFR BLD AUTO: 13.4 % (ref 0–10)
NEUTROPHILS # BLD AUTO: 5.7 THOU/UL (ref 1.4–6.5)
NEUTROPHILS NFR BLD AUTO: 75.9 % (ref 42–75)
PLATELET # BLD AUTO: 293 THOU/UL (ref 130–400)
PLATELET # BLD AUTO: 318 THOU/UL (ref 130–400)
POTASSIUM SERPL-SCNC: 5 MMOL/L (ref 3.5–5.1)
PROTHROMBIN TIME: 29.5 SEC (ref 12–14.7)
RBC # BLD AUTO: 3.08 MILL/UL (ref 4.7–6.1)
SODIUM SERPL-SCNC: 142 MMOL/L (ref 136–145)
WBC # BLD AUTO: 7.6 THOU/UL (ref 4.8–10.8)

## 2019-03-26 RX ADMIN — SODIUM BICARBONATE SCH MG: 325 TABLET ORAL at 16:15

## 2019-03-26 RX ADMIN — MOMETASONE FUROATE AND FORMOTEROL FUMARATE DIHYDRATE SCH PUFF: 200; 5 AEROSOL RESPIRATORY (INHALATION) at 18:41

## 2019-03-26 RX ADMIN — ASPIRIN SCH MG: 81 TABLET ORAL at 08:47

## 2019-03-26 RX ADMIN — CYANOCOBALAMIN TAB 1000 MCG SCH MCG: 1000 TAB at 08:47

## 2019-03-26 RX ADMIN — SODIUM BICARBONATE SCH MG: 325 TABLET ORAL at 08:47

## 2019-03-26 RX ADMIN — Medication SCH ML: at 08:48

## 2019-03-26 RX ADMIN — MYCOPHENOLIC ACID SCH MG: 180 TABLET, DELAYED RELEASE ORAL at 08:47

## 2019-03-26 RX ADMIN — SODIUM BICARBONATE SCH MG: 325 TABLET ORAL at 20:12

## 2019-03-26 RX ADMIN — MYCOPHENOLIC ACID SCH MG: 180 TABLET, DELAYED RELEASE ORAL at 20:11

## 2019-03-26 RX ADMIN — Medication SCH ML: at 20:12

## 2019-03-26 RX ADMIN — MOMETASONE FUROATE AND FORMOTEROL FUMARATE DIHYDRATE SCH PUFF: 200; 5 AEROSOL RESPIRATORY (INHALATION) at 06:48

## 2019-03-26 NOTE — PRG
DATE OF SERVICE:  03/26/2019



SERVICE:  Renal Medicine.



SUBJECTIVE:  Mr. Collins is a 63-year-old black male, seen by the Renal Service for

his acute kidney injury on top of his chronic renal failure.  Adjustment of

diuretics and albumin infusion has been done.  Renal function is remaining stable.

He is currently on a diuretic regimen of Lasix 40 mg tablet once a day.  He also has

chronic shortness of breath, which was secondary to CHF/COPD exacerbation.  No new

complaints today.  Recently, I stopped the tacrolimus due to the elevated levels of

his tacrolimus.  My plan is to again resume back tacrolimus possibly at 0.5 mg

b.i.d., and we will again recheck tacrolimus level.  No other complaints today. 



OBJECTIVE:  VITAL SIGNS:  Blood pressure 141/74, heart rate 75, respiratory rate 18,

temperature 97.3, and pulse ox 93%. 

GENERAL:  Awake, alert, comfortable, not in distress. 

SKIN:  Adequate turgor. 

HEENT:  Slightly pale conjunctivae.  Anicteric sclerae.  No neck mass.  No carotid

bruits.  No JVD. 

CHEST:  No deformities. 

LUNGS:  Decreased breath sounds. 

HEART:  Normal sinus rhythm.  No murmurs, gallops, or rubs. 

ABDOMEN:  Globular, soft, nontender.  No masses. 

EXTREMITIES:  No edema.  No deformities.



MEDICATIONS:  Medications of March 26, 2019, was reviewed.



LABORATORY DATA:  Laboratories of March 26, 2019; white count 7.6, hemoglobin 8.2.

Sodium 142, potassium 5, chloride 111, carbon dioxide 17, BUN 65, creatinine 2.71,

glucose 75, and calcium 9.3. 



ASSESSMENT AND PLAN:  

1. Acute kidney injury/chronic renal failure.  Stable renal function, tolerating

current diuretic regimen.  Continue current Lasix.  No indication for any dialytic

intervention. 

2. Status post cadaveric renal transplant-my plan is to again resume his tacrolimus

at 0.5 mg tablet b.i.d.  We will recheck tacrolimus level in a few days. 

3. Shortness of breath-multifactorial.  Currently stable.  I will maintain current

dose of Lasix at 40 mg tablet once a day. 

4. Anemia-stable.  Supportive care.  Declining blood transfusion in the past.

5. Recheck CBC, basic metabolic panel in a.m.







Job ID:  [id number]

## 2019-03-26 NOTE — PDOC.PN
- Subjective


Encounter Start Date: 03/26/19


Encounter Start Time: 07:40





Patient seen and examined. No new complaints. No overnight events





- Objective


Resuscitation Status - Order Detail:





03/09/19 08:16


Resuscitation Status Routine 


   Resuscitation Status: FULL: Full Resuscitation


   Discussed with: patient








MAR Reviewed: Yes


Vital Signs & Weight: 


 Vital Signs (12 hours)











  Temp Pulse Resp BP Pulse Ox


 


 03/26/19 07:44  97.3 F L  75  18  141/74 H  93 L


 


 03/26/19 06:46   65  20   94 L


 


 03/26/19 04:00  97.3 F L  68  16  139/83  91 L


 


 03/26/19 00:03   62  20   96








 Weight











Admit Weight                   162 lb 11.218 oz


 


Weight                         166 lb











 Most Recent Monitor Data











Heart Rate from ECG            92


 


NIBP                           147/98


 


NIBP BP-Mean                   114


 


Respiration from ECG           10


 


SpO2                           99














I&O: 


 











 03/25/19 03/26/19 03/27/19





 06:59 06:59 06:59


 


Intake Total 865 450 


 


Output Total 400 200 


 


Balance 465 250 











Result Diagrams: 


 03/26/19 05:01





 03/26/19 05:01


EKG Reviewed by me: Yes (afib)





Phys Exam





- Physical Examination


Constitutional: NAD


HEENT: PERRLA, moist MMs, sclera anicteric


Neck: no JVD, supple


Respiratory: no wheezing, no rales, no rhonchi


reduced air entry


Cardiovascular: no significant murmur, irregular


Gastrointestinal: soft, non-tender, no distention, positive bowel sounds


Musculoskeletal: no edema, pulses present


Neurological: non-focal, normal sensation


Lymphatic: no nodes


Psychiatric: normal affect


Skin: no rash, normal turgor





Dx/Plan


(1) Atrial fibrillation with RVR


Code(s): I48.91 - UNSPECIFIED ATRIAL FIBRILLATION   Status: Acute   





(2) Acute on chronic diastolic ACC/AHA stage C congestive heart failure


Code(s): I50.33 - ACUTE ON CHRONIC DIASTOLIC (CONGESTIVE) HEART FAILURE   Status

: Acute   





(3) Acute worsening of stage 3 chronic kidney disease


Code(s): N18.3 - CHRONIC KIDNEY DISEASE, STAGE 3 (MODERATE)   Status: Acute   





(4) Hyperkalemia


Code(s): E87.5 - HYPERKALEMIA   Status: Acute   





(5) Metabolic acidosis


Code(s): E87.2 - ACIDOSIS   Status: Acute   





(6) Brachial vein thrombosis


Code(s): I82.629 - ACUTE EMBOLISM AND THROMBOSIS OF DEEP VN UNSP UP EXTREM   

Status: Chronic   





(7) CAD (coronary artery disease)


Code(s): I25.10 - ATHSCL HEART DISEASE OF NATIVE CORONARY ARTERY W/O ANG PCTRS 

  Status: Chronic   


Qualifiers: 


 





(8) COPD (chronic obstructive pulmonary disease)


Status: Chronic   





(9) Cholelithiasis


Code(s): K80.20 - CALCULUS OF GALLBLADDER W/O CHOLECYSTITIS W/O OBSTRUCTION   

Status: Chronic   


Qualifiers: 


 





(10) Chronic anticoagulation


Code(s): Z79.01 - LONG TERM (CURRENT) USE OF ANTICOAGULANTS   Status: Chronic   





(11) Elevated troponin


Code(s): R74.8 - ABNORMAL LEVELS OF OTHER SERUM ENZYMES   Status: Chronic   





(12) Hypertension


Code(s): I10 - ESSENTIAL (PRIMARY) HYPERTENSION   Status: Chronic   


Qualifiers: 


 





(13) Kidney transplant status


Code(s): Z94.0 - KIDNEY TRANSPLANT STATUS   Status: Chronic   Comment: its been 

11 yrs now   





(14) Normocytic anemia


Code(s): D64.9 - ANEMIA, UNSPECIFIED   Status: Chronic   





(15) Paroxysmal A-fib


Code(s): I48.0 - PAROXYSMAL ATRIAL FIBRILLATION   Status: Chronic   





(16) Severe mitral regurgitation by prior echocardiogram


Code(s): I34.0 - NONRHEUMATIC MITRAL (VALVE) INSUFFICIENCY   Status: Chronic   





(17) Severe tricuspid regurgitation by prior echocardiogram


Code(s): I07.1 - RHEUMATIC TRICUSPID INSUFFICIENCY   Status: Chronic   





(18) GI bleed


Code(s): K92.2 - GASTROINTESTINAL HEMORRHAGE, UNSPECIFIED   Status: Resolved   





(19) Anemia due to acute blood loss


Code(s): D62 - ACUTE POSTHEMORRHAGIC ANEMIA   Status: Resolved   





(20) Thrombocytopenia


Code(s): D69.6 - THROMBOCYTOPENIA, UNSPECIFIED   Status: Acute   





- Plan


cont current plan of care, PT/OT, , respiratory therapy





* pt is at his baseline


* given his multiple commodity, he is always at risk for readmission,


* this is his new baseline status which is worse level than previous


* today palliative care will meet with pt and family about goal of care


* medication reviewed as below


* symptomatic treatment.


* discharge when all consultant are ok








Review of Systems





- Review of Systems


ENT: negative: Ear Pain, Ear Discharge, Nose Pain, Nose Discharge, Nose 

Congestion, Mouth Pain, Mouth Swelling, Throat Pain, Throat Swelling, Other


Respiratory: negative: Cough, Dry, Shortness of Breath, Hemoptysis, SOB with 

Excertion, Pleuritic Pain, Sputum, Wheezing


Cardiovascular: negative: chest pain, palpitations, orthopnea, paroxysmal 

nocturnal dyspnea, edema, light headedness, other


Gastrointestinal: negative: Nausea, Vomiting, Abdominal Pain, Diarrhea, 

Constipation, Melena, Hematochezia, Other


Genitourinary: negative: Dysuria, Frequency, Incontinence, Hematuria, Retention

, Other


Musculoskeletal: negative: Neck Pain, Shoulder Pain, Arm Pain, Back Pain, Hand 

Pain, Leg Pain, Foot Pain, Other





- Medications/Allergies


Allergies/Adverse Reactions: 


 Allergies











Allergy/AdvReac Type Severity Reaction Status Date / Time


 


No Known Drug Allergies Allergy Unknown  Verified 02/25/18 12:37











Medications: 


 Current Medications





Hydrocodone Bitart/Acetaminophen (Norco 5/325)  1 tab PO Q4H PRN


   PRN Reason: Moderate Pain (4-6)


   Last Admin: 03/23/19 17:35 Dose:  1 tab


Albuterol/Ipratropium (Duoneb)  3 ml NEB [id number] [facility]


   Last Admin: 03/26/19 06:46 Dose:  3 ml


Alprazolam (Xanax)  0.5 mg PO HSPRN PRN


   PRN Reason: Anxiety/Insomnia


   Last Admin: 03/25/19 19:53 Dose:  0.5 mg


Artificial Tears (Tears Renewed 15ml Bottle)  2 drop EA EYE PRN PRN


   PRN Reason: Dry Eyes


Aspirin (Ecotrin)  81 mg PO DAILY [facility]


   Last Admin: 03/26/19 08:47 Dose:  81 mg


Atorvastatin Calcium (Lipitor)  20 mg PO HS [facility]


   Last Admin: 03/25/19 19:53 Dose:  20 mg


Bisacodyl (Dulcolax)  10 mg [place] DAILYPRN PRN


   PRN Reason: Constipation


Colchicine (Colcrys)  0.6 mg PO DAILY [facility]


   Last Admin: 03/26/19 08:47 Dose:  0.6 mg


Cyanocobalamin (Vitamin B-12)  1,000 mcg PO DAILY [facility]


   Last Admin: 03/26/19 08:47 Dose:  1,000 mcg


Ferrous Sulfate (Feosol)  325 mg PO QAM-[facility]


   Last Admin: 03/26/19 08:47 Dose:  325 mg


Fluticasone Propionate (Flonase Nasal Spray)  0 gm NASAL DAILY [facility]


   Last Admin: 03/26/19 08:48 Dose:  2 spr


Folic Acid (Folvite)  1 mg PO DAILY [facility]


   Last Admin: 03/26/19 08:47 Dose:  1 mg


Furosemide (Lasix)  40 mg PO DAILY-[facility]


   Last Admin: 03/26/19 08:47 Dose:  40 mg


Guaifenesin (Robitussin Sf)  200 mg PO Q4H PRN


   PRN Reason: Cough


Hydralazine HCl (Apresoline)  25 mg PO BID [facility]


   Last Admin: 03/26/19 08:48 Dose:  25 mg


Hydralazine HCl (Apresoline)  10 mg SLOW IVP Q4H PRN


   PRN Reason: SBP > 180 and HR < 70


Loperamide HCl (Imodium)  2 mg PO PRN PRN


   PRN Reason: Diarrhea/Loose Stools


Loratadine (Claritin)  10 mg PO DAILYPRN PRN


   PRN Reason: Sinus Symptoms


Metoprolol Succinate (Toprol Xl)  100 mg PO DAILY [facility]


   Last Admin: 03/26/19 08:48 Dose:  100 mg


Mineral Oil/White Petrolatum (Eucerin Cream)  0 gm TOP BIDPRN PRN


   PRN Reason: Dry Skin


Miscellaneous Medication (Pharmacy To Dose)  0 each PO PRN PRN


   PRN Reason: PHARM TO DOSE WARFARIN


Mometasone Furoate/Formoterol Fumar (Dulera 200 Mcg/5 Mcg Inhaler)  2 puff INH 

BID-RT [facility]


   Last Admin: 03/26/19 06:48 Dose:  2 puff


Mycophenolate Sodium (Myfortic)  360 mg PO BID [facility]


   Last Admin: 03/26/19 08:47 Dose:  360 mg


Nitroglycerin (Nitrostat)  0.4 mg SL Q5MIN PRN


   PRN Reason: Chest Pain


Ondansetron HCl (Zofran Odt)  4 mg PO Q6H PRN


   PRN Reason: Nausea/Vomiting


Ondansetron HCl (Zofran)  4 mg IVP Q6H PRN


   PRN Reason: Nausea/Vomiting


Senna/Docusate Sodium (Senokot S)  2 tab PO BID PRN


   PRN Reason: Constipation


Sildenafil Citrate (Revatio)  20 mg PO TID [facility]


   Last Admin: 03/26/19 08:47 Dose:  20 mg


Sodium Bicarbonate (Bicarbonate, Sodium)  325 mg PO TID [facility]


   Last Admin: 03/26/19 08:47 Dose:  325 mg


Sodium Chloride (Ocean Nasal Spray 0.65%)  0 ml EA NARE QIDPRN PRN


   PRN Reason: Nasal Congestion


   Last Admin: 03/16/19 20:44 Dose:  1 spr


Sodium Chloride (Flush - Normal Saline)  10 ml IVF Q12HR [facility]


   Last Admin: 03/26/19 08:48 Dose:  10 ml


Sodium Chloride (Flush - Normal Saline)  10 ml IVF PRN PRN


   PRN Reason: Saline Flush


Tacrolimus (Prograf)  0.5 mg PO BID [facility]


   Last Admin: 03/26/19 09:25 Dose:  0.5 mg


Tamsulosin HCl (Flomax)  0.4 mg PO HS [facility]


   Last Admin: 03/25/19 19:53 Dose:  0.4 mg


Throat Lozenges (Cepastat Lozenges)  1 carissa PO Q2H PRN


   PRN Reason: Sore Throat


Tramadol HCl (Ultram)  50 mg PO Q6H PRN


   PRN Reason: Pain


Warfarin Sodium (Coumadin)  5 mg PO 1700 [facility]


Zolpidem Tartrate (Ambien)  5 mg PO HSPRN PRN


   PRN Reason: Insomnia

## 2019-03-27 LAB
ANION GAP SERPL CALC-SCNC: 20 MMOL/L (ref 10–20)
BUN SERPL-MCNC: 51 MG/DL (ref 8.4–25.7)
CALCIUM SERPL-MCNC: 9.7 MG/DL (ref 7.8–10.44)
CHLORIDE SERPL-SCNC: 109 MMOL/L (ref 98–107)
CO2 SERPL-SCNC: 18 MMOL/L (ref 23–31)
CREAT CL PREDICTED SERPL C-G-VRATE: 31 ML/MIN (ref 70–130)
GLUCOSE SERPL-MCNC: 89 MG/DL (ref 80–115)
HGB BLD-MCNC: 8.5 G/DL (ref 14–18)
INR PPP: 3.8
MCH RBC QN AUTO: 26.8 PG (ref 27–31)
MCV RBC AUTO: 88 FL (ref 78–98)
MDIFF COMPLETE?: YES
PLATELET # BLD AUTO: 292 THOU/UL (ref 130–400)
POTASSIUM SERPL-SCNC: 4.8 MMOL/L (ref 3.5–5.1)
PROTHROMBIN TIME: 37.3 SEC (ref 12–14.7)
RBC # BLD AUTO: 3.15 MILL/UL (ref 4.7–6.1)
SODIUM SERPL-SCNC: 142 MMOL/L (ref 136–145)
WBC # BLD AUTO: 8.2 THOU/UL (ref 4.8–10.8)

## 2019-03-27 RX ADMIN — MOMETASONE FUROATE AND FORMOTEROL FUMARATE DIHYDRATE SCH PUFF: 200; 5 AEROSOL RESPIRATORY (INHALATION) at 07:21

## 2019-03-27 RX ADMIN — SODIUM BICARBONATE SCH: 325 TABLET ORAL at 15:51

## 2019-03-27 RX ADMIN — ASPIRIN SCH MG: 81 TABLET ORAL at 08:55

## 2019-03-27 RX ADMIN — Medication SCH ML: at 08:56

## 2019-03-27 RX ADMIN — SODIUM BICARBONATE SCH MG: 325 TABLET ORAL at 20:46

## 2019-03-27 RX ADMIN — Medication SCH ML: at 20:49

## 2019-03-27 RX ADMIN — CYANOCOBALAMIN TAB 1000 MCG SCH MCG: 1000 TAB at 08:55

## 2019-03-27 RX ADMIN — SODIUM BICARBONATE SCH MG: 325 TABLET ORAL at 08:54

## 2019-03-27 RX ADMIN — MYCOPHENOLIC ACID SCH MG: 180 TABLET, DELAYED RELEASE ORAL at 20:45

## 2019-03-27 RX ADMIN — MYCOPHENOLIC ACID SCH MG: 180 TABLET, DELAYED RELEASE ORAL at 08:54

## 2019-03-27 RX ADMIN — MOMETASONE FUROATE AND FORMOTEROL FUMARATE DIHYDRATE SCH PUFF: 200; 5 AEROSOL RESPIRATORY (INHALATION) at 19:32

## 2019-03-27 NOTE — PRG
DATE OF SERVICE:  03/27/2019



SUBJECTIVE:  Mr. Collins is a 63-year-old black male, who was initially admitted

due to shortness of breath.  Shortness of breath is multifactorial-CHF/COPD

exacerbation.  We are following up this patient for his acute kidney injury on top

of his chronic renal failure.  Also following him up for his management of his

anti-rejection medication for his previous renal transplant.  He has been stable for

the last few days.  He is currently tolerating the current diuretic regimen. 



No other complaints today.



OBJECTIVE:  VITAL SIGNS:  Blood pressure 163/83, heart rate 80, respiratory rate 18,

temperature 97.3, and pulse ox 93%. 

GENERAL:  Awake, supine, comfortable, not in overt distress. 

SKIN:  Adequate turgor. 

HEENT:  Pinkish conjunctivae.  Anicteric sclerae.  No neck mass.  No carotid bruits.

 No JVD. 

CHEST:  No deformities. 

LUNGS:  Decreased breath sounds. 

HEART:  Normal sinus rhythm.  No murmurs, gallops, or rubs. 

ABDOMEN:  Globular, soft, and nontender.  No masses. 

EXTREMITIES:  No edema.



MEDICATIONS:  Medications of 03/27/2019, was reviewed.



LABORATORY DATA:  Laboratories of 03/27/2019; white count 8.2 and hemoglobin 8.5.

Sodium 142, potassium 4.8, chloride 109, carbon dioxide 18, BUN 51, creatinine 2.59,

glucose 89, and calcium 9.7. 



ASSESSMENT AND PLAN:  

1. Acute kidney injury/chronic renal failure with stable renal function.  Creatinine

is noted to be stabilizing.  Tolerating current diuretic regimen.  No indication for

any dialysis. 

2. Status post cadaveric renal transplant.  Tacrolimus resume at 0.5 mg p.o. b.i.d.

Recheck tacrolimus level in a few days. 

3. Shortness of breath, multifactorial etiology.  The patient has underlying chronic

obstructive pulmonary disease as well as congestive heart failure.  Tolerating

current diuretic regimen.  Breathing is much improved. 

4. Anemia.  Supportive care.  Refusing previous blood transfusion.

5. Overall prognosis remains guarded.

6. Recheck basic metabolic CBC in a.m.







Job ID:  [id number]

## 2019-03-28 VITALS — DIASTOLIC BLOOD PRESSURE: 75 MMHG | SYSTOLIC BLOOD PRESSURE: 104 MMHG

## 2019-03-28 LAB
ANALYZER IN CARDIO: (no result)
ANION GAP SERPL CALC-SCNC: 19 MMOL/L (ref 10–20)
BASE EXCESS STD BLDA CALC-SCNC: -6.6 MEQ/L
BASOPHILS # BLD AUTO: 0 THOU/UL (ref 0–0.2)
BASOPHILS NFR BLD AUTO: 0 % (ref 0–1)
BUN SERPL-MCNC: 52 MG/DL (ref 8.4–25.7)
CA-I BLDA-SCNC: 1.21 MMOL/L (ref 1.12–1.3)
CALCIUM SERPL-MCNC: 9.8 MG/DL (ref 7.8–10.44)
CHLORIDE SERPL-SCNC: 109 MMOL/L (ref 98–107)
CO2 SERPL-SCNC: 19 MMOL/L (ref 23–31)
CREAT CL PREDICTED SERPL C-G-VRATE: 30 ML/MIN (ref 70–130)
EOSINOPHIL # BLD AUTO: 0 THOU/UL (ref 0–0.7)
EOSINOPHIL NFR BLD AUTO: 0.5 % (ref 0–10)
GLUCOSE SERPL-MCNC: 80 MG/DL (ref 80–115)
HCO3 BLDA-SCNC: 17.9 MEQ/L (ref 22–28)
HCT VFR BLDA CALC: 26 % (ref 42–52)
HGB BLD-MCNC: 8.1 G/DL (ref 14–18)
HGB BLD-MCNC: 8.4 G/DL (ref 14–18)
HGB BLDA-MCNC: 8.7 G/DL (ref 14–18)
INR PPP: 5
LYMPHOCYTES # BLD: 0.7 THOU/UL (ref 1.2–3.4)
LYMPHOCYTES NFR BLD AUTO: 11.7 % (ref 21–51)
MCH RBC QN AUTO: 26.7 PG (ref 27–31)
MCV RBC AUTO: 89 FL (ref 78–98)
MONOCYTES # BLD AUTO: 0.9 THOU/UL (ref 0.11–0.59)
MONOCYTES NFR BLD AUTO: 14.8 % (ref 0–10)
NEUTROPHILS # BLD AUTO: 4.2 THOU/UL (ref 1.4–6.5)
NEUTROPHILS NFR BLD AUTO: 73 % (ref 42–75)
PCO2 BLDA: 31.6 MMHG (ref 35–45)
PH BLDA: 7.37 [PH] (ref 7.35–7.45)
PLATELET # BLD AUTO: 247 THOU/UL (ref 130–400)
PLATELET # BLD AUTO: 249 THOU/UL (ref 130–400)
PO2 BLDA: 48.5 MMHG (ref 80–?)
POTASSIUM BLD-SCNC: 5 MMOL/L (ref 3.7–5.3)
POTASSIUM SERPL-SCNC: 5.3 MMOL/L (ref 3.5–5.1)
PROTHROMBIN TIME: 45.9 SEC (ref 12–14.7)
RBC # BLD AUTO: 3.04 MILL/UL (ref 4.7–6.1)
SODIUM SERPL-SCNC: 142 MMOL/L (ref 136–145)
SPECIMEN DRAWN FROM PATIENT: (no result)
T4 FREE SERPL-MCNC: 0.87 NG/DL (ref 0.7–1.48)
TSH SERPL DL<=0.005 MIU/L-ACNC: 10.87 UIU/ML (ref 0.35–4.94)
WBC # BLD AUTO: 5.8 THOU/UL (ref 4.8–10.8)

## 2019-03-28 RX ADMIN — MOMETASONE FUROATE AND FORMOTEROL FUMARATE DIHYDRATE SCH PUFF: 200; 5 AEROSOL RESPIRATORY (INHALATION) at 18:25

## 2019-03-28 RX ADMIN — SODIUM BICARBONATE SCH MG: 325 TABLET ORAL at 21:08

## 2019-03-28 RX ADMIN — Medication SCH ML: at 08:50

## 2019-03-28 RX ADMIN — SODIUM BICARBONATE SCH MG: 325 TABLET ORAL at 08:49

## 2019-03-28 RX ADMIN — ASPIRIN SCH MG: 81 TABLET ORAL at 08:47

## 2019-03-28 RX ADMIN — Medication SCH ML: at 21:09

## 2019-03-28 RX ADMIN — MYCOPHENOLIC ACID SCH MG: 180 TABLET, DELAYED RELEASE ORAL at 21:08

## 2019-03-28 RX ADMIN — SODIUM BICARBONATE SCH MG: 325 TABLET ORAL at 15:44

## 2019-03-28 RX ADMIN — CYANOCOBALAMIN TAB 1000 MCG SCH MCG: 1000 TAB at 08:48

## 2019-03-28 RX ADMIN — MYCOPHENOLIC ACID SCH MG: 180 TABLET, DELAYED RELEASE ORAL at 08:49

## 2019-03-28 RX ADMIN — MOMETASONE FUROATE AND FORMOTEROL FUMARATE DIHYDRATE SCH PUFF: 200; 5 AEROSOL RESPIRATORY (INHALATION) at 07:48

## 2019-03-28 NOTE — PDOC.PN
- Subjective


Encounter Start Date: 03/28/19


Encounter Start Time: 07:00





Pt is overall baseline worse now, 


Patient seen and examined. No overnight events





- Objective


Resuscitation Status - Order Detail:





03/09/19 08:16


Resuscitation Status Routine 


   Resuscitation Status: FULL: Full Resuscitation


   Discussed with: patient








MAR Reviewed: Yes


Vital Signs & Weight: 


 Vital Signs (12 hours)











  Temp Pulse Resp BP BP BP Pulse Ox


 


 03/28/19 08:48   62   135/72   


 


 03/28/19 08:00  96.7 F L  62  24 H    135/75  93 L


 


 03/28/19 07:38   65  24 H     88 L


 


 03/28/19 04:00  98.1 F  84  16   141/73 H   93 L


 


 03/28/19 00:36   63  20     88 L


 


 03/28/19 00:00  97.8 F  87  20    140/77  94 L








 Weight











Admit Weight                   162 lb 11.218 oz


 


Weight                         08104 lb 3.043 oz











 Most Recent Monitor Data











Heart Rate from ECG            92


 


NIBP                           147/98


 


NIBP BP-Mean                   114


 


Respiration from ECG           10


 


SpO2                           99














I&O: 


 











 03/27/19 03/28/19 03/29/19





 06:59 06:59 06:59


 


Intake Total 1200 500 


 


Output Total 800 1100 


 


Balance 400 -600 











Result Diagrams: 


 03/28/19 04:26





 03/28/19 04:26





Phys Exam





- Physical Examination


Constitutional: NAD


HEENT: PERRLA, sclera anicteric


Neck: supple


Respiratory: no wheezing, no rhonchi


reduced air entry


Cardiovascular: no significant murmur, irregular


Gastrointestinal: soft, non-tender


Musculoskeletal: no edema, pulses present


Neurological: moves all 4 limbs


Lymphatic: no nodes


Psychiatric: normal affect


Skin: no rash, normal turgor





Dx/Plan


(1) Atrial fibrillation with RVR


Code(s): I48.91 - UNSPECIFIED ATRIAL FIBRILLATION   Status: Acute   





(2) Acute on chronic diastolic ACC/AHA stage C congestive heart failure


Code(s): I50.33 - ACUTE ON CHRONIC DIASTOLIC (CONGESTIVE) HEART FAILURE   Status

: Acute   





(3) Acute worsening of stage 3 chronic kidney disease


Code(s): N18.3 - CHRONIC KIDNEY DISEASE, STAGE 3 (MODERATE)   Status: Acute   





(4) Hyperkalemia


Code(s): E87.5 - HYPERKALEMIA   Status: Acute   





(5) Metabolic acidosis


Code(s): E87.2 - ACIDOSIS   Status: Acute   





(6) Brachial vein thrombosis


Code(s): I82.629 - ACUTE EMBOLISM AND THROMBOSIS OF DEEP VN UNSP UP EXTREM   

Status: Chronic   





(7) CAD (coronary artery disease)


Code(s): I25.10 - ATHSCL HEART DISEASE OF NATIVE CORONARY ARTERY W/O ANG PCTRS 

  Status: Chronic   


Qualifiers: 


 





(8) COPD (chronic obstructive pulmonary disease)


Status: Chronic   





(9) Cholelithiasis


Code(s): K80.20 - CALCULUS OF GALLBLADDER W/O CHOLECYSTITIS W/O OBSTRUCTION   

Status: Chronic   


Qualifiers: 


 





(10) Chronic anticoagulation


Code(s): Z79.01 - LONG TERM (CURRENT) USE OF ANTICOAGULANTS   Status: Chronic   





(11) Elevated troponin


Code(s): R74.8 - ABNORMAL LEVELS OF OTHER SERUM ENZYMES   Status: Chronic   





(12) Hypertension


Code(s): I10 - ESSENTIAL (PRIMARY) HYPERTENSION   Status: Chronic   


Qualifiers: 


 





(13) Kidney transplant status


Code(s): Z94.0 - KIDNEY TRANSPLANT STATUS   Status: Chronic   Comment: its been 

11 yrs now   





(14) Normocytic anemia


Code(s): D64.9 - ANEMIA, UNSPECIFIED   Status: Chronic   





(15) Paroxysmal A-fib


Code(s): I48.0 - PAROXYSMAL ATRIAL FIBRILLATION   Status: Chronic   





(16) Severe mitral regurgitation by prior echocardiogram


Code(s): I34.0 - NONRHEUMATIC MITRAL (VALVE) INSUFFICIENCY   Status: Chronic   





(17) Severe tricuspid regurgitation by prior echocardiogram


Code(s): I07.1 - RHEUMATIC TRICUSPID INSUFFICIENCY   Status: Chronic   





(18) GI bleed


Code(s): K92.2 - GASTROINTESTINAL HEMORRHAGE, UNSPECIFIED   Status: Resolved   





(19) Anemia due to acute blood loss


Code(s): D62 - ACUTE POSTHEMORRHAGIC ANEMIA   Status: Resolved   





(20) Thrombocytopenia


Code(s): D69.6 - THROMBOCYTOPENIA, UNSPECIFIED   Status: Acute   





- Plan


cont current plan of care





* i spoke with pt and his family and addressed code status, he still wants full 

code


* his prognosis is not good, his status is worse, his prognosis is very poor, 

should be hospice candidate but he declines


* continue current supportive care


* I will defer further to cardiology and pulmonary if any other option they can 

think off but I think we have ran out all option given his multiple organ 

failure and end stage condition


* medication reviewed as below


* symptomatic treatment.








Review of Systems





- Review of Systems


Other: 





not reliable today as he is sleepy





- Medications/Allergies


Allergies/Adverse Reactions: 


 Allergies











Allergy/AdvReac Type Severity Reaction Status Date / Time


 


No Known Drug Allergies Allergy Unknown  Verified 02/25/18 12:37











Medications: 


 Current Medications





Albuterol/Ipratropium (Duoneb)  3 ml NEB [id number] [facility]


   Last Admin: 03/28/19 07:38 Dose:  3 ml


Alprazolam (Xanax)  0.5 mg PO HSPRN PRN


   PRN Reason: Anxiety/Insomnia


   Last Admin: 03/27/19 04:00 Dose:  0.5 mg


Artificial Tears (Tears Renewed 15ml Bottle)  2 drop EA EYE PRN PRN


   PRN Reason: Dry Eyes


Aspirin (Ecotrin)  81 mg PO DAILY [facility]


   Last Admin: 03/28/19 08:47 Dose:  81 mg


Atorvastatin Calcium (Lipitor)  20 mg PO HS [facility]


   Last Admin: 03/27/19 20:46 Dose:  20 mg


Bisacodyl (Dulcolax)  10 mg [place] DAILYPRN PRN


   PRN Reason: Constipation


Colchicine (Colcrys)  0.6 mg PO DAILY [facility]


   Last Admin: 03/28/19 08:47 Dose:  0.6 mg


Cyanocobalamin (Vitamin B-12)  1,000 mcg PO DAILY [facility]


   Last Admin: 03/28/19 08:48 Dose:  1,000 mcg


Ferrous Sulfate (Feosol)  325 mg PO QAM-WM [facility]


   Last Admin: 03/28/19 08:47 Dose:  325 mg


Fluticasone Propionate (Flonase Nasal Spray)  0 gm NASAL DAILY [facility]


   Last Admin: 03/28/19 08:48 Dose:  2 spr


Folic Acid (Folvite)  1 mg PO DAILY [facility]


   Last Admin: 03/28/19 08:48 Dose:  1 mg


Furosemide (Lasix)  40 mg PO DAILY-AC [facility]


   Last Admin: 03/28/19 08:47 Dose:  40 mg


Guaifenesin (Robitussin Sf)  200 mg PO Q4H PRN


   PRN Reason: Cough


Hydralazine HCl (Apresoline)  25 mg PO BID [facility]


   Last Admin: 03/28/19 08:48 Dose:  25 mg


Hydralazine HCl (Apresoline)  10 mg SLOW IVP Q4H PRN


   PRN Reason: SBP > 180 and HR < 70


Loperamide HCl (Imodium)  2 mg PO PRN PRN


   PRN Reason: Diarrhea/Loose Stools


Loratadine (Claritin)  10 mg PO DAILYPRN PRN


   PRN Reason: Sinus Symptoms


Metoprolol Succinate (Toprol Xl)  100 mg PO DAILY [facility]


   Last Admin: 03/28/19 08:48 Dose:  100 mg


Mineral Oil/White Petrolatum (Eucerin Cream)  0 gm TOP BIDPRN PRN


   PRN Reason: Dry Skin


Mometasone Furoate/Formoterol Fumar (Dulera 200 Mcg/5 Mcg Inhaler)  2 puff INH 

BID-RT [facility]


   Last Admin: 03/28/19 07:48 Dose:  2 puff


Mycophenolate Sodium (Myfortic)  360 mg PO BID [facility]


   Last Admin: 03/28/19 08:49 Dose:  360 mg


Nitroglycerin (Nitrostat)  0.4 mg SL Q5MIN PRN


   PRN Reason: Chest Pain


Ondansetron HCl (Zofran Odt)  4 mg PO Q6H PRN


   PRN Reason: Nausea/Vomiting


Ondansetron HCl (Zofran)  4 mg IVP Q6H PRN


   PRN Reason: Nausea/Vomiting


Senna/Docusate Sodium (Senokot S)  2 tab PO BID PRN


   PRN Reason: Constipation


Sildenafil Citrate (Revatio)  20 mg PO TID [facility]


   Last Admin: 03/28/19 08:49 Dose:  20 mg


Sodium Bicarbonate (Bicarbonate, Sodium)  325 mg PO TID [facility]


   Last Admin: 03/28/19 08:49 Dose:  325 mg


Sodium Chloride (Ocean Nasal Spray 0.65%)  0 ml EA NARE QIDPRN PRN


   PRN Reason: Nasal Congestion


   Last Admin: 03/16/19 20:44 Dose:  1 spr


Sodium Chloride (Flush - Normal Saline)  10 ml IVF Q12HR [facility]


   Last Admin: 03/28/19 08:50 Dose:  10 ml


Sodium Chloride (Flush - Normal Saline)  10 ml IVF PRN PRN


   PRN Reason: Saline Flush


Tacrolimus (Prograf)  0.5 mg PO BID [facility]


   Last Admin: 03/28/19 08:49 Dose:  0.5 mg


Tamsulosin HCl (Flomax)  0.4 mg PO HS [facility]


   Last Admin: 03/27/19 20:47 Dose:  0.4 mg


Throat Lozenges (Cepastat Lozenges)  1 carissa PO Q2H PRN


   PRN Reason: Sore Throat


Tramadol HCl (Ultram)  50 mg PO Q6H PRN


   PRN Reason: Pain


   Last Admin: 03/27/19 20:46 Dose:  50 mg


Zolpidem Tartrate (Ambien)  5 mg PO HSPRN PRN


   PRN Reason: Insomnia

## 2019-03-28 NOTE — PRG
DATE OF SERVICE:  03/28/2019



SUBJECTIVE:  I was asked by Dr. Lehman to see this patient again.  Thirty minutes

earlier, I was called and told he was being discharged to home, but then he called

back saying that they were moving the patient to [facility].  I think one of big issues is

that his temperature has been running around 92.  There are also been having

problems getting O2 sats.  When I came to the room, I found him surprisingly awake

and alert, although he looked pale. 



OBJECTIVE:  VITAL SIGNS:  His O2 saturations all ranging between the high 80s and

the high 90s on 4 liters.  I am not sure I believe the temperature reported is 92,

pulse is 57, blood pressure 138/89. 

GENERAL:  He appears pale but in no obvious distress. 

HEENT:  Unremarkable. 

NECK:  No JVD. 

LUNGS:  Clear but distant breath sounds. 

CARDIAC:  S1 and S2 regular. 

ABDOMEN:  Soft. 

EXTREMITIES:  No edema.



LABORATORY DATA:  White blood cell count 5.8, hematocrit 27.1, and platelet count

247.  His INR is running 5.0.  Blood gas; pH 7.37, pCO2 of 31, and pO2 of 49 on 4 L.

 Sodium 142, potassium 5.3, chloride 109, CO2 of 19, BUN 52, creatinine 2.6, glucose

80. 



ASSESSMENT:  This patient is presenting with end-stage chronic obstructive pulmonary

disease, end-stage cardiomyopathy, and acute on chronic renal dysfunction.  His

clinical course has not improved in terms of hypoxemia despite aggressive treatment

while in the hospital.  I am not quite sure what the etiology of his hypothermia is.

 I am not even sure he is truly hypothermic. 



RECOMMENDATIONS:  

1. I would increase the flow of his oxygen and try warming up to see if the

temperature would improve.  Given that he is hypothermic, infection always has to be

considered.  Therefore, I would culture the patient, empirically start antibiotics. 

2. I spoke with the family telling them I thought that his medical care has gone

about as far as it can without intervention such as intubation, which currently are

in his best interest according all physicians involved. 

3. We will follow.







Job ID:  [id number]

## 2019-03-29 ENCOUNTER — HOSPITAL ENCOUNTER (INPATIENT)
Dept: HOSPITAL 92 - IMCU/EMU | Age: 64
LOS: 3 days | DRG: 951 | End: 2019-04-01
Attending: FAMILY MEDICINE | Admitting: FAMILY MEDICINE
Payer: COMMERCIAL

## 2019-03-29 VITALS — TEMPERATURE: 96.1 F

## 2019-03-29 VITALS — BODY MASS INDEX: 23.3 KG/M2

## 2019-03-29 DIAGNOSIS — N18.6: ICD-10-CM

## 2019-03-29 DIAGNOSIS — Z51.5: Primary | ICD-10-CM

## 2019-03-29 DIAGNOSIS — I27.20: ICD-10-CM

## 2019-03-29 DIAGNOSIS — I48.91: ICD-10-CM

## 2019-03-29 DIAGNOSIS — J44.9: ICD-10-CM

## 2019-03-29 DIAGNOSIS — I13.2: ICD-10-CM

## 2019-03-29 DIAGNOSIS — E78.5: ICD-10-CM

## 2019-03-29 DIAGNOSIS — I82.502: ICD-10-CM

## 2019-03-29 DIAGNOSIS — Z79.01: ICD-10-CM

## 2019-03-29 DIAGNOSIS — Z79.82: ICD-10-CM

## 2019-03-29 DIAGNOSIS — T86.12: ICD-10-CM

## 2019-03-29 DIAGNOSIS — Z79.84: ICD-10-CM

## 2019-03-29 DIAGNOSIS — I08.1: ICD-10-CM

## 2019-03-29 DIAGNOSIS — N17.9: ICD-10-CM

## 2019-03-29 DIAGNOSIS — Z94.0: ICD-10-CM

## 2019-03-29 DIAGNOSIS — Z95.1: ICD-10-CM

## 2019-03-29 DIAGNOSIS — J96.00: ICD-10-CM

## 2019-03-29 DIAGNOSIS — I50.33: ICD-10-CM

## 2019-03-29 DIAGNOSIS — I25.10: ICD-10-CM

## 2019-03-29 NOTE — PDOC.PN
- Subjective


Encounter Start Date: 03/29/19


Encounter Start Time: 12:00


Subjective: No new problem 


-: Now on hospice care


-: Denied dyscomfort or fever





- Objective


Vital Signs & Weight: 


 Vital Signs (12 hours)











  Temp Pulse Resp BP Pulse Ox


 


 03/29/19 10:36  97.9 F    


 


 03/29/19 08:00   83  24 H  110/79  98


 


 03/29/19 07:52      97


 


 03/29/19 07:09  98.1 F    


 


 03/29/19 06:04     114/70 


 


 03/29/19 03:59  97.8 F    


 


 03/29/19 02:24      98


 


 03/29/19 02:06  96.1 F L  81  15  92/59 L  98








 Weight











Weight                         158 lb 1.6 oz

















Phys Exam





- Physical Examination


chronically ill looking male. Agebrile


HEENT: moist MMs


Neck: supple


fair air entry with few bibasal crackles


Cardiovascular: irregular


Gastrointestinal: soft, non-tender, positive bowel sounds


mild right and moderate left leg edema


Neurological: moves all 4 limbs


coverstaional but slow. 


Psychiatric: A&O x 3





Dx/Plan





- Plan





* .

## 2019-03-29 NOTE — DIS
DATE OF ADMISSION:  03/09/2019



DATE OF DISCHARGE:  03/29/2019



PRIMARY CARE PHYSICIAN:  Dr. Arthur Dang.



DISCHARGE DISPOSITION:  Hospice facility.



PRIMARY DISCHARGE DIAGNOSES:  

1. Acute respiratory failure with hypoxia.

2. Acute on chronic diastolic heart failure stage C.

3. Acute on chronic kidney failure, stage __________.

4. Atrial fibrillation with rapid ventricular response.

5. Hyperkalemia.

6. Sepsis with acute organ dysfunction.

7. Gastrointestinal bleed.

8. Anemia due to acute blood loss.



SECONDARY DISCHARGE DIAGNOSES:  Severe tricuspid regurgitation, severe mitral

regurgitation, kidney transplant status immunocompromised patient, hypertension,

chronically elevated troponin, chronic obstructive pulmonary disease, chronic

respiratory failure with hypoxia, atrial fibrillation, stage 3 chronic kidney

disease, coronary artery disease, normocytic anemia, cholelithiasis, chronic

anticoagulation. 



PRIMARY PROCEDURE/OPERATION:  None.



RADIOLOGICAL INVESTIGATION:  Vascular ultrasound, chest x-ray.



SIGNIFICANT LABORATORY DATA:  Hemoglobin 8.4, INR 5.0, creatinine 2.64.



DISCHARGE MEDICATIONS:  The patient is discharged to hospice facility for comfort

care.  The patient will get medication as per Hospice team. 



CONTRAINDICATION:  None, because the patient is discharged to hospice team for

comfort care only. 



CODE STATUS:  DNR.



INPATIENT CONSULTANT:  Pulmonary group, Nephrology group, Cardiology group was

following while in hospital. 



TEST RESULTS PENDING ON DISCHARGE:  None.



ALLERGIES:  NO KNOWN DRUG ALLERGIES.



DISCHARGE PLAN:  The patient is discharged to inpatient hospice for comfort care.



HOSPITAL COURSE:  A 63-year-old male who was admitted by Dr. Toussaint on March 9, 2019.  On admission, he was having acute respiratory failure on top of his chronic

respiratory failure with hypoxia and hypercapnia secondary to COPD exacerbation as

well as acute on chronic diastolic heart failure.  He was also having atrial

fibrillation with rapid ventricular response.  Cardiology group and Pulmonary group

as well as Nephrology group was involved.  This patient condition was little bit

stabilized.  His heart rate was not appropriately controlled with medication and

that is why electrophysiologist was consulted and electrophysiologist recommended

that because of multiple comorbidities and the patient's overall health status, they

were not comfortable doing an electrophysiologic study.  Eventually, his rate was

controlled with amiodarone and subsequently with beta blocker.  His congestive heart

failure was improved with diuretic therapy. 



Because of diuretic therapy, the patient developed acute on chronic kidney failure

and that is why nephrologist started on IV fluid and that is why renal failure

improved, but the patient was fluid overloaded, and that is why we have to restart

giving him Lasix and in this way, the patient was back and forth for a heart failure

and renal failure and his condition was not improving. 



The patient's respiratory status was also complicated by COPD exacerbation and his

pulmonary group was following.  He was treated optimally with respiratory therapy

because of multiorgan failure and he developed Coumadin-induced coagulopathy and GI

bleed and anemia due to acute blood loss.  We have to stop warfarin for a period of

time and then we have to restart that.  Gastroenterology was consulted and they said

because of multiple comorbidities they cannot do any procedure. 



In this, the patient's hospital course was prolonged.  His prognosis was extremely

poor.  We had multiple time discussion about goal of care.  Palliative Care Team was

following.  Despite that the patient was not changing his mind and he was continued

to be a full code, finally condition deteriorated.  We transferred him to medical

floor and with help of palliative care and after multiple family member discussion

and group meeting, the patient decided that he wanted to be a DNR and at that point,

out of hospital paperwork was done and the patient was transferred to inpatient

hospice facility. 



In short, all consultant ran out all their options and because of multiorgan failure

and because of his end-stage COPD, end-stage heart failure and renal failure, and

immunocompromised status, his life expectancy is very poor and his quality of life

is not good and we all agreed to send him to inpatient hospice facility. 



The patient was seen on the day of discharge.







Job ID:  [id number]

## 2019-03-31 VITALS — DIASTOLIC BLOOD PRESSURE: 84 MMHG | SYSTOLIC BLOOD PRESSURE: 135 MMHG

## 2019-03-31 VITALS — TEMPERATURE: 96.1 F

## 2019-04-12 NOTE — DIS
DATE OF ADMISSION:  03/09/2019



DATE OF DEATH:  04/01/2019



DEATH NOTICE 

The patient had previously been admitted to [facility] and was transferred over 
to

inpatient hospice on March 29 and passed away on the April 1. His date of 
admission

to the hospital was March 9. 



PRIMARY DIAGNOSES:  Acute respiratory failure with chronic severe COPD, also 
with

acute on chronic diastolic heart failure, acute on chronic end-stage renal 
failure,

severe tricuspid regurgitation, severe mitral regurgitation, atrial fibrillation

with rapid ventricular response, recent sepsis, recent anemia from a

gastrointestinal bleed. The patient is also known to be a kidney transplant

recipient. 



HOSPITAL COURSE:  The patient was admitted to the [facility]. 
He was

essentially nonresponsive on high-flow oxygen as well as a Russell Hugger and on 
the

1st of April, when the patient passed away, death was verified. No pulse, no

respirations, no pupillary responses, and family was aware and they were pleased

with the services provided by the hospital and the hospice personnel. 







Job ID:  [id number]



MTDD

## 2019-11-04 NOTE — PDOC.PN
- Subjective


Encounter Start Date: 03/27/19


Encounter Start Time: 07:10





Patient seen and examined. No new complaints. No overnight events





- Objective


Resuscitation Status - Order Detail:





03/09/19 08:16


Resuscitation Status Routine 


   Resuscitation Status: FULL: Full Resuscitation


   Discussed with: patient








MAR Reviewed: Yes


Vital Signs & Weight: 


 Vital Signs (12 hours)











  Temp Pulse Resp BP Pulse Ox


 


 03/27/19 08:55   68   


 


 03/27/19 07:21   68  18  


 


 03/27/19 04:00  97.3 F L  80  18  163/83 H  93 L


 


 03/27/19 00:19   72  20   93 L








 Weight











Admit Weight                   162 lb 11.218 oz


 


Weight                         167 lb











 Most Recent Monitor Data











Heart Rate from ECG            92


 


NIBP                           147/98


 


NIBP BP-Mean                   114


 


Respiration from ECG           10


 


SpO2                           99














I&O: 


 











 03/26/19 03/27/19 03/28/19





 06:59 06:59 06:59


 


Intake Total 450 1200 


 


Output Total 200 800 


 


Balance 250 400 











Result Diagrams: 


 03/27/19 04:13





 03/27/19 04:13


EKG Reviewed by me: Yes





Phys Exam





- Physical Examination


Constitutional: NAD


HEENT: PERRLA, moist MMs, sclera anicteric


Neck: no JVD, supple


Respiratory: no wheezing, no rales, no rhonchi


Cardiovascular: no significant murmur, irregular


Gastrointestinal: soft, non-tender, no distention, positive bowel sounds


Musculoskeletal: no edema, pulses present


Neurological: non-focal, normal sensation


Lymphatic: no nodes


Psychiatric: normal affect


Skin: no rash, normal turgor





Dx/Plan


(1) Atrial fibrillation with RVR


Code(s): I48.91 - UNSPECIFIED ATRIAL FIBRILLATION   Status: Acute   





(2) Acute on chronic diastolic ACC/AHA stage C congestive heart failure


Code(s): I50.33 - ACUTE ON CHRONIC DIASTOLIC (CONGESTIVE) HEART FAILURE   Status

: Acute   





(3) Acute worsening of stage 3 chronic kidney disease


Code(s): N18.3 - CHRONIC KIDNEY DISEASE, STAGE 3 (MODERATE)   Status: Acute   





(4) Hyperkalemia


Code(s): E87.5 - HYPERKALEMIA   Status: Acute   





(5) Metabolic acidosis


Code(s): E87.2 - ACIDOSIS   Status: Acute   





(6) Brachial vein thrombosis


Code(s): I82.629 - ACUTE EMBOLISM AND THROMBOSIS OF DEEP VN UNSP UP EXTREM   

Status: Chronic   





(7) CAD (coronary artery disease)


Code(s): I25.10 - ATHSCL HEART DISEASE OF NATIVE CORONARY ARTERY W/O ANG PCTRS 

  Status: Chronic   


Qualifiers: 


 





(8) COPD (chronic obstructive pulmonary disease)


Status: Chronic   





(9) Cholelithiasis


Code(s): K80.20 - CALCULUS OF GALLBLADDER W/O CHOLECYSTITIS W/O OBSTRUCTION   

Status: Chronic   


Qualifiers: 


 





(10) Chronic anticoagulation


Code(s): Z79.01 - LONG TERM (CURRENT) USE OF ANTICOAGULANTS   Status: Chronic   





(11) Elevated troponin


Code(s): R74.8 - ABNORMAL LEVELS OF OTHER SERUM ENZYMES   Status: Chronic   





(12) Hypertension


Code(s): I10 - ESSENTIAL (PRIMARY) HYPERTENSION   Status: Chronic   


Qualifiers: 


 





(13) Kidney transplant status


Code(s): Z94.0 - KIDNEY TRANSPLANT STATUS   Status: Chronic   Comment: its been 

11 yrs now   





(14) Normocytic anemia


Code(s): D64.9 - ANEMIA, UNSPECIFIED   Status: Chronic   





(15) Paroxysmal A-fib


Code(s): I48.0 - PAROXYSMAL ATRIAL FIBRILLATION   Status: Chronic   





(16) Severe mitral regurgitation by prior echocardiogram


Code(s): I34.0 - NONRHEUMATIC MITRAL (VALVE) INSUFFICIENCY   Status: Chronic   





(17) Severe tricuspid regurgitation by prior echocardiogram


Code(s): I07.1 - RHEUMATIC TRICUSPID INSUFFICIENCY   Status: Chronic   





(18) GI bleed


Code(s): K92.2 - GASTROINTESTINAL HEMORRHAGE, UNSPECIFIED   Status: Resolved   





(19) Anemia due to acute blood loss


Code(s): D62 - ACUTE POSTHEMORRHAGIC ANEMIA   Status: Resolved   





(20) Thrombocytopenia


Code(s): D69.6 - THROMBOCYTOPENIA, UNSPECIFIED   Status: Acute   





- Plan


cont current plan of care, 





* medication reviewed as below


* symptomatic treatment


* stable for discharge


* high risk for readmission


* see discharge meds reconciliation.








Review of Systems





- Review of Systems


ENT: negative: Ear Pain, Ear Discharge, Nose Pain, Nose Discharge, Nose 

Congestion, Mouth Pain, Mouth Swelling, Throat Pain, Throat Swelling, Other


Respiratory: negative: Cough, Dry, Shortness of Breath, Hemoptysis, SOB with 

Excertion, Pleuritic Pain, Sputum, Wheezing


Cardiovascular: negative: chest pain, palpitations, orthopnea, paroxysmal 

nocturnal dyspnea, edema, light headedness, other


Gastrointestinal: negative: Nausea, Vomiting, Abdominal Pain, Diarrhea, 

Constipation, Melena, Hematochezia, Other


Genitourinary: negative: Dysuria, Frequency, Incontinence, Hematuria, Retention

, Other


Musculoskeletal: negative: Neck Pain, Shoulder Pain, Arm Pain, Back Pain, Hand 

Pain, Leg Pain, Foot Pain, Other





- Medications/Allergies


Allergies/Adverse Reactions: 


 Allergies











Allergy/AdvReac Type Severity Reaction Status Date / Time


 


No Known Drug Allergies Allergy Unknown  Verified 02/25/18 12:37











Medications: 


 Current Medications





Albuterol/Ipratropium (Duoneb)  3 ml NEB [id number] [facility]


   Last Admin: 03/27/19 07:21 Dose:  3 ml


Alprazolam (Xanax)  0.5 mg PO HSPRN PRN


   PRN Reason: Anxiety/Insomnia


   Last Admin: 03/27/19 04:00 Dose:  0.5 mg


Artificial Tears (Tears Renewed 15ml Bottle)  2 drop EA EYE PRN PRN


   PRN Reason: Dry Eyes


Aspirin (Ecotrin)  81 mg PO DAILY [facility]


   Last Admin: 03/27/19 08:55 Dose:  81 mg


Atorvastatin Calcium (Lipitor)  20 mg PO HS [facility]


   Last Admin: 03/26/19 20:11 Dose:  20 mg


Bisacodyl (Dulcolax)  10 mg NM DAILYPRN PRN


   PRN Reason: Constipation


Colchicine (Colcrys)  0.6 mg PO DAILY [facility]


   Last Admin: 03/27/19 08:55 Dose:  0.6 mg


Cyanocobalamin (Vitamin B-12)  1,000 mcg PO DAILY [facility]


   Last Admin: 03/27/19 08:55 Dose:  1,000 mcg


Ferrous Sulfate (Feosol)  325 mg PO QAM-WM [facility]


   Last Admin: 03/27/19 08:55 Dose:  325 mg


Fluticasone Propionate (Flonase Nasal Spray)  0 gm NASAL DAILY [facility]


   Last Admin: 03/27/19 08:56 Dose:  2 spr


Folic Acid (Folvite)  1 mg PO DAILY [facility]


   Last Admin: 03/27/19 08:55 Dose:  1 mg


Furosemide (Lasix)  40 mg PO DAILY-AC [facility]


   Last Admin: 03/27/19 08:54 Dose:  40 mg


Guaifenesin (Robitussin Sf)  200 mg PO Q4H PRN


   PRN Reason: Cough


Hydralazine HCl (Apresoline)  25 mg PO BID [facility]


   Last Admin: 03/27/19 08:55 Dose:  Not Given


Hydralazine HCl (Apresoline)  10 mg SLOW IVP Q4H PRN


   PRN Reason: SBP > 180 and HR < 70


Loperamide HCl (Imodium)  2 mg PO PRN PRN


   PRN Reason: Diarrhea/Loose Stools


Loratadine (Claritin)  10 mg PO DAILYPRN PRN


   PRN Reason: Sinus Symptoms


Metoprolol Succinate (Toprol Xl)  100 mg PO DAILY [facility]


   Last Admin: 03/27/19 08:54 Dose:  100 mg


Mineral Oil/White Petrolatum (Eucerin Cream)  0 gm TOP BIDPRN PRN


   PRN Reason: Dry Skin


Miscellaneous Medication (Pharmacy To Dose)  0 each PO PRN PRN


   PRN Reason: PHARM TO DOSE WARFARIN


Mometasone Furoate/Formoterol Fumar (Dulera 200 Mcg/5 Mcg Inhaler)  2 puff INH 

BID-RT [facility]


   Last Admin: 03/27/19 07:21 Dose:  2 puff


Mycophenolate Sodium (Myfortic)  360 mg PO BID [facility]


   Last Admin: 03/27/19 08:54 Dose:  360 mg


Nitroglycerin (Nitrostat)  0.4 mg SL Q5MIN PRN


   PRN Reason: Chest Pain


Ondansetron HCl (Zofran Odt)  4 mg PO Q6H PRN


   PRN Reason: Nausea/Vomiting


Ondansetron HCl (Zofran)  4 mg IVP Q6H PRN


   PRN Reason: Nausea/Vomiting


Senna/Docusate Sodium (Senokot S)  2 tab PO BID PRN


   PRN Reason: Constipation


Sildenafil Citrate (Revatio)  20 mg PO TID [facility]


   Last Admin: 03/27/19 09:00 Dose:  20 mg


Sodium Bicarbonate (Bicarbonate, Sodium)  325 mg PO TID [facility]


   Last Admin: 03/27/19 08:54 Dose:  325 mg


Sodium Chloride (Ocean Nasal Spray 0.65%)  0 ml EA NARE QIDPRN PRN


   PRN Reason: Nasal Congestion


   Last Admin: 03/16/19 20:44 Dose:  1 spr


Sodium Chloride (Flush - Normal Saline)  10 ml IVF Q12HR [facility]


   Last Admin: 03/27/19 08:56 Dose:  10 ml


Sodium Chloride (Flush - Normal Saline)  10 ml IVF PRN PRN


   PRN Reason: Saline Flush


Tacrolimus (Prograf)  0.5 mg PO BID [facility]


   Last Admin: 03/27/19 08:55 Dose:  0.5 mg


Tamsulosin HCl (Flomax)  0.4 mg PO HS [facility]


   Last Admin: 03/26/19 20:12 Dose:  0.4 mg


Throat Lozenges (Cepastat Lozenges)  1 carissa PO Q2H PRN


   PRN Reason: Sore Throat


Tramadol HCl (Ultram)  50 mg PO Q6H PRN


   PRN Reason: Pain


Zolpidem Tartrate (Ambien)  5 mg PO HSPRN PRN


   PRN Reason: Insomnia PSR to call pt to schedule, he needs a consult per Dr Orr, per insurance as well. EA

## 2021-06-07 NOTE — RAD
CHEST 1 VIEW:

 

HISTORY: 

Shortness of breath.

 

COMPARISON: 

3/22/2018.

 

FINDINGS: 

Sternotomy wires are noted.  Heart is enlarged.  Pulmonary vessels are prominent.  Patchy interstitia
l opacities, without consolidation/air bronchograms.  No pneumothorax or osseous abnormalities.

 

IMPRESSION: 

Congestive heart failure.

 

POS: SANTIAGOH Needs appointment prior to refill

## 2021-12-20 NOTE — RAD
OFFICE VISIT in office [facility]      Patient: Jorge Ellsworth Date of Service: 2021   : 1958 MRN: [id number]         SUBJECTIVE:     Chief Complaint   Patient presents with   • Physical       Jorge Ellsworth is a 63 year old male who presents today for  see below    HISTORY OF PRESENT ILLNESS:   HPI    Presents  for   Here w daughter teo  Still some discomfort hernia surgery site  Bowels are regular  Fingernails  Deformed  Right hand  Cleaning chemicals daily  No wearing gloves daily    Dr ev HYDE  Ordered  Switch  To trelegy or breztri  Smokes one ppd  Also ordered oxygen        PAST MEDICAL HISTORY:  History reviewed. No pertinent past medical history.    MEDICATIONS:  Current Outpatient Medications   Medication Sig   • fluticasone-umeclidin-vilanterol (TRELEGY ELLIPTA) 100-62.5-25 MCG/INH inhaler Inhale 1 puff into the lungs daily.   • Budeson-Glycopyrrol-Formoterol (Breztri Aerosphere) 160-9-4.8 MCG/ACT Aerosol Inhale 2 puffs into the lungs 2 times daily.   • albuterol 108 (90 Base) MCG/ACT inhaler INHALE 2 PUFFS BY MOUTH EVERY 4 HOURS AS NEEDED FOR WHEEZE OR SHORTNESS OF BREATH   • Spiriva Respimat 2.5 MCG/ACT inhaler INHALE 2 PUFFS BY MOUTH INTO THE LUNGS DAILY   • Symbicort 160-4.5 MCG/ACT inhaler INHALE 2 PUFFS BY MOUTH TWICE A DAY     No current facility-administered medications for this visit.       ALLERGIES:  ALLERGIES:  No Known Allergies    PAST SURGICAL HISTORY:  History reviewed. No pertinent surgical history.    FAMILY HISTORY:  History reviewed. No pertinent family history.    SOCIAL HISTORY:  Social History     Tobacco Use   • Smoking status: Current Every Day Smoker   • Smokeless tobacco: Never Used   Substance Use Topics   • Alcohol use: Yes     Comment: social   • Drug use: Not on file       Review of Systems   Constitutional: Negative for activity change, appetite change, chills, diaphoresis, fever and unexpected weight change.   HENT: Negative for congestion, dental problem, trouble  RADIOGRAPH CHEST 1 VIEW:

 

Date: 3/20/18

Time: 11:01 p.m.

 

HISTORY: 

62-year-old male with respiratory distress.

 

COMPARISON: 

3/19/18, 12:$4 p.m.

 

FINDINGS:

The previously demonstrated interstitial edema has become worse, becoming confluent into early pulmon
ilene alveolar edema in certain areas. There is cardiomegaly. Again noted are the sternotomy wires. No 
pneumothorax. 

 

IMPRESSION:

Evidence for worsening of congestive heart failure, with mild worsening of pulmonary edema.

 

 

CALIN []

 

POS: JULISSA swallowing and voice change.    Eyes: Negative for photophobia and visual disturbance.   Respiratory: Negative for apnea, cough, chest tightness and shortness of breath.    Cardiovascular: Negative for chest pain, palpitations and leg swelling.   Gastrointestinal: Negative for abdominal distention, abdominal pain, blood in stool, constipation, diarrhea, nausea and vomiting.   Endocrine: Negative for cold intolerance and heat intolerance.   Genitourinary: Negative for difficulty urinating, dysuria, frequency and urgency.   Musculoskeletal: Negative for arthralgias, back pain, gait problem, joint swelling and neck pain.   Skin: Negative for color change, rash and wound.   Allergic/Immunologic: Negative for environmental allergies and food allergies.   Neurological: Negative for dizziness, tremors, syncope, weakness, numbness and headaches.   Hematological: Negative for adenopathy. Does not bruise/bleed easily.   Psychiatric/Behavioral: Negative for agitation, behavioral problems, confusion, decreased concentration, dysphoric mood, sleep disturbance and suicidal ideas. The patient is not nervous/anxious.        OBJECTIVE:     Visit Vitals  /74   Pulse 76   Temp 97.1 °F (36.2 °C)   Ht 5' 11\" (1.803 m)   Wt 53.5 kg (118 lb)   BMI 16.46 kg/m²       Physical Exam  Vitals reviewed.   Constitutional:       General: He is not in acute distress.     Appearance: He is well-developed. He is not diaphoretic.   HENT:      Head: Normocephalic and atraumatic.      Right Ear: External ear normal.      Left Ear: External ear normal.      Mouth/Throat:      Pharynx: No oropharyngeal exudate.   Eyes:      General: No scleral icterus.     Conjunctiva/sclera: Conjunctivae normal.      Pupils: Pupils are equal, round, and reactive to light.   Cardiovascular:      Rate and Rhythm: Normal rate and regular rhythm.      Heart sounds: No murmur heard.      Pulmonary:      Effort: Pulmonary effort is normal. No respiratory distress.       Breath sounds: Normal breath sounds. No stridor. No wheezing, rhonchi or rales.   Chest:      Chest wall: No tenderness.   Abdominal:      General: Bowel sounds are normal. There is no distension.      Palpations: Abdomen is soft. There is no mass.      Tenderness: There is no abdominal tenderness.   Musculoskeletal:         General: No deformity. Normal range of motion.      Cervical back: Normal range of motion and neck supple.   Lymphadenopathy:      Cervical: No cervical adenopathy.   Skin:     General: Skin is warm and dry.      Comments: Nail deformity right hand   Neurological:      Mental Status: He is alert and oriented to person, place, and time.      Cranial Nerves: No cranial nerve deficit.      Motor: No abnormal muscle tone.      Coordination: Coordination normal.      Deep Tendon Reflexes: Reflexes are normal and symmetric.   Psychiatric:         Behavior: Behavior normal.         Thought Content: Thought content normal.         Judgment: Judgment normal.         DIAGNOSTIC STUDIES:   LAB RESULTS:    No visits with results within 1 Year(s) from this visit.   Latest known visit with results is:   No results found for any previous visit.       ASSESSMENT AND PLAN:   This is a 63 year old year-old male who presents with:    Problem List Items Addressed This Visit        Health Encounters    Encounter for preventive health examination - Primary     Lab  Colonoscopy  Dr rodriguez  Lab  Stop smoking  O2 per pulmonary  CXR  now         Laboratory examination ordered as part of a routine general medical examination    Relevant Orders    CBC WITH DIFFERENTIAL    COMPREHENSIVE METABOLIC PANEL    GLYCOHEMOGLOBIN    LIPID PANEL WITHOUT REFLEX    PSA    THYROID STIMULATING HORMONE REFLEX       Pulmonary and Pneumonias    COPD (chronic obstructive pulmonary disease) (CMS/[facility])     Monitor: The problem is worsening.  Evaluation: Labs/tests ordered, see encounter summary.  Assessment/Treatment:  Continue current  treatment/monitoring regimen. and Managed by specialist.   Dr carreno  CXR  covid booster done last weekl  Declines flu shot  And pneumovax 23         Relevant Orders    XR CHEST PA AND LATERAL 2 VIEWS       Tobacco    Current every day smoker     Stop smoking  CXR  Follow up pulmonary  duiscussed tapering amount  Then consider chantix or zyban           Other Visit Diagnoses     Need for hepatitis C screening test        Relevant Orders    HEPATITIS C ANTIBODY WITH REFLEX          PLEASE TAKE MEDICATIONS AS DIRECTED.      Instructions provided as documented in the AVS.    No follow-ups on file.      The patient and daughter(s) indicated understanding of the diagnosis and agreed with the plan of care.

## 2022-03-11 NOTE — CON
DATE OF CONSULTATION:  02/25/2018

 

REASON FOR CONSULTATION:  Congestive heart failure.

 

PRIMARY CARDIOLOGIST:  Dr. Juanjo Boggs.

 

HISTORY OF PRESENT ILLNESS:  Mr. Obie Collins is a very pleasant 62-year-old gentleman with congesti
ve heart failure who admitted to the hospital with recurrent shortness of breath and congestive heart
 failure.

 

Mr. Collins states that the last few days he has been getting progressively short of breath and havi
ng increasing swelling of his lower extremities.  Finally, came to the hospital where he was found to
 be in congestive heart failure and intermittent bradycardia.  He was placed on oxygen.  He tried BiP
AP, but he could not keep it on.  He was given diuretics and has been improving.  The heart rate has 
not been low since he has been up on the telemetry/IMCU floor.

 

No chest pain or pressure.

 

PAST MEDICAL HISTORY:

1.  Chronic kidney disease, has renal transplant, on immunosuppressive drugs.

2.  Previous coronary bypass grafting.

3.  History of nonsustained ventricular tachycardia.

 

PAST SURGICAL HISTORY:  Bypass surgery and renal transplantation.

 

SOCIAL HISTORY:  No tobacco, no alcohol, no drugs.

 

FAMILY HISTORY:  Negative for heart disease at a young age.

 

ALLERGIES:  None known.

 

HOME MEDICATIONS:

1.  Albuterol inhaler.

2.  Aspirin.

3.  Atorvastatin.

4.  Clonidine.

5.  Lasix 40 mg daily.

6.  Hydralazine 25 mg twice a day.

7.  Isosorbide.

8.  Metoprolol.

9.  Nifedipine

10.  Prograf.

 

REVIEW OF SYSTEMS:

CONSTITUTIONAL:  No significant weight gain or loss.

VISION:  No changes.

HEARING:  No changes.

PULMONARY:  No cough or wheezing.

GASTROINTESTINAL:  No nausea, vomiting, diarrhea.

SKIN:  No rashes.

NEUROLOGIC:  No unilateral weakness or numbness.

PSYCHIATRIC:  No unusual depression or anxiety.

HEMATOLOGIC:  No unusual bruising.

GENITOURINARY:  No burning with urination.

 

PHYSICAL EXAMINATION:

GENERAL:  A 62-year-old gentleman.  He says he is beginning to feel better, but still does not feel n
ormal.

VITAL SIGNS:  Blood pressure 119/72, pulse 70.

HEENT:  Eyes:  Sclerae nonicteric.  Mouth:  Mucous membranes moist.

NECK:  Supple, no lymphadenopathy.

LUNGS:  Clear.  No wheezing, rales or rhonchi.

CARDIAC:  Mildly tachycardic for rest.  No murmur, rub or gallop.

ABDOMEN:  Soft and nontender, no hepatosplenomegaly.

EXTREMITIES:  There is moderate peripheral edema.

SKIN:  Warm and dry.

 

PERTINENT LABORATORY AND X-RAY FINDINGS:  Hemoglobin is 9.3.  Creatinine is 2.25.  Sodium is 134.

 

The patient is no longer bradycardia.

 

EKG sinus rhythm, earlier it is sinus bradycardia but now sinus rhythm, right bundle branch block, pr
obable old inferior infarct, most recent echocardiogram in the chart was 07/2017, ejection fraction 5
5%-60% at that time.

 

ASSESSMENT:

1.  Congestive heart failure, diastolic, acute on chronic.

2.  History of coronary disease.

3.  Previous renal transplantation.

4.  Transient bradycardia, improved now.

 

PLAN:

1.  We would hold beta blockers today, go to carvedilol tomorrow.

2.  In view of the anemia, check iron levels if he is iron deficient.  He would probably benefit from
 intravenous iron.

3.  Dr. Boggs resume care tomorrow. no

## 2022-07-19 NOTE — CON
DATE OF CONSULTATION:  03/11/2019



ADDITIONAL REFERRING PHYSICIAN:  Santo Toussaint MD



HISTORY OF PRESENT ILLNESS:  I am seeing Mr. Collins at our [facility]-down ICU as an Electrophysiology consultant.  His problems are; 

1. Sustained atypical atrial flutter with occasional rapid ventricular rates.

2. History of nonsustained ventricular tachycardia.

3. Coronary artery disease with prior bypass grafting surgery.

4. Renal insufficiency status post renal transplant.  Moderate renal insufficiency

with BUN 65 and creatinine 3.6 today, slightly worsens than before. 

5. History of diastolic heart failure with preserved LV systolic function.  There is

an echo on February 9, 2019 revealing LVEF 55% to 60%, severe mitral regurgitation,

moderate aortic regurgitation, and severe tricuspid regurgitation. 

6. History of peripheral vascular disease with prior aortoiliac bypass grafting.

7. Advanced COPD.

8. Prior history of dialysis access procedures in the past, but currently not on

hemodialysis. 

9. Prior history of transcatheter aortic valve replacement in [place] about December.

10. Arthritis.



SUBJECTIVE:  Mr. Collins was admitted with complaints of progressive dyspnea.  This

happened over the 5 or 6 days after his last discharge from the hospital and he was

hospitalized for diastolic heart failure.  At this point, denies orthopnea.  No

coughing.  He does have some fevers.  He was noted to have a borderline elevated

troponin.  His ventricular rates with a chronic atrial flutter were somewhat

increased.  The rest of 12-point system otherwise unremarkable. 



PAST MEDICAL HISTORY:  As above.



PAST SURGICAL HISTORY:  Significant for renal transplant, bypass surgery, aortoiliac

bypass, cardioversion of atrial fibrillation, and history of dialysis access. 



Also had percutaneous aortic valve implant about 3 months ago.



OBJECTIVE DATA:  VITAL SIGNS:  Blood pressure is 134/95, heart rate 108,

respirations 19, and temperature 97.5 degrees Fahrenheit. 

GENERAL:  Alert and oriented man, in no apparent distress. 

NECK:  Supple.  Jugular veins slightly distended. 

CHEST:  Coarse without crackles. 

HEART:  Sounds are regular to rate and rhythm.  No murmur or gallop. 

ABDOMEN:  Benign.  Bowel sounds positive. 

EXTREMITIES:  Lower extremities without edema, clubbing, or cyanosis.



DATABASE:  EKG is reviewed revealing atypical atrial flutter with variable AV

conduction, right bundle branch block, and occasional PVCs. 



LABORATORY DATA:  Electrolytes normal range.  BUN is 65 and creatinine is 3.6.

White count is 8.7, hemoglobin 9.3, and platelet count is 165. 



The chest x-ray showed no evidence of cardiopulmonary disease.  Vascular ultrasound

shows occlusion of basilic vein, likely due to prior dialysis fistula, small

eccentric thrombus. 



ASSESSMENT AND PLAN:  Mr. Collins is a pleasant 63-year-old man, known well from

prior visits.  He has a chronic atrial fibrillation/atypical atrial flutter, now

presenting with atrial flutter, has some atypical features, although cannot

completely rule out, isthmus dependent flutter.  Rates were somewhat rapid, although

fairly easily controlled with increasing the baseline metoprolol.  Transient IV

diltiazem was also used. 



Nonsustained ventricular arrhythmias are asymptomatic. 



The future plans for this gentleman is definitively continue medical management.  I

doubt he would be a good candidate for left atrial ablation, especially hence he is

not well anticoagulated with INR level was 1.4 on admission.  Also, he has multiple

comorbidities will make it difficult proposition.  Should ventricular rate control

proves to be difficult and his repeated EKGs are still suggesting involvement of a

cavotricuspid isthmus/typical atrial flutter, right atrial flutter ablation could be

a consideration.  For now though, he seems to be stabilizing. 



For now, he is still being treated for assumed infectious source hoping that he will

stabilize after treatment for this.  Also, he likely has some degree of inflammatory

arthritis. 



In short, I would continue rate control and agree with increasing beta blocker

therapy as tolerated, possibly adding diltiazem if necessary. 



Continue Coumadin for oral anticoagulation. 



Nonsustained ventricular tachycardia with history of preserved left ventricular

ejection fraction, asymptomatic for now, I am advising beta-blocker therapy. 



We will follow with you. 



Thank you for allowing me to participate in the care of this patient.







Job ID:  [id number] Otezla Counseling: The side effects of Otezla were discussed with the patient, including but not limited to worsening or new depression, weight loss, diarrhea, nausea, upper respiratory tract infection, and headache. Patient instructed to call the office should any adverse effect occur.  The patient verbalized understanding of the proper use and possible adverse effects of Otezla.  All the patient's questions and concerns were addressed.

## 2023-01-01 NOTE — RAD
CHEST TWO VIEWS:

 

History: CHF. 

 

Comparison: None. 

 

FINDINGS: 

The lungs are mildly hyperinflated. No pneumothorax or effusion. Cardiac silhouette and mediastinal c
ontours are similar. 

 

Mild chronic appearing height loss of the thoracic spine. 

 

IMPRESSION: 

No acute intrathoracic abnormality. No significant change. 

 

POS: [facility] (1) body pink, extremities blue